# Patient Record
Sex: FEMALE | Race: WHITE
[De-identification: names, ages, dates, MRNs, and addresses within clinical notes are randomized per-mention and may not be internally consistent; named-entity substitution may affect disease eponyms.]

---
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
## 2023-01-03 ENCOUNTER — HOSPITAL ENCOUNTER (OUTPATIENT)
Dept: HOSPITAL 76 - LAB | Age: 18
Discharge: HOME | End: 2023-01-03
Payer: COMMERCIAL

## 2023-01-03 DIAGNOSIS — N83.202: Primary | ICD-10-CM

## 2023-01-03 DIAGNOSIS — G89.29: ICD-10-CM

## 2023-01-03 DIAGNOSIS — R10.2: ICD-10-CM

## 2023-01-03 LAB
ANION GAP SERPL CALCULATED.4IONS-SCNC: 11 MMOL/L (ref 6–13)
BASOPHILS NFR BLD AUTO: 0 10^3/UL (ref 0–0.1)
BASOPHILS NFR BLD AUTO: 0.4 %
BUN SERPL-MCNC: 17 MG/DL (ref 6–20)
CALCIUM UR-MCNC: 9 MG/DL (ref 8.5–10.3)
CHLORIDE SERPL-SCNC: 99 MMOL/L (ref 101–111)
CO2 SERPL-SCNC: 26 MMOL/L (ref 21–32)
CREAT SERPLBLD-SCNC: 0.9 MG/DL (ref 0.4–1)
EOSINOPHIL # BLD AUTO: 0.1 10^3/UL (ref 0–0.7)
EOSINOPHIL NFR BLD AUTO: 1.3 %
ERYTHROCYTE [DISTWIDTH] IN BLOOD BY AUTOMATED COUNT: 12.7 % (ref 12–15)
GFRSERPLBLD MDRD-ARVRAT: 81 ML/MIN/{1.73_M2} (ref 89–?)
GLUCOSE SERPL-MCNC: 96 MG/DL (ref 70–100)
HCT VFR BLD AUTO: 40.8 % (ref 37–47)
HGB UR QL STRIP: 13.3 G/DL (ref 12–16)
IRON SATN MFR SERPL: 11 % (ref 20–50)
IRON SERPL-MCNC: 55 UG/DL (ref 28–170)
LYMPHOCYTES # SPEC AUTO: 2.1 10^3/UL (ref 1.5–3.5)
LYMPHOCYTES NFR BLD AUTO: 19.1 %
MCH RBC QN AUTO: 29 PG (ref 27–31)
MCHC RBC AUTO-ENTMCNC: 32.6 G/DL (ref 32–36)
MCV RBC AUTO: 88.9 FL (ref 81–99)
MONOCYTES # BLD AUTO: 0.6 10^3/UL (ref 0–1)
MONOCYTES NFR BLD AUTO: 5.6 %
NEUTROPHILS # BLD AUTO: 7.9 10^3/UL (ref 1.5–6.6)
NEUTROPHILS # SNV AUTO: 10.8 X10^3/UL (ref 4.8–10.8)
NEUTROPHILS NFR BLD AUTO: 73.3 %
NRBC # BLD AUTO: 0 /100WBC
NRBC # BLD AUTO: 0 X10^3/UL
PDW BLD AUTO: 9.8 FL (ref 7.9–10.8)
PLATELET # BLD: 504 10^3/UL (ref 130–450)
POTASSIUM SERPL-SCNC: 3.7 MMOL/L (ref 3.5–5)
RBC MAR: 4.59 10^6/UL (ref 4.2–5.4)
SODIUM SERPLBLD-SCNC: 136 MMOL/L (ref 135–145)
TIBC SERPL-MCNC: 482 UG/DL (ref 250–450)
TRANSFERRIN SERPL-MCNC: 344 MG/DL (ref 192–382)

## 2023-01-03 PROCEDURE — 36415 COLL VENOUS BLD VENIPUNCTURE: CPT

## 2023-01-03 PROCEDURE — 85025 COMPLETE CBC W/AUTO DIFF WBC: CPT

## 2023-01-03 PROCEDURE — 83540 ASSAY OF IRON: CPT

## 2023-01-03 PROCEDURE — 80048 BASIC METABOLIC PNL TOTAL CA: CPT

## 2023-01-03 PROCEDURE — 84702 CHORIONIC GONADOTROPIN TEST: CPT

## 2023-01-03 PROCEDURE — 84466 ASSAY OF TRANSFERRIN: CPT

## 2023-01-03 PROCEDURE — 82728 ASSAY OF FERRITIN: CPT

## 2023-02-17 ENCOUNTER — HOSPITAL ENCOUNTER (EMERGENCY)
Dept: HOSPITAL 76 - ED | Age: 18
Discharge: HOME | End: 2023-02-17
Payer: COMMERCIAL

## 2023-02-17 VITALS — DIASTOLIC BLOOD PRESSURE: 77 MMHG | SYSTOLIC BLOOD PRESSURE: 127 MMHG

## 2023-02-17 DIAGNOSIS — L23.1: Primary | ICD-10-CM

## 2023-02-17 PROCEDURE — 99282 EMERGENCY DEPT VISIT SF MDM: CPT

## 2023-02-17 PROCEDURE — 99283 EMERGENCY DEPT VISIT LOW MDM: CPT

## 2023-02-17 NOTE — EXTERNAL MEDICAL SUMMARY RPT
Continuity of Care Document

                          Created on:2023



Patient:Lesvia Aguero

Sex:Female

:2005

External Reference #:[id number]





Demographics







                          Address                   [address] [place] [address]

 

                          Phone                     Unavailable

 

                          Preferred Language        English

 

                          Marital Status            Never 

 

                          Protestant Affiliation     Unknown

 

                          Race                      Unknown

 

                          Ethnic Group              Unknown









Author







                          Organization              [facility]

 

                          [place] [address]

 

                          Phone                     [phone]









Care Team Providers







                    Name                Role                Phone

 

                    Unavailable         Unavailable         Unavailable

 

                    Denia Faustin      Unavailable         Unavailable









Allergies and Intolerances







                 date            description     facility        type

 

                 (no date)       Opioids - Morphine Analogues  [facility]  

(unknown)

 

                 (no date)       doxycycline     [facility]  (unknown)







Encounters

No information.



Functional Status

No information.



Immunizations

No information.



Medications







                     date                description         facility

 

                     2022 00:00    Ondansetron         [facility]

 

                     2022 00:00    Ketorolac           [facility]

 

                     2022 00:00    Hydrocodone-Acetaminophen  [facility]

joyce







Problems







                     date                description         facility

 

                     2022 00:00    Hemorrhagic cyst of left ovary  [facility]

 

                     2022 00:00    Chronic pelvic pain in female  [facility]

ospital

 

                     2023 00:00    Cyst of ovary       [facility]

 

                     2023-02-15 14:44    Other specified noninflammatory disorde

rs [facility]



                                        vagina              

 

                     2023-02-15 14:44    Pelvic and perineal pain  [facility]

 

                     2023-02-15 14:44    Encounter for screening for infections 

with a  [facility]



                                        predominantly       

 

                     2023-02-15 15:06    Hemorrhage in early pregnancy, unspecif

[facility]

 

                     2023-02-15 15:15    Other chronic pain  [facility]

 

                     2023-02-15 15:15    Hemorrhage in early pregnancy, unspecif

[facility]

 

                     2023-02-15 15:15    Left upper quadrant [facility]

al

 

                     2023-02-15 15:15    Pelvic and perineal pain  [facility]

 

                     2023-02-15 15:33    Other chronic pain  [facility]

 

                     2023-02-15 15:33    Hemorrhage in early pregnancy, unspecif

[facility]

 

                     2023-02-15 15:33    Left upper quadrant pain  [facility]

al

 

                     2023-02-15 15:33    Pelvic and perineal pain  [facility]

al

 

                     2023 02:58    Other specified noninflammatory disorde

rs of  [facility]



                                        vagina              

 

                     2023 02:58    Pelvic and perineal pain  [facility]

al

 

                     2023 02:58    Encounter for screening for infections 

with a  [facility]



                                        predominantly       







Procedures







                     date                description         facility

 

                     2022 00:00    Complete ultrasound of pelvis  [facility]

ospital

 

                     2023 00:00    Complete ultrasound of pelvis  [facility]

ospital

 

                     2022 00:00    Gram Stain          [facility]

 

                     2022 00:00    Gram Stain          [facility]

 

                     2023 00:00    Gram Stain          [facility]

 

                     2023-02-15 00:00    Gram Stain          [facility]

 

                     2022 00:00    CT abdomen pelvis w con  [facility]

l







Results/Labs







           test      date      author    facility  value     unit      interpret

ation









                                         Result panel 1









           (unknown)  (no date)  (unknown)  [facility]  ([facility]    (unk

nown)



                                        [facility]            unknown)  









                                         Result panel 2









           (unknown)  (no date)  (unknown)  [facility] value[facility]  (units    (unk

nown)



                                        Hospital            unknown)  









                                         Result panel 3









           (unknown)  (no date)  (unknown)  [facility] value[facility]  (units    (unk

nown)



                                        Hospital            unknown)  









                                         Result panel 4









           (unknown)  (no date)  (unknown)  [facility]  (units    (unk

nown)



                                        Hospital            unknown)  









                                         Result panel 5









           (unknown)  (no date)  (unknown)  [facility]  (units    (unk

nown)



                                        Hospital            unknown)  









                                         Result panel 6









           (unknown)  (no date)  (unknown)  [facility]  [facility]units    (unk

nown)



                                        Hospital            unknown)  









                                         Result panel 7









           (unknown)  (no date)  (unknown)  [facility] value[facility]  (units    (unk

nown)



                                        Hospital            unknown)  









                                         Result panel 8









           (unknown)  (no date)  (unknown)  [facility]  (units    (unk

nown)



                                        Hospital            unknown)  









                                         Result panel 9









           (unknown)  (no date)  (unknown)  [facility]  [facility]units    (unk

nown)



                                        Hospital            unknown)  









                                         Result panel 10









           (unknown)  (no date)  (unknown)  [facility] value[facility]  (units    (unk

nown)



                                        Hospital            unknown)  









                                         Result panel 11









           (unknown)  (no date)  (unknown)  [facility]  [facility]units    (unk

nown)



                                        Hospital            unknown)  









                                         Result panel 12









           (unknown)  (no date)  (unknown)  [facility]  [facility]units    (unk

nown)



                                        Hospital            unknown)  









                                         Result panel 13









           (unknown)  (no date)  (unknown)  [facility]  (units    (unk

nown)



                                        Hospital            unknown)  









                                         Result panel 14









           (unknown)  (no date)  (unknown)  [facility]  [facility]units    (unk

nown)



                                        Hospital            unknown)  









                                         Result panel 15









           (unknown)  (no date)  (unknown)  [facility]  [facility]units    (unk

nown)



                                        Hospital            unknown)  









                                         Result panel 16









           (unknown)  (no date)  (unknown)  [facility]  [facility]units    (unk

nown)



                                        Hospital            unknown)  









                                         Result panel 17









           (unknown)  (no date)  (unknown)  [facility]  [facility]units    (unk

nown)



                                        Hospital            unknown)  









                                         Result panel 18









           (unknown)  (no date)  (unknown)  [facility]  [facility]units    (unk

nown)



                                        Hospital            unknown)  









                                         Result panel 19









           (unknown)  (no date)  (unknown)  [facility]  (units    (unk

nown)



                                        Hospital            unknown)  









                                         Result panel 20









           (unknown)  (no date)  (unknown)  [facility]  (units    (unk

nown)



                                        Hospital            unknown)  









                                         Result panel 21









           (unknown)  (no date)  (unknown)  [facility]  [facility]units    (unk

nown)



                                        Hospital            unknown)  









                                         Result panel 22









           (unknown)  (no date)  (unknown)  [facility]units    (unk

nown)



                                        Hospital            unknown)  









                                         Result panel 23









           (unknown)  (no date)  (unknown)  [facility]  [facility]units    (unk

nown)



                                        Hospital            unknown)  









                                         Result panel 24









           (unknown)  (no date)  (unknown)  [facility]units    (unk

nown)



                                        Hospital            unknown)  









                                         Result panel 25









           (unknown)  (no date)  (unknown)  [facility]  [facility]units    (unk

nown)



                                        Hospital            unknown)  









                                         Result panel 26









           (unknown)  (no date)  (unknown)  [facility]  [facility]units    (unk

nown)



                                        Hospital            unknown)  









                                         Result panel 27









           (unknown)  (no date)  (unknown)  [facility]  [facility]units    (unk

nown)



                                        Hospital            unknown)  









                                         Result panel 28









           (unknown)  (no date)  (unknown)  [facility]  [facility]units    (unk

nown)



                                        Hospital            unknown)  









                                         Result panel 29









           (unknown)  (no date)  (unknown)  [facility]units    (unk

nown)



                                        Hospital            unknown)  









                                         Result panel 30









           (unknown)  (no date)  (unknown)  [facility]  [facility]units    (unk

nown)



                                        Hospital            unknown)  









                                         Result panel 31









           (unknown)  (no date)  (unknown)  [facility]  [facility]units    (unk

nown)



                                        Hospital            unknown)  









                                         Result panel 32









           (unknown)  (no date)  (unknown)  [facility]units    (unk

nown)



                                        Hospital            unknown)  









                                         Result panel 33









           (unknown)  (no date)  (unknown)  [facility]units    (unk

nown)



                                        Hospital            unknown)  









                                         Result panel 34









           (unknown)  (no date)  (unknown)  [facility]units    (unk

nown)



                                        Hospital            unknown)  









                                         Result panel 35









           (unknown)  (no date)  (unknown)  [facility]units    (unk

nown)



                                        Hospital            unknown)  









                                         Result panel 36









           (unknown)  (no date)  (unknown)  [facility]units    (unk

nown)



                                        Hospital            unknown)  









                                         Result panel 37









           (unknown)  (no date)  (unknown)  [facility]units    (unk

nown)



                                        Hospital            unknown)  









                                         Result panel 38









           (unknown)  (no date)  (unknown)  [facility]  (units    (unk

nown)



                                        Hospital            unknown)  









                                         Result panel 39









           (unknown)  (no date)  (unknown)  [facility]  [facility]units    (unk

nown)



                                        Hospital            unknown)  









                                         Result panel 40









           (unknown)  (no date)  (unknown)  [facility]  [facility]units    (unk

nown)



                                        Hospital            unknown)  









                                         Result panel 41









           (unknown)  (no date)  (unknown)  [facility]  (units    (unk

nown)



                                        Hospital            unknown)  









                                         Result panel 42









           (unknown)  (no date)  (unknown)  [facility]  [facility]units    (unk

nown)



                                        Hospital            unknown)  









                                         Result panel 43









           (unknown)  (no date)  (unknown)  [facility]  [facility]units    (unk

nown)



                                        Hospital            unknown)  









                                         Result panel 44









           (unknown)  (no date)  (unknown)  [facility]  (units    (unk

nown)



                                        Hospital            unknown)  









                                         Result panel 45









           (unknown)  (no date)  (unknown)  [facility]  [facility]units    (unk

nown)



                                        Hospital            unknown)  









                                         Result panel 46









           (unknown)  (no date)  (unknown)  [facility]  (units    (unk

nown)



                                        Hospital            unknown)  









                                         Result panel 47









           (unknown)  (no date)  (unknown)  [facility]  (units    (unk

nown)



                                        Hospital            unknown)  









                                         Result panel 48









           (unknown)  (no date)  (unknown)  [facility]  (units    (unk

nown)



                                        Hospital            unknown)  









                                         Result panel 49









           (unknown)  (no date)  (unknown)  [facility]  (units    (unk

nown)



                                        Hospital            unknown)  









                                         Result panel 50









           (unknown)  (no date)  (unknown)  [facility]  (units    (unk

nown)



                                        Hospital            unknown)  









                                         Result panel 51









           (unknown)  (no date)  (unknown)  [facility]  (units    (unk

nown)



                                        Hospital            unknown)  









                                         Result panel 52









           (unknown)  (no date)  (unknown)  [facility]  (units    (unk

nown)



                                        Hospital            unknown)  









                                         Result panel 53









           (unknown)  (no date)  (unknown)  [facility]  [facility]units    (unk

nown)



                                        Hospital            unknown)  









                                         Result panel 54









           (unknown)  (no date)  (unknown)  [facility] value[facility]  (units    (unk

nown)



                                        Hospital            unknown)  









                                         Result panel 55









           (unknown)  (no date)  (unknown)  [facility]  (units    (unk

nown)



                                        Hospital            unknown)  









                                         Result panel 56









           (unknown)  (no date)  (unknown)  [facility] value[facility]  (units    (unk

nown)



                                        Hospital            unknown)  









                                         Result panel 57









           (unknown)  (no date)  (unknown)  [facility]    (no value[facility]  (units    (unk

nown)



                                        Hospital            unknown)  









                                         Result panel 58









           (unknown)  (no date)  (unknown)  [facility] value[facility]  (units    (unk

nown)



                                        Hospital            unknown)  









                                         Result panel 59









           (unknown)  (no date)  (unknown)  [facility]    (no value[facility]  (units    (unk

nown)



                                        Hospital            unknown)  









                                         Result panel 60









           (unknown)  (no date)  (unknown)  [facility]    (no value[facility]  (units    (unk

nown)



                                        Hospital            unknown)  









                                         Result panel 61









           (unknown)  (no date)  (unknown)  [facility]    (no value[facility]  (units    (unk

nown)



                                        Hospital            unknown)  









                                         Result panel 62









           (unknown)  (no date)  (unknown)  [facility]    (no value[facility]  (units    (unk

nown)



                                        Hospital            unknown)  









                                         Result panel 63









           (unknown)  (no date)  (unknown)  [facility]    (no value[facility]  (units    (unk

nown)



                                        Hospital            unknown)  









                                         Result panel 64









           (unknown)  (no date)  (unknown)  [facility]    (no value[facility]  (units    (unk

nown)



                                        Hospital            unknown)  









                                         Result panel 65









           (unknown)  (no date)  (unknown)  [facility] value[facility]  (units    (unk

nown)



                                        Hospital            unknown)  









                                         Result panel 66









           (unknown)  (no date)  (unknown)  [facility] value[facility]  (units    (unk

nown)



                                        Hospital            unknown)  









                                         Result panel 67









           (unknown)  (no date)  (unknown)  [facility]    (no value[facility]  (units    (unk

nown)



                                        Hospital            unknown)  









                                         Result panel 68









           (unknown)  (no date)  (unknown)  [facility] value[facility]  (units    (unk

nown)



                                        Hospital            unknown)  









                                         Result panel 69









           (unknown)  (no date)  (unknown)  [facility] value[facility]  (units    (unk

nown)



                                        Hospital            unknown)  









                                         Result panel 70









           (unknown)  (no date)  (unknown)  [facility] value[facility]  (units    (unk

nown)



                                        Hospital            unknown)  









                                         Result panel 71









           (unknown)  (no date)  (unknown)  [facility] value[facility]  (units    (unk

nown)



                                        Hospital            unknown)  









                                         Result panel 72









           (unknown)  (no date)  (unknown)  [facility] value[facility]  (units    (unk

nown)



                                        Hospital            unknown)  









                                         Result panel 73









           (unknown)  (no date)  (unknown)  [facility] value[facility]  (units    (unk

nown)



                                        Hospital            unknown)  









                                         Result panel 74









           (unknown)  (no date)  (unknown)  [facility] value[facility]  (units    (unk

nown)



                                        Hospital            unknown)  









                                         Result panel 75









           (unknown)  (no date)  (unknown)  [facility] value[facility]  (units    (unk

nown)



                                        Hospital            unknown)  









                                         Result panel 76









           (unknown)  (no date)  (unknown)  [facility] value[facility]  (units    (unk

nown)



                                        Hospital            unknown)  









                                         Result panel 77









           (unknown)  (no date)  (unknown)  [facility] value[facility]  (units    (unk

nown)



                                        Hospital            unknown)  









                                         Result panel 78









           (unknown)  (no date)  (unknown)  [facility] value[facility]  (units    (unk

nown)



                                        [facility]ospital            unknown)  









                                         Result panel 79









           (unknown)  (no date)  (unknown)  [facility] value[facility]  (units    (unk

nown)



                                        Hospital            unknown)  









                                         Result panel 80









           (unknown)  (no date)  (unknown)  [facility] value[facility]  (units    (unk

nown)



                                        Hospital            unknown)  









                                         Result panel 81









           (unknown)  (no date)  (unknown)  [facility] value[facility]  (units    (unk

nown)



                                        Hospital            unknown)  









                                         Result panel 82









           (unknown)  (no date)  (unknown)  [facility] value[facility]  (units    (unk

nown)



                                        Hospital            unknown)  









                                         Result panel 83









           (unknown)  (no date)  (unknown)  [facility] value[facility]  (units    (unk

nown)



                                        Hospital            unknown)  









                                         Result panel 84









           (unknown)  (no date)  (unknown)  [facility]  (units    (unk

nown)



                                        Hospital            unknown)  









                                         Result panel 85









           (unknown)  (no date)  (unknown)  [facility]  (units    (unk

nown)



                                        Hospital            unknown)  









                                         Result panel 86









           (unknown)  (no date)  (unknown)  [facility]  (units    (unk

nown)



                                        Hospital            unknown)  









                                         Result panel 87









           (unknown)  (no date)  (unknown)  [facility] value[facility]  (units    (unk

nown)



                                        Hospital            unknown)  









                                         Result panel 88









           (unknown)  (no date)  (unknown)  [facility] value[facility]  (units    (unk

nown)



                                        Hospital            unknown)  









                                         Result panel 89









           (unknown)  (no date)  (unknown)  [facility]    (no value)  (units    (unk

nown)



                                        [facility]            unknown)  









                                         Result panel 90









           (unknown)  (no date)  (unknown)  [facility]    (no value)  (units    (unk

nown)



                                        [facility]ospital            unknown)  









                                         Result panel 91









           (unknown)  (no date)  (unknown)  [facility]    (no value)  (units    (unk

nown)



                                        [facility]            unknown)  









                                         Result panel 92









           (unknown)  (no date)  (unknown)  [facility]    (no value)  (units    (unk

nown)



                                        [facility]            unknown)  









                                         Result panel 93









           (unknown)  (no date)  (unknown)  [facility]    (no value)  (units    (unk

nown)



                                        [facility]ospital            unknown)  









                                         Result panel 94









           (unknown)  (no date)  (unknown)  [facility]    (no value)  (units    (unk

nown)



                                        [facility]            unknown)  









                                         Result panel 95









           (unknown)  (no date)  (unknown)  [facility]    (no value)  (units    (unk

nown)



                                        [facility]            unknown)  









                                         Result panel 96









           (unknown)  (no date)  (unknown)  [facility]    (no value)  (units    (unk

nown)



                                        [facility]            unknown)  









                                         Result panel 97









           (unknown)  (no date)  (unknown)  [facility]    (no value)  (units    (unk

nown)



                                        [facility]            unknown)  









                                         Result panel 98









           (unknown)  (no date)  (unknown)  [facility]    (no value)  (units    (unk

nown)



                                        [facility]            unknown)  









                                         Result panel 99









           (unknown)  (no date)  (unknown)  [facility]    (no value)  (units    (unk

nown)



                                        [facility]            unknown)  









                                         Result panel 100









           (unknown)  (no date)  (unknown)  [facility]    (no value)  (units    (unk

nown)



                                        [facility]            unknown)  









                                         Result panel 101









           (unknown)  (no date)  (unknown)  [facility]    (no value)  (units    (unk

nown)



                                        [facility]            unknown)  









                                         Result panel 102









           (unknown)  (no date)  (unknown)  [facility]    (no value)  (units    (unk

nown)



                                        [facility]            unknown)  









                                         Result panel 103









           (unknown)  (no date)  (unknown)  [facility]    (no value)  (units    (unk

nown)



                                        [facility]            unknown)  









                                         Result panel 104









           (unknown)  (no date)  (unknown)  [facility]    (no value)  (units    (unk

nown)



                                        [facility]            unknown)  









                                         Result panel 105









           (unknown)  (no date)  (unknown)  [facility]    (no value)  (units    (unk

nown)



                                        [facility]            unknown)  









                                         Result panel 106









           (unknown)  (no date)  (unknown)  [facility]    (no value)  (units    (unk

nown)



                                        [facility]            unknown)  









                                         Result panel 107









           (unknown)  (no date)  (unknown)  [facility]    (no value)  (units    (unk

nown)



                                        [facility]            unknown)  









                                         Result panel 108









           (unknown)  (no date)  (unknown)  [facility]    (no value)  (units    (unk

nown)



                                        [facility]            unknown)  









                                         Result panel 109









           (unknown)  (no date)  (unknown)  [facility]    (no value)  (units    (unk

nown)



                                        [facility]            unknown)  









                                         Result panel 110









           (unknown)  (no date)  (unknown)  [facility]    (no value)  (units    (unk

nown)



                                        [facility]            unknown)  









                                         Result panel 111









           (unknown)  (no date)  (unknown)  [facility]    (no value)  (units    (unk

nown)



                                        [facility]            unknown)  









                                         Result panel 112









           (unknown)  (no date)  (unknown)  [facility]    (no value)  (units    (unk

nown)



                                        [facility]            unknown)  









                                         Result panel 113









           (unknown)  (no date)  (unknown)  [facility]    (no value)  (units    (unk

nown)



                                        [facility]            unknown)  









                                         Result panel 114









           (unknown)  (no date)  (unknown)  [facility]    (no value)  (units    (unk

nown)



                                        [facility]            unknown)  









                                         Result panel 115









           (unknown)  (no date)  (unknown)  [facility]    (no value)  (units    (unk

nown)



                                        [facility]            unknown)  









                                         Result panel 116









           (unknown)  (no date)  (unknown)  [facility]    (no value)  (units    (unk

nown)



                                        [facility]            unknown)  









                                         Result panel 117









           (unknown)  (no date)  (unknown)  [facility]    (no value)  (units    (unk

nown)



                                        [facility]            unknown)  









                                         Result panel 118









           (unknown)  (no date)  (unknown)  [facility]    (no value)  (units    (unk

nown)



                                        [facility]            unknown)  









                                         Result panel 119









           (unknown)  (no date)  (unknown)  [facility]    (no value)  (units    (unk

nown)



                                        [facility]            unknown)  









                                         Result panel 120









           (unknown)  (no date)  (unknown)  [facility]    (no value)  (units    (unk

nown)



                                        [facility]            unknown)  









                                         Result panel 121









           (unknown)  (no date)  (unknown)  [facility]    (no value)  (units    (unk

nown)



                                        [facility]            unknown)  









                                         Result panel 122









           (unknown)  (no date)  (unknown)  [facility]    (no value)  (units    (unk

nown)



                                        [facility]            unknown)  









                                         Result panel 123









           (unknown)  (no date)  (unknown)  [facility]    (no value)  (units    (unk

nown)



                                        [facility]            unknown)  









                                         Result panel 124









           (unknown)  (no date)  (unknown)  [facility]    (no value)  (units    (unk

nown)



                                        [facility]            unknown)  









                                         Result panel 125









           (unknown)  (no date)  (unknown)  [facility]    (no value)  (units    (unk

nown)



                                        [facility]            unknown)  









                                         Result panel 126









           (unknown)  (no date)  (unknown)  [facility]    (no value)  (units    (unk

nown)



                                        [facility]            unknown)  









                                         Result panel 127









           (unknown)  (no date)  (unknown)  [facility]    (no value)  (units    (unk

nown)



                                        [facility]            unknown)  









                                         Result panel 128









           (unknown)  (no date)  (unknown)  [facility]    (no value)  (units    (unk

nown)



                                        [facility]            unknown)  









                                         Result panel 129









           (unknown)  (no date)  (unknown)  [facility]    (no value)  (units    (unk

nown)



                                        [facility]            unknown)  









                                         Result panel 130









           (unknown)  (no date)  (unknown)  [facility]    (no value)  (units    (unk

nown)



                                        [facility]            unknown)  









                                         Result panel 131









           (unknown)  (no date)  (unknown)  [facility]    (no value)  (units    (unk

nown)



                                        [facility]            unknown)  









                                         Result panel 132









           (unknown)  (no date)  (unknown)  [facility]    (no value)  (units    (unk

nown)



                                        [facility]            unknown)  









                                         Result panel 133









           (unknown)  (no date)  (unknown)  [facility]    (no value)  (units    (unk

nown)



                                        [facility]            unknown)  









                                         Result panel 134









           (unknown)  (no date)  (unknown)  [facility]    (no value)  (units    (unk

nown)



                                        [facility]            unknown)  









                                         Result panel 135









           (unknown)  (no date)  (unknown)  [facility]    (no value)  (units    (unk

nown)



                                        [facility]            unknown)  









                                         Result panel 136









           (unknown)  (no date)  (unknown)  [facility]    (no value)  (units    (unk

nown)



                                        [facility]            unknown)  









                                         Result panel 137









           (unknown)  (no date)  (unknown)  [facility]    (no value)  (units    (unk

nown)



                                        [facility]            unknown)  









                                         Result panel 138









           (unknown)  (no date)  (unknown)  [facility]    (no value)  (units    (unk

nown)



                                        [facility]            unknown)  









                                         Result panel 139









           (unknown)  (no date)  (unknown)  [facility]    (no value)  (units    (unk

nown)



                                        [facility]            unknown)  









                                         Result panel 140









           (unknown)  (no date)  (unknown)  [facility]    (no value)  (units    (unk

nown)



                                        [facility]            unknown)  









                                         Result panel 141









           (unknown)  (no date)  (unknown)  [facility]    (no value)  (units    (unk

nown)



                                        [facility]            unknown)  









                                         Result panel 142









           (unknown)  (no date)  (unknown)  [facility]    (no value)  (units    (unk

nown)



                                        [facility]            unknown)  









                                         Result panel 143









           (unknown)  (no date)  (unknown)  [facility]    (no value)  (units    (unk

nown)



                                        [facility]            unknown)  









                                         Result panel 144









           (unknown)  (no date)  (unknown)  [facility]    (no value)  (units    (unk

nown)



                                        [facility]            unknown)  









                                         Result panel 145









           (unknown)  (no date)  (unknown)  [facility]    (no value)  (units    (unk

nown)



                                        [facility]            unknown)  









                                         Result panel 146









           (unknown)  (no date)  (unknown)  [facility]    (no value)  (units    (unk

nown)



                                        [facility]            unknown)  









                                         Result panel 147









           (unknown)  (no date)  (unknown)  [facility]    (no value)  (units    (unk

nown)



                                        [facility]            unknown)  









                                         Result panel 148









           (unknown)  (no date)  (unknown)  [facility]    (no value)  (units    (unk

nown)



                                        [facility]            unknown)  









                                         Result panel 149









           (unknown)  (no date)  (unknown)  [facility]    (no value)  (units    (unk

nown)



                                        [facility]            unknown)  









                                         Result panel 150









           (unknown)  (no date)  (unknown)  [facility]    (no value)  (units    (unk

nown)



                                        [facility]            unknown)  









                                         Result panel 151









           (unknown)  (no date)  (unknown)  [facility]    (no value)  (units    (unk

nown)



                                        [facility]            unknown)  









                                         Result panel 152









           (unknown)  (no date)  (unknown)  [facility]    (no value)  (units    (unk

nown)



                                        [facility]            unknown)  









                                         Result panel 153









           (unknown)  (no date)  (unknown)  [facility]    (no value)  (units    (unk

nown)



                                        [facility]            unknown)  









                                         Result panel 154









           (unknown)  (no date)  (unknown)  [facility]    (no value)  (units    (unk

nown)



                                        [facility]            unknown)  









                                         Result panel 155









           (unknown)  (no date)  (unknown)  [facility]    (no value)  (units    (unk

nown)



                                        [facility]            unknown)  









                                         Result panel 156









           (unknown)  (no date)  (unknown)  [facility]    (no value)  (units    (unk

nown)



                                        [facility]            unknown)  









                                         Result panel 157









           (unknown)  (no date)  (unknown)  [facility]    (no value[facility]  (units    (unk

nown)



                                        [facility]            unknown)  









                                         Result panel 158









           (unknown)  (no date)  (unknown)  [facility]    (no value[facility]  (units    (unk

nown)



                                        [facility]            unknown)  









                                         Result panel 159









           (unknown)  (no date)  (unknown)  [facility]    (no value)  (units    (unk

nown)



                                        [facility]            unknown)  









                                         Result panel 160









           (unknown)  (no date)  (unknown)  [facility]    (no value)  (units    (unk

nown)



                                        [facility]            unknown)  









                                         Result panel 161









           (unknown)  (no date)  (unknown)  [facility]    (no value)  (units    (unk

nown)



                                        [facility]            unknown)  









                                         Result panel 162









           (unknown)  (no date)  (unknown)  [facility]    (no value[facility]  (units    (unk

nown)



                                        [facility]            unknown)  









                                         Result panel 163









           (unknown)  (no date)  (unknown)  [facility]    (no value)  (units    (unk

nown)



                                        [facility]            unknown)  









                                         Result panel 164









           (unknown)  (no date)  (unknown)  [facility]    (no value[facility]  (units    (unk

nown)



                                        [facility]            unknown)  









                                         Result panel 165









           (unknown)  (no date)  (unknown)  [facility]    (no value)  (units    (unk

nown)



                                        [facility]            unknown)  









                                         Result panel 166









           (unknown)  (no date)  (unknown)  [facility]    (no value)  (units    (unk

nown)



                                        [facility]            unknown)  









                                         Result panel 167









           (unknown)  (no date)  (unknown)  [facility]    (no value)  (units    (unk

nown)



                                        [facility]            unknown)  









                                         Result panel 168









           (unknown)  (no date)  (unknown)  [facility]    (no value[facility]  (units    (unk

nown)



                                        [facility]            unknown)  









                                         Result panel 169









           (unknown)  (no date)  (unknown)  [facility]    (no value[facility]  (units    (unk

nown)



                                        [facility]            unknown)  









                                         Result panel 170









           (unknown)  (no date)  (unknown)  [facility]    (no value[facility]  (units    (unk

nown)



                                        [facility]            unknown)  









                                         Result panel 171









           (unknown)  (no date)  (unknown)  [facility]    (no value[facility]  (units    (unk

nown)



                                        [facility]            unknown)  









                                         Result panel 172









           (unknown)  (no date)  (unknown)  [facility]    (no value[facility]  (units    (unk

nown)



                                        [facility]            unknown)  









                                         Result panel 173









           (unknown)  (no date)  (unknown)  [facility]    (no value)  (units    (unk

nown)



                                        [facility]            unknown)  









                                         Result panel 174









           (unknown)  (no date)  (unknown)  [facility]    (no value[facility]  (units    (unk

nown)



                                        [facility]            unknown)  









                                         Result panel 175









           (unknown)  (no date)  (unknown)  [facility]    (no value[facility]  (units    (unk

nown)



                                        [facility]            unknown)  









                                         Result panel 176









           (unknown)  (no date)  (unknown)  [facility]    (no value[facility]  (units    (unk

nown)



                                        [facility]            unknown)  









                                         Result panel 177









           (unknown)  (no date)  (unknown)  [facility]    (no value[facility]  (units    (unk

nown)



                                        [facility]            unknown)  









                                         Result panel 178









           (unknown)  (no date)  (unknown)  [facility]    (no value[facility]  (units    (unk

nown)



                                        [facility]            unknown)  









                                         Result panel 179









           (unknown)  (no date)  (unknown)  [facility]    (no value[facility]  (units    (unk

nown)



                                        [facility]            unknown)  









                                         Result panel 180









           (unknown)  (no date)  (unknown)  [facility]    (no value[facility]  (units    (unk

nown)



                                        [facility]            unknown)  









                                         Result panel 181









           (unknown)  (no date)  (unknown)  [facility]    (no value[facility]  (units    (unk

nown)



                                        [facility]            unknown)  









                                         Result panel 182









           (unknown)  (no date)  (unknown)  [facility]    (no value[facility]  (units    (unk

nown)



                                        [facility]            unknown)  









                                         Result panel 183









           (unknown)  (no date)  (unknown)  [facility]    (no value[facility]  (units    (unk

nown)



                                        [facility]            unknown)  









                                         Result panel 184









           (unknown)  (no date)  (unknown)  [facility]    (no value[facility]  (units    (unk

nown)



                                        [facility]            unknown)  









                                         Result panel 185









           (unknown)  (no date)  (unknown)  [facility]    (no value[facility]  (units    (unk

nown)



                                        [facility]            unknown)  









                                         Result panel 186









           (unknown)  (no date)  (unknown)  [facility]    (no value[facility]  (units    (unk

nown)



                                        [facility]            unknown)  









                                         Result panel 187









           (unknown)  (no date)  (unknown)  [facility]    (no value[facility]  (units    (unk

nown)



                                        [facility]            unknown)  









                                         Result panel 188









           (unknown)  (no date)  (unknown)  [facility]    (no value[facility]  (units    (unk

nown)



                                        [facility]            unknown)  









                                         Result panel 189









           (unknown)  (no date)  (unknown)  [facility]    (no value[facility]  (units    (unk

nown)



                                        [facility]            unknown)  









                                         Result panel 190









           (unknown)  (no date)  (unknown)  [facility]    (no value[facility]  (units    (unk

nown)



                                        [facility]            unknown)  









                                         Result panel 191









           (unknown)  (no date)  (unknown)  [facility]    (no value[facility]  (units    (unk

nown)



                                        [facility]            unknown)  









                                         Result panel 192









           (unknown)  (no date)  (unknown)  [facility] value[facility]  (units    (unk

nown)



                                        [facility]            unknown)  









                                         Result panel 193









           (unknown)  (no date)  (unknown)  [facility]    (no value[facility]  (units    (unk

nown)



                                        [facility]            unknown)  









                                         Result panel 194









           (unknown)  (no date)  (unknown)  [facility]    (no value[facility]  (units    (unk

nown)



                                        [facility]            unknown)  









                                         Result panel 195









           (unknown)  (no date)  (unknown)  [facility]    (no value[facility]  (units    (unk

nown)



                                        [facility]            unknown)  









                                         Result panel 196









           (unknown)  (no date)  (unknown)  [facility]    (no value[facility]  (units    (unk

nown)



                                        [facility]            unknown)  









                                         Result panel 197









           (unknown)  (no date)  (unknown)  [facility]    (no value[facility]  (units    (unk

nown)



                                        [facility]            unknown)  









                                         Result panel 198









           (unknown)  (no date)  (unknown)  [facility]    (no value)  (units    (unk

nown)



                                        [facility]            unknown)  









                                         Result panel 199









           (unknown)  (no date)  (unknown)  [facility]    (no value)  (units    (unk

nown)



                                        [facility]ospital            unknown)  









                                         Result panel 200









           (unknown)  (no date)  (unknown)  [facility]    (no value)  (units    (unk

nown)



                                        [facility]            unknown)  









                                         Result panel 201









           (unknown)  (no date)  (unknown)  [facility]    (no value)  (units    (unk

nown)



                                        [facility]            unknown)  









                                         Result panel 202









           (unknown)  (no date)  (unknown)  [facility]    (no value)  (units    (unk

nown)



                                        [facility]            unknown)  









                                         Result panel 203









           (unknown)  (no date)  (unknown)  [facility]    (no value)  (units    (unk

nown)



                                        [facility]            unknown)  









                                         Result panel 204









           (unknown)  (no date)  (unknown)  [facility]    (no value)  (units    (unk

nown)



                                        [facility]            unknown)  









                                         Result panel 205









           (unknown)  (no date)  (unknown)  [facility]    (no value)  (units    (unk

nown)



                                        [facility]            unknown)  









                                         Result panel 206









           (unknown)  (no date)  (unknown)  [facility]    (no value)  (units    (unk

nown)



                                        [facility]            unknown)  









                                         Result panel 207









           (unknown)  (no date)  (unknown)  [facility]    (no value)  (units    (unk

nown)



                                        [facility]            unknown)  









                                         Result panel 208









           (unknown)  (no date)  (unknown)  [facility]    (no value)  (units    (unk

nown)



                                        [facility]            unknown)  









                                         Result panel 209









           (unknown)  (no date)  (unknown)  [facility]    (no value)  (units    (unk

nown)



                                        [facility]            unknown)  









                                         Result panel 210









           (unknown)  (no date)  (unknown)  [facility]    (no value)  (units    (unk

nown)



                                        [facility]            unknown)  









                                         Result panel 211









           (unknown)  (no date)  (unknown)  [facility]    (no value)  (units    (unk

nown)



                                        [facility]            unknown)  









                                         Result panel 212









           (unknown)  (no date)  (unknown)  [facility]    (no value)  (units    (unk

nown)



                                        [facility]            unknown)  









                                         Result panel 213









           (unknown)  (no date)  (unknown)  [facility]    (no value)  (units    (unk

nown)



                                        [facility]            unknown)  









                                         Result panel 214









           (unknown)  (no date)  (unknown)  [facility]    (no value)  (units    (unk

nown)



                                        [facility]            unknown)  









                                         Result panel 215









           (unknown)  (no date)  (unknown)  [facility]    (no value)  (units    (unk

nown)



                                        [facility]            unknown)  









                                         Result panel 216









           (unknown)  (no date)  (unknown)  [facility]    (no value)  (units    (unk

nown)



                                        [facility]            unknown)  









                                         Result panel 217









           (unknown)  (no date)  (unknown)  [facility]    (no value)  (units    (unk

nown)



                                        [facility]            unknown)  









                                         Result panel 218









           (unknown)  (no date)  (unknown)  [facility]    (no value)  (units    (unk

nown)



                                        [facility]            unknown)  









                                         Result panel 219









           (unknown)  (no date)  (unknown)  [facility]    (no value)  (units    (unk

nown)



                                        [facility]            unknown)  









                                         Result panel 220









           (unknown)  (no date)  (unknown)  [facility]    (no value)  (units    (unk

nown)



                                        [facility]            unknown)  









                                         Result panel 221









           (unknown)  (no date)  (unknown)  [facility]    (no value)  (units    (unk

nown)



                                        [facility]            unknown)  









                                         Result panel 222









           (unknown)  (no date)  (unknown)  [facility]    (no value)  (units    (unk

nown)



                                        [facility]            unknown)  









                                         Result panel 223









           (unknown)  (no date)  (unknown)  [facility]    (no value)  (units    (unk

nown)



                                        [facility]            unknown)  









                                         Result panel 224









           (unknown)  (no date)  (unknown)  [facility]    (no value)  (units    (unk

nown)



                                        [facility]            unknown)  









                                         Result panel 225









           (unknown)  (no date)  (unknown)  [facility]    (no value)  (units    (unk

nown)



                                        [facility]            unknown)  









                                         Result panel 226









           (unknown)  (no date)  (unknown)  [facility]    (no value)  (units    (unk

nown)



                                        [facility]            unknown)  









                                         Result panel 227









           (unknown)  (no date)  (unknown)  [facility]    (no value)  (units    (unk

nown)



                                        [facility]            unknown)  









                                         Result panel 228









           (unknown)  (no date)  (unknown)  [facility]    (no value)  (units    (unk

nown)



                                        [facility]            unknown)  









                                         Result panel 229









           (unknown)  (no date)  (unknown)  [facility]    (no value)  (units    (unk

nown)



                                        [facility]            unknown)  









                                         Result panel 230









           (unknown)  (no date)  (unknown)  [facility]    (no value)  (units    (unk

nown)



                                        [facility]            unknown)  









                                         Result panel 231









           (unknown)  (no date)  (unknown)  [facility]    (no value[facility]  (units    (unk

nown)



                                        [facility]            unknown)  









                                         Result panel 232









           (unknown)  (no date)  (unknown)  [facility]    (no value)  (units    (unk

nown)



                                        [facility]            unknown)  









                                         Result panel 233









           (unknown)  (no date)  (unknown)  [facility]    (no value)  (units    (unk

nown)



                                        [facility]            unknown)  









                                         Result panel 234









           (unknown)  (no date)  (unknown)  [facility]    (no value)  (units    (unk

nown)



                                        [facility]            unknown)  









                                         Result panel 235









           (unknown)  (no date)  (unknown)  [facility]    (no value)  (units    (unk

nown)



                                        [facility]            unknown)  









                                         Result panel 236









           (unknown)  (no date)  (unknown)  [facility]    (no value)  (units    (unk

nown)



                                        [facility]            unknown)  









                                         Result panel 237









           (unknown)  (no date)  (unknown)  [facility]    (no value)  (units    (unk

nown)



                                        [facility]            unknown)  









                                         Result panel 238









           (unknown)  (no date)  (unknown)  [facility]    (no value)  (units    (unk

nown)



                                        [facility]            unknown)  









                                         Result panel 239









           (unknown)  (no date)  (unknown)  [facility]    (no value[facility]  (units    (unk

nown)



                                        [facility]            unknown)  









                                         Result panel 240









           (unknown)  (no date)  (unknown)  [facility]    (no value)  (units    (unk

nown)



                                        [facility]            unknown)  









                                         Result panel 241









           (unknown)  (no date)  (unknown)  [facility]    (no value)  (units    (unk

nown)



                                        [facility]            unknown)  









                                         Result panel 242









           (unknown)  (no date)  (unknown)  [facility]    (no value)  (units    (unk

nown)



                                        [facility]            unknown)  









                                         Result panel 243









           (unknown)  (no date)  (unknown)  [facility]    (no value)  (units    (unk

nown)



                                        [facility]            unknown)  









                                         Result panel 244









           (unknown)  (no date)  (unknown)  [facility]    (no value)  (units    (unk

nown)



                                        [facility]            unknown)  









                                         Result panel 245









           (unknown)  (no date)  (unknown)  [facility]    (no value[facility]  (units    (unk

nown)



                                        [facility]            unknown)  









                                         Result panel 246









           (unknown)  (no date)  (unknown)  [facility]    (no value)  (units    (unk

nown)



                                        [facility]            unknown)  









                                         Result panel 247









           (unknown)  (no date)  (unknown)  [facility]    (no value)  (units    (unk

nown)



                                        [facility]            unknown)  









                                         Result panel 248









           (unknown)  (no date)  (unknown)  [facility]    (no value)  (units    (unk

nown)



                                        [facility]            unknown)  









                                         Result panel 249









           (unknown)  (no date)  (unknown)  [facility]    (no value)  (units    (unk

nown)



                                        [facility]            unknown)  









                                         Result panel 250









           (unknown)  (no date)  (unknown)  [facility]    (no value[facility]  (units    (unk

nown)



                                        [facility]            unknown)  









                                         Result panel 251









           (unknown)  (no date)  (unknown)  [facility]    (no value)  (units    (unk

nown)



                                        [facility]            unknown)  









                                         Result panel 252









           (unknown)  (no date)  (unknown)  [facility]    (no value)  (units    (unk

nown)



                                        [facility]            unknown)  









                                         Result panel 253









           (unknown)  (no date)  (unknown)  [facility]    (no value)  (units    (unk

nown)



                                        [facility]            unknown)  









                                         Result panel 254









           (unknown)  (no date)  (unknown)  [facility]    (no value)  (units    (unk

nown)



                                        [facility]            unknown)  









                                         Result panel 255









           (unknown)  (no date)  (unknown)  [facility]    (no value)  (units    (unk

nown)



                                        [facility]            unknown)  









                                         Result panel 256









           (unknown)  (no date)  (unknown)  [facility]    (no value)  (units    (unk

nown)



                                        [facility]            unknown)  









                                         Result panel 257









           (unknown)  (no date)  (unknown)  [facility]    (no value)  (units    (unk

nown)



                                        [facility]            unknown)  









                                         Result panel 258









           (unknown)  (no date)  (unknown)  [facility]    (no value)  (units    (unk

nown)



                                        [facility]            unknown)  









                                         Result panel 259









           (unknown)  (no date)  (unknown)  [facility]    (no value)  (units    (unk

nown)



                                        [facility]            unknown)  









                                         Result panel 260









           (unknown)  (no date)  (unknown)  [facility]    (no value)  (units    (unk

nown)



                                        [facility]            unknown)  









                                         Result panel 261









           (unknown)  (no date)  (unknown)  [facility]    (no value)  (units    (unk

nown)



                                        [facility]            unknown)  









                                         Result panel 262









           (unknown)  (no date)  (unknown)  [facility]    (no value)  (units    (unk

nown)



                                        [facility]            unknown)  









                                         Result panel 263









           (unknown)  (no date)  (unknown)  [facility]    (no value[facility]  (units    (unk

nown)



                                        [facility]            unknown)  









                                         Result panel 264









           (unknown)  (no date)  (unknown)  [facility]    (no value)  (units    (unk

nown)



                                        [facility]            unknown)  









                                         Result panel 265









           (unknown)  (no date)  (unknown)  [facility]    (no value[facility]  (units    (unk

nown)



                                        [facility]            unknown)  









                                         Result panel 266









           (unknown)  (no date)  (unknown)  [facility]    (no value[facility]  (units    (unk

nown)



                                        [facility]            unknown)  









                                         Result panel 267









           (unknown)  (no date)  (unknown)  [facility]    (no value)  (units    (unk

nown)



                                        [facility]            unknown)  









                                         Result panel 268









           (unknown)  (no date)  (unknown)  [facility]    (no value[facility]  (units    (unk

nown)



                                        [facility]            unknown)  









                                         Result panel 269









           (unknown)  (no date)  (unknown)  [facility]    (no value[facility]  (units    (unk

nown)



                                        [facility]            unknown)  









                                         Result panel 270









           (unknown)  (no date)  (unknown)  [facility]    (no value[facility]  (units    (unk

nown)



                                        [facility]            unknown)  









                                         Result panel 271









           (unknown)  (no date)  (unknown)  [facility]    (no value[facility]  (units    (unk

nown)



                                        [facility]            unknown)  









                                         Result panel 272









           (unknown)  (no date)  (unknown)  [facility]    (no value[facility]  (units    (unk

nown)



                                        [facility]            unknown)  









                                         Result panel 273









           (unknown)  (no date)  (unknown)  [facility]    (no value[facility]  (units    (unk

nown)



                                        [facility]            unknown)  









                                         Result panel 274









           (unknown)  (no date)  (unknown)  [facility]    (no value[facility]  (units    (unk

nown)



                                        [facility]            unknown)  









                                         Result panel 275









           (unknown)  (no date)  (unknown)  [facility]    (no value[facility]  (units    (unk

nown)



                                        [facility]            unknown)  









                                         Result panel 276









           (unknown)  (no date)  (unknown)  [facility]    (no value[facility]  (units    (unk

nown)



                                        [facility]            unknown)  









                                         Result panel 277









           (unknown)  (no date)  (unknown)  [facility]    (no value[facility]  (units    (unk

nown)



                                        [facility]            unknown)  









                                         Result panel 278









           (unknown)  (no date)  (unknown)  [facility]    (no value[facility]  (units    (unk

nown)



                                        [facility]            unknown)  









                                         Result panel 279









           (unknown)  (no date)  (unknown)  [facility]    (no value[facility]  (units    (unk

nown)



                                        [facility]            unknown)  









                                         Result panel 280









           (unknown)  (no date)  (unknown)  [facility]    (no value[facility]  (units    (unk

nown)



                                        [facility]            unknown)  









                                         Result panel 281









           (unknown)  (no date)  (unknown)  [facility]    (no value[facility]  (units    (unk

nown)



                                        [facility]            unknown)  









                                         Result panel 282









           (unknown)  (no date)  (unknown)  [facility]    (no value[facility]  (units    (unk

nown)



                                        [facility]            unknown)  









                                         Result panel 283









           (unknown)  (no date)  (unknown)  [facility]    (no value[facility]  (units    (unk

nown)



                                        [facility]            unknown)  









                                         Result panel 284









           (unknown)  (no date)  (unknown)  [facility]    (no value[facility]  (units    (unk

nown)



                                        [facility]            unknown)  









                                         Result panel 285









           (unknown)  (no date)  (unknown)  [facility]    (no value[facility]  (units    (unk

nown)



                                        [facility]            unknown)  









                                         Result panel 286









           (unknown)  (no date)  (unknown)  [facility]    (no value[facility]  (units    (unk

nown)



                                        [facility]            unknown)  









                                         Result panel 287









           (unknown)  (no date)  (unknown)  [facility]    (no value[facility]  (units    (unk

nown)



                                        [facility]            unknown)  









                                         Result panel 288









           (unknown)  (no date)  (unknown)  [facility]    (no value[facility]  (units    (unk

nown)



                                        [facility]            unknown)  









                                         Result panel 289









           (unknown)  (no date)  (unknown)  [facility]    (no value[facility]  (units    (unk

nown)



                                        [facility]            unknown)  









                                         Result panel 290









           (unknown)  (no date)  (unknown)  [facility]    (no value[facility]  (units    (unk

nown)



                                        [facility]            unknown)  









                                         Result panel 291









           (unknown)  (no date)  (unknown)  [facility] value[facility]  (units    (unk

nown)



                                        [facility]            unknown)  









                                         Result panel 292









           (unknown)  (no date)  (unknown)  [facility]    (no value[facility]  (units    (unk

nown)



                                        [facility]            unknown)  









                                         Result panel 293









           (unknown)  (no date)  (unknown)  [facility]    (no value[facility]  (units    (unk

nown)



                                        [facility]            unknown)  









                                         Result panel 294









           (unknown)  (no date)  (unknown)  [facility]    (no value[facility]  (units    (unk

nown)



                                        [facility]            unknown)  









                                         Result panel 295









           (unknown)  (no date)  (unknown)  [facility]    (no value[facility]  (units    (unk

nown)



                                        [facility]            unknown)  









                                         Result panel 296









           (unknown)  (no date)  (unknown)  [facility]    (no value[facility]  (units    (unk

nown)



                                        [facility]            unknown)  









                                         Result panel 297









           (unknown)  (no date)  (unknown)  [facility]    (no value[facility]  (units    (unk

nown)



                                        [facility]            unknown)  









                                         Result panel 298









           (unknown)  (no date)  (unknown)  [facility]    (no value[facility]  (units    (unk

nown)



                                        [facility]            unknown)  









                                         Result panel 299









           (unknown)  (no date)  (unknown)  [facility] value[facility]  (units    (unk

nown)



                                        [facility]            unknown)  









                                         Result panel 300









           (unknown)  (no date)  (unknown)  [facility]    (no value[facility]  (units    (unk

nown)



                                        [facility]            unknown)  









                                         Result panel 301









           (unknown)  (no date)  (unknown)  [facility] value[facility]  (units    (unk

nown)



                                        [facility]            unknown)  









                                         Result panel 302









           (unknown)  (no date)  (unknown)  [facility] value[facility]  (units    (unk

nown)



                                        [facility]            unknown)  









                                         Result panel 303









           (unknown)  (no date)  (unknown)  [facility] value[facility]  (units    (unk

nown)



                                        [facility]            unknown)  









                                         Result panel 304









           (unknown)  (no date)  (unknown)  [facility] value[facility]  (units    (unk

nown)



                                        [facility]            unknown)  









                                         Result panel 305









           (unknown)  (no date)  (unknown)  [facility]    (no value[facility]  (units    (unk

nown)



                                        [facility]            unknown)  









                                         Result panel 306









           (unknown)  (no date)  (unknown)  [facility] value[facility]  (units    (unk

nown)



                                        [facility]            unknown)  









                                         Result panel 307









           (unknown)  (no date)  (unknown)  [facility]    (no value)  (units    (unk

nown)



                                        Hospital            unknown)  









                                         Result panel 308









           (unknown)  (no date)  (unknown)  [facility]    (no value)  (units    (unk

nown)



                                        [facility]            unknown)  









                                         Result panel 309









           (unknown)  (no date)  (unknown)  [facility]    (no value)  (units    (unk

nown)



                                        [facility]            unknown)  









                                         Result panel 310









           (unknown)  (no date)  (unknown)  [facility]    (no value)  (units    (unk

nown)



                                        [facility]            unknown)  









                                         Result panel 311









           (unknown)  (no date)  (unknown)  [facility]    (no value)  (units    (unk

nown)



                                        [facility]            unknown)  









                                         Result panel 312









           (unknown)  (no date)  (unknown)  [facility]    (no value)  (units    (unk

nown)



                                        [facility]            unknown)  









                                         Result panel 313









           (unknown)  (no date)  (unknown)  [facility]    (no value)  (units    (unk

nown)



                                        [facility]            unknown)  









                                         Result panel 314









           (unknown)  (no date)  (unknown)  [facility]    (no value)  (units    (unk

nown)



                                        [facility]ospital            unknown)  









                                         Result panel 315









           (unknown)  (no date)  (unknown)  [facility]    (no value)  (units    (unk

nown)



                                        [facility]            unknown)  









                                         Result panel 316









           (unknown)  (no date)  (unknown)  [facility]    (no value)  (units    (unk

nown)



                                        [facility]            unknown)  









                                         Result panel 317









           (unknown)  (no date)  (unknown)  [facility]    (no value)  (units    (unk

nown)



                                        [facility]            unknown)  









                                         Result panel 318









           (unknown)  (no date)  (unknown)  [facility]    (no value)  (units    (unk

nown)



                                        [facility]            unknown)  









                                         Result panel 319









           (unknown)  (no date)  (unknown)  [facility]    (no value)  (units    (unk

nown)



                                        [facility]            unknown)  









                                         Result panel 320









           (unknown)  (no date)  (unknown)  [facility]    (no value)  (units    (unk

nown)



                                        [facility]            unknown)  









                                         Result panel 321









           (unknown)  (no date)  (unknown)  [facility]    (no value)  (units    (unk

nown)



                                        [facility]            unknown)  









                                         Result panel 322









           (unknown)  (no date)  (unknown)  [facility]    (no value)  (units    (unk

nown)



                                        [facility]            unknown)  









                                         Result panel 323









           (unknown)  (no date)  (unknown)  [facility]    (no value)  (units    (unk

nown)



                                        [facility]            unknown)  









                                         Result panel 324









           (unknown)  (no date)  (unknown)  [facility]    (no value)  (units    (unk

nown)



                                        [facility]            unknown)  









                                         Result panel 325









           (unknown)  (no date)  (unknown)  [facility]    (no value)  (units    (unk

nown)



                                        [facility]            unknown)  









                                         Result panel 326









           (unknown)  (no date)  (unknown)  [facility]    (no value)  (units    (unk

nown)



                                        [facility]            unknown)  









                                         Result panel 327









           (unknown)  (no date)  (unknown)  [facility]    (no value)  (units    (unk

nown)



                                        [facility]            unknown)  









                                         Result panel 328









           (unknown)  (no date)  (unknown)  [facility]    (no value)  (units    (unk

nown)



                                        [facility]            unknown)  









                                         Result panel 329









           (unknown)  (no date)  (unknown)  [facility]    (no value)  (units    (unk

nown)



                                        [facility]            unknown)  









                                         Result panel 330









           (unknown)  (no date)  (unknown)  [facility]    (no value)  (units    (unk

nown)



                                        [facility]            unknown)  









                                         Result panel 331









           (unknown)  (no date)  (unknown)  [facility]    (no value)  (units    (unk

nown)



                                        [facility]            unknown)  









                                         Result panel 332









           (unknown)  (no date)  (unknown)  [facility]    (no value)  (units    (unk

nown)



                                        [facility]            unknown)  









                                         Result panel 333









           (unknown)  (no date)  (unknown)  [facility]    (no value)  (units    (unk

nown)



                                        [facility]            unknown)  









                                         Result panel 334









           (unknown)  (no date)  (unknown)  [facility]    (no value)  (units    (unk

nown)



                                        [facility]            unknown)  









                                         Result panel 335









           (unknown)  (no date)  (unknown)  [facility]    (no value)  (units    (unk

nown)



                                        [facility]            unknown)  









                                         Result panel 336









           (unknown)  (no date)  (unknown)  [facility]    (no value)  (units    (unk

nown)



                                        [facility]            unknown)  









                                         Result panel 337









           (unknown)  (no date)  (unknown)  [facility]    (no value)  (units    (unk

nown)



                                        [facility]            unknown)  









                                         Result panel 338









           (unknown)  (no date)  (unknown)  [facility]    (no value)  (units    (unk

nown)



                                        [facility]            unknown)  









                                         Result panel 339









           (unknown)  (no date)  (unknown)  [facility]    (no value)  (units    (unk

nown)



                                        [facility]            unknown)  









                                         Result panel 340









           (unknown)  (no date)  (unknown)  [facility]    (no value)  (units    (unk

nown)



                                        [facility]            unknown)  









                                         Result panel 341









           (unknown)  (no date)  (unknown)  [facility]    (no value)  (units    (unk

nown)



                                        [facility]            unknown)  









                                         Result panel 342









           (unknown)  (no date)  (unknown)  [facility]    (no value)  (units    (unk

nown)



                                        [facility]            unknown)  









                                         Result panel 343









           (unknown)  (no date)  (unknown)  [facility]    (no value)  (units    (unk

nown)



                                        [facility]            unknown)  









                                         Result panel 344









           (unknown)  (no date)  (unknown)  [facility]    (no value)  (units    (unk

nown)



                                        [facility]            unknown)  









                                         Result panel 345









           (unknown)  (no date)  (unknown)  [facility]    (no value)  (units    (unk

nown)



                                        [facility]            unknown)  









                                         Result panel 346









           (unknown)  (no date)  (unknown)  [facility]    (no value)  (units    (unk

nown)



                                        [facility]            unknown)  









                                         Result panel 347









           (unknown)  (no date)  (unknown)  [facility]    (no value)  (units    (unk

nown)



                                        [facility]            unknown)  









                                         Result panel 348









           (unknown)  (no date)  (unknown)  [facility]    (no value)  (units    (unk

nown)



                                        [facility]            unknown)  









                                         Result panel 349









           (unknown)  (no date)  (unknown)  [facility]    (no value)  (units    (unk

nown)



                                        [facility]            unknown)  









                                         Result panel 350









           (unknown)  (no date)  (unknown)  [facility]    (no value)  (units    (unk

nown)



                                        [facility]            unknown)  









                                         Result panel 351









           (unknown)  (no date)  (unknown)  [facility]    (no value)  (units    (unk

nown)



                                        [facility]            unknown)  









                                         Result panel 352









           (unknown)  (no date)  (unknown)  [facility]    (no value)  (units    (unk

nown)



                                        [facility]            unknown)  









                                         Result panel 353









           (unknown)  (no date)  (unknown)  [facility]    (no value)  (units    (unk

nown)



                                        [facility]            unknown)  









                                         Result panel 354









           (unknown)  (no date)  (unknown)  [facility]    (no value)  (units    (unk

nown)



                                        [facility]            unknown)  









                                         Result panel 355









           (unknown)  (no date)  (unknown)  [facility]    (no value)  (units    (unk

nown)



                                        [facility]            unknown)  









                                         Result panel 356









           (unknown)  (no date)  (unknown)  [facility]    (no value)  (units    (unk

nown)



                                        [facility]            unknown)  









                                         Result panel 357









           (unknown)  (no date)  (unknown)  [facility]    (no value)  (units    (unk

nown)



                                        [facility]            unknown)  









                                         Result panel 358









           (unknown)  (no date)  (unknown)  [facility]    (no value)  (units    (unk

nown)



                                        [facility]            unknown)  









                                         Result panel 359









           (unknown)  (no date)  (unknown)  [facility]    (no value)  (units    (unk

nown)



                                        [facility]            unknown)  









                                         Result panel 360









           (unknown)  (no date)  (unknown)  [facility]    (no value)  (units    (unk

nown)



                                        [facility]            unknown)  









                                         Result panel 361









           (unknown)  (no date)  (unknown)  [facility]    (no value)  (units    (unk

nown)



                                        [facility]            unknown)  









                                         Result panel 362









           (unknown)  (no date)  (unknown)  [facility]    (no value)  (units    (unk

nown)



                                        [facility]            unknown)  









                                         Result panel 363









           (unknown)  (no date)  (unknown)  [facility]    (no value)  (units    (unk

nown)



                                        [facility]            unknown)  









                                         Result panel 364









           (unknown)  (no date)  (unknown)  [facility]    (no value)  (units    (unk

nown)



                                        [facility]            unknown)  









                                         Result panel 365









           (unknown)  (no date)  (unknown)  [facility]    (no value)  (units    (unk

nown)



                                        [facility]            unknown)  









                                         Result panel 366









           (unknown)  (no date)  (unknown)  [facility]    (no value)  (units    (unk

nown)



                                        [facility]            unknown)  









                                         Result panel 367









           (unknown)  (no date)  (unknown)  [facility]    (no value)  (units    (unk

nown)



                                        [facility]            unknown)  









                                         Result panel 368









           (unknown)  (no date)  (unknown)  [facility]    (no value)  (units    (unk

nown)



                                        [facility]            unknown)  









                                         Result panel 369









           (unknown)  (no date)  (unknown)  [facility]    (no value)  (units    (unk

nown)



                                        [facility]            unknown)  









                                         Result panel 370









           (unknown)  (no date)  (unknown)  [facility]    (no value)  (units    (unk

nown)



                                        [facility]            unknown)  









                                         Result panel 371









           (unknown)  (no date)  (unknown)  [facility]    (no value)  (units    (unk

nown)



                                        [facility]            unknown)  









                                         Result panel 372









           (unknown)  (no date)  (unknown)  [facility]    (no value)  (units    (unk

nown)



                                        [facility]            unknown)  









                                         Result panel 373









           (unknown)  (no date)  (unknown)  [facility]    (no value)  (units    (unk

nown)



                                        [facility]            unknown)  









                                         Result panel 374









           (unknown)  (no date)  (unknown)  [facility]    (no value)  (units    (unk

nown)



                                        [facility]            unknown)  









                                         Result panel 375









           (unknown)  (no date)  (unknown)  [facility]    (no value)  (units    (unk

nown)



                                        [facility]            unknown)  









                                         Result panel 376









           (unknown)  (no date)  (unknown)  [facility]    (no value)  (units    (unk

nown)



                                        [facility]            unknown)  









                                         Result panel 377









           (unknown)  (no date)  (unknown)  [facility]    (no value)  (units    (unk

nown)



                                        [facility]            unknown)  









                                         Result panel 378









           (unknown)  (no date)  (unknown)  [facility]    (no value)  (units    (unk

nown)



                                        [facility]            unknown)  









                                         Result panel 379









           (unknown)  (no date)  (unknown)  [facility]    (no value)  (units    (unk

nown)



                                        [facility]            unknown)  









                                         Result panel 380









           (unknown)  (no date)  (unknown)  [facility]    (no value)  (units    (unk

nown)



                                        [facility]            unknown)  









                                         Result panel 381









           (unknown)  (no date)  (unknown)  [facility]    (no value)  (units    (unk

nown)



                                        [facility]            unknown)  









                                         Result panel 382









           (unknown)  (no date)  (unknown)  [facility]    (no value)  (units    (unk

nown)



                                        [facility]            unknown)  









                                         Result panel 383









           (unknown)  (no date)  (unknown)  [facility]    (no value)  (units    (unk

nown)



                                        [facility]            unknown)  









                                         Result panel 384









           (unknown)  (no date)  (unknown)  [facility]    (no value)  (units    (unk

nown)



                                        [facility]            unknown)  









                                         Result panel 385









           (unknown)  (no date)  (unknown)  [facility]    (no value)  (units    (unk

nown)



                                        [facility]            unknown)  









                                         Result panel 386









           (unknown)  (no date)  (unknown)  [facility]    (no value)  (units    (unk

nown)



                                        [facility]            unknown)  









                                         Result panel 387









           (unknown)  (no date)  (unknown)  [facility]    (no value)  (units    (unk

nown)



                                        [facility]            unknown)  









                                         Result panel 388









           (unknown)  (no date)  (unknown)  [facility]    (no value)  (units    (unk

nown)



                                        [facility]            unknown)  









                                         Result panel 389









           (unknown)  (no date)  (unknown)  [facility]    (no value)  (units    (unk

nown)



                                        [facility]            unknown)  









                                         Result panel 390









           (unknown)  (no date)  (unknown)  [facility]    (no value)  (units    (unk

nown)



                                        [facility]            unknown)  









                                         Result panel 391









           (unknown)  (no date)  (unknown)  [facility]    (no value)  (units    (unk

nown)



                                        [facility]            unknown)  









                                         Result panel 392









           (unknown)  (no date)  (unknown)  [facility]    (no value)  (units    (unk

nown)



                                        [facility]            unknown)  









                                         Result panel 393









           (unknown)  (no date)  (unknown)  [facility]    (no value)  (units    (unk

nown)



                                        [facility]            unknown)  









                                         Result panel 394









           (unknown)  (no date)  (unknown)  [facility]    (no value)  (units    (unk

nown)



                                        [facility]            unknown)  









                                         Result panel 395









           (unknown)  (no date)  (unknown)  [facility]    (no value)  (units    (unk

nown)



                                        [facility]            unknown)  









                                         Result panel 396









           (unknown)  (no date)  (unknown)  [facility]    (no value)  (units    (unk

nown)



                                        [facility]            unknown)  









                                         Result panel 397









           (unknown)  (no date)  (unknown)  [facility]    (no value)  (units    (unk

nown)



                                        [facility]            unknown)  









                                         Result panel 398









           (unknown)  (no date)  (unknown)  [facility]    (no value)  (units    (unk

nown)



                                        [facility]            unknown)  









                                         Result panel 399









           (unknown)  (no date)  (unknown)  [facility]    (no value)  (units    (unk

nown)



                                        [facility]            unknown)  









                                         Result panel 400









           (unknown)  (no date)  (unknown)  [facility]    (no value)  (units    (unk

nown)



                                        [facility]            unknown)  









                                         Result panel 401









           (unknown)  (no date)  (unknown)  [facility]    (no value)  (units    (unk

nown)



                                        [facility]            unknown)  









                                         Result panel 402









           (unknown)  (no date)  (unknown)  [facility]    (no value)  (units    (unk

nown)



                                        [facility]            unknown)  









                                         Result panel 403









           (unknown)  (no date)  (unknown)  [facility]    (no value)  (units    (unk

nown)



                                        [facility]            unknown)  









                                         Result panel 404









           (unknown)  (no date)  (unknown)  [facility]    (no value)  (units    (unk

nown)



                                        [facility]            unknown)  









                                         Result panel 405









           (unknown)  (no date)  (unknown)  [facility]    (no value)  (units    (unk

nown)



                                        [facility]            unknown)  









                                         Result panel 406









           (unknown)  (no date)  (unknown)  [facility]    (no value)  (units    (unk

nown)



                                        [facility]            unknown)  









                                         Result panel 407









           (unknown)  (no date)  (unknown)  [facility]    (no value)  (units    (unk

nown)



                                        [facility]            unknown)  









                                         Result panel 408









           (unknown)  (no date)  (unknown)  [facility]    (no value)  (units    (unk

nown)



                                        [facility]            unknown)  









                                         Result panel 409









           (unknown)  (no date)  (unknown)  [facility]    (no value)  (units    (unk

nown)



                                        [facility]            unknown)  









                                         Result panel 410









           (unknown)  (no date)  (unknown)  [facility]    (no value)  (units    (unk

nown)



                                        [facility]            unknown)  









                                         Result panel 411









           (unknown)  (no date)  (unknown)  [facility]    (no value)  (units    (unk

nown)



                                        [facility]            unknown)  









                                         Result panel 412









           (unknown)  (no date)  (unknown)  [facility]    (no value)  (units    (unk

nown)



                                        [facility]            unknown)  









                                         Result panel 413









           (unknown)  (no date)  (unknown)  [facility]    (no value)  (units    (unk

nown)



                                        [facility]            unknown)  









                                         Result panel 414









           (unknown)  (no date)  (unknown)  [facility]    (no value)  (units    (unk

nown)



                                        [facility]            unknown)  









                                         Result panel 415









           (unknown)  (no date)  (unknown)  [facility]    (no value)  (units    (unk

nown)



                                        [facility]            unknown)  









                                         Result panel 416









           (unknown)  (no date)  (unknown)  [facility]    (no value)  (units    (unk

nown)



                                        Hospital            unknown)  









                                         Result panel 417









           (unknown)  (no date)  (unknown)  [facility]    (no value)  (units    (unk

nown)



                                        Hospital            unknown)  









                                         Result panel 418









           (unknown)  (no date)  (unknown)  [facility]    (no value)  (units    (unk

nown)



                                        Hospital            unknown)  









                                         Result panel 419









           (unknown)  (no date)  (unknown)  [facility]    (no value)  (units    (unk

nown)



                                        Hospital            unknown)  









                                         Result panel 420









           (unknown)  (no date)  (unknown)  [facility]    (no value)  (units    (unk

nown)



                                        Hospital            unknown)  









                                         Result panel 421









           (unknown)  (no date)  (unknown)  [facility]    (no value)  (units    (unk

nown)



                                        Hospital            unknown)  









                                         Result panel 422









           (unknown)  (no date)  (unknown)  [facility]    (no value)  (units    (unk

nown)



                                        Hospital            unknown)  









                                         Result panel 423









           (unknown)  (no date)  (unknown)  [facility]    (no value)  (units    (unk

nown)



                                        Hospital            unknown)  









                                         Result panel 424









           (unknown)  (no date)  (unknown)  [facility]    (no value)  (units    (unk

nown)



                                        [facility]ospital            unknown)  









                                         Result panel 425









           (unknown)  (no date)  (unknown)  [facility]    (no value)  (units    (unk

nown)



                                        [facility]ospital            unknown)  









                                         Result panel 426









           (unknown)  (no date)  (unknown)  [facility]    (no value)  (units    (unk

nown)



                                        [facility]ospital            unknown)  









                                         Result panel 427









           (unknown)  (no date)  (unknown)  [facility]    (no value)  (units    (unk

nown)



                                        [facility]ospital            unknown)  









                                         Result panel 428









           (unknown)  (no date)  (unknown)  [facility]    (no value)  (units    (unk

nown)



                                        [facility]ospital            unknown)  









                                         Result panel 429









           (unknown)  (no date)  (unknown)  [facility]    (no value)  (units    (unk

nown)



                                        [facility]ospital            unknown)  









                                         Result panel 430









           (unknown)  (no date)  (unknown)  [facility]    (no value)  (units    (unk

nown)



                                        [facility]ospital            unknown)  









                                         Result panel 431









           (unknown)  (no date)  (unknown)  [facility]    (no value)  (units    (unk

nown)



                                        Hospital            unknown)  









                                         Result panel 432









           (unknown)  (no date)  (unknown)  [facility]    (no value)  (units    (unk

nown)



                                        Hospital            unknown)  









                                         Result panel 433









           (unknown)  (no date)  (unknown)  [facility]    (no value)  (units    (unk

nown)



                                        [facility]ospital            unknown)  









                                         Result panel 434









           (unknown)  (no date)  (unknown)  [facility]    (no value)  (units    (unk

nown)



                                        Hospital            unknown)  









                                         Result panel 435









           (unknown)  (no date)  (unknown)  [facility]    (no value)  (units    (unk

nown)



                                        [facility]ospital            unknown)  









                                         Result panel 436









           (unknown)  (no date)  (unknown)  [facility]    (no value)  (units    (unk

nown)



                                        Hospital            unknown)  









                                         Result panel 437









           (unknown)  (no date)  (unknown)  [facility]    (no value)  (units    (unk

nown)



                                        [facility]ospital            unknown)  









                                         Result panel 438









           (unknown)  (no date)  (unknown)  [facility]    (no value)  (units    (unk

nown)



                                        Hospital            unknown)  









                                         Result panel 439









           (unknown)  (no date)  (unknown)  [facility]    (no value)  (units    (unk

nown)



                                        Hospital            unknown)  









                                         Result panel 440









           (unknown)  (no date)  (unknown)  [facility]    (no value)  (units    (unk

nown)



                                        [facility]ospital            unknown)  









                                         Result panel 441









           (unknown)  (no date)  (unknown)  [facility]    (no value)  (units    (unk

nown)



                                        Hospital            unknown)  









                                         Result panel 442









           (unknown)  (no date)  (unknown)  [facility]    (no value)  (units    (unk

nown)



                                        [facility]ospital            unknown)  









                                         Result panel 443









           (unknown)  (no date)  (unknown)  [facility]    (no value)  (units    (unk

nown)



                                        [facility]ospital            unknown)  









                                         Result panel 444









           (unknown)  (no date)  (unknown)  [facility]    (no value)  (units    (unk

nown)



                                        [facility]ospital            unknown)  









                                         Result panel 445









           (unknown)  (no date)  (unknown)  [facility]    (no value)  (units    (unk

nown)



                                        [facility]ospital            unknown)  









                                         Result panel 446









           (unknown)  (no date)  (unknown)  [facility]    (no value)  (units    (unk

nown)



                                        [facility]ospital            unknown)  









                                         Result panel 447









           (unknown)  (no date)  (unknown)  [facility]    (no value)  (units    (unk

nown)



                                        Hospital            unknown)  









                                         Result panel 448









           (unknown)  (no date)  (unknown)  [facility]    (no value)  (units    (unk

nown)



                                        [facility]ospital            unknown)  









                                         Result panel 449









           (unknown)  (no date)  (unknown)  [facility]    (no value)  (units    (unk

nown)



                                        [facility]ospital            unknown)  









                                         Result panel 450









           (unknown)  (no date)  (unknown)  [facility]    (no value)  (units    (unk

nown)



                                        [facility]ospital            unknown)  









                                         Result panel 451









           (unknown)  (no date)  (unknown)  [facility]    (no value)  (units    (unk

nown)



                                        [facility]            unknown)  









                                         Result panel 452









           (unknown)  (no date)  (unknown)  [facility]    (no value)  (units    (unk

nown)



                                        [facility]ospital            unknown)  









                                         Result panel 453









           (unknown)  (no date)  (unknown)  [facility]    (no value)  (units    (unk

nown)



                                        [facility]ospital            unknown)  









                                         Result panel 454









           (unknown)  (no date)  (unknown)  [facility]    (no value)  (units    (unk

nown)



                                        [facility]ospital            unknown)  









                                         Result panel 455









           (unknown)  (no date)  (unknown)  [facility]    (no value)  (units    (unk

nown)



                                        [facility]ospital            unknown)  









                                         Result panel 456









           (unknown)  (no date)  (unknown)  [facility]    (no value)  (units    (unk

nown)



                                        [facility]ospital            unknown)  









                                         Result panel 457









           (unknown)  (no date)  (unknown)  [facility]    (no value)  (units    (unk

nown)



                                        [facility]ospital            unknown)  









                                         Result panel 458









           (unknown)  (no date)  (unknown)  [facility]    (no value)  (units    (unk

nown)



                                        [facility]ospital            unknown)  









                                         Result panel 459









           (unknown)  (no date)  (unknown)  [facility]    (no value)  (units    (unk

nown)



                                        [facility]ospital            unknown)  









                                         Result panel 460









           (unknown)  (no date)  (unknown)  [facility]    (no value)  (units    (unk

nown)



                                        [facility]            unknown)  









                                         Result panel 461









           (unknown)  (no date)  (unknown)  [facility]    (no value)  (units    (unk

nown)



                                        [facility]ospital            unknown)  









                                         Result panel 462









           (unknown)  (no date)  (unknown)  [facility]    (no value)  (units    (unk

nown)



                                        [facility]ospital            unknown)  









                                         Result panel 463









           (unknown)  (no date)  (unknown)  [facility]    (no value)  (units    (unk

nown)



                                        [facility]            unknown)  









                                         Result panel 464









           (unknown)  (no date)  (unknown)  [facility]    (no value)  (units    (unk

nown)



                                        [facility]            unknown)  









                                         Result panel 465









           (unknown)  (no date)  (unknown)  [facility]    (no value)  (units    (unk

nown)



                                        [facility]ospital            unknown)  









                                         Result panel 466









           (unknown)  (no date)  (unknown)  [facility]    (no value)  (units    (unk

nown)



                                        [facility]ospital            unknown)  









                                         Result panel 467









           (unknown)  (no date)  (unknown)  [facility]    (no value)  (units    (unk

nown)



                                        [facility]            unknown)  









                                         Result panel 468









           (unknown)  (no date)  (unknown)  [facility]    (no value)  (units    (unk

nown)



                                        [facility]            unknown)  









                                         Result panel 469









           (unknown)  (no date)  (unknown)  [facility]    (no value)  (units    (unk

nown)



                                        [facility]ospital            unknown)  









                                         Result panel 470









           (unknown)  (no date)  (unknown)  [facility]    (no value)  (units    (unk

nown)



                                        [facility]ospital            unknown)  









                                         Result panel 471









           (unknown)  (no date)  (unknown)  [facility]    (no value)  (units    (unk

nown)



                                        [facility]            unknown)  









                                         Result panel 472









           (unknown)  (no date)  (unknown)  [facility]    (no value)  (units    (unk

nown)



                                        [facility]            unknown)  









                                         Result panel 473









           (unknown)  (no date)  (unknown)  [facility]    (no value)  (units    (unk

nown)



                                        [facility]            unknown)  









                                         Result panel 474









           (unknown)  (no date)  (unknown)  [facility]    (no value)  (units    (unk

nown)



                                        [facility]            unknown)  









                                         Result panel 475









           (unknown)  (no date)  (unknown)  [facility]    (no value)  (units    (unk

nown)



                                        [facility]            unknown)  









                                         Result panel 476









           (unknown)  (no date)  (unknown)  [facility]    (no value)  (units    (unk

nown)



                                        [facility]            unknown)  









                                         Result panel 477









           (unknown)  (no date)  (unknown)  [facility]    (no value)  (units    (unk

nown)



                                        [facility]            unknown)  









                                         Result panel 478









           (unknown)  (no date)  (unknown)  [facility]    (no value)  (units    (unk

nown)



                                        [facility]            unknown)  









                                         Result panel 479









           (unknown)  (no date)  (unknown)  [facility]    (no value)  (units    (unk

nown)



                                        [facility]            unknown)  









                                         Result panel 480









           (unknown)  (no date)  (unknown)  [facility]    (no value)  (units    (unk

nown)



                                        [facility]            unknown)  









                                         Result panel 481









           (unknown)  (no date)  (unknown)  [facility]    (no value)  (units    (unk

nown)



                                        [facility]            unknown)  









                                         Result panel 482









           (unknown)  (no date)  (unknown)  [facility]    (no value)  (units    (unk

nown)



                                        [facility]            unknown)  









                                         Result panel 483









           (unknown)  (no date)  (unknown)  [facility]    (no value)  (units    (unk

nown)



                                        [facility]            unknown)  









                                         Result panel 484









           (unknown)  (no date)  (unknown)  [facility]    (no value)  (units    (unk

nown)



                                        [facility]            unknown)  









                                         Result panel 485









           (unknown)  (no date)  (unknown)  [facility]    (no value)  (units    (unk

nown)



                                        [facility]            unknown)  









                                         Result panel 486









           (unknown)  (no date)  (unknown)  [facility]    (no value)  (units    (unk

nown)



                                        [facility]            unknown)  









                                         Result panel 487









           (unknown)  (no date)  (unknown)  [facility]    (no value)  (units    (unk

nown)



                                        [facility]            unknown)  









                                         Result panel 488









           (unknown)  (no date)  (unknown)  [facility]    (no value)  (units    (unk

nown)



                                        [facility]            unknown)  









                                         Result panel 489









           (unknown)  (no date)  (unknown)  [facility]    (no value)  (units    (unk

nown)



                                        [facility]            unknown)  









                                         Result panel 490









           (unknown)  (no date)  (unknown)  [facility]    (no value)  (units    (unk

nown)



                                        [facility]            unknown)  









                                         Result panel 491









           (unknown)  (no date)  (unknown)  [facility]    (no value)  (units    (unk

nown)



                                        [facility]            unknown)  









                                         Result panel 492









           (unknown)  (no date)  (unknown)  [facility]    (no value)  (units    (unk

nown)



                                        [facility]            unknown)  









                                         Result panel 493









           (unknown)  (no date)  (unknown)  [facility]    (no value)  (units    (unk

nown)



                                        [facility]            unknown)  









                                         Result panel 494









           (unknown)  (no date)  (unknown)  [facility]    (no value)  (units    (unk

nown)



                                        [facility]            unknown)  









                                         Result panel 495









           (unknown)  (no date)  (unknown)  [facility]    (no value)  (units    (unk

nown)



                                        [facility]            unknown)  









                                         Result panel 496









           (unknown)  (no date)  (unknown)  [facility]    (no value)  (units    (unk

nown)



                                        [facility]            unknown)  









                                         Result panel 497









           (unknown)  (no date)  (unknown)  [facility]    (no value)  (units    (unk

nown)



                                        [facility]            unknown)  









                                         Result panel 498









           (unknown)  (no date)  (unknown)  [facility]    (no value)  (units    (unk

nown)



                                        [facility]            unknown)  









                                         Result panel 499









           (unknown)  (no date)  (unknown)  [facility]    (no value)  (units    (unk

nown)



                                        [facility]            unknown)  









                                         Result panel 500









           (unknown)  (no date)  (unknown)  [facility]    (no value)  (units    (unk

nown)



                                        [facility]            unknown)  









                                         Result panel 501









           (unknown)  (no date)  (unknown)  [facility]    (no value)  (units    (unk

nown)



                                        [facility]            unknown)  









                                         Result panel 502









           (unknown)  (no date)  (unknown)  [facility]    (no value)  (units    (unk

nown)



                                        [facility]            unknown)  









                                         Result panel 503









           (unknown)  (no date)  (unknown)  [facility]    (no value)  (units    (unk

nown)



                                        [facility]            unknown)  









                                         Result panel 504









           (unknown)  (no date)  (unknown)  [facility]    (no value)  (units    (unk

nown)



                                        [facility]            unknown)  









                                         Result panel 505









           (unknown)  (no date)  (unknown)  [facility]    (no value)  (units    (unk

nown)



                                        [facility]            unknown)  









                                         Result panel 506









           (unknown)  (no date)  (unknown)  [facility]    (no value)  (units    (unk

nown)



                                        [facility]            unknown)  









                                         Result panel 507









           (unknown)  (no date)  (unknown)  [facility]    (no value)  (units    (unk

nown)



                                        [facility]            unknown)  









                                         Result panel 508









           (unknown)  (no date)  (unknown)  [facility]    (no value)  (units    (unk

nown)



                                        [facility]            unknown)  









                                         Result panel 509









           (unknown)  (no date)  (unknown)  [facility]    (no value)  (units    (unk

nown)



                                        [facility]            unknown)  









                                         Result panel 510









           (unknown)  (no date)  (unknown)  [facility]    (no value)  (units    (unk

nown)



                                        [facility]            unknown)  









                                         Result panel 511









           (unknown)  (no date)  (unknown)  [facility]    (no value)  (units    (unk

nown)



                                        [facility]            unknown)  









                                         Result panel 512









           (unknown)  (no date)  (unknown)  [facility]    (no value)  (units    (unk

nown)



                                        [facility]            unknown)  









                                         Result panel 513









           (unknown)  (no date)  (unknown)  [facility]    (no value)  (units    (unk

nown)



                                        [facility]            unknown)  









                                         Result panel 514









           (unknown)  (no date)  (unknown)  [facility]    (no value)  (units    (unk

nown)



                                        [facility]            unknown)  









                                         Result panel 515









           (unknown)  (no date)  (unknown)  [facility]    (no value)  (units    (unk

nown)



                                        [facility]            unknown)  









                                         Result panel 516









           (unknown)  (no date)  (unknown)  [facility]    (no value)  (units    (unk

nown)



                                        [facility]            unknown)  









                                         Result panel 517









           (unknown)  (no date)  (unknown)  [facility]    (no value)  (units    (unk

nown)



                                        [facility]            unknown)  









                                         Result panel 518









           (unknown)  (no date)  (unknown)  [facility]    (no value)  (units    (unk

nown)



                                        [facility]            unknown)  









                                         Result panel 519









           (unknown)  (no date)  (unknown)  [facility]    (no value)  (units    (unk

nown)



                                        [facility]            unknown)  









                                         Result panel 520









           (unknown)  (no date)  (unknown)  [facility]    (no value)  (units    (unk

nown)



                                        [facility]            unknown)  









                                         Result panel 521









           (unknown)  (no date)  (unknown)  [facility]    (no value)  (units    (unk

nown)



                                        [facility]            unknown)  









                                         Result panel 522









           (unknown)  (no date)  (unknown)  [facility]    (no value)  (units    (unk

nown)



                                        [facility]            unknown)  









                                         Result panel 523









           (unknown)  (no date)  (unknown)  [facility]    (no value)  (units    (unk

nown)



                                        [facility]            unknown)  









                                         Result panel 524









           (unknown)  (no date)  (unknown)  [facility]    (no value)  (units    (unk

nown)



                                        Hospital            unknown)  









                                         Result panel 525









           (unknown)  (no date)  (unknown)  [facility]    (no value)  (units    (unk

nown)



                                        Hospital            unknown)  









                                         Result panel 526









           (unknown)  (no date)  (unknown)  [facility] value)  (units    (unk

nown)



                                        Hospital            unknown)  









                                         Result panel 527









           (unknown)  (no date)  (unknown)  [facility]    (no value)  (units    (unk

nown)



                                        Hospital            unknown)  









                                         Result panel 528









           (unknown)  (no date)  (unknown)  [facility]    (no value)  (units    (unk

nown)



                                        Hospital            unknown)  









                                         Result panel 529









           (unknown)  (no date)  (unknown)  [facility]    (no value)  (units    (unk

nown)



                                        Hospital            unknown)  









                                         Result panel 530









           (unknown)  (no date)  (unknown)  [facility]    (no value)  (units    (unk

nown)



                                        Hospital            unknown)  









                                         Result panel 531









           (unknown)  (no date)  (unknown)  [facility]    (no value)  (units    (unk

nown)



                                        Hospital            unknown)  









                                         Result panel 532









           (unknown)  (no date)  (unknown)  [facility]    (no value)  (units    (unk

nown)



                                        Hospital            unknown)  









                                         Result panel 533









           (unknown)  (no date)  (unknown)  [facility]    (no value)  (units    (unk

nown)



                                        Hospital            unknown)  









                                         Result panel 534









           (unknown)  (no date)  (unknown)  [facility]    (no value)  (units    (unk

nown)



                                        Hospital            unknown)  









                                         Result panel 535









           (unknown)  (no date)  (unknown)  [facility]    (no value)  (units    (unk

nown)



                                        Hospital            unknown)  









                                         Result panel 536









           (unknown)  (no date)  (unknown)  [facility]    (no value)  (units    (unk

nown)



                                        Hospital            unknown)  









                                         Result panel 537









           (unknown)  (no date)  (unknown)  [facility]    (no value)  (units    (unk

nown)



                                        Hospital            unknown)  









                                         Result panel 538









           (unknown)  (no date)  (unknown)  [facility]    (no value)  (units    (unk

nown)



                                        Hospital            unknown)  









                                         Result panel 539









           (unknown)  (no date)  (unknown)  [facility]    (no value)  (units    (unk

nown)



                                        Hospital            unknown)  









                                         Result panel 540









           (unknown)  (no date)  (unknown)  [facility]    (no value)  (units    (unk

nown)



                                        Hospital            unknown)  









                                         Result panel 541









           (unknown)  (no date)  (unknown)  [facility]    (no value)  (units    (unk

nown)



                                        Hospital            unknown)  









                                         Result panel 542









           (unknown)  (no date)  (unknown)  [facility] value)  (units    (unk

nown)



                                        Hospital            unknown)  









                                         Result panel 543









           (unknown)  (no date)  (unknown)  [facility]    (no value)  (units    (unk

nown)



                                        Hospital            unknown)  









                                         Result panel 544









           (unknown)  (no date)  (unknown)  [facility]    (no value)  (units    (unk

nown)



                                        Hospital            unknown)  









                                         Result panel 545









           (unknown)  (no       (unknown)  (unknown)  (no value)  (units    (unk

nown)



                    date)                                   unknown)  

 

           (unknown)  (no       (unknown)  (unknown)  (Dilaudid)  (units    (unk

nown)



                    date)                                   unknown)  

 

           (unknown)  (no       (unknown)  (unknown)  [id number]  (units    (unkno

wn)



                    date)                                   unknown)  

 

           (unknown)  (no       (unknown)  (unknown)  02:06     (units    (unkno

wn)



                    date)                                   unknown)  

 

           (unknown)  (no       (unknown)  (unknown)  22  (units    (unkno

wn)



                    date)                                   unknown)  

 

           (unknown)  (no       (unknown)  (unknown)  150 mg PO DAILY  (units   

 (unknown)



                    date)                         Qty: 30 0RF unknown)  

 

           (unknown)  (no       (unknown)  (unknown)  4 mg PO Q4-6H  (units    (

unknown)



                    date)                         PRN (Reason: unknown)  



                                                  pain) Qty: 20 0RF           

 

           (unknown)  (no       (unknown)  (unknown)  5 mg PO Q4H PRN  (units   

 (unknown)



                    date)                         (Reason: pain) unknown)  



                                                  Qty: 20 0RF           

 

           (unknown)  (no       (unknown)  (unknown)  Acne (-)  (units    (u

nknown)



                    date)                                   unknown)  

 

           (unknown)  (no       (unknown)  (unknown)  Age/Sex: 17 / F  (units   

 (unknown)



                    date)                                   unknown)  

 

           (unknown)  (no       (unknown)  (unknown)  Alcohol type:  (units    (

unknown)



                    date)                         wine      unknown)  

 

           (unknown)  (no       (unknown)  (unknown)  Allergies  (units    (unkn

own)



                    date)                                   unknown)  

 

           (unknown)  (no       (unknown)  (unknown)  Allergy/AdvReac  (units   

 (unknown)



                    date)                         Type Severity unknown)  



                                                  Reaction Status           



                                                  Date / Time           

 

           (unknown)  (no       (unknown)  (unknown)  Anesthesia  (units    (unk

nown)



                    date)                                   unknown)  

 

           (unknown)  (no       (unknown)  (unknown)  Blood Pressure  (units    

(unknown)



                    date)                         112/71 22 unknown)  



                                                  02:06               

 

           (unknown)  (no       (unknown)  (unknown)  Blood Pressure  (units    

(unknown)



                    date)                             unknown)  

 

           (unknown)  (no       (unknown)  (unknown)  Chief Complaint:  (units  

  (unknown)



                    date)                         Abdominal Pain unknown)  

 

           (unknown)  (no       (unknown)  (unknown)  Chlamydia  (units    (unkn

own)



                    date)                         infection () unknown)  

 

           (unknown)  (no       (unknown)  (unknown)  Course    (units    (unkno

wn)



                    date)                                   unknown)  

 

           (unknown)  (no       (unknown)  (unknown)  : 2005  (units   

 (unknown)



                    date)                         Acct:[id number] unknown)  

 

           (unknown)  (no       (unknown)  (unknown)  Date of Service:  (units  

  (unknown)



                    date)                         22  unknown)  

 

           (unknown)  (no       (unknown)  (unknown)  Departure  (units    (unkn

own)



                    date)                                   unknown)  

 

           (unknown)  (no       (unknown)  (unknown)  Discharge Plan  (units    

(unknown)



                    date)                                   unknown)  

 

           (unknown)  (no       (unknown)  (unknown)  ER Physician:  (units    (

unknown)



                    date)                         Dashawn Quintero unknown)  



                                                  D.O.                

 

           (unknown)  (no       (unknown)  (unknown)  Emergency Report  (units  

  (unknown)



                    date)                                   unknown)  

 

           (unknown)  (no       (unknown)  (unknown)  Endometriosis  (units    (

unknown)



                    date)                         ()   unknown)  

 

           (unknown)  (no       (unknown)  (unknown)  Exam      (units    (unkno

wn)



                    date)                                   unknown)  

 

           (unknown)  (no       (unknown)  (unknown)  General   (units    (unkno

wn)



                    date)                                   unknown)  

 

           (unknown)  (no       (unknown)  (unknown)  HPI - Abdominal  (units   

 (unknown)



                    date)                         Pain      unknown)  

 

           (unknown)  (no       (unknown)  (unknown)  Heavy menstrual  (units   

 (unknown)



                    date)                         period (-2019) unknown)  

 

           (unknown)  (no       (unknown)  (unknown)  Initial Vital  (units    (

unknown)



                    date)                         Signs     unknown)  

 

           (unknown)  (no       (unknown)  (unknown)  Initial Vital  (units    (

unknown)



                    date)                         Signs:    unknown)  

 

           (unknown)  (no       (unknown)  (unknown)  Irregular  (units    (unkn

own)



                    date)                         menstrual cycle unknown)  



                                                  ()             

 

           (unknown)  (no       (unknown)  (unknown)  [facility]  (units   

 (unknown)



                    date)                         [address] unknown)  



                                                  [place]           



                                                  [id number]               

 

           (unknown)  (no       (unknown)  (unknown)  Arcenio Darnell  (units  

  (unknown)



                    date)                         HOLLY kim [Primary unknown)  



                                                  Care Provider]           

 

           (unknown)  (no       (unknown)  (unknown)  Lymphangioma  (units    (u

nknown)



                    date)                                   unknown)  

 

           (unknown)  (no       (unknown)  (unknown)  Medical History  (units   

 (unknown)



                    date)                         (Updated 10/04/22 unknown)  



                                                  @ 10:24 by Jose Almodovar MD)           

 

           (unknown)  (no       (unknown)  (unknown)  Medication  (units    (unk

nown)



                    date)                         Instructions unknown)  



                                                  Recorded            

 

           (unknown)  (no       (unknown)  (unknown)  Mode of arrival:  (units  

  (unknown)



                    date)                         Ambulatory unknown)  

 

           (unknown)  (no       (unknown)  (unknown)  No Action  (units    (unkn

own)



                    date)                                   unknown)  

 

           (unknown)  (no       (unknown)  (unknown)  Opioids -  (units    (unkn

own)



                    date)                         Morphine  unknown)  



                                                  Analogues Allergy           



                                                  Intermediate rash           



                                                  Verified 10/04/22           



                                                  07:57               

 

           (unknown)  (no       (unknown)  (unknown)  Ovarian cyst  (units    (u

nknown)



                    date)                                   unknown)  

 

           (unknown)  (no       (unknown)  (unknown)  Oxygen Delivery  (units   

 (unknown)



                    date)                         Method 22 unknown)  



                                                  02:06               

 

           (unknown)  (no       (unknown)  (unknown)  Oxygen Delivery  (units   

 (unknown)



                    date)                         Method Room Air unknown)  

 

           (unknown)  (no       (unknown)  (unknown)  Painful   (units    (unkno

wn)



                    date)                         menstrual periods unknown)  



                                                  ()             

 

           (unknown)  (no       (unknown)  (unknown)  Patient History  (units   

 (unknown)



                    date)                                   unknown)  

 

           (unknown)  (no       (unknown)  (unknown)  Patient:  (units    (unkno

wn)



                    date)                         Lesvia Aguero unknown)  



                                                  MR#: [id number]             

 

           (unknown)  (no       (unknown)  (unknown)  Prescriptions:  (units    

(unknown)



                    date)                                   unknown)  

 

           (unknown)  (no       (unknown)  (unknown)  Previous Rx's  (units    (

unknown)



                    date)                                   unknown)  

 

           (unknown)  (no       (unknown)  (unknown)  Pulse Oximetry  (units    

(unknown)



                    date)                         100 22 unknown)  



                                                  02:06               

 

           (unknown)  (no       (unknown)  (unknown)  Pulse Oximetry  (units    

(unknown)



                    date)                         100       unknown)  

 

           (unknown)  (no       (unknown)  (unknown)  Pulse Rate 72  (units    (

unknown)



                    date)                         22 02:06 unknown)  

 

           (unknown)  (no       (unknown)  (unknown)  Pulse Rate 72  (units    (

unknown)



                    date)                                   unknown)  

 

           (unknown)  (no       (unknown)  (unknown)  Referrals:  (units    (unk

nown)



                    date)                                   unknown)  

 

           (unknown)  (no       (unknown)  (unknown)  Related Data  (units    (u

nknown)



                    date)                                   unknown)  

 

           (unknown)  (no       (unknown)  (unknown)  Respiratory Rate  (units  

  (unknown)



                    date)                         16 22 02:06 unknown)  

 

           (unknown)  (no       (unknown)  (unknown)  Respiratory Rate  (units  

  (unknown)



                    date)                         16        unknown)  

 

           (unknown)  (no       (unknown)  (unknown)  S/P ACL   (units    (unkno

wn)



                    date)                         reconstruction unknown)  



                                                  (-21)           

 

           (unknown)  (no       (unknown)  (unknown)  Signed By:  (units    (unk

nown)



                    date)                                   unknown)  

 

           (unknown)  (no       (unknown)  (unknown)  Smoking Status:  (units   

 (unknown)



                    date)                         Current some day unknown)  



                                                  smoker              

 

           (unknown)  (no       (unknown)  (unknown)  Social History  (units    

(unknown)



                    date)                                   unknown)  

 

           (unknown)  (no       (unknown)  (unknown)  Source: patient  (units   

 (unknown)



                    date)                                   unknown)  

 

           (unknown)  (no       (unknown)  (unknown)  Stated    (units    (unkno

wn)



                    date)                         Complaint: ABD unknown)  



                                                  PAIN                

 

           (unknown)  (no       (unknown)  (unknown)  Substance Use  (units    (

unknown)



                    date)                         Type: does not unknown)  



                                                  use                 

 

           (unknown)  (no       (unknown)  (unknown)  Surgical History  (units  

  (unknown)



                    date)                         (Updated 10/04/22 unknown)  



                                                  @ 10:24 by Jose Almodovar MD)           

 

           (unknown)  (no       (unknown)  (unknown)  TOA       (units    (unkno

wn)



                    date)                         (tubo-ovarian unknown)  



                                                  abscess)            



                                                  (-21)           

 

           (unknown)  (no       (unknown)  (unknown)  Temperature 98.4  (units  

  (unknown)



                    date)                         F 22 02:06 unknown)  

 

           (unknown)  (no       (unknown)  (unknown)  Temperature 98.4  (units  

  (unknown)



                    date)                         F         unknown)  

 

           (unknown)  (no       (unknown)  (unknown)  Time Seen by  (units    (u

nknown)



                    date)                         Provider: unknown)  



                                                  22 02:07           

 

           (unknown)  (no       (unknown)  (unknown)  Vital Signs - 8  (units   

 (unknown)



                    date)                         hr        unknown)  

 

           (unknown)  (no       (unknown)  (unknown)  Vital Signs  (units    (un

known)



                    date)                                   unknown)  

 

           (unknown)  (no       (unknown)  (unknown)  Vital signs:  (units    (u

nknown)



                    date)                                   unknown)  

 

           (unknown)  (no       (unknown)  (unknown)  alcohol intake  (units    

(unknown)



                    date)                         frequency: unknown)  



                                                  holidays/special           



                                                  occasions only           

 

           (unknown)  (no       (unknown)  (unknown)  alcohol intake:  (units   

 (unknown)



                    date)                         current   unknown)  

 

           (unknown)  (no       (unknown)  (unknown)  clindamycin HCl  (units   

 (unknown)



                    date)                         150 mg capsule unknown)  



                                                  150 mg PO DAILY           



                                                  #30 caps 22           

 

           (unknown)  (no       (unknown)  (unknown)  clindamycin HCl  (units   

 (unknown)



                    date)                         150 mg capsule unknown)  

 

           (unknown)  (no       (unknown)  (unknown)  doxycycline  (units    (un

known)



                    date)                         AdvReac   unknown)  



                                                  Intermediate           



                                                  vomitt Verified           



                                                  10/04/22 07:57           

 

           (unknown)  (no       (unknown)  (unknown)  household  (units    (unkn

own)



                    date)                         members:  unknown)  



                                                  friend(s)           

 

           (unknown)  (no       (unknown)  (unknown)  hydromorphone 4  (units   

 (unknown)



                    date)                         mg tablet 4 mg PO unknown)  



                                                  Q4-6H PRN pain           



                                                  #20 tabs 22           

 

           (unknown)  (no       (unknown)  (unknown)  hydromorphone  (units    (

unknown)



                    date)                         [Dilaudid] 4 mg unknown)  



                                                  tablet              

 

           (unknown)  (no       (unknown)  (unknown)  oxycodone 5 mg  (units    

(unknown)



                    date)                         tablet 5 mg PO unknown)  



                                                  Q4H PRN pain #20           



                                                  tabs 22           

 

           (unknown)  (no       (unknown)  (unknown)  oxycodone 5 mg  (units    

(unknown)



                    date)                         tablet    unknown)  

 

           (unknown)  (no       (unknown)  (unknown)  tobacco type:  (units    (

unknown)



                    date)                         vaping    unknown)  









                                         Result panel 546









           (unknown)  (no       (unknown)  (unknown)  (no value)  (units    (unk

nown)



                    date)                                   unknown)  

 

           (unknown)  (no       (unknown)  (unknown)  [id number]  (units    (unkn

own)



                    date)                                   unknown)  

 

           (unknown)  (no       (unknown)  (unknown)  1. A 4.0 x 5.3 x  (units  

  (unknown)



                    date)                         5.2 cm complex unknown)  



                                                  cystic mass in           



                                                  left adnexa.           



                                                  Differential           

 

           (unknown)  (no       (unknown)  (unknown)  22  (units    (unkno

wn)



                    date)                                   unknown)  

 

           (unknown)  (no       (unknown)  (unknown)  [address] [place] [address]  (units  

  (unknown)



                    date)                                   unknown)  

 

           (unknown)  (no       (unknown)  (unknown)  2. There is a  (units    (

unknown)



                    date)                         trace amount of unknown)  



                                                  free fluid in the           



                                                  cul-de-sac.           

 

           (unknown)  (no       (unknown)  (unknown)  ABDOMEN:  (units    (unkno

wn)



                    date)                                   unknown)  

 

           (unknown)  (no       (unknown)  (unknown)  Abdominal Nodes:  (units  

  (unknown)



                    date)                         No retroperitoneal unknown)  



                                                  or mesenteric           



                                                  adenopathy by size           



                                                  criteria.           

 

           (unknown)  (no       (unknown)  (unknown)  Accession Number:  (units 

   (unknown)



                    date)                         [id number] unknown)  

 

           (unknown)  (no       (unknown)  (unknown)  Adrenal Glands:  (units   

 (unknown)



                    date)                         Unremarkable. unknown)  

 

           (unknown)  (no       (unknown)  (unknown)  After the  (units    (unkn

own)



                    date)                         administration of unknown)  



                                                  oral and IV           



                                                  contrast, axial           



                                                  sections were           



                                                  acquired            

 

           (unknown)  (no       (unknown)  (unknown)  Age/Sex: 17 / F  (units   

 (unknown)



                    date)                         Date of Service: unknown)  

 

           (unknown)  (no       (unknown)  (unknown)  [place], [place]  (units    (

unknown)



                    date)                         [id number]     unknown)  

 

           (unknown)  (no       (unknown)  (unknown)  Approved by:  (units    (u

nknown)



                    date)                         Lucian Ly M.D. on unknown)  



                                                  2022 at 8:32           

 

           (unknown)  (no       (unknown)  (unknown)  Biliary ducts:  (units    

(unknown)



                    date)                         Unremarkable. unknown)  

 

           (unknown)  (no       (unknown)  (unknown)  Bladder:  (units    (unkno

wn)



                    date)                         Unremarkable. unknown)  

 

           (unknown)  (no       (unknown)  (unknown)  Bones:    (units    (unkno

wn)



                    date)                         Unremarkable. unknown)  

 

           (unknown)  (no       (unknown)  (unknown)  COMPARISON:  (units    (un

known)



                    date)                         [facility], unknown)  



                                                  US, US PELVIC           



                                                  COMPLETE,           



                                                  2022, 5:30.           

 

           (unknown)  (no       (unknown)  (unknown)  CT Scan Report  (units    

(unknown)



                    date)                                   unknown)  

 

           (unknown)  (no       (unknown)  (unknown)  : 2005  (units   

 (unknown)



                    date)                         Acct:[id number] unknown)  

 

           (unknown)  (no       (unknown)  (unknown)  Dictated by:  (units    (u

nknown)



                    date)                         Lucian Ly M.D. on unknown)  



                                                  2022 at 8:24           

 

           (unknown)  (no       (unknown)  (unknown)  FINDINGS:  (units    (unkn

own)



                    date)                                   unknown)  

 

           (unknown)  (no       (unknown)  (unknown)  Gallbladder:  (units    (u

nknown)



                    date)                         Unremarkable. unknown)  

 

           (unknown)  (no       (unknown)  (unknown)  Heart: No  (units    (unkn

own)



                    date)                         significant unknown)  



                                                  findings.           

 

           (unknown)  (no       (unknown)  (unknown)  IMPRESSION:  (units    (un

known)



                    date)                                   unknown)  

 

           (unknown)  (no       (unknown)  (unknown)  INDICATIONS:  (units    (u

nknown)



                    date)                         severe LLQ pain, unknown)  



                                                  recent pelvic           



                                                  surgery, hx           



                                                  abscess             

 

           (unknown)  (no       (unknown)  (unknown)  Image quality:  (units    

(unknown)



                    date)                         Excellent. unknown)  

 

           (unknown)  (no       (unknown)  (unknown)  [facility]  (units   

 (unknown)



                    date)                                   unknown)  

 

           (unknown)  (no       (unknown)  (unknown)  Kidneys and  (units    (un

known)



                    date)                         Ureters:  unknown)  



                                                  Unremarkable.           

 

           (unknown)  (no       (unknown)  (unknown)  Liver:    (units    (unkno

wn)



                    date)                         Unremarkable. unknown)  

 

           (unknown)  (no       (unknown)  (unknown)  Loc: ED   (units    (unkno

wn)



                    date)                                   unknown)  

 

           (unknown)  (no       (unknown)  (unknown)  Lung bases:  (units    (un

known)



                    date)                         Unremarkable. unknown)  

 

           (unknown)  (no       (unknown)  (unknown)  Miscellaneous: No  (units 

   (unknown)



                    date)                         inguinal hernias unknown)  



                                                  are seen.           

 

           (unknown)  (no       (unknown)  (unknown)  No significant  (units    

(unknown)



                    date)                         discrepancy with unknown)  



                                                  the night shift           



                                                  radiology           



                                                  preliminary           



                                                  report.             

 

           (unknown)  (no       (unknown)  (unknown)  Ordering  (units    (unkno

wn)



                    date)                         Provider: unknown)  



                                                  Dashawn Quintero D.O.           

 

           (unknown)  (no       (unknown)  (unknown)  PELVIS:   (units    (unkno

wn)



                    date)                                   unknown)  

 

           (unknown)  (no       (unknown)  (unknown)  PROCEDURE: CT  (units    (

unknown)



                    date)                         ABDOMEN PELVIS W unknown)  



                                                  CON                 

 

           (unknown)  (no       (unknown)  (unknown)  Pancreas:  (units    (unkn

own)



                    date)                         Unremarkable. unknown)  

 

           (unknown)  (no       (unknown)  (unknown)  Patient:  (units    (unkno

wn)



                    date)                         Lesvia Aguero E unknown)  



                                                  MR#: M              

 

           (unknown)  (no       (unknown)  (unknown)  Pelvic Nodes: No  (units  

  (unknown)



                    date)                         enlarged lymph unknown)  



                                                  nodes.              

 

           (unknown)  (no       (unknown)  (unknown)  Pelvic Organs:  (units    

(unknown)



                    date)                         Unremarkable. unknown)  



                                                  Uterus is normal.           



                                                  There is a complex           



                                                  cystic              

 

           (unknown)  (no       (unknown)  (unknown)  Peritoneum: No  (units    

(unknown)



                    date)                         abnormal  unknown)  



                                                  intraperitoneal           



                                                  fluid. No free           



                                                  air.                

 

           (unknown)  (no       (unknown)  (unknown)  Procedure: CT  (units    (

unknown)



                    date)                         abdomen pelvis w unknown)  



                                                  con                 

 

           (unknown)  (no       (unknown)  (unknown)  Signed    (units    (unkno

wn)



                    date)                                   unknown)  

 

           (unknown)  (no       (unknown)  (unknown)  Spleen:   (units    (unkno

wn)



                    date)                         Unremarkable. unknown)  

 

           (unknown)  (no       (unknown)  (unknown)  Stomach and  (units    (un

known)



                    date)                         Bowel: Stomach, unknown)  



                                                  small bowel loops,           



                                                  and colon are           



                                                  unremarkable.           

 

           (unknown)  (no       (unknown)  (unknown)  TECHNIQUE:  (units    (unk

nown)



                    date)                                   unknown)  

 

           (unknown)  (no       (unknown)  (unknown)  There is a  (units    (unk

nown)



                    date)                                   unknown)  

 

           (unknown)  (no       (unknown)  (unknown)  Ventral Wall: No  (units  

  (unknown)



                    date)                         hernia.   unknown)  

 

           (unknown)  (no       (unknown)  (unknown)  Vessels: Aorta  (units    

(unknown)



                    date)                         and inferior vena unknown)  



                                                  cava are normal in           



                                                  size.               

 

           (unknown)  (no       (unknown)  (unknown)  adjustment  (units    (unk

nown)



                    date)                                   unknown)  

 

           (unknown)  (no       (unknown)  (unknown)  amount of free  (units    

(unknown)



                    date)                         fluid in the unknown)  



                                                  cul-de-sac.           

 

           (unknown)  (no       (unknown)  (unknown)  diagnoses  (units    (unkn

own)



                    date)                                   unknown)  

 

           (unknown)  (no       (unknown)  (unknown)  from the  (units    (unkno

wn)



                    date)                                   unknown)  

 

           (unknown)  (no       (unknown)  (unknown)  gynecological  (units    (

unknown)



                    date)                         follow-up. unknown)  

 

           (unknown)  (no       (unknown)  (unknown)  include complex  (units   

 (unknown)



                    date)                         ovarian cyst and unknown)  



                                                  tubo-ovarian           



                                                  abscess. Recommend           



                                                  pelvic              

 

           (unknown)  (no       (unknown)  (unknown)  is a trace  (units    (unk

nown)



                    date)                                   unknown)  

 

           (unknown)  (no       (unknown)  (unknown)  left adnexa  (units    (un

known)



                    date)                         measuring 4.0 x unknown)  



                                                  5.3 x 5.2 cm.           



                                                  Right ovary is           



                                                  unremarkable.           



                                                  There               

 

           (unknown)  (no       (unknown)  (unknown)  lung bases to the  (units 

   (unknown)



                    date)                         pubic symphysis. unknown)  



                                                  Coronal and           



                                                  sagittal reformats           



                                                  were                

 

           (unknown)  (no       (unknown)  (unknown)  mass in the  (units    (un

known)



                    date)                                   unknown)  

 

           (unknown)  (no       (unknown)  (unknown)  moderate amount  (units   

 (unknown)



                    date)                         of stool in colon. unknown)  

 

           (unknown)  (no       (unknown)  (unknown)  of mA and/or kV  (units   

 (unknown)



                    date)                         according to unknown)  



                                                  patient size.           

 

           (unknown)  (no       (unknown)  (unknown)  performed. For  (units    

(unknown)



                    date)                                   unknown)  

 

           (unknown)  (no       (unknown)  (unknown)  radiation dose  (units    

(unknown)



                    date)                         reduction, the unknown)  



                                                  following was           



                                                  used: automated           



                                                  exposure control,           

 

           (unknown)  (no       (unknown)  (unknown)  ultrasound and  (units    

(unknown)



                    date)                                   unknown)  









                                         Result panel 547









           (unknown)  (no date)  (unknown)  (unknown)  1.0       %         (unkn

own)

 

           (unknown)  (no date)  (unknown)  (unknown)  100       /ul       (unkn

own)

 

           (unknown)  (no date)  (unknown)  (unknown)  12.5      g/dl      (unkn

own)

 

           (unknown)  (no date)  (unknown)  (unknown)  14.0      %         (unkn

own)

 

           (unknown)  (no date)  (unknown)  (unknown)  2700      /ul       (unkn

own)

 

           (unknown)  (no date)  (unknown)  (unknown)  29.3      pg        (unkn

own)

 

           (unknown)  (no date)  (unknown)  (unknown)  3.9       %         (unkn

own)

 

           (unknown)  (no date)  (unknown)  (unknown)  300       /ul       (unkn

own)

 

           (unknown)  (no date)  (unknown)  (unknown)  32.4      %         (unkn

own)

 

           (unknown)  (no date)  (unknown)  (unknown)  34.0      %         (unkn

own)

 

           (unknown)  (no date)  (unknown)  (unknown)  359       x10 3/ul  (unkn

own)

 

           (unknown)  (no date)  (unknown)  (unknown)  36.6      %         (unkn

own)

 

           (unknown)  (no date)  (unknown)  (unknown)  4.25      x10 6/ul  (unkn

own)

 

           (unknown)  (no date)  (unknown)  (unknown)  4700      /ul       (unkn

own)

 

           (unknown)  (no date)  (unknown)  (unknown)  54.8      %         (unkn

own)

 

           (unknown)  (no date)  (unknown)  (unknown)  7.9       %         (unkn

own)

 

           (unknown)  (no date)  (unknown)  (unknown)  700       /ul       (unkn

own)

 

           (unknown)  (no date)  (unknown)  (unknown)  8.5       x10 3/ul  (unkn

own)

 

           (unknown)  (no date)  (unknown)  (unknown)  86.2      fl        (unkn

own)









                                         Result panel 548









           (unknown)  (no date)  (unknown)  (unknown)  > 60      ml/min    (unkn

own)

 

           (unknown)  (no date)  (unknown)  (unknown)  > 60      ml/min    (unkn

own)

 

           (unknown)  (no date)  (unknown)  (unknown)  0.4       mg/dl     (unkn

own)

 

           (unknown)  (no date)  (unknown)  (unknown)  0.75      mg/dl     (unkn

own)

 

           (unknown)  (no date)  (unknown)  (unknown)  1.5       (units unknown)

  (unknown)

 

           (unknown)  (no date)  (unknown)  (unknown)  106       mmol/l    (unkn

own)

 

           (unknown)  (no date)  (unknown)  (unknown)  11        mg/dl     (unkn

own)

 

           (unknown)  (no date)  (unknown)  (unknown)  113       u/l       (unkn

own)

 

           (unknown)  (no date)  (unknown)  (unknown)  13        iu/l      (unkn

own)

 

           (unknown)  (no date)  (unknown)  (unknown)  138       mmol/l    (unkn

own)

 

           (unknown)  (no date)  (unknown)  (unknown)  14.7      (units unknown)

  (unknown)

 

           (unknown)  (no date)  (unknown)  (unknown)  16        iu/l      (unkn

own)

 

           (unknown)  (no date)  (unknown)  (unknown)  2.6       g/dl      (unkn

own)

 

           (unknown)  (no date)  (unknown)  (unknown)  26        mmol/l    (unkn

own)

 

           (unknown)  (no date)  (unknown)  (unknown)  3.6       mmol/l    (unkn

own)

 

           (unknown)  (no date)  (unknown)  (unknown)  4.0       g/dl      (unkn

own)

 

           (unknown)  (no date)  (unknown)  (unknown)  6.6       g/dl      (unkn

own)

 

           (unknown)  (no date)  (unknown)  (unknown)  8.8       mg/dl     (unkn

own)

 

           (unknown)  (no date)  (unknown)  (unknown)  81        u/l       (unkn

own)

 

           (unknown)  (no date)  (unknown)  (unknown)  99        mg/dl     (unkn

own)

 

           (unknown)  (no date)  (unknown)  (unknown)  99        mg/dl     (unkn

own)









                                         Result panel 549









           (unknown)  (no       (unknown)  (unknown)  (no value)  (units    (unk

nown)



                    date)                                   unknown)  

 

           (unknown)  (no       (unknown)  (unknown)  [id number]  (units    (unkn

own)



                    date)                                   unknown)  

 

           (unknown)  (no       (unknown)  (unknown)  1. The left  (units    (un

known)



                    date)                         ovary is  unknown)  



                                                  enlarged. There           



                                                  is a complex cyst           



                                                  in the left           



                                                  ovary.              

 

           (unknown)  (no       (unknown)  (unknown)  22  (units    (unkno

wn)



                    date)                                   unknown)  

 

           (unknown)  (no       (unknown)  (unknown)  [address] [place] [address]  (units  

  (unknown)



                    date)                                   unknown)  

 

           (unknown)  (no       (unknown)  (unknown)  2. Normal right  (units   

 (unknown)



                    date)                         ovary and uterus. unknown)  

 

           (unknown)  (no       (unknown)  (unknown)  Accession  (units    (unkn

own)



                    date)                         Number:   unknown)  



                                                  [id number]           

 

           (unknown)  (no       (unknown)  (unknown)  Additional  (units    (unk

nown)



                    date)                         endovaginal unknown)  



                                                  scanning was           



                                                  necessary due to           



                                                  incomplete           



                                                  visualization           

 

           (unknown)  (no       (unknown)  (unknown)  Age/Sex: 17 / F  (units   

 (unknown)



                    date)                         Date of Service: unknown)  

 

           (unknown)  (no       (unknown)  (unknown)  RENZO Roland  (units    (

unknown)



                    date)                         [id number]     unknown)  

 

           (unknown)  (no       (unknown)  (unknown)  Approved by:  (units    (u

nknown)



                    date)                         Lucian Ly M.D. unknown)  



                                                  on 2022 at           



                                                  9:03                

 

           (unknown)  (no       (unknown)  (unknown)  COMPARISON:  (units    (un

known)



                    date)                         [facility], unknown)  



                                                  CT, CT ABDOMEN           



                                                  PELVIS W CON,           



                                                  2022, 3:28.           

 

           (unknown)  (no       (unknown)  (unknown)  : 2005  (units   

 (unknown)



                    date)                         Acct:[id number] unknown)  

 

           (unknown)  (no       (unknown)  (unknown)  Dictated by:  (units    (u

nknown)



                    date)                         Lucian Ly M.D. unknown)  



                                                  on 2022 at           



                                                  8:56                

 

           (unknown)  (no       (unknown)  (unknown)  FINDINGS:  (units    (unkn

own)



                    date)                                   unknown)  

 

           (unknown)  (no       (unknown)  (unknown)  IMPRESSION:  (units    (un

known)



                    date)                                   unknown)  

 

           (unknown)  (no       (unknown)  (unknown)  INDICATIONS:  (units    (u

nknown)



                    date)                         LEFT LOWER unknown)  



                                                  QUADRANT PAIN.           



                                                  FOLLOW UP CT.           

 

           (unknown)  (no       (unknown)  (unknown)  [facility]  (units   

 (unknown)



                    date)                                   unknown)  

 

           (unknown)  (no       (unknown)  (unknown)  Loc: ED   (units    (unkno

wn)



                    date)                                   unknown)  

 

           (unknown)  (no       (unknown)  (unknown)  No significant  (units    

(unknown)



                    date)                         discrepancy with unknown)  



                                                  the night shift           



                                                  radiology           



                                                  preliminary           



                                                  report.             

 

           (unknown)  (no       (unknown)  (unknown)  Ordering  (units    (unkno

wn)



                    date)                         Provider: unknown)  



                                                  Dashawn Quintero D.O.                

 

           (unknown)  (no       (unknown)  (unknown)  Other: No  (units    (unkn

own)



                    date)                         pathologic free unknown)  



                                                  abdominal or           



                                                  pelvic fluid.           

 

           (unknown)  (no       (unknown)  (unknown)  Ovaries: The  (units    (u

nknown)



                    date)                         right ovary unknown)  



                                                  measures 3.0 x           



                                                  1.5 x 2.7 cm,           



                                                  with a calculated           

 

           (unknown)  (no       (unknown)  (unknown)  PROCEDURE: US  (units    (

unknown)



                    date)                         PELVIC COMPLETE unknown)  

 

           (unknown)  (no       (unknown)  (unknown)  Patient:  (units    (unkno

wn)



                    date)                         More,Grace E unknown)  



                                                  MR#: M              

 

           (unknown)  (no       (unknown)  (unknown)  Procedure: US  (units    (

unknown)



                    date)                         pelvic complete unknown)  

 

           (unknown)  (no       (unknown)  (unknown)  Real-time  (units    (unkn

own)



                    date)                         scanning was unknown)  



                                                  performed of the           



                                                  pelvic organs,           



                                                  with image           

 

           (unknown)  (no       (unknown)  (unknown)  Recommend  (units    (unkn

own)



                    date)                                   unknown)  

 

           (unknown)  (no       (unknown)  (unknown)  Signed    (units    (unkno

wn)



                    date)                                   unknown)  

 

           (unknown)  (no       (unknown)  (unknown)  TECHNIQUE:  (units    (unk

nown)



                    date)                                   unknown)  

 

           (unknown)  (no       (unknown)  (unknown)  There is a  (units    (unk

nown)



                    date)                                   unknown)  

 

           (unknown)  (no       (unknown)  (unknown)  To assist  (units    (unkn

own)



                    date)                                   unknown)  

 

           (unknown)  (no       (unknown)  (unknown)  Ultrasound  (units    (unk

nown)



                    date)                         Report    unknown)  

 

           (unknown)  (no       (unknown)  (unknown)  Uterus: Uterus  (units    

(unknown)



                    date)                         is anteverted and unknown)  



                                                  normal in size at           



                                                  7.1 x 5.2 x 3.8           



                                                  cm. The             

 

           (unknown)  (no       (unknown)  (unknown)  We strive to  (units    (u

nknown)



                    date)                         produce accurate, unknown)  



                                                  complete, and           



                                                  clear reports of           



                                                  imaging services.           

 

           (unknown)  (no       (unknown)  (unknown)  adnexal and  (units    (un

known)



                    date)                         endometrial unknown)  



                                                  structures by           



                                                  transabdominal           



                                                  scanning.           

 

           (unknown)  (no       (unknown)  (unknown)  and voice  (units    (unkn

own)



                    date)                         recognition unknown)  



                                                  software.           



                                                  Therefore, it may           



                                                  contain abnormal           



                                                  punctuation,           

 

           (unknown)  (no       (unknown)  (unknown)  clinical  (units    (unkno

wn)



                    date)                         correlation. unknown)  



                                                  After adequate           



                                                  treatment, a           



                                                  follow-up           



                                                  ultrasound is           

 

           (unknown)  (no       (unknown)  (unknown)  documentation.  (units    

(unknown)



                    date)                                   unknown)  

 

           (unknown)  (no       (unknown)  (unknown)  fluid filled  (units    (u

nknown)



                    date)                         structure unknown)  



                                                  adjacent to the           



                                                  left ovary.           



                                                  Differential           



                                                  diagnoses are           

 

           (unknown)  (no       (unknown)  (unknown)  hemorrhagic  (units    (un

known)



                    date)                         ovarian cyst unknown)  



                                                  versus              



                                                  hydrosalpinx and           



                                                  tubo-ovarian           



                                                  abscess.            

 

           (unknown)  (no       (unknown)  (unknown)  homogeneous. The  (units  

  (unknown)



                    date)                         endometrium unknown)  



                                                  measures 10.9 mm           



                                                  combined            



                                                  thickness.           

 

           (unknown)  (no       (unknown)  (unknown)  in each   (units    (unkno

wn)



                    date)                                   unknown)  

 

           (unknown)  (no       (unknown)  (unknown)  inaccuracies.  (units    (

unknown)



                    date)                                   unknown)  

 

           (unknown)  (no       (unknown)  (unknown)  insertions  (units    (unk

nown)



                    date)                         and/or omissions. unknown)  



                                                  Occasional           



                                                  wrong-word or           



                                                  sound-alike           



                                                  substitutions           

 

           (unknown)  (no       (unknown)  (unknown)  left ovary,  (units    (un

known)



                    date)                                   unknown)  

 

           (unknown)  (no       (unknown)  (unknown)  may       (units    (unkno

wn)



                    date)                                   unknown)  

 

           (unknown)  (no       (unknown)  (unknown)  myometrium is  (units    (

unknown)



                    date)                                   unknown)  

 

           (unknown)  (no       (unknown)  (unknown)  occur. Though we  (units  

  (unknown)



                    date)                         review the report unknown)  



                                                  and make efforts           



                                                  to correct it, we           



                                                  do                  

 

           (unknown)  (no       (unknown)  (unknown)  of 6.3 cc. The  (units    

(unknown)



                    date)                         left ovary unknown)  



                                                  measures 5.1 x           



                                                  4.5 x 4 point cm,           



                                                  with a calculated           

 

           (unknown)  (no       (unknown)  (unknown)  of the    (units    (unkno

wn)



                    date)                                   unknown)  

 

           (unknown)  (no       (unknown)  (unknown)  ovarian volume  (units    

(unknown)



                    date)                                   unknown)  

 

           (unknown)  (no       (unknown)  (unknown)  ovarian   (units    (unkno

wn)



                    date)                                   unknown)  

 

           (unknown)  (no       (unknown)  (unknown)  ovary. No  (units    (unkn

own)



                    date)                         adnexal masses unknown)  



                                                  are seen.           

 

           (unknown)  (no       (unknown)  (unknown)  recommend that  (units    

(unknown)



                    date)                                   unknown)  

 

           (unknown)  (no       (unknown)  (unknown)  suggested.  (units    (unk

nown)



                    date)                                   unknown)  

 

           (unknown)  (no       (unknown)  (unknown)  templates  (units    (unkn

own)



                    date)                                   unknown)  

 

           (unknown)  (no       (unknown)  (unknown)  the report be  (units    (

unknown)



                    date)                         read carefully in unknown)  



                                                  proper context to           



                                                  recognize any           



                                                  text                

 

           (unknown)  (no       (unknown)  (unknown)  there is  (units    (unkno

wn)



                    date)                         anechoic fluid unknown)  



                                                  filled structure.           



                                                  Less than 12           



                                                  follicles can be           



                                                  seen                

 

           (unknown)  (no       (unknown)  (unknown)  us in improving  (units   

 (unknown)



                    date)                         patient care, unknown)  



                                                  this report was           



                                                  composed using           



                                                  standard report           

 

           (unknown)  (no       (unknown)  (unknown)  volume of 59.1  (units    

(unknown)



                    date)                         cc. There is a unknown)  



                                                  complex cyst in           



                                                  left ovary.           



                                                  Superior to the           









                                         Result panel 550









           (unknown)  (no       (unknown)  (unknown)  (no value)  (units    (unk

nown)



                    date)                                   unknown)  

 

           (unknown)  (no       (unknown)  (unknown)  (Dilaudid)  (units    (unk

nown)



                    date)                                   unknown)  

 

           (unknown)  (no       (unknown)  (unknown)  [id number]  (units    (unkno

wn)



                    date)                                   unknown)  

 

           (unknown)  (no       (unknown)  (unknown)  02:06     (units    (unkno

wn)



                    date)                                   unknown)  

 

           (unknown)  (no       (unknown)  (unknown)  02:55 02:55  (units    (un

known)



                    date)                                   unknown)  

 

           (unknown)  (no       (unknown)  (unknown)  22 02:41  (units    

(unknown)



                    date)                                   unknown)  

 

           (unknown)  (no       (unknown)  (unknown)  22 02:55  (units    

(unknown)



                    date)                                   unknown)  

 

           (unknown)  (no       (unknown)  (unknown)  22 03:01  (units    

(unknown)



                    date)                                   unknown)  

 

           (unknown)  (no       (unknown)  (unknown)  22 04:52  (units    

(unknown)



                    date)                                   unknown)  

 

           (unknown)  (no       (unknown)  (unknown)  22  (units 

   (unknown)



                    date)                         Range/Units unknown)  

 

           (unknown)  (no       (unknown)  (unknown)  22  (units    (unkno

wn)



                    date)                                   unknown)  

 

           (unknown)  (no       (unknown)  (unknown)  12 point review of  (units

    (unknown)



                    date)                         systems is negative unknown)  



                                                  except for those           



                                                  stated above           

 

           (unknown)  (no       (unknown)  (unknown)  150 mg PO DAILY  (units   

 (unknown)



                    date)                         Qty: 30 0RF unknown)  

 

           (unknown)  (no       (unknown)  (unknown)  17-year-old female  (units

    (unknown)



                    date)                         nonsmoker with unknown)  



                                                  history of a           



                                                  relatively recent           



                                                  pelvic surgery           

 

           (unknown)  (no       (unknown)  (unknown)  4 mg PO Q4-6H PRN  (units 

   (unknown)



                    date)                         (Reason: pain) Qty: unknown)  



                                                  20 0RF              

 

           (unknown)  (no       (unknown)  (unknown)  5 mg PO Q4H PRN  (units   

 (unknown)



                    date)                         (Reason: pain) Qty: unknown)  



                                                  20 0RF              

 

           (unknown)  (no       (unknown)  (unknown)  ALT 13 (<35) IU/L  (units 

   (unknown)



                    date)                                   unknown)  

 

           (unknown)  (no       (unknown)  (unknown)  AST 16 (14-36)  (units    

(unknown)



                    date)                         IU/L      unknown)  

 

           (unknown)  (no       (unknown)  (unknown)  Acne (-2016)  (units    (u

nknown)



                    date)                                   unknown)  

 

           (unknown)  (no       (unknown)  (unknown)  Age/Sex: 17 / F  (units   

 (unknown)



                    date)                                   unknown)  

 

           (unknown)  (no       (unknown)  (unknown)  Albumin 4.0  (units    (un

known)



                    date)                         (3.5-5.0) g/dL unknown)  

 

           (unknown)  (no       (unknown)  (unknown)  Albumin/Globulin  (units  

  (unknown)



                    date)                         Ratio 1.5 (1.0-2.8) unknown)  

 

           (unknown)  (no       (unknown)  (unknown)  Alcohol type: wine  (units

    (unknown)



                    date)                                   unknown)  

 

           (unknown)  (no       (unknown)  (unknown)  Alkaline  (units    (unkno

wn)



                    date)                         Phosphatase 81 unknown)  



                                                  () U/L           

 

           (unknown)  (no       (unknown)  (unknown)  Allergies  (units    (unkn

own)



                    date)                                   unknown)  

 

           (unknown)  (no       (unknown)  (unknown)  Allergy/AdvReac  (units   

 (unknown)



                    date)                         Type Severity unknown)  



                                                  Reaction Status           



                                                  Date / Time           

 

           (unknown)  (no       (unknown)  (unknown)  Anesthesia  (units    (unk

nown)



                    date)                                   unknown)  

 

           (unknown)  (no       (unknown)  (unknown)  BACK: Nontender  (units   

 (unknown)



                    date)                         without deformity unknown)  



                                                  or crepitance. No           



                                                  flank tenderness.           

 

           (unknown)  (no       (unknown)  (unknown)  BUN 11 (7-17)  (units    (

unknown)



                    date)                         mg/dL     unknown)  

 

           (unknown)  (no       (unknown)  (unknown)  BUN/Creatinine  (units    

(unknown)



                    date)                         Ratio 14.7 (6-22) unknown)  

 

           (unknown)  (no       (unknown)  (unknown)  Baso # (Auto) 100  (units 

   (unknown)



                    date)                         (0-100) /uL unknown)  

 

           (unknown)  (no       (unknown)  (unknown)  Baso % (Auto) 1.0  (units 

   (unknown)



                    date)                         (0-2) %   unknown)  

 

           (unknown)  (no       (unknown)  (unknown)  Bedside Urine  (units    (

unknown)



                    date)                         Bilirubin - unknown)  



                                                  Negative            

 

           (unknown)  (no       (unknown)  (unknown)  Bedside Urine  (units    (

unknown)



                    date)                         Glucose Negative unknown)  

 

           (unknown)  (no       (unknown)  (unknown)  Bedside Urine  (units    (

unknown)



                    date)                         Ketone - Negative unknown)  

 

           (unknown)  (no       (unknown)  (unknown)  Bedside Urine  (units    (

unknown)



                    date)                         Leukocytes - unknown)  



                                                  Negative            

 

           (unknown)  (no       (unknown)  (unknown)  Bedside Urine  (units    (

unknown)



                    date)                         Nitrite - Negative unknown)  

 

           (unknown)  (no       (unknown)  (unknown)  Bedside Urine  (units    (

unknown)



                    date)                         Occult Blood - unknown)  



                                                  Negative            

 

           (unknown)  (no       (unknown)  (unknown)  Bedside Urine  (units    (

unknown)



                    date)                         Protein - Negative unknown)  

 

           (unknown)  (no       (unknown)  (unknown)  Bedside Urine  (units    (

unknown)



                    date)                         Urobilinogen - unknown)  



                                                  Negative            

 

           (unknown)  (no       (unknown)  (unknown)  Bedside Urine pH  (units  

  (unknown)



                    date)                         6.0       unknown)  

 

           (unknown)  (no       (unknown)  (unknown)  Blood Pressure  (units    

(unknown)



                    date)                         11271 22 unknown)  



                                                  02:06               

 

           (unknown)  (no       (unknown)  (unknown)  Blood Pressure  (units    

(unknown)



                    date)                             unknown)  

 

           (unknown)  (no       (unknown)  (unknown)  CARDIOVASCULAR:  (units   

 (unknown)



                    date)                         Denies chest pain, unknown)  



                                                  palpitations,           



                                                  orthopnea, edema,           

 

           (unknown)  (no       (unknown)  (unknown)  CARDIOVASCULAR:  (units   

 (unknown)



                    date)                         Regular rate and unknown)  



                                                  rhythm without           



                                                  murmurs, gallops,           



                                                  or rubs.            

 

           (unknown)  (no       (unknown)  (unknown)  CT abdomen pelvis  (units 

   (unknown)



                    date)                         w con Stat unknown)  

 

           (unknown)  (no       (unknown)  (unknown)  Calcium 8.8  (units    (un

known)



                    date)                         (8.4-10.2) mg/dL unknown)  

 

           (unknown)  (no       (unknown)  (unknown)  Carbon Dioxide 26  (units 

   (unknown)



                    date)                         (22-32) mmol/L unknown)  

 

           (unknown)  (no       (unknown)  (unknown)  Chief Complaint:  (units  

  (unknown)



                    date)                         Abdominal Pain unknown)  

 

           (unknown)  (no       (unknown)  (unknown)  Chlamydia  (units    (unkn

own)



                    date)                         infection (-2021) unknown)  

 

           (unknown)  (no       (unknown)  (unknown)  Chloride 106  (units    (u

nknown)



                    date)                         () mmol/L unknown)  

 

           (unknown)  (no       (unknown)  (unknown)  Complete Blood  (units    

(unknown)



                    date)                         Count AUTO DIFF unknown)  



                                                  Stat                

 

           (unknown)  (no       (unknown)  (unknown)  Comprehensive  (units    (

unknown)



                    date)                         Metabolic Panel unknown)  



                                                  Stat                

 

           (unknown)  (no       (unknown)  (unknown)  Course    (units    (unkno

wn)



                    date)                                   unknown)  

 

           (unknown)  (no       (unknown)  (unknown)  Creatinine 0.75  (units   

 (unknown)



                    date)                         (0.52-1.04) mg/dL unknown)  

 

           (unknown)  (no       (unknown)  (unknown)  : 2005  (units   

 (unknown)



                    date)                         Acct:[id number] unknown)  

 

           (unknown)  (no       (unknown)  (unknown)  Date of Service:  (units  

  (unknown)



                    date)                         22  unknown)  

 

           (unknown)  (no       (unknown)  (unknown)  Departure  (units    (unkn

own)



                    date)                                   unknown)  

 

           (unknown)  (no       (unknown)  (unknown)  Discharge Plan  (units    

(unknown)



                    date)                                   unknown)  

 

           (unknown)  (no       (unknown)  (unknown)  Discontinued  (units    (u

nknown)



                    date)                         Medications unknown)  

 

           (unknown)  (no       (unknown)  (unknown)  Documented By: DKB  (units

    (unknown)



                    date)                                   unknown)  

 

           (unknown)  (no       (unknown)  (unknown)  ED Orders  (units    (unkn

own)



                    date)                                   unknown)  

 

           (unknown)  (no       (unknown)  (unknown)  EKG-12 Lead Stat  (units  

  (unknown)



                    date)                                   unknown)  

 

           (unknown)  (no       (unknown)  (unknown)  ENT: Nose without  (units 

   (unknown)



                    date)                         bleeding, purulent unknown)  



                                                  drainage. Throat           



                                                  without erythema,           

 

           (unknown)  (no       (unknown)  (unknown)  ER Physician:  (units    (

unknown)



                    date)                         Dashawn Quintero D.O. unknown)  

 

           (unknown)  (no       (unknown)  (unknown)  EXTREMITIES: No  (units   

 (unknown)



                    date)                         edema or joint unknown)  



                                                  tenderness.           

 

           (unknown)  (no       (unknown)  (unknown)  EYES: Pupils equal  (units

    (unknown)



                    date)                         round and reactive. unknown)  



                                                  Extraocular motions           



                                                  intact. No scleral           

 

           (unknown)  (no       (unknown)  (unknown)  Emergency Report  (units  

  (unknown)



                    date)                                   unknown)  

 

           (unknown)  (no       (unknown)  (unknown)  Endometriosis  (units    (

unknown)



                    date)                         (-1)   unknown)  

 

           (unknown)  (no       (unknown)  (unknown)  Eos # (Auto) 300  (units  

  (unknown)



                    date)                         (0-450) /uL unknown)  

 

           (unknown)  (no       (unknown)  (unknown)  Eos % (Auto) 3.9  (units  

  (unknown)



                    date)                         (2-4) %   unknown)  

 

           (unknown)  (no       (unknown)  (unknown)  Esterase  (units    (unkno

wn)



                    date)                                   unknown)  

 

           (unknown)  (no       (unknown)  (unknown)  Estimated GFR > 60  (units

    (unknown)



                    date)                         (>60) mL/min unknown)  

 

           (unknown)  (no       (unknown)  (unknown)  Exam Narrative:  (units   

 (unknown)



                    date)                                   unknown)  

 

           (unknown)  (no       (unknown)  (unknown)  Exam      (units    (unkno

wn)



                    date)                                   unknown)  

 

           (unknown)  (no       (unknown)  (unknown)  GASTROINTESTINAL  (units  

  (unknown)



                    date)                         see HPI   unknown)  

 

           (unknown)  (no       (unknown)  (unknown)  GASTROINTESTINAL:  (units 

   (unknown)



                    date)                         Abdomen soft, unknown)  



                                                  non-tender,           



                                                  nondistended.           

 

           (unknown)  (no       (unknown)  (unknown)  GENERAL: Denies  (units   

 (unknown)



                    date)                         chills, fatigue, unknown)  



                                                  malaise, fever,           



                                                  sweats.             

 

           (unknown)  (no       (unknown)  (unknown)  GENERAL: [19] year  (units

    (unknown)



                    date)                         old patient appears unknown)  



                                                  stated age.           



                                                  Well-developed           



                                                  patient, in           

 

           (unknown)  (no       (unknown)  (unknown)  : D see HPI  (units    (

unknown)



                    date)                                   unknown)  

 

           (unknown)  (no       (unknown)  (unknown)  General   (units    (unkno

wn)



                    date)                                   unknown)  

 

           (unknown)  (no       (unknown)  (unknown)  Globulin 2.6  (units    (u

nknown)



                    date)                         (1.7-4.1) g/dL unknown)  

 

           (unknown)  (no       (unknown)  (unknown)  Glucose 99  (units    (unk

nown)



                    date)                         () mg/dL unknown)  

 

           (unknown)  (no       (unknown)  (unknown)  HEAD: Atraumatic.  (units 

   (unknown)



                    date)                         Normocephalic. unknown)  

 

           (unknown)  (no       (unknown)  (unknown)  HEENT: Denies  (units    (

unknown)



                    date)                         sinus pain, ear unknown)  



                                                  pain, sore throat,           



                                                  difficulty           



                                                  swallowing,           

 

           (unknown)  (no       (unknown)  (unknown)  HPI - Abdominal  (units   

 (unknown)



                    date)                         Pain      unknown)  

 

           (unknown)  (no       (unknown)  (unknown)  HPI narrative:  (units    

(unknown)



                    date)                                   unknown)  

 

           (unknown)  (no       (unknown)  (unknown)  Hct 36.6 (36-46) %  (units

    (unknown)



                    date)                                   unknown)  

 

           (unknown)  (no       (unknown)  (unknown)  Heavy menstrual  (units   

 (unknown)



                    date)                         period (-2019) unknown)  

 

           (unknown)  (no       (unknown)  (unknown)  Hgb 12.5  (units    (unkno

wn)



                    date)                         (12.0-16.0) g/dL unknown)  

 

           (unknown)  (no       (unknown)  (unknown)  History of Present  (units

    (unknown)



                    date)                         Illness   unknown)  

 

           (unknown)  (no       (unknown)  (unknown)  Initial Vital  (units    (

unknown)



                    date)                         Signs     unknown)  

 

           (unknown)  (no       (unknown)  (unknown)  Initial Vital  (units    (

unknown)



                    date)                         Signs:    unknown)  

 

           (unknown)  (no       (unknown)  (unknown)  Irregular  (units    (unkn

own)



                    date)                         menstrual cycle unknown)  



                                                  (-)             

 

           (unknown)  (no       (unknown)  (unknown)  [facility]  (units   

 (unknown)



                    date)                         [address] unknown)  



                                                  [place] [address]           

 

           (unknown)  (no       (unknown)  (unknown)  Casie Darnell,  (units

    (unknown)



                    date)                         ARNP [Primary Care unknown)  



                                                  Provider]           

 

           (unknown)  (no       (unknown)  (unknown)  Ketorolac  (units    (unkn

own)



                    date)                         Tromethamine unknown)  



                                                  (Ketorolac 30 Mg/Ml           



                                                  Vial) 15 mg IV NOW           



                                                  ONE                 

 

           (unknown)  (no       (unknown)  (unknown)  Lab Data  (units    (unkno

wn)



                    date)                                   unknown)  

 

           (unknown)  (no       (unknown)  (unknown)  Lab Results  (units    (un

known)



                    date)                                   unknown)  

 

           (unknown)  (no       (unknown)  (unknown)  Labs:     (units    (unkno

wn)



                    date)                                   unknown)  

 

           (unknown)  (no       (unknown)  (unknown)  Last Admin:  (units    (un

known)



                    date)                         22 04:13 unknown)  



                                                  Dose: Not Given           

 

           (unknown)  (no       (unknown)  (unknown)  Last Admin:  (units    (un

known)



                    date)                         22 04:20 unknown)  



                                                  Dose: 15 mg           

 

           (unknown)  (no       (unknown)  (unknown)  Lipase 113  (units    (unk

nown)



                    date)                         () U/L unknown)  

 

           (unknown)  (no       (unknown)  (unknown)  Lipase Stat  (units    (un

known)



                    date)                                   unknown)  

 

           (unknown)  (no       (unknown)  (unknown)  Lymph # (Auto)  (units    

(unknown)



                    date)                         2700 (8148-0803) unknown)  



                                                  /uL                 

 

           (unknown)  (no       (unknown)  (unknown)  Lymph % (Auto)  (units    

(unknown)



                    date)                         32.4 (25-40) % unknown)  

 

           (unknown)  (no       (unknown)  (unknown)  Lymphangioma  (units    (u

nknown)



                    date)                                   unknown)  

 

           (unknown)  (no       (unknown)  (unknown)  MCH 29.3 (26-34)  (units  

  (unknown)



                    date)                         PG        unknown)  

 

           (unknown)  (no       (unknown)  (unknown)  MCHC 34.0 (30-36)  (units 

   (unknown)



                    date)                         %         unknown)  

 

           (unknown)  (no       (unknown)  (unknown)  MCV 86.2 ()  (units 

   (unknown)



                    date)                         fL        unknown)  

 

           (unknown)  (no       (unknown)  (unknown)  MDM - Abdominal  (units   

 (unknown)



                    date)                         Pain      unknown)  

 

           (unknown)  (no       (unknown)  (unknown)  MUSCULOSKELETAL:  (units  

  (unknown)



                    date)                         denies weakness, unknown)  



                                                  joint pain, or bony           



                                                  pain                

 

           (unknown)  (no       (unknown)  (unknown)  Medical History  (units   

 (unknown)



                    date)                         (Reviewed 22 unknown)  



                                                  @ 05:36 by Dashawn Quintero DO)           

 

           (unknown)  (no       (unknown)  (unknown)  Medication  (units    (unk

nown)



                    date)                         Instructions unknown)  



                                                  Recorded            

 

           (unknown)  (no       (unknown)  (unknown)  Mode of arrival:  (units  

  (unknown)



                    date)                         Ambulatory unknown)  

 

           (unknown)  (no       (unknown)  (unknown)  Mono # (Auto) 700  (units 

   (unknown)



                    date)                         (0-900) /uL unknown)  

 

           (unknown)  (no       (unknown)  (unknown)  Mono % (Auto) 7.9  (units 

   (unknown)



                    date)                         (3-14) %  unknown)  

 

           (unknown)  (no       (unknown)  (unknown)  NECK: Trachea  (units    (

unknown)



                    date)                         midline. Non tender unknown)  

 

           (unknown)  (no       (unknown)  (unknown)  NEURO: AOx3.  (units    (u

nknown)



                    date)                                   unknown)  

 

           (unknown)  (no       (unknown)  (unknown)  NEUROLOGIC: Denies  (units

    (unknown)



                    date)                         weakness, headache, unknown)  



                                                  numbness, change in           



                                                  speech, confusion,           

 

           (unknown)  (no       (unknown)  (unknown)  Narrative  (units    (unkn

own)



                    date)                                   unknown)  

 

           (unknown)  (no       (unknown)  (unknown)  Narrative:  (units    (unk

nown)



                    date)                                   unknown)  

 

           (unknown)  (no       (unknown)  (unknown)  Neut # (Auto) 4700  (units

    (unknown)



                    date)                         (7130-6912) /uL unknown)  

 

           (unknown)  (no       (unknown)  (unknown)  Neut % (Auto) 54.8  (units

    (unknown)



                    date)                         (50-75) % unknown)  

 

           (unknown)  (no       (unknown)  (unknown)  No Action  (units    (unkn

own)



                    date)                                   unknown)  

 

           (unknown)  (no       (unknown)  (unknown)  Ondansetron HCl  (units   

 (unknown)



                    date)                         (Ondansetron 4 Mg unknown)  



                                                  Odt) 4 mg PO NOW           



                                                  ONE                 

 

           (unknown)  (no       (unknown)  (unknown)  Ondansetron HCl  (units   

 (unknown)



                    date)                         (Ondansetron 4 Mg/2 unknown)  



                                                  Ml Inj) 4 mg IV NOW           



                                                  ONE                 

 

           (unknown)  (no       (unknown)  (unknown)  Opioids - Morphine  (units

    (unknown)



                    date)                         Analogues Allergy unknown)  



                                                  Intermediate rash           



                                                  Verified 10/04/22           



                                                  07:57               

 

           (unknown)  (no       (unknown)  (unknown)  Ordered:  (units    (unkno

wn)



                    date)                                   unknown)  

 

           (unknown)  (no       (unknown)  (unknown)  Orders    (units    (unkno

wn)



                    date)                                   unknown)  

 

           (unknown)  (no       (unknown)  (unknown)  Ovarian cyst  (units    (u

nknown)



                    date)                                   unknown)  

 

           (unknown)  (no       (unknown)  (unknown)  Oxygen Delivery  (units   

 (unknown)



                    date)                         Method 22 unknown)  



                                                  02:06               

 

           (unknown)  (no       (unknown)  (unknown)  Oxygen Delivery  (units   

 (unknown)



                    date)                         Method Room Air unknown)  

 

           (unknown)  (no       (unknown)  (unknown)  PSYCHIATRIC: No  (units   

 (unknown)



                    date)                         concerning unknown)  



                                                  psychosocial           



                                                  issues.             

 

           (unknown)  (no       (unknown)  (unknown)  Painful menstrual  (units 

   (unknown)



                    date)                         periods () unknown)  

 

           (unknown)  (no       (unknown)  (unknown)  Patient History  (units   

 (unknown)



                    date)                                   unknown)  

 

           (unknown)  (no       (unknown)  (unknown)  Patient:  (units    (unkno

wn)



                    date)                         Lesvia Aguero unknown)  



                                                  MR#: [id number]             

 

           (unknown)  (no       (unknown)  (unknown)  Plt Count 359  (units    (

unknown)



                    date)                         (150-400) X103/uL unknown)  

 

           (unknown)  (no       (unknown)  (unknown)  Point of Care  (units    (

unknown)



                    date)                         Testing   unknown)  

 

           (unknown)  (no       (unknown)  (unknown)  Point of care  (units    (

unknown)



                    date)                         testing:  unknown)  

 

           (unknown)  (no       (unknown)  (unknown)  Potassium 3.6  (units    (

unknown)



                    date)                         (3.4-5.1) mmol/L unknown)  

 

           (unknown)  (no       (unknown)  (unknown)  Pregnancy Test  (units    

(unknown)



                    date)                         Results Negative unknown)  

 

           (unknown)  (no       (unknown)  (unknown)  Prescriptions:  (units    

(unknown)



                    date)                                   unknown)  

 

           (unknown)  (no       (unknown)  (unknown)  Previous Rx's  (units    (

unknown)



                    date)                                   unknown)  

 

           (unknown)  (no       (unknown)  (unknown)  Pulse Oximetry 100  (units

    (unknown)



                    date)                         22 02:06 unknown)  

 

           (unknown)  (no       (unknown)  (unknown)  Pulse Oximetry 100  (units

    (unknown)



                    date)                                   unknown)  

 

           (unknown)  (no       (unknown)  (unknown)  Pulse Rate 72  (units    (

unknown)



                    date)                         22 02:06 unknown)  

 

           (unknown)  (no       (unknown)  (unknown)  Pulse Rate 72  (units    (

unknown)



                    date)                                   unknown)  

 

           (unknown)  (no       (unknown)  (unknown)  RBC 4.25 (4.0-5.2)  (units

    (unknown)



                    date)                         X106/uL   unknown)  

 

           (unknown)  (no       (unknown)  (unknown)  RDW 14.0  (units    (unkno

wn)



                    date)                         (11.6-14.8) % unknown)  

 

           (unknown)  (no       (unknown)  (unknown) RESPIRATORY: Clear  (units 

   (unknown)



                    date)                         to auscultation. unknown)  



                                                  Breath sounds equal           



                                                  bilaterally. No           



                                                  wheezes,            

 

           (unknown)  (no       (unknown)  (unknown)  RESPIRATORY:  (units    (u

nknown)



                    date)                         Denies dyspnea, unknown)  



                                                  cough, wheezing,           



                                                  hemoptysis, sputum.           

 

           (unknown)  (no       (unknown)  (unknown)  Referrals:  (units    (unk

nown)



                    date)                                   unknown)  

 

           (unknown)  (no       (unknown)  (unknown)  Related Data  (units    (u

nknown)



                    date)                                   unknown)  

 

           (unknown)  (no       (unknown)  (unknown)  Respiratory Rate  (units  

  (unknown)



                    date)                         16 22 02:06 unknown)  

 

           (unknown)  (no       (unknown)  (unknown)  Respiratory Rate  (units  

  (unknown)



                    date)                         16        unknown)  

 

           (unknown)  (no       (unknown)  (unknown)  Result diagrams:  (units  

  (unknown)



                    date)                                   unknown)  

 

           (unknown)  (no       (unknown)  (unknown)  Review of Systems  (units 

   (unknown)



                    date)                                   unknown)  

 

           (unknown)  (no       (unknown)  (unknown)  S/P ACL   (units    (unkno

wn)



                    date)                         reconstruction unknown)  



                                                  (-21)           

 

           (unknown)  (no       (unknown)  (unknown)  SKIN: Denies rash,  (units

    (unknown)



                    date)                         skin lesions, or unknown)  



                                                  other               

 

           (unknown)  (no       (unknown)  (unknown)  SKIN: No rash or  (units  

  (unknown)



                    date)                         erythema of visible unknown)  



                                                  areas               

 

           (unknown)  (no       (unknown)  (unknown)  Signed By:  (units    (unk

nown)



                    date)                                   unknown)  

 

           (unknown)  (no       (unknown)  (unknown)  Smoking Status:  (units   

 (unknown)



                    date)                         Current some day unknown)  



                                                  smoker              

 

           (unknown)  (no       (unknown)  (unknown)  Social History  (units    

(unknown)



                    date)                         (Reviewed 22 unknown)  



                                                  @ 05:36 by Dashawn Quintero DO)           

 

           (unknown)  (no       (unknown)  (unknown)  Sodium 138  (units    (unk

nown)



                    date)                         (137-145) mmol/L unknown)  

 

           (unknown)  (no       (unknown)  (unknown)  Source: patient  (units   

 (unknown)



                    date)                                   unknown)  

 

           (unknown)  (no       (unknown)  (unknown)  Stated Complaint:  (units 

   (unknown)



                    date)                         ABD PAIN  unknown)  

 

           (unknown)  (no       (unknown)  (unknown)  Stop: 22  (units    

(unknown)



                    date)                         02:42     unknown)  

 

           (unknown)  (no       (unknown)  (unknown)  Stop: 22  (units    

(unknown)



                    date)                         03:44     unknown)  

 

           (unknown)  (no       (unknown)  (unknown)  Substance Use  (units    (

unknown)



                    date)                         Type: does not use unknown)  

 

           (unknown)  (no       (unknown)  (unknown)  Surgical History  (units  

  (unknown)



                    date)                         (Reviewed 22 unknown)  



                                                  @ 05:36 by Dashawn Quintero DO)           

 

           (unknown)  (no       (unknown)  (unknown)  TOA (tubo-ovarian  (units 

   (unknown)



                    date)                         abscess)  unknown)  



                                                  (-21)           

 

           (unknown)  (no       (unknown)  (unknown)  Temperature 98.4 F  (units

    (unknown)



                    date)                         22 02:06 unknown)  

 

           (unknown)  (no       (unknown)  (unknown)  Temperature 98.4 F  (units

    (unknown)



                    date)                                   unknown)  

 

           (unknown)  (no       (unknown)  (unknown)  Time Seen by  (units    (u

nknown)



                    date)                         Provider: 22 unknown)  



                                                  02:07               

 

           (unknown)  (no       (unknown)  (unknown)  Total Bilirubin  (units   

 (unknown)



                    date)                         0.4 (0.2-1.3) mg/dL unknown)  

 

           (unknown)  (no       (unknown)  (unknown)  Total Protein 6.6  (units 

   (unknown)



                    date)                         (6.3-8.2) g/dL unknown)  

 

           (unknown)  (no       (unknown)  (unknown)  US pelvic complete  (units

    (unknown)



                    date)                         Stat      unknown)  

 

           (unknown)  (no       (unknown)  (unknown)  Urine Dip  (units    (unkn

own)



                    date)                                   unknown)  

 

           (unknown)  (no       (unknown)  (unknown)  Urine Specific  (units    

(unknown)



                    date)                         Gravity 1.025 unknown)  

 

           (unknown)  (no       (unknown)  (unknown)  Vital Signs - 8 hr  (units

    (unknown)



                    date)                                   unknown)  

 

           (unknown)  (no       (unknown)  (unknown)  Vital Signs  (units    (un

known)



                    date)                                   unknown)  

 

           (unknown)  (no       (unknown)  (unknown)  Vital signs:  (units    (u

nknown)



                    date)                                   unknown)  

 

           (unknown)  (no       (unknown)  (unknown)  WBC 8.5 (4.5-11.0)  (units

    (unknown)



                    date)                         X103/uL   unknown)  

 

           (unknown)  (no       (unknown)  (unknown)  [Embedded Image  (units   

 (unknown)



                    date)                         Not Available] unknown)  

 

           (unknown)  (no       (unknown)  (unknown)  abscess presents  (units  

  (unknown)



                    date)                         with significant unknown)  



                                                  other and a chief           



                                                  complaint of severe           



                                                  left                

 

           (unknown)  (no       (unknown)  (unknown)  alcohol intake  (units    

(unknown)



                    date)                         frequency: unknown)  



                                                  holidays/special           



                                                  occasions only           

 

           (unknown)  (no       (unknown)  (unknown)  alcohol intake:  (units   

 (unknown)



                    date)                         current   unknown)  

 

           (unknown)  (no       (unknown)  (unknown)  clindamycin HCl  (units   

 (unknown)



                    date)                         150 mg capsule 150 unknown)  



                                                  mg PO DAILY #30           



                                                  caps 22           

 

           (unknown)  (no       (unknown)  (unknown)  clindamycin HCl  (units   

 (unknown)



                    date)                         150 mg capsule unknown)  

 

           (unknown)  (no       (unknown)  (unknown)  denies any  (units    (unk

nown)



                    date)                         vomiting. She is unknown)  



                                                  had no change in           



                                                  her bowel habits.           



                                                  She denies           

 

           (unknown)  (no       (unknown)  (unknown)  dizziness.  (units    (unk

nown)



                    date)                                   unknown)  

 

           (unknown)  (no       (unknown)  (unknown)  doxycycline  (units    (un

known)



                    date)                         AdvReac   unknown)  



                                                  Intermediate vomitt           



                                                  Verified 10/04/22           



                                                  07:57               

 

           (unknown)  (no       (unknown)  (unknown)  dysuria, frequency  (units

    (unknown)



                    date)                         or urgency. unknown)  

 

           (unknown)  (no       (unknown)  (unknown)  for persistent  (units    

(unknown)



                    date)                         right lower unknown)  



                                                  quadrant pain in           



                                                  the aftermath of a           



                                                  tubo-ovarian           

 

           (unknown)  (no       (unknown)  (unknown)  household members:  (units

    (unknown)



                    date)                         friend(s) unknown)  

 

           (unknown)  (no       (unknown)  (unknown)  hydromorphone 4 mg  (units

    (unknown)



                    date)                         tablet 4 mg PO unknown)  



                                                  Q4-6H PRN pain #20           



                                                  tabs 22           

 

           (unknown)  (no       (unknown)  (unknown)  hydromorphone  (units    (

unknown)



                    date)                         [Dilaudid] 4 mg unknown)  



                                                  tablet              

 

           (unknown)  (no       (unknown)  (unknown)  icterus. No  (units    (un

known)



                    date)                         injection or unknown)  



                                                  drainage.           

 

           (unknown)  (no       (unknown)  (unknown)  lower quadrant  (units    

(unknown)



                    date)                         pain over the past unknown)  



                                                  few days. The pain           



                                                  is severe and seems           



                                                  to be               

 

           (unknown)  (no       (unknown)  (unknown)  mild distress.  (units    

(unknown)



                    date)                                   unknown)  

 

           (unknown)  (no       (unknown)  (unknown)  oxycodone 5 mg  (units    

(unknown)



                    date)                         tablet 5 mg PO Q4H unknown)  



                                                  PRN pain #20 tabs           



                                                  22            

 

           (unknown)  (no       (unknown)  (unknown)  oxycodone 5 mg  (units    

(unknown)



                    date)                         tablet    unknown)  

 

           (unknown)  (no       (unknown)  (unknown)  radiates to her  (units   

 (unknown)



                    date)                         back. She denies unknown)  



                                                  any fever chills.           



                                                  She is been           



                                                  nauseated but           

 

           (unknown)  (no       (unknown)  (unknown)  rales, or rhonchi.  (units

    (unknown)



                    date)                                   unknown)  

 

           (unknown)  (no       (unknown)  (unknown)  seizures,  (units    (unkn

own)



                    date)                         incoordination. unknown)  

 

           (unknown)  (no       (unknown)  (unknown)  tobacco type:  (units    (

unknown)



                    date)                         vaping    unknown)  

 

           (unknown)  (no       (unknown)  (unknown)  tonsillar  (units    (unkn

own)



                    date)                         hypertrophy or unknown)  



                                                  exudate. Airway           



                                                  patent.             

 

           (unknown)  (no       (unknown)  (unknown)  worse with motion  (units 

   (unknown)



                    date)                         and improves with unknown)  



                                                  rest. She does at           



                                                  times feel like the           



                                                  pain                









                                         Result panel 551









           (unknown)  (no       (unknown)  (unknown)  (no value)  (units    (unk

nown)



                    date)                                   unknown)  

 

           (unknown)  (no       (unknown)  (unknown)  (Dilaudid)  (units    (unk

nown)



                    date)                                   unknown)  

 

           (unknown)  (no       (unknown)  (unknown)  [id number]  (units    (unkno

wn)



                    date)                                   unknown)  

 

           (unknown)  (no       (unknown)  (unknown)  02:06     (units    (unkno

wn)



                    date)                                   unknown)  

 

           (unknown)  (no       (unknown)  (unknown)  02:55 02:55  (units    (un

known)



                    date)                                   unknown)  

 

           (unknown)  (no       (unknown)  (unknown)  22 02:41  (units    

(unknown)



                    date)                                   unknown)  

 

           (unknown)  (no       (unknown)  (unknown)  22 02:55  (units    

(unknown)



                    date)                                   unknown)  

 

           (unknown)  (no       (unknown)  (unknown)  22 03:01  (units    

(unknown)



                    date)                                   unknown)  

 

           (unknown)  (no       (unknown)  (unknown)  22 04:52  (units    

(unknown)



                    date)                                   unknown)  

 

           (unknown)  (no       (unknown)  (unknown)  22  (units 

   (unknown)



                    date)                         Range/Units unknown)  

 

           (unknown)  (no       (unknown)  (unknown)  22  (units    (unkno

wn)



                    date)                                   unknown)  

 

           (unknown)  (no       (unknown)  (unknown)  12 point review of  (units

    (unknown)



                    date)                         systems is negative unknown)  



                                                  except for those           



                                                  stated above           

 

           (unknown)  (no       (unknown)  (unknown)  150 mg PO DAILY  (units   

 (unknown)



                    date)                         Qty: 30 0RF unknown)  

 

           (unknown)  (no       (unknown)  (unknown)  17-year-old female  (units

    (unknown)



                    date)                         nonsmoker with unknown)  



                                                  history of a           



                                                  relatively recent           



                                                  pelvic surgery           

 

           (unknown)  (no       (unknown)  (unknown)  4 mg PO Q4-6H PRN  (units 

   (unknown)



                    date)                         (Reason: pain) Qty: unknown)  



                                                  20 0RF              

 

           (unknown)  (no       (unknown)  (unknown)  5 mg PO Q4H PRN  (units   

 (unknown)



                    date)                         (Reason: pain) Qty: unknown)  



                                                  20 0RF              

 

           (unknown)  (no       (unknown)  (unknown)  ALT 13 (<35) IU/L  (units 

   (unknown)



                    date)                                   unknown)  

 

           (unknown)  (no       (unknown)  (unknown)  AST 16 (14-36)  (units    

(unknown)



                    date)                         IU/L      unknown)  

 

           (unknown)  (no       (unknown)  (unknown)  Acne (-2016)  (units    (u

nknown)



                    date)                                   unknown)  

 

           (unknown)  (no       (unknown)  (unknown)  Age/Sex: 17 / F  (units   

 (unknown)



                    date)                                   unknown)  

 

           (unknown)  (no       (unknown)  (unknown)  Albumin 4.0  (units    (un

known)



                    date)                         (3.5-5.0) g/dL unknown)  

 

           (unknown)  (no       (unknown)  (unknown)  Albumin/Globulin  (units  

  (unknown)



                    date)                         Ratio 1.5 (1.0-2.8) unknown)  

 

           (unknown)  (no       (unknown)  (unknown)  Alcohol type: wine  (units

    (unknown)



                    date)                                   unknown)  

 

           (unknown)  (no       (unknown)  (unknown)  Alkaline  (units    (unkno

wn)



                    date)                         Phosphatase 81 unknown)  



                                                  () U/L           

 

           (unknown)  (no       (unknown)  (unknown)  Allergies  (units    (unkn

own)



                    date)                                   unknown)  

 

           (unknown)  (no       (unknown)  (unknown)  Allergy/AdvReac  (units   

 (unknown)



                    date)                         Type Severity unknown)  



                                                  Reaction Status           



                                                  Date / Time           

 

           (unknown)  (no       (unknown)  (unknown)  Anesthesia  (units    (unk

nown)



                    date)                                   unknown)  

 

           (unknown)  (no       (unknown)  (unknown)  BACK: Nontender  (units   

 (unknown)



                    date)                         without deformity unknown)  



                                                  or crepitance. No           



                                                  flank tenderness.           

 

           (unknown)  (no       (unknown)  (unknown)  BUN 11 (7-17)  (units    (

unknown)



                    date)                         mg/dL     unknown)  

 

           (unknown)  (no       (unknown)  (unknown)  BUN/Creatinine  (units    

(unknown)



                    date)                         Ratio 14.7 (6-22) unknown)  

 

           (unknown)  (no       (unknown)  (unknown)  Baso # (Auto) 100  (units 

   (unknown)



                    date)                         (0-100) /uL unknown)  

 

           (unknown)  (no       (unknown)  (unknown)  Baso % (Auto) 1.0  (units 

   (unknown)



                    date)                         (0-2) %   unknown)  

 

           (unknown)  (no       (unknown)  (unknown)  Bedside Urine  (units    (

unknown)



                    date)                         Bilirubin - unknown)  



                                                  Negative            

 

           (unknown)  (no       (unknown)  (unknown)  Bedside Urine  (units    (

unknown)



                    date)                         Glucose Negative unknown)  

 

           (unknown)  (no       (unknown)  (unknown)  Bedside Urine  (units    (

unknown)



                    date)                         Ketone - Negative unknown)  

 

           (unknown)  (no       (unknown)  (unknown)  Bedside Urine  (units    (

unknown)



                    date)                         Leukocytes - unknown)  



                                                  Negative            

 

           (unknown)  (no       (unknown)  (unknown)  Bedside Urine  (units    (

unknown)



                    date)                         Nitrite - Negative unknown)  

 

           (unknown)  (no       (unknown)  (unknown)  Bedside Urine  (units    (

unknown)



                    date)                         Occult Blood - unknown)  



                                                  Negative            

 

           (unknown)  (no       (unknown)  (unknown)  Bedside Urine  (units    (

unknown)



                    date)                         Protein - Negative unknown)  

 

           (unknown)  (no       (unknown)  (unknown)  Bedside Urine  (units    (

unknown)



                    date)                         Urobilinogen - unknown)  



                                                  Negative            

 

           (unknown)  (no       (unknown)  (unknown)  Bedside Urine pH  (units  

  (unknown)



                    date)                         6.0       unknown)  

 

           (unknown)  (no       (unknown)  (unknown)  Blood Pressure  (units    

(unknown)



                    date)                         112/71 22 unknown)  



                                                  02:06               

 

           (unknown)  (no       (unknown)  (unknown)  Blood Pressure  (units    

(unknown)



                    date)                         112/71    unknown)  

 

           (unknown)  (no       (unknown)  (unknown)  CARDIOVASCULAR:  (units   

 (unknown)



                    date)                         Denies chest pain, unknown)  



                                                  palpitations,           



                                                  orthopnea, edema,           

 

           (unknown)  (no       (unknown)  (unknown)  CARDIOVASCULAR:  (units   

 (unknown)



                    date)                         Regular rate and unknown)  



                                                  rhythm without           



                                                  murmurs, gallops,           



                                                  or rubs.            

 

           (unknown)  (no       (unknown)  (unknown)  CT abdomen pelvis  (units 

   (unknown)



                    date)                         w con Stat unknown)  

 

           (unknown)  (no       (unknown)  (unknown)  Calcium 8.8  (units    (un

known)



                    date)                         (8.4-10.2) mg/dL unknown)  

 

           (unknown)  (no       (unknown)  (unknown)  Carbon Dioxide 26  (units 

   (unknown)



                    date)                         (22-32) mmol/L unknown)  

 

           (unknown)  (no       (unknown)  (unknown)  Chief Complaint:  (units  

  (unknown)



                    date)                         Abdominal Pain unknown)  

 

           (unknown)  (no       (unknown)  (unknown)  Chlamydia  (units    (unkn

own)



                    date)                         infection (-) unknown)  

 

           (unknown)  (no       (unknown)  (unknown)  Chloride 106  (units    (u

nknown)



                    date)                         () mmol/L unknown)  

 

           (unknown)  (no       (unknown)  (unknown)  Complete Blood  (units    

(unknown)



                    date)                         Count AUTO DIFF unknown)  



                                                  Stat                

 

           (unknown)  (no       (unknown)  (unknown)  Comprehensive  (units    (

unknown)



                    date)                         Metabolic Panel unknown)  



                                                  Stat                

 

           (unknown)  (no       (unknown)  (unknown)  Course    (units    (unkno

wn)



                    date)                                   unknown)  

 

           (unknown)  (no       (unknown)  (unknown)  Creatinine 0.75  (units   

 (unknown)



                    date)                         (0.52-1.04) mg/dL unknown)  

 

           (unknown)  (no       (unknown)  (unknown)  : 2005  (units   

 (unknown)



                    date)                         Acct:[id number] unknown)  

 

           (unknown)  (no       (unknown)  (unknown)  Date of Service:  (units  

  (unknown)



                    date)                         22  unknown)  

 

           (unknown)  (no       (unknown)  (unknown)  Departure  (units    (unkn

own)



                    date)                                   unknown)  

 

           (unknown)  (no       (unknown)  (unknown)  Discharge Plan  (units    

(unknown)



                    date)                                   unknown)  

 

           (unknown)  (no       (unknown)  (unknown)  Discontinued  (units    (u

nknown)



                    date)                         Medications unknown)  

 

           (unknown)  (no       (unknown)  (unknown)  Documented By: DKB  (units

    (unknown)



                    date)                                   unknown)  

 

           (unknown)  (no       (unknown)  (unknown)  ED Orders  (units    (unkn

own)



                    date)                                   unknown)  

 

           (unknown)  (no       (unknown)  (unknown)  EKG-12 Lead Stat  (units  

  (unknown)



                    date)                                   unknown)  

 

           (unknown)  (no       (unknown)  (unknown)  ENT: Nose without  (units 

   (unknown)



                    date)                         bleeding, purulent unknown)  



                                                  drainage. Throat           



                                                  without erythema,           

 

           (unknown)  (no       (unknown)  (unknown)  ER Physician:  (units    (

unknown)



                    date)                         Dashawn Quintero D.O. unknown)  

 

           (unknown)  (no       (unknown)  (unknown)  EXTREMITIES: No  (units   

 (unknown)



                    date)                         edema or joint unknown)  



                                                  tenderness.           

 

           (unknown)  (no       (unknown)  (unknown)  EYES: Pupils equal  (units

    (unknown)



                    date)                         round and reactive. unknown)  



                                                  Extraocular motions           



                                                  intact. No scleral           

 

           (unknown)  (no       (unknown)  (unknown)  Emergency Report  (units  

  (unknown)



                    date)                                   unknown)  

 

           (unknown)  (no       (unknown)  (unknown)  Endometriosis  (units    (

unknown)



                    date)                         (-)   unknown)  

 

           (unknown)  (no       (unknown)  (unknown)  Eos # (Auto) 300  (units  

  (unknown)



                    date)                         (0-450) /uL unknown)  

 

           (unknown)  (no       (unknown)  (unknown)  Eos % (Auto) 3.9  (units  

  (unknown)



                    date)                         (2-4) %   unknown)  

 

           (unknown)  (no       (unknown)  (unknown)  Esterase  (units    (unkno

wn)



                    date)                                   unknown)  

 

           (unknown)  (no       (unknown)  (unknown)  Estimated GFR > 60  (units

    (unknown)



                    date)                         (>60) mL/min unknown)  

 

           (unknown)  (no       (unknown)  (unknown)  Exam Narrative:  (units   

 (unknown)



                    date)                                   unknown)  

 

           (unknown)  (no       (unknown)  (unknown)  Exam      (units    (unkno

wn)



                    date)                                   unknown)  

 

           (unknown)  (no       (unknown)  (unknown)  GASTROINTESTINAL  (units  

  (unknown)



                    date)                         see HPI   unknown)  

 

           (unknown)  (no       (unknown)  (unknown)  GASTROINTESTINAL:  (units 

   (unknown)



                    date)                         Abdomen soft, unknown)  



                                                  non-tender,           



                                                  nondistended.           

 

           (unknown)  (no       (unknown)  (unknown)  GENERAL: Denies  (units   

 (unknown)



                    date)                         chills, fatigue, unknown)  



                                                  malaise, fever,           



                                                  sweats.             

 

           (unknown)  (no       (unknown)  (unknown)  GENERAL: [17] year  (units

    (unknown)



                    date)                         old patient appears unknown)  



                                                  stated age.           



                                                  Well-developed           



                                                  patient, in           

 

           (unknown)  (no       (unknown)  (unknown)  : D see HPI  (units    (

unknown)



                    date)                                   unknown)  

 

           (unknown)  (no       (unknown)  (unknown)  General   (units    (unkno

wn)



                    date)                                   unknown)  

 

           (unknown)  (no       (unknown)  (unknown)  Globulin 2.6  (units    (u

nknown)



                    date)                         (1.7-4.1) g/dL unknown)  

 

           (unknown)  (no       (unknown)  (unknown)  Glucose 99  (units    (unk

nown)



                    date)                         () mg/dL unknown)  

 

           (unknown)  (no       (unknown)  (unknown)  HEAD: Atraumatic.  (units 

   (unknown)



                    date)                         Normocephalic. unknown)  

 

           (unknown)  (no       (unknown)  (unknown)  HEENT: Denies  (units    (

unknown)



                    date)                         sinus pain, ear unknown)  



                                                  pain, sore throat,           



                                                  difficulty           



                                                  swallowing,           

 

           (unknown)  (no       (unknown)  (unknown)  HPI - Abdominal  (units   

 (unknown)



                    date)                         Pain      unknown)  

 

           (unknown)  (no       (unknown)  (unknown)  HPI narrative:  (units    

(unknown)



                    date)                                   unknown)  

 

           (unknown)  (no       (unknown)  (unknown)  Hct 36.6 (36-46) %  (units

    (unknown)



                    date)                                   unknown)  

 

           (unknown)  (no       (unknown)  (unknown)  Heavy menstrual  (units   

 (unknown)



                    date)                         period (-2019) unknown)  

 

           (unknown)  (no       (unknown)  (unknown)  Hgb 12.5  (units    (unkno

wn)



                    date)                         (12.0-16.0) g/dL unknown)  

 

           (unknown)  (no       (unknown)  (unknown)  History of Present  (units

    (unknown)



                    date)                         Illness   unknown)  

 

           (unknown)  (no       (unknown)  (unknown)  Initial Vital  (units    (

unknown)



                    date)                         Signs     unknown)  

 

           (unknown)  (no       (unknown)  (unknown)  Initial Vital  (units    (

unknown)



                    date)                         Signs:    unknown)  

 

           (unknown)  (no       (unknown)  (unknown)  Irregular  (units    (unkn

own)



                    date)                         menstrual cycle unknown)  



                                                  ()             

 

           (unknown)  (no       (unknown)  (unknown)  [facility]  (units   

 (unknown)



                    date)                         [address] unknown)  



                                                  [place] [id number]           

 

           (unknown)  (no       (unknown)  (unknown)  Casie Darnell,  (units

    (unknown)



                    date)                         ARNP [Primary Care unknown)  



                                                  Provider]           

 

           (unknown)  (no       (unknown)  (unknown)  Ketorolac  (units    (unkn

own)



                    date)                         Tromethamine unknown)  



                                                  (Ketorolac 30 Mg/Ml           



                                                  Vial) 15 mg IV NOW           



                                                  ONE                 

 

           (unknown)  (no       (unknown)  (unknown)  Lab Data  (units    (unkno

wn)



                    date)                                   unknown)  

 

           (unknown)  (no       (unknown)  (unknown)  Lab Results  (units    (un

known)



                    date)                                   unknown)  

 

           (unknown)  (no       (unknown)  (unknown)  Labs:     (units    (unkno

wn)



                    date)                                   unknown)  

 

           (unknown)  (no       (unknown)  (unknown)  Last Admin:  (units    (un

known)



                    date)                         22 04:13 unknown)  



                                                  Dose: Not Given           

 

           (unknown)  (no       (unknown)  (unknown)  Last Admin:  (units    (un

known)



                    date)                         22 04:20 unknown)  



                                                  Dose: 15 mg           

 

           (unknown)  (no       (unknown)  (unknown)  Lipase 113  (units    (unk

nown)



                    date)                         () U/L unknown)  

 

           (unknown)  (no       (unknown)  (unknown)  Lipase Stat  (units    (un

known)



                    date)                                   unknown)  

 

           (unknown)  (no       (unknown)  (unknown)  Lymph # (Auto)  (units    

(unknown)



                    date)                         2700 (0607-7632) unknown)  



                                                  /uL                 

 

           (unknown)  (no       (unknown)  (unknown)  Lymph % (Auto)  (units    

(unknown)



                    date)                         32.4 (25-40) % unknown)  

 

           (unknown)  (no       (unknown)  (unknown)  Lymphangioma  (units    (u

nknown)



                    date)                                   unknown)  

 

           (unknown)  (no       (unknown)  (unknown)  MCH 29.3 (26-34)  (units  

  (unknown)



                    date)                         PG        unknown)  

 

           (unknown)  (no       (unknown)  (unknown)  MCHC 34.0 (30-36)  (units 

   (unknown)



                    date)                         %         unknown)  

 

           (unknown)  (no       (unknown)  (unknown)  MCV 86.2 ()  (units 

   (unknown)



                    date)                         fL        unknown)  

 

           (unknown)  (no       (unknown)  (unknown)  MDM - Abdominal  (units   

 (unknown)



                    date)                         Pain      unknown)  

 

           (unknown)  (no       (unknown)  (unknown)  MUSCULOSKELETAL:  (units  

  (unknown)



                    date)                         denies weakness, unknown)  



                                                  joint pain, or bony           



                                                  pain                

 

           (unknown)  (no       (unknown)  (unknown)  Medical History  (units   

 (unknown)



                    date)                         (Reviewed 22 unknown)  



                                                  @ 05:36 by Dashawn Quintero DO)           

 

           (unknown)  (no       (unknown)  (unknown)  Medication  (units    (unk

nown)



                    date)                         Instructions unknown)  



                                                  Recorded            

 

           (unknown)  (no       (unknown)  (unknown)  Mode of arrival:  (units  

  (unknown)



                    date)                         Ambulatory unknown)  

 

           (unknown)  (no       (unknown)  (unknown)  Mono # (Auto) 700  (units 

   (unknown)



                    date)                         (0-900) /uL unknown)  

 

           (unknown)  (no       (unknown)  (unknown)  Mono % (Auto) 7.9  (units 

   (unknown)



                    date)                         (3-14) %  unknown)  

 

           (unknown)  (no       (unknown)  (unknown)  NECK: Trachea  (units    (

unknown)



                    date)                         midline. Non tender unknown)  

 

           (unknown)  (no       (unknown)  (unknown)  NEURO: AOx3.  (units    (u

nknown)



                    date)                                   unknown)  

 

           (unknown)  (no       (unknown)  (unknown)  NEUROLOGIC: Denies  (units

    (unknown)



                    date)                         weakness, headache, unknown)  



                                                  numbness, change in           



                                                  speech, confusion,           

 

           (unknown)  (no       (unknown)  (unknown)  Narrative  (units    (unkn

own)



                    date)                                   unknown)  

 

           (unknown)  (no       (unknown)  (unknown)  Narrative:  (units    (unk

nown)



                    date)                                   unknown)  

 

           (unknown)  (no       (unknown)  (unknown)  Neut # (Auto) 4700  (units

    (unknown)



                    date)                         (1071-2937) /uL unknown)  

 

           (unknown)  (no       (unknown)  (unknown)  Neut % (Auto) 54.8  (units

    (unknown)



                    date)                         (50-75) % unknown)  

 

           (unknown)  (no       (unknown)  (unknown)  No Action  (units    (unkn

own)



                    date)                                   unknown)  

 

           (unknown)  (no       (unknown)  (unknown)  Ondansetron HCl  (units   

 (unknown)



                    date)                         (Ondansetron 4 Mg unknown)  



                                                  Odt) 4 mg PO NOW           



                                                  ONE                 

 

           (unknown)  (no       (unknown)  (unknown)  Ondansetron HCl  (units   

 (unknown)



                    date)                         (Ondansetron 4 Mg/2 unknown)  



                                                  Ml Inj) 4 mg IV NOW           



                                                  ONE                 

 

           (unknown)  (no       (unknown)  (unknown)  Opioids - Morphine  (units

    (unknown)



                    date)                         Analogues Allergy unknown)  



                                                  Intermediate rash           



                                                  Verified 10/04/22           



                                                  07:57               

 

           (unknown)  (no       (unknown)  (unknown)  Ordered:  (units    (unkno

wn)



                    date)                                   unknown)  

 

           (unknown)  (no       (unknown)  (unknown)  Orders    (units    (unkno

wn)



                    date)                                   unknown)  

 

           (unknown)  (no       (unknown)  (unknown)  Ovarian cyst  (units    (u

nknown)



                    date)                                   unknown)  

 

           (unknown)  (no       (unknown)  (unknown)  Oxygen Delivery  (units   

 (unknown)



                    date)                         Method 22 unknown)  



                                                  02:06               

 

           (unknown)  (no       (unknown)  (unknown)  Oxygen Delivery  (units   

 (unknown)



                    date)                         Method Room Air unknown)  

 

           (unknown)  (no       (unknown)  (unknown)  PSYCHIATRIC: No  (units   

 (unknown)



                    date)                         concerning unknown)  



                                                  psychosocial           



                                                  issues.             

 

           (unknown)  (no       (unknown)  (unknown)  Painful menstrual  (units 

   (unknown)



                    date)                         periods (-) unknown)  

 

           (unknown)  (no       (unknown)  (unknown)  Patient History  (units   

 (unknown)



                    date)                                   unknown)  

 

           (unknown)  (no       (unknown)  (unknown)  Patient:  (units    (unkno

wn)



                    date)                         Lesvia Aguero unknown)  



                                                  MR#: [id number]             

 

           (unknown)  (no       (unknown)  (unknown)  Plt Count 359  (units    (

unknown)



                    date)                         (150-400) X103/uL unknown)  

 

           (unknown)  (no       (unknown)  (unknown)  Point of Care  (units    (

unknown)



                    date)                         Testing   unknown)  

 

           (unknown)  (no       (unknown)  (unknown)  Point of care  (units    (

unknown)



                    date)                         testing:  unknown)  

 

           (unknown)  (no       (unknown)  (unknown)  Potassium 3.6  (units    (

unknown)



                    date)                         (3.4-5.1) mmol/L unknown)  

 

           (unknown)  (no       (unknown)  (unknown)  Pregnancy Test  (units    

(unknown)



                    date)                         Results Negative unknown)  

 

           (unknown)  (no       (unknown)  (unknown)  Prescriptions:  (units    

(unknown)



                    date)                                   unknown)  

 

           (unknown)  (no       (unknown)  (unknown)  Previous Rx's  (units    (

unknown)



                    date)                                   unknown)  

 

           (unknown)  (no       (unknown)  (unknown)  Pulse Oximetry 100  (units

    (unknown)



                    date)                         22 02:06 unknown)  

 

           (unknown)  (no       (unknown)  (unknown)  Pulse Oximetry 100  (units

    (unknown)



                    date)                                   unknown)  

 

           (unknown)  (no       (unknown)  (unknown)  Pulse Rate 72  (units    (

unknown)



                    date)                         22 02:06 unknown)  

 

           (unknown)  (no       (unknown)  (unknown)  Pulse Rate 72  (units    (

unknown)



                    date)                                   unknown)  

 

           (unknown)  (no       (unknown)  (unknown)  RBC 4.25 (4.0-5.2)  (units

    (unknown)



                    date)                         X106/uL   unknown)  

 

           (unknown)  (no       (unknown)  (unknown)  RDW 14.0  (units    (unkno

wn)



                    date)                         (11.6-14.8) % unknown)  

 

           (unknown)  (no       (unknown)  (unknown) RESPIRATORY: Clear  (units 

   (unknown)



                    date)                         to auscultation. unknown)  



                                                  Breath sounds equal           



                                                  bilaterally. No           



                                                  wheezes,            

 

           (unknown)  (no       (unknown)  (unknown)  RESPIRATORY:  (units    (u

nknown)



                    date)                         Denies dyspnea, unknown)  



                                                  cough, wheezing,           



                                                  hemoptysis, sputum.           

 

           (unknown)  (no       (unknown)  (unknown)  Referrals:  (units    (unk

nown)



                    date)                                   unknown)  

 

           (unknown)  (no       (unknown)  (unknown)  Related Data  (units    (u

nknown)



                    date)                                   unknown)  

 

           (unknown)  (no       (unknown)  (unknown)  Respiratory Rate  (units  

  (unknown)



                    date)                         16 22 02:06 unknown)  

 

           (unknown)  (no       (unknown)  (unknown)  Respiratory Rate  (units  

  (unknown)



                    date)                         16        unknown)  

 

           (unknown)  (no       (unknown)  (unknown)  Result diagrams:  (units  

  (unknown)



                    date)                                   unknown)  

 

           (unknown)  (no       (unknown)  (unknown)  Review of Systems  (units 

   (unknown)



                    date)                                   unknown)  

 

           (unknown)  (no       (unknown)  (unknown)  S/P ACL   (units    (unkno

wn)



                    date)                         reconstruction unknown)  



                                                  (-21)           

 

           (unknown)  (no       (unknown)  (unknown)  SKIN: Denies rash,  (units

    (unknown)



                    date)                         skin lesions, or unknown)  



                                                  other               

 

           (unknown)  (no       (unknown)  (unknown)  SKIN: No rash or  (units  

  (unknown)



                    date)                         erythema of visible unknown)  



                                                  areas               

 

           (unknown)  (no       (unknown)  (unknown)  Signed By:  (units    (unk

nown)



                    date)                                   unknown)  

 

           (unknown)  (no       (unknown)  (unknown)  Smoking Status:  (units   

 (unknown)



                    date)                         Current some day unknown)  



                                                  smoker              

 

           (unknown)  (no       (unknown)  (unknown)  Social History  (units    

(unknown)



                    date)                         (Reviewed 22 unknown)  



                                                  @ 05:36 by Dashawn Quintero DO)           

 

           (unknown)  (no       (unknown)  (unknown)  Sodium 138  (units    (unk

nown)



                    date)                         (137-145) mmol/L unknown)  

 

           (unknown)  (no       (unknown)  (unknown)  Source: patient  (units   

 (unknown)



                    date)                                   unknown)  

 

           (unknown)  (no       (unknown)  (unknown)  Stated Complaint:  (units 

   (unknown)



                    date)                         ABD PAIN  unknown)  

 

           (unknown)  (no       (unknown)  (unknown)  Stop: 22  (units    

(unknown)



                    date)                         02:42     unknown)  

 

           (unknown)  (no       (unknown)  (unknown)  Stop: 22  (units    

(unknown)



                    date)                         03:44     unknown)  

 

           (unknown)  (no       (unknown)  (unknown)  Substance Use  (units    (

unknown)



                    date)                         Type: does not use unknown)  

 

           (unknown)  (no       (unknown)  (unknown)  Surgical History  (units  

  (unknown)



                    date)                         (Reviewed 22 unknown)  



                                                  @ 05:36 by Dashawn Quintero DO)           

 

           (unknown)  (no       (unknown)  (unknown)  TOA (tubo-ovarian  (units 

   (unknown)



                    date)                         abscess)  unknown)  



                                                  (-21)           

 

           (unknown)  (no       (unknown)  (unknown)  Temperature 98.4 F  (units

    (unknown)



                    date)                         22 02:06 unknown)  

 

           (unknown)  (no       (unknown)  (unknown)  Temperature 98.4 F  (units

    (unknown)



                    date)                                   unknown)  

 

           (unknown)  (no       (unknown)  (unknown)  Time Seen by  (units    (u

nknown)



                    date)                         Provider: 22 unknown)  



                                                  02:07               

 

           (unknown)  (no       (unknown)  (unknown)  Total Bilirubin  (units   

 (unknown)



                    date)                         0.4 (0.2-1.3) mg/dL unknown)  

 

           (unknown)  (no       (unknown)  (unknown)  Total Protein 6.6  (units 

   (unknown)



                    date)                         (6.3-8.2) g/dL unknown)  

 

           (unknown)  (no       (unknown)  (unknown)  US pelvic complete  (units

    (unknown)



                    date)                         Stat      unknown)  

 

           (unknown)  (no       (unknown)  (unknown)  Urine Dip  (units    (unkn

own)



                    date)                                   unknown)  

 

           (unknown)  (no       (unknown)  (unknown)  Urine Specific  (units    

(unknown)



                    date)                         Gravity 1.025 unknown)  

 

           (unknown)  (no       (unknown)  (unknown)  Vital Signs - 8 hr  (units

    (unknown)



                    date)                                   unknown)  

 

           (unknown)  (no       (unknown)  (unknown)  Vital Signs  (units    (un

known)



                    date)                                   unknown)  

 

           (unknown)  (no       (unknown)  (unknown)  Vital signs:  (units    (u

nknown)



                    date)                                   unknown)  

 

           (unknown)  (no       (unknown)  (unknown)  WBC 8.5 (4.5-11.0)  (units

    (unknown)



                    date)                         X103/uL   unknown)  

 

           (unknown)  (no       (unknown)  (unknown)  [Embedded Image  (units   

 (unknown)



                    date)                         Not Available] unknown)  

 

           (unknown)  (no       (unknown)  (unknown)  abscess presents  (units  

  (unknown)



                    date)                         with significant unknown)  



                                                  other and a chief           



                                                  complaint of severe           



                                                  left                

 

           (unknown)  (no       (unknown)  (unknown)  alcohol intake  (units    

(unknown)



                    date)                         frequency: unknown)  



                                                  holidays/special           



                                                  occasions only           

 

           (unknown)  (no       (unknown)  (unknown)  alcohol intake:  (units   

 (unknown)



                    date)                         current   unknown)  

 

           (unknown)  (no       (unknown)  (unknown)  clindamycin HCl  (units   

 (unknown)



                    date)                         150 mg capsule 150 unknown)  



                                                  mg PO DAILY #30           



                                                  caps 22           

 

           (unknown)  (no       (unknown)  (unknown)  clindamycin HCl  (units   

 (unknown)



                    date)                         150 mg capsule unknown)  

 

           (unknown)  (no       (unknown)  (unknown)  denies any  (units    (unk

nown)



                    date)                         vomiting. She is unknown)  



                                                  had no change in           



                                                  her bowel habits.           



                                                  She denies           

 

           (unknown)  (no       (unknown)  (unknown)  dizziness.  (units    (unk

nown)



                    date)                                   unknown)  

 

           (unknown)  (no       (unknown)  (unknown)  doxycycline  (units    (un

known)



                    date)                         AdvReac   unknown)  



                                                  Intermediate vomitt           



                                                  Verified 10/04/22           



                                                  07:57               

 

           (unknown)  (no       (unknown)  (unknown)  dysuria, frequency  (units

    (unknown)



                    date)                         or urgency. unknown)  

 

           (unknown)  (no       (unknown)  (unknown)  for persistent  (units    

(unknown)



                    date)                         right lower unknown)  



                                                  quadrant pain in           



                                                  the aftermath of a           



                                                  tubo-ovarian           

 

           (unknown)  (no       (unknown)  (unknown)  household members:  (units

    (unknown)



                    date)                         friend(s) unknown)  

 

           (unknown)  (no       (unknown)  (unknown)  hydromorphone 4 mg  (units

    (unknown)



                    date)                         tablet 4 mg PO unknown)  



                                                  Q4-6H PRN pain #20           



                                                  tabs 22           

 

           (unknown)  (no       (unknown)  (unknown)  hydromorphone  (units    (

unknown)



                    date)                         [Dilaudid] 4 mg unknown)  



                                                  tablet              

 

           (unknown)  (no       (unknown)  (unknown)  icterus. No  (units    (un

known)



                    date)                         injection or unknown)  



                                                  drainage.           

 

           (unknown)  (no       (unknown)  (unknown)  lower quadrant  (units    

(unknown)



                    date)                         pain over the past unknown)  



                                                  few days. The pain           



                                                  is severe and seems           



                                                  to be               

 

           (unknown)  (no       (unknown)  (unknown)  mild distress.  (units    

(unknown)



                    date)                                   unknown)  

 

           (unknown)  (no       (unknown)  (unknown)  oxycodone 5 mg  (units    

(unknown)



                    date)                         tablet 5 mg PO Q4H unknown)  



                                                  PRN pain #20 tabs           



                                                  22            

 

           (unknown)  (no       (unknown)  (unknown)  oxycodone 5 mg  (units    

(unknown)



                    date)                         tablet    unknown)  

 

           (unknown)  (no       (unknown)  (unknown)  radiates to her  (units   

 (unknown)



                    date)                         back. She denies unknown)  



                                                  any fever chills.           



                                                  She is been           



                                                  nauseated but           

 

           (unknown)  (no       (unknown)  (unknown)  rales, or rhonchi.  (units

    (unknown)



                    date)                                   unknown)  

 

           (unknown)  (no       (unknown)  (unknown)  seizures,  (units    (unkn

own)



                    date)                         incoordination. unknown)  

 

           (unknown)  (no       (unknown)  (unknown)  tobacco type:  (units    (

unknown)



                    date)                         vaping    unknown)  

 

           (unknown)  (no       (unknown)  (unknown)  tonsillar  (units    (unkn

own)



                    date)                         hypertrophy or unknown)  



                                                  exudate. Airway           



                                                  patent.             

 

           (unknown)  (no       (unknown)  (unknown)  worse with motion  (units 

   (unknown)



                    date)                         and improves with unknown)  



                                                  rest. She does at           



                                                  times feel like the           



                                                  pain                









                                         Result panel 552









           (unknown)  (no       (unknown)  (unknown)  (no value)  (units    (unk

nown)



                    date)                                   unknown)  

 

           (unknown)  (no       (unknown)  (unknown)  <Electronically  (units   

 (unknown)



                    date)                         signed by Dashawn Quintero D.O.>           

 

           (unknown)  (no       (unknown)  (unknown)  (Dilaudid)  (units    (unk

nown)



                    date)                                   unknown)  

 

           (unknown)  (no       (unknown)  (unknown)  *Please continue  (units  

  (unknown)



                    date)                         to take your unknown)  



                                                  regular medications           



                                                  as directed.           

 

           (unknown)  (no       (unknown)  (unknown)  *Please follow up  (units 

   (unknown)



                    date)                         with your primary unknown)  



                                                  gynecologist in 2-3           



                                                  days, call for an           

 

           (unknown)  (no       (unknown)  (unknown)  *Return to  (units    (unk

nown)



                    date)                         Emergency unknown)  



                                                  Department if you           



                                                  should have any           



                                                  new, worsening or           

 

           (unknown)  (no       (unknown)  (unknown)  *What to do:  (units    (u

nknown)



                    date)                                   unknown)  

 

           (unknown)  (no       (unknown)  (unknown)  *You have been  (units    

(unknown)



                    date)                         diagnosed with unknown)  



                                                  [left hemorrhagic           



                                                  ovarian cyst ]           

 

           (unknown)  (no       (unknown)  (unknown)  [id number]  (units    (unkno

wn)



                    date)                                   unknown)  

 

           (unknown)  (no       (unknown)  (unknown)  02:06     (units    (unkno

wn)



                    date)                                   unknown)  

 

           (unknown)  (no       (unknown)  (unknown)  02:55 02:55  (units    (un

known)



                    date)                                   unknown)  

 

           (unknown)  (no       (unknown)  (unknown)  1 tab PO Q4-6H PRN  (units

    (unknown)



                    date)                         (Reason: pain) Qty: unknown)  



                                                  10 0RF              

 

           (unknown)  (no       (unknown)  (unknown)  10 mg PO Q6H PRN  (units  

  (unknown)



                    date)                         (Reason: pain) Qty: unknown)  



                                                  14 0RF              

 

           (unknown)  (no       (unknown)  (unknown)  22 02:55  (units    

(unknown)



                    date)                                   unknown)  

 

           (unknown)  (no       (unknown)  (unknown)  22  (units 

   (unknown)



                    date)                         Range/Units unknown)  

 

           (unknown)  (no       (unknown)  (unknown)  22  (units    (unkno

wn)



                    date)                                   unknown)  

 

           (unknown)  (no       (unknown)  (unknown)  22 0244  (units    (

unknown)



                    date)                                   unknown)  

 

           (unknown)  (no       (unknown)  (unknown)  12 point review of  (units

    (unknown)



                    date)                         systems is negative unknown)  



                                                  except for those           



                                                  stated above           

 

           (unknown)  (no       (unknown)  (unknown)  150 mg PO DAILY  (units   

 (unknown)



                    date)                         Qty: 30 0RF unknown)  

 

           (unknown)  (no       (unknown)  (unknown)  17-year-old female  (units

    (unknown)



                    date)                         nonsmoker with unknown)  



                                                  history of a           



                                                  relatively recent           



                                                  pelvic surgery           

 

           (unknown)  (no       (unknown)  (unknown)  4 mg PO Q4-6H PRN  (units 

   (unknown)



                    date)                         (Reason: pain) Qty: unknown)  



                                                  20 0RF              

 

           (unknown)  (no       (unknown)  (unknown)  4 mg PO TID-QID  (units   

 (unknown)



                    date)                         PRN (Reason: nausea unknown)  



                                                  and vomiting) Qty:           



                                                  10 0RF              

 

           (unknown)  (no       (unknown)  (unknown)  5 mg PO Q4H PRN  (units   

 (unknown)



                    date)                         (Reason: pain) Qty: unknown)  



                                                  20 0RF              

 

           (unknown)  (no       (unknown)  (unknown)  ALT 13 (<35) IU/L  (units 

   (unknown)



                    date)                                   unknown)  

 

           (unknown)  (no       (unknown)  (unknown)  AST 16 (14-36)  (units    

(unknown)



                    date)                         IU/L      unknown)  

 

           (unknown)  (no       (unknown)  (unknown)  Acne (-2016)  (units    (u

nknown)



                    date)                                   unknown)  

 

           (unknown)  (no       (unknown)  (unknown)  Activity  (units    (unkno

wn)



                    date)                         Restrictions/Additi unknown)  



                                                  onal Instructions:           

 

           (unknown)  (no       (unknown)  (unknown)  Age/Sex: 17 / F  (units   

 (unknown)



                    date)                                   unknown)  

 

           (unknown)  (no       (unknown)  (unknown)  Albumin 4.0  (units    (un

known)



                    date)                         (3.5-5.0) g/dL unknown)  

 

           (unknown)  (no       (unknown)  (unknown)  Albumin/Globulin  (units  

  (unknown)



                    date)                         Ratio 1.5 (1.0-2.8) unknown)  

 

           (unknown)  (no       (unknown)  (unknown)  Alcohol type: wine  (units

    (unknown)



                    date)                                   unknown)  

 

           (unknown)  (no       (unknown)  (unknown)  Alkaline  (units    (unkno

wn)



                    date)                         Phosphatase 81 unknown)  



                                                  () U/L           

 

           (unknown)  (no       (unknown)  (unknown)  Allergies  (units    (unkn

own)



                    date)                                   unknown)  

 

           (unknown)  (no       (unknown)  (unknown)  Allergy/AdvReac  (units   

 (unknown)



                    date)                         Type Severity unknown)  



                                                  Reaction Status           



                                                  Date / Time           

 

           (unknown)  (no       (unknown)  (unknown)  Anesthesia  (units    (unk

nown)



                    date)                                   unknown)  

 

           (unknown)  (no       (unknown)  (unknown)  BACK: Nontender  (units   

 (unknown)



                    date)                         without deformity unknown)  



                                                  or crepitance. No           



                                                  flank tenderness.           

 

           (unknown)  (no       (unknown)  (unknown)  BUN 11 (7-17)  (units    (

unknown)



                    date)                         mg/dL     unknown)  

 

           (unknown)  (no       (unknown)  (unknown)  BUN/Creatinine  (units    

(unknown)



                    date)                         Ratio 14.7 (6-22) unknown)  

 

           (unknown)  (no       (unknown)  (unknown)  Baso # (Auto) 100  (units 

   (unknown)



                    date)                         (0-100) /uL unknown)  

 

           (unknown)  (no       (unknown)  (unknown)  Baso % (Auto) 1.0  (units 

   (unknown)



                    date)                         (0-2) %   unknown)  

 

           (unknown)  (no       (unknown)  (unknown)  Bedside Urine  (units    (

unknown)



                    date)                         Bilirubin - unknown)  



                                                  Negative            

 

           (unknown)  (no       (unknown)  (unknown)  Bedside Urine  (units    (

unknown)



                    date)                         Glucose Negative unknown)  

 

           (unknown)  (no       (unknown)  (unknown)  Bedside Urine  (units    (

unknown)



                    date)                         Ketone - Negative unknown)  

 

           (unknown)  (no       (unknown)  (unknown)  Bedside Urine  (units    (

unknown)



                    date)                         Leukocytes - unknown)  



                                                  Negative            

 

           (unknown)  (no       (unknown)  (unknown)  Bedside Urine  (units    (

unknown)



                    date)                         Nitrite - Negative unknown)  

 

           (unknown)  (no       (unknown)  (unknown)  Bedside Urine  (units    (

unknown)



                    date)                         Occult Blood - unknown)  



                                                  Negative            

 

           (unknown)  (no       (unknown)  (unknown)  Bedside Urine  (units    (

unknown)



                    date)                         Protein - Negative unknown)  

 

           (unknown)  (no       (unknown)  (unknown)  Bedside Urine  (units    (

unknown)



                    date)                         Urobilinogen - unknown)  



                                                  Negative            

 

           (unknown)  (no       (unknown)  (unknown)  Bedside Urine pH  (units  

  (unknown)



                    date)                         6.0       unknown)  

 

           (unknown)  (no       (unknown)  (unknown)  Blood Pressure  (units    

(unknown)



                    date)                         22 unknown)  



                                                  02:06               

 

           (unknown)  (no       (unknown)  (unknown)  Blood Pressure  (units    

(unknown)



                    date)                             unknown)  

 

           (unknown)  (no       (unknown)  (unknown)  CARDIOVASCULAR:  (units   

 (unknown)



                    date)                         Denies chest pain, unknown)  



                                                  palpitations,           



                                                  orthopnea, edema,           

 

           (unknown)  (no       (unknown)  (unknown)  CARDIOVASCULAR:  (units   

 (unknown)



                    date)                         Regular rate and unknown)  



                                                  rhythm without           



                                                  murmurs, gallops,           



                                                  or rubs.            

 

           (unknown)  (no       (unknown)  (unknown)  Calcium 8.8  (units    (un

known)



                    date)                         (8.4-10.2) mg/dL unknown)  

 

           (unknown)  (no       (unknown)  (unknown)  Carbon Dioxide 26  (units 

   (unknown)



                    date)                         (22-32) mmol/L unknown)  

 

           (unknown)  (no       (unknown)  (unknown)  Chief Complaint:  (units  

  (unknown)



                    date)                         Abdominal Pain unknown)  

 

           (unknown)  (no       (unknown)  (unknown)  Chlamydia  (units    (unkn

own)



                    date)                         infection (-) unknown)  

 

           (unknown)  (no       (unknown)  (unknown)  Chloride 106  (units    (u

nknown)



                    date)                         () mmol/L unknown)  

 

           (unknown)  (no       (unknown)  (unknown)  Clinical  (units    (unkno

wn)



                    date)                         Impression: unknown)  

 

           (unknown)  (no       (unknown)  (unknown)  Course    (units    (unkno

wn)



                    date)                                   unknown)  

 

           (unknown)  (no       (unknown)  (unknown)  Creatinine 0.75  (units   

 (unknown)



                    date)                         (0.52-1.04) mg/dL unknown)  

 

           (unknown)  (no       (unknown)  (unknown)  : 2005  (units   

 (unknown)



                    date)                         Acct:[id number] unknown)  

 

           (unknown)  (no       (unknown)  (unknown)  Date of Service:  (units  

  (unknown)



                    date)                         22  unknown)  

 

           (unknown)  (no       (unknown)  (unknown)  Departure  (units    (unkn

own)



                    date)                                   unknown)  

 

           (unknown)  (no       (unknown)  (unknown)  Discharge Plan  (units    

(unknown)



                    date)                                   unknown)  

 

           (unknown)  (no       (unknown)  (unknown)  Discontinued  (units    (u

nknown)



                    date)                         Medications unknown)  

 

           (unknown)  (no       (unknown)  (unknown)  Documented By: DKB  (units

    (unknown)



                    date)                                   unknown)  

 

           (unknown)  (no       (unknown)  (unknown)  Documented By: GC  (units 

   (unknown)



                    date)                                   unknown)  

 

           (unknown)  (no       (unknown)  (unknown)  ENT: Nose without  (units 

   (unknown)



                    date)                         bleeding, purulent unknown)  



                                                  drainage. Throat           



                                                  without erythema,           

 

           (unknown)  (no       (unknown)  (unknown)  ER Physician:  (units    (

unknown)



                    date)                         Dashawn Quintero D.O. unknown)  

 

           (unknown)  (no       (unknown)  (unknown)  EXTREMITIES: No  (units   

 (unknown)



                    date)                         edema or joint unknown)  



                                                  tenderness.           

 

           (unknown)  (no       (unknown)  (unknown)  EYES: Pupils equal  (units

    (unknown)



                    date)                         round and reactive. unknown)  



                                                  Extraocular motions           



                                                  intact. No scleral           

 

           (unknown)  (no       (unknown)  (unknown)  Emergency Report  (units  

  (unknown)



                    date)                                   unknown)  

 

           (unknown)  (no       (unknown)  (unknown)  Endometriosis  (units    (

unknown)



                    date)                         (-)   unknown)  

 

           (unknown)  (no       (unknown)  (unknown)  Eos # (Auto) 300  (units  

  (unknown)



                    date)                         (0-450) /uL unknown)  

 

           (unknown)  (no       (unknown)  (unknown)  Eos % (Auto) 3.9  (units  

  (unknown)



                    date)                         (2-4) %   unknown)  

 

           (unknown)  (no       (unknown)  (unknown)  Esterase  (units    (unkno

wn)



                    date)                                   unknown)  

 

           (unknown)  (no       (unknown)  (unknown)  Estimated GFR > 60  (units

    (unknown)



                    date)                         (>60) mL/min unknown)  

 

           (unknown)  (no       (unknown)  (unknown)  Exam Narrative:  (units   

 (unknown)



                    date)                                   unknown)  

 

           (unknown)  (no       (unknown)  (unknown)  Exam      (units    (unkno

wn)



                    date)                                   unknown)  

 

           (unknown)  (no       (unknown)  (unknown)  [place] ]  (units    (unk

nown)



                    date)                                   unknown)  

 

           (unknown)  (no       (unknown)  (unknown)  GASTROINTESTINAL  (units  

  (unknown)



                    date)                         see HPI   unknown)  

 

           (unknown)  (no       (unknown)  (unknown)  GASTROINTESTINAL:  (units 

   (unknown)



                    date)                         Abdomen soft, unknown)  



                                                  non-tender,           



                                                  nondistended.           

 

           (unknown)  (no       (unknown)  (unknown)  GENERAL: Denies  (units   

 (unknown)



                    date)                         chills, fatigue, unknown)  



                                                  malaise, fever,           



                                                  sweats.             

 

           (unknown)  (no       (unknown)  (unknown)  GENERAL: [17] year  (units

    (unknown)



                    date)                         old patient appears unknown)  



                                                  stated age.           



                                                  Well-developed           



                                                  patient, in           

 

           (unknown)  (no       (unknown)  (unknown)  : D see HPI  (units    (

unknown)



                    date)                                   unknown)  

 

           (unknown)  (no       (unknown)  (unknown)  General   (units    (unkno

wn)



                    date)                                   unknown)  

 

           (unknown)  (no       (unknown)  (unknown)  Globulin 2.6  (units    (u

nknown)



                    date)                         (1.7-4.1) g/dL unknown)  

 

           (unknown)  (no       (unknown)  (unknown)  Glucose 99  (units    (unk

nown)



                    date)                         () mg/dL unknown)  

 

           (unknown)  (no       (unknown)  (unknown)  HEAD: Atraumatic.  (units 

   (unknown)



                    date)                         Normocephalic. unknown)  

 

           (unknown)  (no       (unknown)  (unknown)  HEENT: Denies  (units    (

unknown)



                    date)                         sinus pain, ear unknown)  



                                                  pain, sore throat,           



                                                  difficulty           



                                                  swallowing,           

 

           (unknown)  (no       (unknown)  (unknown)  HPI - Abdominal  (units   

 (unknown)



                    date)                         Pain      unknown)  

 

           (unknown)  (no       (unknown)  (unknown)  HPI narrative:  (units    

(unknown)



                    date)                                   unknown)  

 

           (unknown)  (no       (unknown)  (unknown)  Hct 36.6 (36-46) %  (units

    (unknown)



                    date)                                   unknown)  

 

           (unknown)  (no       (unknown)  (unknown)  Heavy menstrual  (units   

 (unknown)



                    date)                         period (-2019) unknown)  

 

           (unknown)  (no       (unknown)  (unknown)  Hemorrhagic cyst  (units  

  (unknown)



                    date)                         of left ovary unknown)  

 

           (unknown)  (no       (unknown)  (unknown)  Hgb 12.5  (units    (unkno

wn)



                    date)                         (12.0-16.0) g/dL unknown)  

 

           (unknown)  (no       (unknown)  (unknown)  History of Present  (units

    (unknown)



                    date)                         Illness   unknown)  

 

           (unknown)  (no       (unknown)  (unknown)  Hydrocodone  (units    (un

known)



                    date)                         Bitart/Acetaminophe unknown)  



                                                  n (Hydrocodone/Acet           



                                                  5/325 Prepack) 1           



                                                  bottle MISC           

 

           (unknown)  (no       (unknown)  (unknown)  Initial Vital  (units    (

unknown)



                    date)                         Signs     unknown)  

 

           (unknown)  (no       (unknown)  (unknown)  Initial Vital  (units    (

unknown)



                    date)                         Signs:    unknown)  

 

           (unknown)  (no       (unknown)  (unknown)  Instructions: DI  (units  

  (unknown)



                    date)                         for Ovarian Cyst unknown)  

 

           (unknown)  (no       (unknown)  (unknown)  Irregular  (units    (unkn

own)



                    date)                         menstrual cycle unknown)  



                                                  (-)             

 

           (unknown)  (no       (unknown)  (unknown)  [facility]  (units   

 (unknown)



                    date)                         [address] unknown)  



                                                  [place] [address]           

 

           (unknown)  (no       (unknown)  (unknown)  Casie Darnell,  (units

    (unknown)



                    date)                         ARNP [Primary Care unknown)  



                                                  Provider]           

 

           (unknown)  (no       (unknown)  (unknown)  Ketorolac  (units    (unkn

own)



                    date)                         Tromethamine unknown)  



                                                  (Ketorolac 30 Mg/Ml           



                                                  Vial) 15 mg IV NOW           



                                                  ONE                 

 

           (unknown)  (no       (unknown)  (unknown)  Lab Data  (units    (unkno

wn)



                    date)                                   unknown)  

 

           (unknown)  (no       (unknown)  (unknown)  Lab Results  (units    (un

known)



                    date)                                   unknown)  

 

           (unknown)  (no       (unknown)  (unknown)  Labs:     (units    (unkno

wn)



                    date)                                   unknown)  

 

           (unknown)  (no       (unknown)  (unknown)  Last Admin:  (units    (un

known)



                    date)                         22 04:13 unknown)  



                                                  Dose: Not Given           

 

           (unknown)  (no       (unknown)  (unknown)  Last Admin:  (units    (un

known)



                    date)                         22 04:20 unknown)  



                                                  Dose: 15 mg           

 

           (unknown)  (no       (unknown)  (unknown)  Last Admin:  (units    (un

known)



                    date)                         22 06:50 unknown)  



                                                  Dose: 1 bottle           

 

           (unknown)  (no       (unknown)  (unknown)  Last Admin:  (units    (un

known)



                    date)                         22 06:51 unknown)  



                                                  Dose: 1 bottle           

 

           (unknown)  (no       (unknown)  (unknown)  Lipase 113  (units    (unk

nown)



                    date)                         () U/L unknown)  

 

           (unknown)  (no       (unknown)  (unknown)  Lymph # (Auto)  (units    

(unknown)



                    date)                         2700 (5930-0653) unknown)  



                                                  /uL                 

 

           (unknown)  (no       (unknown)  (unknown)  Lymph % (Auto)  (units    

(unknown)



                    date)                         32.4 (25-40) % unknown)  

 

           (unknown)  (no       (unknown)  (unknown)  Lymphangioma  (units    (u

nknown)



                    date)                                   unknown)  

 

           (unknown)  (no       (unknown)  (unknown)  MCH 29.3 (26-34)  (units  

  (unknown)



                    date)                         PG        unknown)  

 

           (unknown)  (no       (unknown)  (unknown)  MCHC 34.0 (30-36)  (units 

   (unknown)



                    date)                         %         unknown)  

 

           (unknown)  (no       (unknown)  (unknown)  MCV 86.2 ()  (units 

   (unknown)



                    date)                         fL        unknown)  

 

           (unknown)  (no       (unknown)  (unknown)  MDM - Abdominal  (units   

 (unknown)



                    date)                         Pain      unknown)  

 

           (unknown)  (no       (unknown)  (unknown)  MUSCULOSKELETAL:  (units  

  (unknown)



                    date)                         denies weakness, unknown)  



                                                  joint pain, or bony           



                                                  pain                

 

           (unknown)  (no       (unknown)  (unknown)  Medical History  (units   

 (unknown)



                    date)                         (Reviewed 22 unknown)  



                                                  @ 05:36 by Dashawn Quintero DO)           

 

           (unknown)  (no       (unknown)  (unknown)  Medication  (units    (unk

nown)



                    date)                         Instructions unknown)  



                                                  Recorded            

 

           (unknown)  (no       (unknown)  (unknown)  Mode of arrival:  (units  

  (unknown)



                    date)                         Ambulatory unknown)  

 

           (unknown)  (no       (unknown)  (unknown)  Mono # (Auto) 700  (units 

   (unknown)



                    date)                         (0-900) /uL unknown)  

 

           (unknown)  (no       (unknown)  (unknown)  Mono % (Auto) 7.9  (units 

   (unknown)



                    date)                         (3-14) %  unknown)  

 

           (unknown)  (no       (unknown)  (unknown)  NECK: Trachea  (units    (

unknown)



                    date)                         midline. Non tender unknown)  

 

           (unknown)  (no       (unknown)  (unknown)  NEURO: AOx3.  (units    (u

nknown)



                    date)                                   unknown)  

 

           (unknown)  (no       (unknown)  (unknown)  NEUROLOGIC: Denies  (units

    (unknown)



                    date)                         weakness, headache, unknown)  



                                                  numbness, change in           



                                                  speech, confusion,           

 

           (unknown)  (no       (unknown)  (unknown)  Narrative  (units    (unkn

own)



                    date)                                   unknown)  

 

           (unknown)  (no       (unknown)  (unknown)  Narrative:  (units    (unk

nown)



                    date)                                   unknown)  

 

           (unknown)  (no       (unknown)  (unknown)  Neut # (Auto) 4700  (units

    (unknown)



                    date)                         (2374-0630) /uL unknown)  

 

           (unknown)  (no       (unknown)  (unknown)  Neut % (Auto) 54.8  (units

    (unknown)



                    date)                         (50-75) % unknown)  

 

           (unknown)  (no       (unknown)  (unknown)  New       (units    (unkno

wn)



                    date)                                   unknown)  

 

           (unknown)  (no       (unknown)  (unknown)  No Action  (units    (unkn

own)



                    date)                                   unknown)  

 

           (unknown)  (no       (unknown)  (unknown)  Ondansetron HCl  (units   

 (unknown)



                    date)                         (Ondansetron 4 Mg unknown)  



                                                  Odt Prepack) 1           



                                                  bottle MISC           



                                                  SEEINSTR ONE           

 

           (unknown)  (no       (unknown)  (unknown)  Ondansetron HCl  (units   

 (unknown)



                    date)                         (Ondansetron 4 Mg unknown)  



                                                  Odt) 4 mg PO NOW           



                                                  ONE                 

 

           (unknown)  (no       (unknown)  (unknown)  Ondansetron HCl  (units   

 (unknown)



                    date)                         (Ondansetron 4 Mg/2 unknown)  



                                                  Ml Inj) 4 mg IV NOW           



                                                  ONE                 

 

           (unknown)  (no       (unknown)  (unknown)  Opioids - Morphine  (units

    (unknown)



                    date)                         Analogues Allergy unknown)  



                                                  Intermediate rash           



                                                  Verified 10/04/22           



                                                  07:57               

 

           (unknown)  (no       (unknown)  (unknown)  Ordered:  (units    (unkno

wn)



                    date)                                   unknown)  

 

           (unknown)  (no       (unknown)  (unknown)  Orders    (units    (unkno

wn)



                    date)                                   unknown)  

 

           (unknown)  (no       (unknown)  (unknown)  Ovarian cyst  (units    (u

nknown)



                    date)                                   unknown)  

 

           (unknown)  (no       (unknown)  (unknown)  Oxygen Delivery  (units   

 (unknown)



                    date)                         Method 22 unknown)  



                                                  02:06               

 

           (unknown)  (no       (unknown)  (unknown)  Oxygen Delivery  (units   

 (unknown)



                    date)                         Method Room Air unknown)  

 

           (unknown)  (no       (unknown)  (unknown)  PSYCHIATRIC: No  (units   

 (unknown)



                    date)                         concerning unknown)  



                                                  psychosocial           



                                                  issues.             

 

           (unknown)  (no       (unknown)  (unknown)  Painful menstrual  (units 

   (unknown)



                    date)                         periods () unknown)  

 

           (unknown)  (no       (unknown)  (unknown)  Patient   (units    (unkno

wn)



                    date)                         Disposition: Home unknown)  

 

           (unknown)  (no       (unknown)  (unknown)  Patient History  (units   

 (unknown)



                    date)                                   unknown)  

 

           (unknown)  (no       (unknown)  (unknown)  Patient:  (units    (unkno

wn)



                    date)                         Lesvia Aguero unknown)  



                                                  MR#: [id number]             

 

           (unknown)  (no       (unknown)  (unknown)  Plt Count 359  (units    (

unknown)



                    date)                         (150-400) X103/uL unknown)  

 

           (unknown)  (no       (unknown)  (unknown)  Point of Care  (units    (

unknown)



                    date)                         Testing   unknown)  

 

           (unknown)  (no       (unknown)  (unknown)  Point of care  (units    (

unknown)



                    date)                         testing:  unknown)  

 

           (unknown)  (no       (unknown)  (unknown)  Potassium 3.6  (units    (

unknown)



                    date)                         (3.4-5.1) mmol/L unknown)  

 

           (unknown)  (no       (unknown)  (unknown)  Pregnancy Test  (units    

(unknown)



                    date)                         Results Negative unknown)  

 

           (unknown)  (no       (unknown)  (unknown)  Prescriptions:  (units    

(unknown)



                    date)                                   unknown)  

 

           (unknown)  (no       (unknown)  (unknown)  Previous Rx's  (units    (

unknown)



                    date)                                   unknown)  

 

           (unknown)  (no       (unknown)  (unknown)  Pulse Oximetry 100  (units

    (unknown)



                    date)                         22 02:06 unknown)  

 

           (unknown)  (no       (unknown)  (unknown)  Pulse Oximetry 100  (units

    (unknown)



                    date)                                   unknown)  

 

           (unknown)  (no       (unknown)  (unknown)  Pulse Rate 72  (units    (

unknown)



                    date)                         22 02:06 unknown)  

 

           (unknown)  (no       (unknown)  (unknown)  Pulse Rate 72  (units    (

unknown)



                    date)                                   unknown)  

 

           (unknown)  (no       (unknown)  (unknown)  RBC 4.25 (4.0-5.2)  (units

    (unknown)



                    date)                         X106/uL   unknown)  

 

           (unknown)  (no       (unknown)  (unknown)  RDW 14.0  (units    (unkno

wn)



                    date)                         (11.6-14.8) % unknown)  

 

           (unknown)  (no       (unknown)  (unknown) RESPIRATORY: Clear  (units 

   (unknown)



                    date)                         to auscultation. unknown)  



                                                  Breath sounds equal           



                                                  bilaterally. No           



                                                  wheezes,            

 

           (unknown)  (no       (unknown)  (unknown)  RESPIRATORY:  (units    (u

nknown)



                    date)                         Denies dyspnea, unknown)  



                                                  cough, wheezing,           



                                                  hemoptysis, sputum.           

 

           (unknown)  (no       (unknown)  (unknown)  Referrals:  (units    (unk

nown)



                    date)                                   unknown)  

 

           (unknown)  (no       (unknown)  (unknown)  Related Data  (units    (u

nknown)



                    date)                                   unknown)  

 

           (unknown)  (no       (unknown)  (unknown)  Respiratory Rate  (units  

  (unknown)



                    date)                         16 22 02:06 unknown)  

 

           (unknown)  (no       (unknown)  (unknown)  Respiratory Rate  (units  

  (unknown)



                    date)                         16        unknown)  

 

           (unknown)  (no       (unknown)  (unknown)  Result diagrams:  (units  

  (unknown)



                    date)                                   unknown)  

 

           (unknown)  (no       (unknown)  (unknown)  Review of Systems  (units 

   (unknown)



                    date)                                   unknown)  

 

           (unknown)  (no       (unknown)  (unknown)  S/P ACL   (units    (unkno

wn)



                    date)                         reconstruction unknown)  



                                                  (-21)           

 

           (unknown)  (no       (unknown)  (unknown)  SEEINSTR ONE  (units    (u

nknown)



                    date)                                   unknown)  

 

           (unknown)  (no       (unknown)  (unknown)  SKIN: Denies rash,  (units

    (unknown)



                    date)                         skin lesions, or unknown)  



                                                  other               

 

           (unknown)  (no       (unknown)  (unknown)  SKIN: No rash or  (units  

  (unknown)



                    date)                         erythema of visible unknown)  



                                                  areas               

 

           (unknown)  (no       (unknown)  (unknown)  Signed By:  (units    (unk

nown)



                    date)                                   unknown)  

 

           (unknown)  (no       (unknown)  (unknown)  Smoking Status:  (units   

 (unknown)



                    date)                         Current some day unknown)  



                                                  smoker              

 

           (unknown)  (no       (unknown)  (unknown)  Social History  (units    

(unknown)



                    date)                         (Reviewed 22 unknown)  



                                                  @ 05:36 by Dashawn Quintero DO)           

 

           (unknown)  (no       (unknown)  (unknown)  Sodium 138  (units    (unk

nown)



                    date)                         (137-145) mmol/L unknown)  

 

           (unknown)  (no       (unknown)  (unknown)  Source: patient  (units   

 (unknown)



                    date)                                   unknown)  

 

           (unknown)  (no       (unknown)  (unknown)  Stated Complaint:  (units 

   (unknown)



                    date)                         ABD PAIN  unknown)  

 

           (unknown)  (no       (unknown)  (unknown)  Stop: 22  (units    

(unknown)



                    date)                         02:42     unknown)  

 

           (unknown)  (no       (unknown)  (unknown)  Stop: 22  (units    

(unknown)



                    date)                         03:44     unknown)  

 

           (unknown)  (no       (unknown)  (unknown)  Stop: 22  (units    

(unknown)



                    date)                         06:24     unknown)  

 

           (unknown)  (no       (unknown)  (unknown)  Substance Use  (units    (

unknown)



                    date)                         Type: does not use unknown)  

 

           (unknown)  (no       (unknown)  (unknown)  Surgical History  (units  

  (unknown)



                    date)                         (Reviewed 22 unknown)  



                                                  @ 05:36 by Dashawn Quintero DO)           

 

           (unknown)  (no       (unknown)  (unknown)  TOA (tubo-ovarian  (units 

   (unknown)



                    date)                         abscess)  unknown)  



                                                  (-21)           

 

           (unknown)  (no       (unknown)  (unknown)  Temperature 98.4 F  (units

    (unknown)



                    date)                         22 02:06 unknown)  

 

           (unknown)  (no       (unknown)  (unknown)  Temperature 98.4 F  (units

    (unknown)



                    date)                                   unknown)  

 

           (unknown)  (no       (unknown)  (unknown)  Time Seen by  (units    (u

nknown)



                    date)                         Provider: 22 unknown)  



                                                  02:07               

 

           (unknown)  (no       (unknown)  (unknown)  Total Bilirubin  (units   

 (unknown)



                    date)                         0.4 (0.2-1.3) mg/dL unknown)  

 

           (unknown)  (no       (unknown)  (unknown)  Total Protein 6.6  (units 

   (unknown)



                    date)                         (6.3-8.2) g/dL unknown)  

 

           (unknown)  (no       (unknown)  (unknown)  Urine Dip  (units    (unkn

own)



                    date)                                   unknown)  

 

           (unknown)  (no       (unknown)  (unknown)  Urine Specific  (units    

(unknown)



                    date)                         Gravity 1.025 unknown)  

 

           (unknown)  (no       (unknown)  (unknown)  Visit Report  (units    (u

nknown)



                    date)                         Forms: Patient unknown)  



                                                  Portal/API           

 

           (unknown)  (no       (unknown)  (unknown)  Vital Signs - 8 hr  (units

    (unknown)



                    date)                                   unknown)  

 

           (unknown)  (no       (unknown)  (unknown)  Vital Signs  (units    (un

known)



                    date)                                   unknown)  

 

           (unknown)  (no       (unknown)  (unknown)  Vital signs:  (units    (u

nknown)



                    date)                                   unknown)  

 

           (unknown)  (no       (unknown)  (unknown)  WBC 8.5 (4.5-11.0)  (units

    (unknown)



                    date)                         X103/uL   unknown)  

 

           (unknown)  (no       (unknown)  (unknown)  [ ] New medication  (units

    (unknown)



                    date)                         written as a paper unknown)  



                                                  prescription           

 

           (unknown)  (no       (unknown)  (unknown)  [ ] No new  (units    (unk

nown)



                    date)                         medications given unknown)  

 

           (unknown)  (no       (unknown)  (unknown)  [ x] New  (units    (unkno

wn)



                    date)                         medication unknown)  



                                                  prescriptions sent           



                                                  to your pharmacy:           



                                                  [[facility] in           

 

           (unknown)  (no       (unknown)  (unknown)  [Embedded Image  (units   

 (unknown)



                    date)                         Not Available] unknown)  

 

           (unknown)  (no       (unknown)  (unknown)  abscess presents  (units  

  (unknown)



                    date)                         with significant unknown)  



                                                  other and a chief           



                                                  complaint of severe           



                                                  left                

 

           (unknown)  (no       (unknown)  (unknown)  alcohol intake  (units    

(unknown)



                    date)                         frequency: unknown)  



                                                  holidays/special           



                                                  occasions only           

 

           (unknown)  (no       (unknown)  (unknown)  alcohol intake:  (units   

 (unknown)



                    date)                         current   unknown)  

 

           (unknown)  (no       (unknown)  (unknown) appointment. Let  (units   

 (unknown)



                    date)                         them know you were unknown)  



                                                  seen in the           



                                                  Emergency           



                                                  Department and that           



                                                  we                  

 

           (unknown)  (no       (unknown)  (unknown)  ask that you be  (units   

 (unknown)



                    date)                         seen in follow up. unknown)  



                                                  We will             



                                                  electronically           



                                                  transmit a record           



                                                  of                  

 

           (unknown)  (no       (unknown)  (unknown)  clindamycin HCl  (units   

 (unknown)



                    date)                         150 mg capsule 150 unknown)  



                                                  mg PO DAILY #30           



                                                  caps 22           

 

           (unknown)  (no       (unknown)  (unknown)  clindamycin HCl  (units   

 (unknown)



                    date)                         150 mg capsule unknown)  

 

           (unknown)  (no       (unknown)  (unknown)  concerning  (units    (unk

nown)



                    date)                         symptoms, such as unknown)  



                                                  [fever greater than           



                                                  101 F, shaking           



                                                  chills,             

 

           (unknown)  (no       (unknown)  (unknown)  denies any  (units    (unk

nown)



                    date)                         vomiting. She is unknown)  



                                                  had no change in           



                                                  her bowel habits.           



                                                  She denies           

 

           (unknown)  (no       (unknown)  (unknown)  dizziness.  (units    (unk

nown)



                    date)                                   unknown)  

 

           (unknown)  (no       (unknown)  (unknown)  doxycycline  (units    (un

known)



                    date)                         AdvReac   unknown)  



                                                  Intermediate vomitt           



                                                  Verified 10/04/22           



                                                  07:57               

 

           (unknown)  (no       (unknown)  (unknown)  dysuria, frequency  (units

    (unknown)



                    date)                         or urgency. unknown)  

 

           (unknown)  (no       (unknown)  (unknown)  for persistent  (units    

(unknown)



                    date)                         right lower unknown)  



                                                  quadrant pain in           



                                                  the aftermath of a           



                                                  tubo-ovarian           

 

           (unknown)  (no       (unknown)  (unknown)  household members:  (units

    (unknown)



                    date)                         friend(s) unknown)  

 

           (unknown)  (no       (unknown)  (unknown)  hydrocodone 5  (units    (

unknown)



                    date)                         mg-acetaminophen unknown)  



                                                  325 1 tab PO Q4-6H           



                                                  PRN pain #10 tabs           



                                                  22            

 

           (unknown)  (no       (unknown)  (unknown)  hydrocodone-acetam  (units

    (unknown)



                    date)                         inophen 5-325 mg unknown)  



                                                  tablet              

 

           (unknown)  (no       (unknown)  (unknown)  hydromorphone 4 mg  (units

    (unknown)



                    date)                         tablet 4 mg PO unknown)  



                                                  Q4-6H PRN pain #20           



                                                  tabs 22           

 

           (unknown)  (no       (unknown)  (unknown)  hydromorphone  (units    (

unknown)



                    date)                         [Dilaudid] 4 mg unknown)  



                                                  tablet              

 

           (unknown)  (no       (unknown)  (unknown)  icterus. No  (units    (un

known)



                    date)                         injection or unknown)  



                                                  drainage.           

 

           (unknown)  (no       (unknown)  (unknown)  ketorolac 10 mg  (units   

 (unknown)



                    date)                         tablet 10 mg PO Q6H unknown)  



                                                  PRN pain #14 tabs           



                                                  22            

 

           (unknown)  (no       (unknown)  (unknown)  ketorolac 10 mg  (units   

 (unknown)



                    date)                         tablet    unknown)  

 

           (unknown)  (no       (unknown)  (unknown)  lower quadrant  (units    

(unknown)



                    date)                         pain over the past unknown)  



                                                  few days. The pain           



                                                  is severe and seems           



                                                  to be               

 

           (unknown)  (no       (unknown)  (unknown)  mg tablet  (units    (unkn

own)



                    date)                                   unknown)  

 

           (unknown)  (no       (unknown)  (unknown)  mild distress.  (units    

(unknown)



                    date)                                   unknown)  

 

           (unknown)  (no       (unknown)  (unknown)  ondansetron 4 mg  (units  

  (unknown)



                    date)                         disintegrating 4 mg unknown)  



                                                  PO TID-QID PRN           



                                                  nausea and 22           

 

           (unknown)  (no       (unknown)  (unknown)  ondansetron 4 mg  (units  

  (unknown)



                    date)                         tablet,disintegrati unknown)  



                                                  ng                  

 

           (unknown)  (no       (unknown)  (unknown)  oxycodone 5 mg  (units    

(unknown)



                    date)                         tablet 5 mg PO Q4H unknown)  



                                                  PRN pain #20 tabs           



                                                  22            

 

           (unknown)  (no       (unknown)  (unknown)  oxycodone 5 mg  (units    

(unknown)



                    date)                         tablet    unknown)  

 

           (unknown)  (no       (unknown)  (unknown)  radiates to her  (units   

 (unknown)



                    date)                         back. She denies unknown)  



                                                  any fever chills.           



                                                  She is been           



                                                  nauseated but           

 

           (unknown)  (no       (unknown)  (unknown)  rales, or rhonchi.  (units

    (unknown)



                    date)                                   unknown)  

 

           (unknown)  (no       (unknown)  (unknown)  seizures,  (units    (unkn

own)



                    date)                         incoordination. unknown)  

 

           (unknown)  (no       (unknown)  (unknown)  tablet vomiting  (units   

 (unknown)



                    date)                         #10 tabs  unknown)  

 

           (unknown)  (no       (unknown)  (unknown)  tobacco type:  (units    (

unknown)



                    date)                         vaping    unknown)  

 

           (unknown)  (no       (unknown)  (unknown)  today's note  (units    (u

nknown)



                    date)                                   unknown)  

 

           (unknown)  (no       (unknown)  (unknown)  tonsillar  (units    (unkn

own)



                    date)                         hypertrophy or unknown)  



                                                  exudate. Airway           



                                                  patent.             

 

           (unknown)  (no       (unknown)  (unknown)  worse with motion  (units 

   (unknown)



                    date)                         and improves with unknown)  



                                                  rest. She does at           



                                                  times feel like the           



                                                  pain                

 

           (unknown)  (no       (unknown)  (unknown)  worsening pain,  (units   

 (unknown)



                    date)                         persistent vomiting unknown)  



                                                  or other bothersome           



                                                  symptoms]           









                                         Result panel 553









           (unknown)  (no       (unknown)  (unknown)  (no value)  (units    (unk

nown)



                    date)                                   unknown)  

 

           (unknown)  (no       (unknown)  (unknown)  #10 tabs  (units    (unkno

wn)



                    date)                         22 [Rx unknown)  



                                                  Confirmed           



                                                  22]           

 

           (unknown)  (no       (unknown)  (unknown)  [id number]  (units    (unkno

wn)



                    date)                                   unknown)  

 

           (unknown)  (no       (unknown)  (unknown)  22 [Rx  (units    (u

nknown)



                    date)                         Confirmed unknown)  



                                                  22]           

 

           (unknown)  (no       (unknown)  (unknown)  22  (units    (unkno

wn)



                    date)                                   unknown)  

 

           (unknown)  (no       (unknown)  (unknown)  22]  (units    (unkn

own)



                    date)                                   unknown)  

 

           (unknown)  (no       (unknown)  (unknown)  14:51     (units    (unkno

wn)



                    date)                                   unknown)  

 

           (unknown)  (no       (unknown)  (unknown)  Acne (-2016)  (units    (u

nknown)



                    date)                                   unknown)  

 

           (unknown)  (no       (unknown)  (unknown)  Age/Sex: 17 / F  (units   

 (unknown)



                    date)                         Date of Service: unknown)  

 

           (unknown)  (no       (unknown)  (unknown)  Allergies  (units    (unkn

own)



                    date)                                   unknown)  

 

           (unknown)  (no       (unknown)  (unknown)  [place]  (units    (

unknown)



                    date)                         [id number]     unknown)  

 

           (unknown)  (no       (unknown)  (unknown)  Anesthesia  (units    (unk

nown)



                    date)                                   unknown)  

 

           (unknown)  (no       (unknown)  (unknown)  Attending Dr:  (units    (

unknown)



                    date)                         Jose Almodovar unknown)  



                                                  MD                  

 

           (unknown)  (no       (unknown)  (unknown)  BMI 27.1  (units    (unkno

wn)



                    date)                                   unknown)  

 

           (unknown)  (no       (unknown)  (unknown)  /62  (units    (unkn

own)



                    date)                                   unknown)  

 

           (unknown)  (no       (unknown)  (unknown)  Blood Pressure  (units    

(unknown)



                    date)                         Location Rt unknown)  



                                                  brachial            

 

           (unknown)  (no       (unknown)  (unknown)  Chlamydia  (units    (unkn

own)



                    date)                         infection () unknown)  

 

           (unknown)  (no       (unknown)  (unknown)  : 2005  (units   

 (unknown)



                    date)                         Acct:[id number] unknown)  

 

           (unknown)  (no       (unknown)  (unknown)  Dept at   (units    (unkno

wn)



                    date)                         [phone]. unknown)  

 

           (unknown)  (no       (unknown)  (unknown)  Documented By:  (units    

(unknown)



                    date)                         Jose Almodovar unknown)  



                                                  MD 22 1450           

 

           (unknown)  (no       (unknown)  (unknown)  [employer]     (units    (unkno

wn)



                    date)                                   unknown)  

 

           (unknown)  (no       (unknown)  (unknown)  Endometriosis  (units    (

unknown)



                    date)                         ()   unknown)  

 

           (unknown)  (no       (unknown)  (unknown)  [facility]  (units   

 (unknown)



                    date)                         [facility] unknown)  

 

           (unknown)  (no       (unknown)  (unknown)  Gynecology Visit  (units  

  (unknown)



                    date)                                   unknown)  

 

           (unknown)  (no       (unknown)  (unknown)  Heavy menstrual  (units   

 (unknown)



                    date)                         period () unknown)  

 

           (unknown)  (no       (unknown)  (unknown)  Height 5 ft 3 in  (units  

  (unknown)



                    date)                                   unknown)  

 

           (unknown)  (no       (unknown)  (unknown)  Intake Note:  (units    (u

nknown)



                    date)                                   unknown)  

 

           (unknown)  (no       (unknown)  (unknown)  Intake performed  (units  

  (unknown)



                    date)                         by: Harshal Gipson unknown)  

 

           (unknown)  (no       (unknown)  (unknown)  Intake    (units    (unkno

wn)



                    date)                                   unknown)  

 

           (unknown)  (no       (unknown)  (unknown)  Intake- Clincial  (units  

  (unknown)



                    date)                         Staff     unknown)  

 

           (unknown)  (no       (unknown)  (unknown)  Irregular  (units    (unkn

own)



                    date)                         menstrual cycle unknown)  



                                                  ()             

 

           (unknown)  (no       (unknown)  (unknown)  Last Menstural  (units    

(unknown)



                    date)                         Cycle + Details unknown)  

 

           (unknown)  (no       (unknown)  (unknown)  Loc: [facility]  (units    (unkno

wn)



                    date)                                   unknown)  

 

           (unknown)  (no       (unknown)  (unknown)  Lymphangioma  (units    (u

nknown)



                    date)                                   unknown)  

 

           (unknown)  (no       (unknown)  (unknown)  Medical History  (units   

 (unknown)



                    date)                         (Reviewed unknown)  



                                                  22 @ 05:36           



                                                  by Dashawn Quintero DO)                 

 

           (unknown)  (no       (unknown)  (unknown)  Medications  (units    (un

known)



                    date)                                   unknown)  

 

           (unknown)  (no       (unknown)  (unknown)  Opioids -  (units    (unkn

own)



                    date)                         Morphine  unknown)  



                                                  Analogues Allergy           



                                                  (Intermediate,           



                                                  Verified 22           



                                                  14:51)              

 

           (unknown)  (no       (unknown)  (unknown)  Other Menstrual  (units   

 (unknown)



                    date)                         Period: Other unknown)  

 

           (unknown)  (no       (unknown)  (unknown)  Ovarian cyst  (units    (u

nknown)



                    date)                                   unknown)  

 

           (unknown)  (no       (unknown)  (unknown)  PFSH      (units    (unkno

wn)



                    date)                                   unknown)  

 

           (unknown)  (no       (unknown)  (unknown)  Painful   (units    (unkno

wn)



                    date)                         menstrual periods unknown)  



                                                  ()             

 

           (unknown)  (no       (unknown)  (unknown)  Patient:  (units    (unkno

wn)



                    date)                         Lesvia Aguero E unknown)  



                                                  MR#: [id number]             

 

           (unknown)  (no       (unknown)  (unknown)  Position Sitting  (units  

  (unknown)



                    date)                                   unknown)  

 

           (unknown)  (no       (unknown)  (unknown)  Pt here to  (units    (unk

nown)



                    date)                         follow-up on her unknown)  



                                                  hemorrhagic           



                                                  ovarian cyst           

 

           (unknown)  (no       (unknown)  (unknown)  Reason For Visit  (units  

  (unknown)



                    date)                                   unknown)  

 

           (unknown)  (no       (unknown)  (unknown)  S/P ACL   (units    (unkno

wn)



                    date)                         reconstruction unknown)  



                                                  (-21)           

 

           (unknown)  (no       (unknown)  (unknown)  Signed By:  (units    (unk

nown)



                    date)                                   unknown)  

 

           (unknown)  (no       (unknown)  (unknown)  Smoking Status:  (units   

 (unknown)



                    date)                         Current some day unknown)  



                                                  smoker              

 

           (unknown)  (no       (unknown)  (unknown)  Social History  (units    

(unknown)



                    date)                                   unknown)  

 

           (unknown)  (no       (unknown)  (unknown)  Surgical History  (units  

  (unknown)



                    date)                         (Reviewed unknown)  



                                                  22 @ 05:36           



                                                  by Dashawn Quintero DO)                 

 

           (unknown)  (no       (unknown)  (unknown)  TOA       (units    (unkno

wn)



                    date)                         (tubo-ovarian unknown)  



                                                  abscess)            



                                                  ()           

 

           (unknown)  (no       (unknown)  (unknown)  This note may  (units    (

unknown)



                    date)                         have been all or unknown)  



                                                  partially           



                                                  generated using           



                                                  voice recognition           

 

           (unknown)  (no       (unknown)  (unknown)  Tobacco +  (units    (unkn

own)



                    date)                         Substance Use unknown)  

 

           (unknown)  (no       (unknown)  (unknown)  Tobacco Status  (units    

(unknown)



                    date)                                   unknown)  

 

           (unknown)  (no       (unknown)  (unknown)  Visit Reasons:  (units    

(unknown)



                    date)                         Hemorrhagic unknown)  



                                                  Ovarian Cyst F/U           

 

           (unknown)  (no       (unknown)  (unknown)  Vitals    (units    (unkno

wn)



                    date)                                   unknown)  

 

           (unknown)  (no       (unknown)  (unknown)  Weight 153 lb  (units    (

unknown)



                    date)                                   unknown)  

 

           (unknown)  (no       (unknown)  (unknown)  [Rx Confirmed  (units    (

unknown)



                    date)                         22] unknown)  

 

           (unknown)  (no       (unknown)  (unknown)  alcohol intake:  (units   

 (unknown)



                    date)                         current   unknown)  

 

           (unknown)  (no       (unknown)  (unknown)  clindamycin HCl  (units   

 (unknown)



                    date)                         150 mg capsule unknown)  



                                                  150 mg PO DAILY           



                                                  #30 caps 22           



                                                  [Rx Confirmed           

 

           (unknown)  (no       (unknown)  (unknown)  doxycycline  (units    (un

known)



                    date)                         Adverse Reaction unknown)  



                                                  (Intermediate,           



                                                  Verified 22           



                                                  14:51)              

 

           (unknown)  (no       (unknown)  (unknown)  have occurred.  (units    

(unknown)



                    date)                         If there are any unknown)  



                                                  questions, please           



                                                  contact the           



                                                  Medical Records           

 

           (unknown)  (no       (unknown)  (unknown)  household  (units    (unkn

own)



                    date)                         members:  unknown)  



                                                  friend(s)           

 

           (unknown)  (no       (unknown)  (unknown)  hydrocodone 5  (units    (

unknown)



                    date)                         mg-acetaminophen unknown)  



                                                  325 mg tablet 1           



                                                  tab PO Q4-6H PRN           



                                                  pain #10 tabs           

 

           (unknown)  (no       (unknown)  (unknown)  hydromorphone 4  (units   

 (unknown)



                    date)                         mg tablet unknown)  



                                                  (Dilaudid) 4 mg           



                                                  PO Q4-6H PRN pain           



                                                  #20 tabs 22           

 

           (unknown)  (no       (unknown)  (unknown)  ketorolac 10 mg  (units   

 (unknown)



                    date)                         tablet 10 mg PO unknown)  



                                                  Q6H PRN pain #14           



                                                  tabs 22 [Rx           



                                                  Confirmed           

 

           (unknown)  (no       (unknown)  (unknown)  may occur.  (units    (unk

nown)



                    date)                         Occasional unknown)  



                                                  wrong-word or           



                                                  'sound-alike'           



                                                  substitutions may           



                                                  have                

 

           (unknown)  (no       (unknown)  (unknown)  occurred due to  (units   

 (unknown)



                    date)                         the inherent unknown)  



                                                  limitations of           



                                                  voice recognition           



                                                  software. Please           

 

           (unknown)  (no       (unknown)  (unknown)  ondansetron 4 mg  (units  

  (unknown)



                    date)                         disintegrating unknown)  



                                                  tablet 4 mg PO           



                                                  TID-QID PRN           



                                                  nausea and           



                                                  vomiting            

 

           (unknown)  (no       (unknown)  (unknown)  oxycodone 5 mg  (units    

(unknown)



                    date)                         tablet 5 mg PO unknown)  



                                                  Q4H PRN pain #20           



                                                  tabs 22 [Rx           



                                                  Confirmed           

 

           (unknown)  (no       (unknown)  (unknown)  rash      (units    (unkno

wn)



                    date)                                   unknown)  

 

           (unknown)  (no       (unknown)  (unknown)  read the note  (units    (

unknown)



                    date)                         carefully and unknown)  



                                                  recognize, using           



                                                  context, where           



                                                  these               



                                                  substitutions           

 

           (unknown)  (no       (unknown)  (unknown)  software.  (units    (unkn

own)



                    date)                         Although every unknown)  



                                                  effort is made to           



                                                  edit content,           



                                                  transcription           



                                                  errors              

 

           (unknown)  (no       (unknown)  (unknown)  vomcarlo    (units    (unkno

wn)



                    date)                                   unknown)  









                                         Result panel 554









           (unknown)  (no       (unknown)  (unknown)  (no value)  (units    (unk

nown)



                    date)                                   unknown)  

 

           (unknown)  (no       (unknown)  (unknown)  #10 tabs  (units    (unkno

wn)



                    date)                         22 [Rx unknown)  



                                                  Confirmed           



                                                  22]           

 

           (unknown)  (no       (unknown)  (unknown)  [id number]  (units    (unkno

wn)



                    date)                                   unknown)  

 

           (unknown)  (no       (unknown)  (unknown)  22 [Rx  (units    (u

nknown)



                    date)                         Confirmed unknown)  



                                                  22]           

 

           (unknown)  (no       (unknown)  (unknown)  22  (units    (unkno

wn)



                    date)                                   unknown)  

 

           (unknown)  (no       (unknown)  (unknown)  22]  (units    (unkn

own)



                    date)                                   unknown)  

 

           (unknown)  (no       (unknown)  (unknown)  14:51     (units    (unkno

wn)



                    date)                                   unknown)  

 

           (unknown)  (no       (unknown)  (unknown)  Acne (-2016)  (units    (u

nknown)



                    date)                                   unknown)  

 

           (unknown)  (no       (unknown)  (unknown)  Age/Sex: 17 / F  (units   

 (unknown)



                    date)                         Date of Service: unknown)  

 

           (unknown)  (no       (unknown)  (unknown)  Allergies  (units    (unkn

own)



                    date)                                   unknown)  

 

           (unknown)  (no       (unknown)  (unknown)  [place]  (units    (

unknown)



                    date)                         [id number]     unknown)  

 

           (unknown)  (no       (unknown)  (unknown)  Anesthesia  (units    (unk

nown)



                    date)                                   unknown)  

 

           (unknown)  (no       (unknown)  (unknown)  Attending Dr:  (units    (

unknown)



                    date)                         Jose Almodovar unknown)  



                                                  MD                  

 

           (unknown)  (no       (unknown)  (unknown)  BMI 27.1  (units    (unkno

wn)



                    date)                                   unknown)  

 

           (unknown)  (no       (unknown)  (unknown)  /62  (units    (unkn

own)



                    date)                                   unknown)  

 

           (unknown)  (no       (unknown)  (unknown)  Blood Pressure  (units    

(unknown)



                    date)                         Location Rt unknown)  



                                                  brachial            

 

           (unknown)  (no       (unknown)  (unknown)  Chlamydia  (units    (unkn

own)



                    date)                         infection () unknown)  

 

           (unknown)  (no       (unknown)  (unknown)  : 2005  (units   

 (unknown)



                    date)                         Acct:[id number] unknown)  

 

           (unknown)  (no       (unknown)  (unknown)  Dept at   (units    (unkno

wn)



                    date)                         [phone]. unknown)  

 

           (unknown)  (no       (unknown)  (unknown)  Documented By:  (units    

(unknown)



                    date)                         Jose Almodovar unknown)  



                                                  MD 22 1450           

 

           (unknown)  (no       (unknown)  (unknown)  Draft     (units    (unkno

wn)



                    date)                                   unknown)  

 

           (unknown)  (no       (unknown)  (unknown)  Endometriosis  (units    (

unknown)



                    date)                         ()   unknown)  

 

           (unknown)  (no       (unknown)  (unknown)  [facility]  (units   

 (unknown)



                    date)                         [facility] unknown)  

 

           (unknown)  (no       (unknown)  (unknown)  Gynecology Visit  (units  

  (unknown)



                    date)                                   unknown)  

 

           (unknown)  (no       (unknown)  (unknown)  Heavy menstrual  (units   

 (unknown)



                    date)                         period () unknown)  

 

           (unknown)  (no       (unknown)  (unknown)  Height 5 ft 3 in  (units  

  (unknown)



                    date)                                   unknown)  

 

           (unknown)  (no       (unknown)  (unknown)  Intake Note:  (units    (u

nknown)



                    date)                                   unknown)  

 

           (unknown)  (no       (unknown)  (unknown)  Intake performed  (units  

  (unknown)



                    date)                         by: Harshal Gipson unknown)  

 

           (unknown)  (no       (unknown)  (unknown)  Intake    (units    (unkno

wn)



                    date)                                   unknown)  

 

           (unknown)  (no       (unknown)  (unknown)  Intake- Clincial  (units  

  (unknown)



                    date)                         Staff     unknown)  

 

           (unknown)  (no       (unknown)  (unknown)  Irregular  (units    (unkn

own)



                    date)                         menstrual cycle unknown)  



                                                  ()             

 

           (unknown)  (no       (unknown)  (unknown)  Last Menstural  (units    

(unknown)



                    date)                         Cycle + Details unknown)  

 

           (unknown)  (no       (unknown)  (unknown)  Loc: [facility]  (units    (unkno

wn)



                    date)                                   unknown)  

 

           (unknown)  (no       (unknown)  (unknown)  Lymphangioma  (units    (u

nknown)



                    date)                                   unknown)  

 

           (unknown)  (no       (unknown)  (unknown)  Medical History  (units   

 (unknown)



                    date)                         (Reviewed unknown)  



                                                  22 @ 05:36           



                                                  by Dashawn Quintero DO)                 

 

           (unknown)  (no       (unknown)  (unknown)  Medications  (units    (un

known)



                    date)                                   unknown)  

 

           (unknown)  (no       (unknown)  (unknown)  Opioids -  (units    (unkn

own)



                    date)                         Morphine  unknown)  



                                                  Analogues Allergy           



                                                  (Intermediate,           



                                                  Verified 22           



                                                  14:51)              

 

           (unknown)  (no       (unknown)  (unknown)  Other Menstrual  (units   

 (unknown)



                    date)                         Period: Other unknown)  

 

           (unknown)  (no       (unknown)  (unknown)  Ovarian cyst  (units    (u

nknown)



                    date)                                   unknown)  

 

           (unknown)  (no       (unknown)  (unknown)  PFSH      (units    (unkno

wn)



                    date)                                   unknown)  

 

           (unknown)  (no       (unknown)  (unknown)  Painful   (units    (unkno

wn)



                    date)                         menstrual periods unknown)  



                                                  ()             

 

           (unknown)  (no       (unknown)  (unknown)  Patient:  (units    (unkno

wn)



                    date)                         Lesvia Aguero E unknown)  



                                                  MR#: [id number]             

 

           (unknown)  (no       (unknown)  (unknown)  Position Sitting  (units  

  (unknown)



                    date)                                   unknown)  

 

           (unknown)  (no       (unknown)  (unknown)  Pt here to  (units    (unk

nown)



                    date)                         follow-up on her unknown)  



                                                  hemorrhagic           



                                                  ovarian cyst pt           



                                                  was seen in the           



                                                  ED                  

 

           (unknown)  (no       (unknown)  (unknown)  Reason For Visit  (units  

  (unknown)



                    date)                                   unknown)  

 

           (unknown)  (no       (unknown)  (unknown)  S/P ACL   (units    (unkno

wn)



                    date)                         reconstruction unknown)  



                                                  (-21)           

 

           (unknown)  (no       (unknown)  (unknown)  Signed By:  (units    (unk

nown)



                    date)                                   unknown)  

 

           (unknown)  (no       (unknown)  (unknown)  Smoking Status:  (units   

 (unknown)



                    date)                         Current some day unknown)  



                                                  smoker              

 

           (unknown)  (no       (unknown)  (unknown)  Social History  (units    

(unknown)



                    date)                                   unknown)  

 

           (unknown)  (no       (unknown)  (unknown)  Surgical History  (units  

  (unknown)



                    date)                         (Reviewed unknown)  



                                                  22 @ 05:36           



                                                  by Dashawn Quintero DO)                 

 

           (unknown)  (no       (unknown)  (unknown)  TOA       (units    (unkno

wn)



                    date)                         (tubo-ovarian unknown)  



                                                  abscess)            



                                                  (-21)           

 

           (unknown)  (no       (unknown)  (unknown)  This note may  (units    (

unknown)



                    date)                         have been all or unknown)  



                                                  partially           



                                                  generated using           



                                                  voice recognition           

 

           (unknown)  (no       (unknown)  (unknown)  Tobacco +  (units    (unkn

own)



                    date)                         Substance Use unknown)  

 

           (unknown)  (no       (unknown)  (unknown)  Tobacco Status  (units    

(unknown)



                    date)                                   unknown)  

 

           (unknown)  (no       (unknown)  (unknown)  Visit Reasons:  (units    

(unknown)



                    date)                         Hemorrhagic unknown)  



                                                  Ovarian Cyst F/U           

 

           (unknown)  (no       (unknown)  (unknown)  Vitals    (units    (unkno

wn)



                    date)                                   unknown)  

 

           (unknown)  (no       (unknown)  (unknown)  Weight 153 lb  (units    (

unknown)



                    date)                                   unknown)  

 

           (unknown)  (no       (unknown)  (unknown)  [Rx Confirmed  (units    (

unknown)



                    date)                         22] unknown)  

 

           (unknown)  (no       (unknown)  (unknown)  alcohol intake:  (units   

 (unknown)



                    date)                         current   unknown)  

 

           (unknown)  (no       (unknown)  (unknown)  clindamycin HCl  (units   

 (unknown)



                    date)                         150 mg capsule unknown)  



                                                  150 mg PO DAILY           



                                                  #30 caps 22           



                                                  [Rx Confirmed           

 

           (unknown)  (no       (unknown)  (unknown)  doxycycline  (units    (un

known)



                    date)                         Adverse Reaction unknown)  



                                                  (Intermediate,           



                                                  Verified 22           



                                                  14:51)              

 

           (unknown)  (no       (unknown)  (unknown)  have occurred.  (units    

(unknown)



                    date)                         If there are any unknown)  



                                                  questions, please           



                                                  contact the           



                                                  Medical Records           

 

           (unknown)  (no       (unknown)  (unknown)  household  (units    (unkn

own)



                    date)                         members:  unknown)  



                                                  friend(s)           

 

           (unknown)  (no       (unknown)  (unknown)  hydrocodone 5  (units    (

unknown)



                    date)                         mg-acetaminophen unknown)  



                                                  325 mg tablet 1           



                                                  tab PO Q4-6H PRN           



                                                  pain #10 tabs           

 

           (unknown)  (no       (unknown)  (unknown)  hydromorphone 4  (units   

 (unknown)



                    date)                         mg tablet unknown)  



                                                  (Dilaudid) 4 mg           



                                                  PO Q4-6H PRN pain           



                                                  #20 tabs 22           

 

           (unknown)  (no       (unknown)  (unknown)  ketorolac 10 mg  (units   

 (unknown)



                    date)                         tablet 10 mg PO unknown)  



                                                  Q6H PRN pain #14           



                                                  tabs 22 [Rx           



                                                  Confirmed           

 

           (unknown)  (no       (unknown)  (unknown)  may occur.  (units    (unk

nown)



                    date)                         Occasional unknown)  



                                                  wrong-word or           



                                                  'sound-alike'           



                                                  substitutions may           



                                                  have                

 

           (unknown)  (no       (unknown)  (unknown)  occurred due to  (units   

 (unknown)



                    date)                         the inherent unknown)  



                                                  limitations of           



                                                  voice recognition           



                                                  software. Please           

 

           (unknown)  (no       (unknown)  (unknown)  ondansetron 4 mg  (units  

  (unknown)



                    date)                         disintegrating unknown)  



                                                  tablet 4 mg PO           



                                                  TID-QID PRN           



                                                  nausea and           



                                                  vomiting            

 

           (unknown)  (no       (unknown)  (unknown)  oxycodone 5 mg  (units    

(unknown)



                    date)                         tablet 5 mg PO unknown)  



                                                  Q4H PRN pain #20           



                                                  tabs 22 [Rx           



                                                  Confirmed           

 

           (unknown)  (no       (unknown)  (unknown)  rash      (units    (unkno

wn)



                    date)                                   unknown)  

 

           (unknown)  (no       (unknown)  (unknown)  read the note  (units    (

unknown)



                    date)                         carefully and unknown)  



                                                  recognize, using           



                                                  context, where           



                                                  these               



                                                  substitutions           

 

           (unknown)  (no       (unknown)  (unknown)  software.  (units    (unkn

own)



                    date)                         Although every unknown)  



                                                  effort is made to           



                                                  edit content,           



                                                  transcription           



                                                  errors              

 

           (unknown)  (no       (unknown)  (unknown)  vomitt    (units    (unkno

wn)



                    date)                                   unknown)  









                                         Result panel 555









           (unknown)  (no       (unknown)  (unknown)  (no value)  (units    (unk

nown)



                    date)                                   unknown)  

 

           (unknown)  (no       (unknown)  (unknown)  #10 tabs  (units    (unkno

wn)



                    date)                         22 [Rx unknown)  



                                                  Confirmed           



                                                  22]           

 

           (unknown)  (no       (unknown)  (unknown)  [id number]  (units    (unkno

wn)



                    date)                                   unknown)  

 

           (unknown)  (no       (unknown)  (unknown)  22 [Rx  (units    (u

nknown)



                    date)                         Confirmed unknown)  



                                                  22]           

 

           (unknown)  (no       (unknown)  (unknown)  22  (units    (unkno

wn)



                    date)                                   unknown)  

 

           (unknown)  (no       (unknown)  (unknown)  22]  (units    (unkn

own)



                    date)                                   unknown)  

 

           (unknown)  (no       (unknown)  (unknown)  14:51     (units    (unkno

wn)



                    date)                                   unknown)  

 

           (unknown)  (no       (unknown)  (unknown)  Acne (-2016)  (units    (u

nknown)



                    date)                                   unknown)  

 

           (unknown)  (no       (unknown)  (unknown)  Age/Sex: 17 / F  (units   

 (unknown)



                    date)                         Date of Service: unknown)  

 

           (unknown)  (no       (unknown)  (unknown)  Allergies  (units    (unkn

own)



                    date)                                   unknown)  

 

           (unknown)  (no       (unknown)  (unknown)  [place]  (units    (

unknown)



                    date)                         [id number]     unknown)  

 

           (unknown)  (no       (unknown)  (unknown)  Anesthesia  (units    (unk

nown)



                    date)                                   unknown)  

 

           (unknown)  (no       (unknown)  (unknown)  Attending Dr:  (units    (

unknown)



                    date)                         Jose Almodovar unknown)  



                                                  MD                  

 

           (unknown)  (no       (unknown)  (unknown)  BMI 27.1  (units    (unkno

wn)



                    date)                                   unknown)  

 

           (unknown)  (no       (unknown)  (unknown)  /62  (units    (unkn

own)



                    date)                                   unknown)  

 

           (unknown)  (no       (unknown)  (unknown)  Blood Pressure  (units    

(unknown)



                    date)                         Location Rt unknown)  



                                                  brachial            

 

           (unknown)  (no       (unknown)  (unknown)  Chlamydia  (units    (unkn

own)



                    date)                         infection () unknown)  

 

           (unknown)  (no       (unknown)  (unknown)  : 2005  (units   

 (unknown)



                    date)                         Acct:[id number] unknown)  

 

           (unknown)  (no       (unknown)  (unknown)  Dept at   (units    (unkno

wn)



                    date)                         [phone]. unknown)  

 

           (unknown)  (no       (unknown)  (unknown)  Documented By:  (units    

(unknown)



                    date)                         Jose Almodovar unknown)  



                                                  MD 22 1450           

 

           (unknown)  (no       (unknown)  (unknown)  Draft     (units    (unkno

wn)



                    date)                                   unknown)  

 

           (unknown)  (no       (unknown)  (unknown)  Endometriosis  (units    (

unknown)



                    date)                         ()   unknown)  

 

           (unknown)  (no       (unknown)  (unknown)  [facility]  (units   

 (unknown)



                    date)                         [facility] unknown)  

 

           (unknown)  (no       (unknown)  (unknown)  Gynecology Visit  (units  

  (unknown)



                    date)                                   unknown)  

 

           (unknown)  (no       (unknown)  (unknown)  Heavy menstrual  (units   

 (unknown)



                    date)                         period (-2019) unknown)  

 

           (unknown)  (no       (unknown)  (unknown)  Height 5 ft 3 in  (units  

  (unknown)



                    date)                                   unknown)  

 

           (unknown)  (no       (unknown)  (unknown)  Intake Note:  (units    (u

nknown)



                    date)                                   unknown)  

 

           (unknown)  (no       (unknown)  (unknown)  Intake performed  (units  

  (unknown)



                    date)                         by: Harshal Gipson unknown)  

 

           (unknown)  (no       (unknown)  (unknown)  Intake    (units    (unkno

wn)



                    date)                                   unknown)  

 

           (unknown)  (no       (unknown)  (unknown)  Intake- Clincial  (units  

  (unknown)



                    date)                         Staff     unknown)  

 

           (unknown)  (no       (unknown)  (unknown)  Irregular  (units    (unkn

own)



                    date)                         menstrual cycle unknown)  



                                                  ()             

 

           (unknown)  (no       (unknown)  (unknown)  Last Menstural  (units    

(unknown)



                    date)                         Cycle + Details unknown)  

 

           (unknown)  (no       (unknown)  (unknown)  Loc: [facility]  (units    (unkno

wn)



                    date)                                   unknown)  

 

           (unknown)  (no       (unknown)  (unknown)  Lymphangioma  (units    (u

nknown)



                    date)                                   unknown)  

 

           (unknown)  (no       (unknown)  (unknown)  Medical History  (units   

 (unknown)



                    date)                         (Reviewed unknown)  



                                                  22 @ 05:36           



                                                  by Dashawn Quintero DO)                 

 

           (unknown)  (no       (unknown)  (unknown)  Medications  (units    (un

known)



                    date)                                   unknown)  

 

           (unknown)  (no       (unknown)  (unknown)  Opioids -  (units    (unkn

own)



                    date)                         Morphine  unknown)  



                                                  Analogues Allergy           



                                                  (Intermediate,           



                                                  Verified 22           



                                                  14:51)              

 

           (unknown)  (no       (unknown)  (unknown)  Other Menstrual  (units   

 (unknown)



                    date)                         Period: Other unknown)  

 

           (unknown)  (no       (unknown)  (unknown)  Ovarian cyst  (units    (u

nknown)



                    date)                                   unknown)  

 

           (unknown)  (no       (unknown)  (unknown)  PFSH      (units    (unkno

wn)



                    date)                                   unknown)  

 

           (unknown)  (no       (unknown)  (unknown)  Painful   (units    (unkno

wn)



                    date)                         menstrual periods unknown)  



                                                  ()             

 

           (unknown)  (no       (unknown)  (unknown)  Patient:  (units    (unkno

wn)



                    date)                         MoreLesvia E unknown)  



                                                  MR#: [id number]             

 

           (unknown)  (no       (unknown)  (unknown)  Position Sitting  (units  

  (unknown)



                    date)                                   unknown)  

 

           (unknown)  (no       (unknown)  (unknown) Pt here to  (units    (unkn

own)



                    date)                         follow-up on her unknown)  



                                                  hemorrhagic           



                                                  ovarian cyst pt           



                                                  was seen in the           



                                                  ED at             

 

           (unknown)  (no       (unknown)  (unknown)  Reason For Visit  (units  

  (unknown)



                    date)                                   unknown)  

 

           (unknown)  (no       (unknown)  (unknown)  S/P ACL   (units    (unkno

wn)



                    date)                         reconstruction unknown)  



                                                  (-21)           

 

           (unknown)  (no       (unknown)  (unknown)  Signed By:  (units    (unk

nown)



                    date)                                   unknown)  

 

           (unknown)  (no       (unknown)  (unknown)  Smoking Status:  (units   

 (unknown)



                    date)                         Current some day unknown)  



                                                  smoker              

 

           (unknown)  (no       (unknown)  (unknown)  Social History  (units    

(unknown)



                    date)                                   unknown)  

 

           (unknown)  (no       (unknown)  (unknown)  Surgical History  (units  

  (unknown)



                    date)                         (Reviewed unknown)  



                                                  22 @ 05:36           



                                                  by Dashawn Quintero DO)                 

 

           (unknown)  (no       (unknown)  (unknown)  TOA       (units    (unkno

wn)



                    date)                         (tubo-ovarian unknown)  



                                                  abscess)            



                                                  (-21)           

 

           (unknown)  (no       (unknown)  (unknown)  This note may  (units    (

unknown)



                    date)                         have been all or unknown)  



                                                  partially           



                                                  generated using           



                                                  voice recognition           

 

           (unknown)  (no       (unknown)  (unknown)  Tobacco +  (units    (unkn

own)



                    date)                         Substance Use unknown)  

 

           (unknown)  (no       (unknown)  (unknown)  Tobacco Status  (units    

(unknown)



                    date)                                   unknown)  

 

           (unknown)  (no       (unknown)  (unknown)  Visit Reasons:  (units    

(unknown)



                    date)                         Hemorrhagic unknown)  



                                                  Ovarian Cyst F/U           

 

           (unknown)  (no       (unknown)  (unknown)  Vitals    (units    (unkno

wn)



                    date)                                   unknown)  

 

           (unknown)  (no       (unknown)  (unknown)  Weight 153 lb  (units    (

unknown)



                    date)                                   unknown)  

 

           (unknown)  (no       (unknown)  (unknown)  [Rx Confirmed  (units    (

unknown)



                    date)                         22] unknown)  

 

           (unknown)  (no       (unknown)  (unknown)  alcohol intake:  (units   

 (unknown)



                    date)                         current   unknown)  

 

           (unknown)  (no       (unknown)  (unknown)  clindamycin HCl  (units   

 (unknown)



                    date)                         150 mg capsule unknown)  



                                                  150 mg PO DAILY           



                                                  #30 caps 22           



                                                  [Rx Confirmed           

 

           (unknown)  (no       (unknown)  (unknown)  doxycycline  (units    (un

known)



                    date)                         Adverse Reaction unknown)  



                                                  (Intermediate,           



                                                  Verified 22           



                                                  14:51)              

 

           (unknown)  (no       (unknown)  (unknown)  have occurred.  (units    

(unknown)



                    date)                         If there are any unknown)  



                                                  questions, please           



                                                  contact the           



                                                  Medical Records           

 

           (unknown)  (no       (unknown)  (unknown)  household  (units    (unkn

own)



                    date)                         members:  unknown)  



                                                  friend(s)           

 

           (unknown)  (no       (unknown)  (unknown)  hydrocodone 5  (units    (

unknown)



                    date)                         mg-acetaminophen unknown)  



                                                  325 mg tablet 1           



                                                  tab PO Q4-6H PRN           



                                                  pain #10 tabs           

 

           (unknown)  (no       (unknown)  (unknown)  hydromorphone 4  (units   

 (unknown)



                    date)                         mg tablet unknown)  



                                                  (Dilaudid) 4 mg           



                                                  PO Q4-6H PRN pain           



                                                  #20 tabs 22           

 

           (unknown)  (no       (unknown)  (unknown)  ketorolac 10 mg  (units   

 (unknown)



                    date)                         tablet 10 mg PO unknown)  



                                                  Q6H PRN pain #14           



                                                  tabs 22 [Rx           



                                                  Confirmed           

 

           (unknown)  (no       (unknown)  (unknown)  may occur.  (units    (unk

nown)



                    date)                         Occasional unknown)  



                                                  wrong-word or           



                                                  'sound-alike'           



                                                  substitutions may           



                                                  have                

 

           (unknown)  (no       (unknown)  (unknown)  occurred due to  (units   

 (unknown)



                    date)                         the inherent unknown)  



                                                  limitations of           



                                                  voice recognition           



                                                  software. Please           

 

           (unknown)  (no       (unknown)  (unknown)  on  due to  (units   

 (unknown)



                    date)                         extreme pain pt unknown)  



                                                  had a pelvic US           



                                                  and pelvic CT           



                                                  done                

 

           (unknown)  (no       (unknown)  (unknown)  ondansetron 4 mg  (units  

  (unknown)



                    date)                         disintegrating unknown)  



                                                  tablet 4 mg PO           



                                                  TID-QID PRN           



                                                  nausea and           



                                                  vomiting            

 

           (unknown)  (no       (unknown)  (unknown)  oxycodone 5 mg  (units    

(unknown)



                    date)                         tablet 5 mg PO unknown)  



                                                  Q4H PRN pain #20           



                                                  tabs 22 [Rx           



                                                  Confirmed           

 

           (unknown)  (no       (unknown)  (unknown)  rash      (units    (unkno

wn)



                    date)                                   unknown)  

 

           (unknown)  (no       (unknown)  (unknown)  read the note  (units    (

unknown)



                    date)                         carefully and unknown)  



                                                  recognize, using           



                                                  context, where           



                                                  these               



                                                  substitutions           

 

           (unknown)  (no       (unknown)  (unknown)  software.  (units    (unkn

own)



                    date)                         Although every unknown)  



                                                  effort is made to           



                                                  edit content,           



                                                  transcription           



                                                  errors              

 

           (unknown)  (no       (unknown)  (unknown)  vomcarlo    (units    (unkno

wn)



                    date)                                   unknown)  









                                         Result panel 556









           (unknown)  (no       (unknown)  (unknown)  (no value)  (units    (unk

nown)



                    date)                                   unknown)  

 

           (unknown)  (no       (unknown)  (unknown)  #10 tabs  (units    (unkno

wn)



                    date)                         22 [Rx unknown)  



                                                  Confirmed           



                                                  22]           

 

           (unknown)  (no       (unknown)  (unknown)  [id number]  (units    (unkno

wn)



                    date)                                   unknown)  

 

           (unknown)  (no       (unknown)  (unknown)  22 [Rx  (units    (u

nknown)



                    date)                         Confirmed unknown)  



                                                  22]           

 

           (unknown)  (no       (unknown)  (unknown)  22  (units    (unkno

wn)



                    date)                                   unknown)  

 

           (unknown)  (no       (unknown)  (unknown)  22]  (units    (unkn

own)



                    date)                                   unknown)  

 

           (unknown)  (no       (unknown)  (unknown)  14:51     (units    (unkno

wn)



                    date)                                   unknown)  

 

           (unknown)  (no       (unknown)  (unknown)  Acne (-2016)  (units    (u

nknown)



                    date)                                   unknown)  

 

           (unknown)  (no       (unknown)  (unknown)  Age/Sex: 17 / F  (units   

 (unknown)



                    date)                         Date of Service: unknown)  

 

           (unknown)  (no       (unknown)  (unknown)  Allergies  (units    (unkn

own)



                    date)                                   unknown)  

 

           (unknown)  (no       (unknown)  (unknown)  [place]  (units    (

unknown)



                    date)                         [address]     unknown)  

 

           (unknown)  (no       (unknown)  (unknown)  Anesthesia  (units    (unk

nown)



                    date)                                   unknown)  

 

           (unknown)  (no       (unknown)  (unknown)  Attending Dr:  (units    (

unknown)



                    date)                         Jose Almodovar unknown)  



                                                  MD                  

 

           (unknown)  (no       (unknown)  (unknown)  BMI 27.1  (units    (unkno

wn)



                    date)                                   unknown)  

 

           (unknown)  (no       (unknown)  (unknown)  /62  (units    (unkn

own)



                    date)                                   unknown)  

 

           (unknown)  (no       (unknown)  (unknown)  Blood Pressure  (units    

(unknown)



                    date)                         Location Rt unknown)  



                                                  brachial            

 

           (unknown)  (no       (unknown)  (unknown)  Chief Complaint  (units   

 (unknown)



                    date)                                   unknown)  

 

           (unknown)  (no       (unknown)  (unknown)  Chief Complaint:  (units  

  (unknown)



                    date)                         ED follow-up unknown)  

 

           (unknown)  (no       (unknown)  (unknown)  Chlamydia  (units    (unkn

own)



                    date)                         infection () unknown)  

 

           (unknown)  (no       (unknown)  (unknown)  : 2005  (units   

 (unknown)



                    date)                         Acct:[id number] unknown)  

 

           (unknown)  (no       (unknown)  (unknown)  Dept at   (units    (unkno

wn)



                    date)                         [phone] unknown)  

 

           (unknown)  (no       (unknown)  (unknown)  Details:  (units    (unkno

wn)



                    date)                                   unknown)  

 

           (unknown)  (no       (unknown)  (unknown)  Documented By:  (units    

(unknown)



                    date)                         Jose Almodovar unknown)  



                                                  MD 22 1450           

 

           (unknown)  (no       (unknown)  (unknown)  [employer]     (units    (unkno

wn)



                    date)                                   unknown)  

 

           (unknown)  (no       (unknown)  (unknown)  Endometriosis  (units    (

unknown)



                    date)                         ()   unknown)  

 

           (unknown)  (no       (unknown)  (unknown)  [facility]  (units   

 (unknown)



                    date)                         [facility] unknown)  

 

           (unknown)  (no       (unknown)  (unknown)  Lesvia was seen  (units    

(unknown)



                    date[facility]           



                                                  [facility]Q pain on           

 

           (unknown)  (no       (unknown)  (unknown)  Gynecology Visit  (units  

  (unknown)



                    date)                                   unknown)  

 

           (unknown)  (no       (unknown)  (unknown)  HPI       (units    (unkno

wn)



                    date)                                   unknown)  

 

           (unknown)  (no       (unknown)  (unknown)  Heavy menstrual  (units   

 (unknown)



                    date)                         period (-) unknown)  

 

           (unknown)  (no       (unknown)  (unknown)  Height 5 ft 3 in  (units  

  (unknown)



                    date)                                   unknown)  

 

           (unknown)  (no       (unknown)  (unknown)  Intake Note:  (units    (u

nknown)



                    date)                                   unknown)  

 

           (unknown)  (no       (unknown)  (unknown)  Intake performed  (units  

  (unknown)



                    date)                         by: Harshal Gipson unknown)  

 

           (unknown)  (no       (unknown)  (unknown)  Intake    (units    (unkno

wn)



                    date)                                   unknown)  

 

           (unknown)  (no       (unknown)  (unknown)  Intake- Clincial  (units  

  (unknown)



                    date)                         Staff     unknown)  

 

           (unknown)  (no       (unknown)  (unknown)  Irregular  (units    (unkn

own)



                    date)                         menstrual cycle unknown)  



                                                  ()             

 

           (unknown)  (no       (unknown)  (unknown)  Last Menstural  (units    

(unknown)



                    date)                         Cycle + Details unknown)  

 

           (unknown)  (no       (unknown)  (unknown)  Loc: [facility]  (units    (unkno

wn)



                    date)                                   unknown)  

 

           (unknown)  (no       (unknown)  (unknown)  Lymphangioma  (units    (u

nknown)



                    date)                                   unknown)  

 

           (unknown)  (no       (unknown)  (unknown)  Medical History  (units   

 (unknown)



                    date)                         (Reviewed unknown)  



                                                  22 @ 05:36           



                                                  by Dashawn Quintero DO)                 

 

           (unknown)  (no       (unknown)  (unknown)  Medications  (units    (un

known)



                    date)                                   unknown)  

 

           (unknown)  (no       (unknown)  (unknown)  Opioids -  (units    (unkn

own)



                    date)                         Morphine  unknown)  



                                                  Analogues Allergy           



                                                  (Intermediate,           



                                                  Verified 22           



                                                  14:51)              

 

           (unknown)  (no       (unknown)  (unknown)  Other Menstrual  (units   

 (unknown)



                    date)                         Period: Other unknown)  

 

           (unknown)  (no       (unknown)  (unknown)  Ovarian cyst  (units    (u

nknown)



                    date)                                   unknown)  

 

           (unknown)  (no       (unknown)  (unknown)  PFSH      (units    (unkno

wn)



                    date)                                   unknown)  

 

           (unknown)  (no       (unknown)  (unknown)  Painful   (units    (unkno

wn)



                    date)                         menstrual periods unknown)  



                                                  ()             

 

           (unknown)  (no       (unknown)  (unknown)  Patient:  (units    (unkno

wn)



                    date)                         MoreLesvia unknown)  



                                                  MR#: [id number]             

 

           (unknown)  (no       (unknown)  (unknown)  Position Sitting  (units  

  (unknown)



                    date)                                   unknown)  

 

           (unknown)  (no       (unknown)  (unknown) Pt here to  (units    (unkn

own)



                    date)                         follow-up on her unknown)  



                                                  hemorrhagic           



                                                  ovarian cyst pt           



                                                  was seen in the           



                                                  ED at             

 

           (unknown)  (no       (unknown)  (unknown)  Reason For Visit  (units  

  (unknown)



                    date)                                   unknown)  

 

           (unknown)  (no       (unknown)  (unknown)  S/P ACL   (units    (unkno

wn)



                    date)                         reconstruction unknown)  



                                                  (-21)           

 

           (unknown)  (no       (unknown)  (unknown)  Signed By:  (units    (unk

nown)



                    date)                                   unknown)  

 

           (unknown)  (no       (unknown)  (unknown)  Smoking Status:  (units   

 (unknown)



                    date)                         Current some day unknown)  



                                                  smoker              

 

           (unknown)  (no       (unknown)  (unknown)  Social History  (units    

(unknown)



                    date)                                   unknown)  

 

           (unknown)  (no       (unknown)  (unknown)  Surgical History  (units  

  (unknown)



                    date)                         (Reviewed unknown)  



                                                  22 @ 05:36           



                                                  by Dashawn Quintero DO)                 

 

           (unknown)  (no       (unknown)  (unknown)  TOA       (units    (unkno

wn)



                    date)                         (tubo-ovarian unknown)  



                                                  abscess)            



                                                  (-21)           

 

           (unknown)  (no       (unknown)  (unknown)  This note may  (units    (

unknown)



                    date)                         have been all or unknown)  



                                                  partially           



                                                  generated using           



                                                  voice recognition           

 

           (unknown)  (no       (unknown)  (unknown)  Tobacco +  (units    (unkn

own)



                    date)                         Substance Use unknown)  

 

           (unknown)  (no       (unknown)  (unknown)  Tobacco Status  (units    

(unknown)



                    date)                                   unknown)  

 

           (unknown)  (no       (unknown)  (unknown)  Visit Reasons:  (units    

(unknown)



                    date)                         Hemorrhagic unknown)  



                                                  Ovarian Cyst F/U           

 

           (unknown)  (no       (unknown)  (unknown)  Vitals    (units    (unkno

wn)



                    date)                                   unknown)  

 

           (unknown)  (no       (unknown)  (unknown)  Weight 153 lb  (units    (

unknown)



                    date)                                   unknown)  

 

           (unknown)  (no       (unknown)  (unknown)  [Rx Confirmed  (units    (

unknown)



                    date)                         22] unknown)  

 

           (unknown)  (no       (unknown)  (unknown)  alcohol intake:  (units   

 (unknown)



                    date)                         current   unknown)  

 

           (unknown)  (no       (unknown)  (unknown)  clindamycin HCl  (units   

 (unknown)



                    date)                         150 mg capsule unknown)  



                                                  150 mg PO DAILY           



                                                  #30 caps 22           



                                                  [Rx Confirmed           

 

           (unknown)  (no       (unknown)  (unknown)  doxycycline  (units    (un

known)



                    date)                         Adverse Reaction unknown)  



                                                  (Intermediate,           



                                                  Verified 22           



                                                  14:51)              

 

           (unknown)  (no       (unknown)  (unknown)  have occurred.  (units    

(unknown)



                    date)                         If there are any unknown)  



                                                  questions, please           



                                                  contact the           



                                                  Medical Records           

 

           (unknown)  (no       (unknown)  (unknown)  household  (units    (unkn

own)



                    date)                         members:  unknown)  



                                                  friend(s)           

 

           (unknown)  (no       (unknown)  (unknown)  hydrocodone 5  (units    (

unknown)



                    date)                         mg-acetaminophen unknown)  



                                                  325 mg tablet 1           



                                                  tab PO Q4-6H PRN           



                                                  pain #10 tabs           

 

           (unknown)  (no       (unknown)  (unknown)  hydromorphone 4  (units   

 (unknown)



                    date)                         mg tablet unknown)  



                                                  (Dilaudid) 4 mg           



                                                  PO Q4-6H PRN pain           



                                                  #20 tabs 22           

 

           (unknown)  (no       (unknown)  (unknown)  ketorolac 10 mg  (units   

 (unknown)



                    date)                         tablet 10 mg PO unknown)  



                                                  Q6H PRN pain #14           



                                                  tabs 22 [Rx           



                                                  Confirmed           

 

           (unknown)  (no       (unknown)  (unknown)  may occur.  (units    (unk

nown)



                    date)                         Occasional unknown)  



                                                  wrong-word or           



                                                  'sound-alike'           



                                                  substitutions may           



                                                  have                

 

           (unknown)  (no       (unknown)  (unknown)  occurred due to  (units   

 (unknown)



                    date)                         the inherent unknown)  



                                                  limitations of           



                                                  voice recognition           



                                                  software. Please           

 

           (unknown)  (no       (unknown)  (unknown)  on  due to  (units   

 (unknown)



                    date)                         extreme pain pt unknown)  



                                                  had a pelvic US           



                                                  and pelvic CT           



                                                  done                

 

           (unknown)  (no       (unknown)  (unknown)  ondansetron 4 mg  (units  

  (unknown)



                    date)                         disintegrating unknown)  



                                                  tablet 4 mg PO           



                                                  TID-QID PRN           



                                                  nausea and           



                                                  vomiting            

 

           (unknown)  (no       (unknown)  (unknown)  oxycodone 5 mg  (units    

(unknown)



                    date)                         tablet 5 mg PO unknown)  



                                                  Q4H PRN pain #20           



                                                  tabs 22 [Rx           



                                                  Confirmed           

 

           (unknown)  (no       (unknown)  (unknown)  rash      (units    (unkno

wn)



                    date)                                   unknown)  

 

           (unknown)  (no       (unknown)  (unknown)  read the note  (units    (

unknown)



                    date)                         carefully and unknown)  



                                                  recognize, using           



                                                  context, where           



                                                  these               



                                                  substitutions           

 

           (unknown)  (no       (unknown)  (unknown)  software.  (units    (unkn

own)



                    date)                         Although every unknown)  



                                                  effort is made to           



                                                  edit content,           



                                                  transcription           



                                                  errors              

 

           (unknown)  (no       (unknown)  (unknown)  vomitt    (units    (unkno

wn)



                    date)                                   unknown)  









                                         Result panel 557









           (unknown)  (no       (unknown)  (unknown)  (no value)  (units    (unk

nown)



                    date)                                   unknown)  

 

           (unknown)  (no       (unknown)  (unknown)  #10 tabs 22  (units 

   (unknown)



                    date)                         [Rx Confirmed unknown)  



                                                  22]           

 

           (unknown)  (no       (unknown)  (unknown)  [id number]  (units    (unkno

wn)



                    date)                                   unknown)  

 

           (unknown)  (no       (unknown)  (unknown)  1. A 4.0 x 5.3 x  (units  

  (unknown)



                    date)                         5.2 cm complex unknown)  



                                                  cystic mass in           



                                                  left adnexa.?           



                                                  Differential           

 

           (unknown)  (no       (unknown)  (unknown)  22 [Rx  (units    (u

nknown)



                    date)                         Confirmed unknown)  



                                                  22]           

 

           (unknown)  (no       (unknown)  (unknown)  22. Labs  (units    

(unknown)



                    date)                         including CBC and unknown)  



                                                  CMP were normal.           



                                                  Imaging studies           



                                                  included            

 

           (unknown)  (no       (unknown)  (unknown)  22  (units    (unkno

wn)



                    date)                                   unknown)  

 

           (unknown)  (no       (unknown)  (unknown)  22]  (units    (unkn

own)



                    date)                                   unknown)  

 

           (unknown)  (no       (unknown)  (unknown)  14:51     (units    (unkno

wn)



                    date)                                   unknown)  

 

           (unknown)  (no       (unknown)  (unknown)  2.? There is a  (units    

(unknown)



                    date)                         trace amount of unknown)  



                                                  free fluid in the           



                                                  cul-de-sac.           

 

           (unknown)  (no       (unknown)  (unknown)  ?         (units    (unkno

wn)



                    date)                                   unknown)  

 

           (unknown)  (no       (unknown)  (unknown)  ABD/PELVIC CT  (units    (

unknown)



                    date)                         which showed: unknown)  

 

           (unknown)  (no       (unknown)  (unknown)  ABDOMEN:  (units    (unkno

wn)



                    date)                                   unknown)  

 

           (unknown)  (no       (unknown)  (unknown) Abdominal Nodes:?  (units  

  (unknown)



                    date)                         No retroperitoneal unknown)  



                                                  or mesenteric           



                                                  adenopathy by size           



                                                  criteria.?           

 

           (unknown)  (no       (unknown)  (unknown)  Acne (-2016)  (units    (u

nknown)



                    date)                                   unknown)  

 

           (unknown)  (no       (unknown)  (unknown)  Adrenal Glands:?  (units  

  (unknown)



                    date)                         Unremarkable.? ? unknown)  

 

           (unknown)  (no       (unknown)  (unknown)  After the  (units    (unkn

own)



                    date)                         administration of unknown)  



                                                  oral and IV           



                                                  contrast, axial           



                                                  sections were           



                                                  acquired            

 

           (unknown)  (no       (unknown)  (unknown)  Age/Sex: 17 / F  (units   

 (unknown)



                    date)                         Date of Service: unknown)  

 

           (unknown)  (no       (unknown)  (unknown)  Allergies  (units    (unkn

own)



                    date)                                   unknown)  

 

           (unknown)  (no       (unknown)  (unknown)  [place]  (units    (

unknown)



                    date)                         [address]     unknown)  

 

           (unknown)  (no       (unknown)  (unknown)  Anesthesia  (units    (unk

nown)



                    date)                                   unknown)  

 

           (unknown)  (no       (unknown)  (unknown)  Attending Dr:  (units    (

unknown)



                    date)                         Jose Almodovar MD unknown)  

 

           (unknown)  (no       (unknown)  (unknown)  BMI 27.1  (units    (unkno

wn)



                    date)                                   unknown)  

 

           (unknown)  (no       (unknown)  (unknown)  /62  (units    (unkn

own)



                    date)                                   unknown)  

 

           (unknown)  (no       (unknown)  (unknown)  Biliary ducts:?  (units   

 (unknown)



                    date)                         Unremarkable.? ? unknown)  

 

           (unknown)  (no       (unknown)  (unknown)  Bladder:?  (units    (unkn

own)



                    date)                         Unremarkable.? ? unknown)  

 

           (unknown)  (no       (unknown)  (unknown)  Blood Pressure  (units    

(unknown)



                    date)                         Location Rt unknown)  



                                                  brachial            

 

           (unknown)  (no       (unknown)  (unknown)  Bones:?   (units    (unkno

wn)



                    date)                         Unremarkable.?? unknown)  

 

           (unknown)  (no       (unknown)  (unknown)  COMPARISON:?  (units    (u

nknown)



                    date)                         [facility], unknown)  



                                                  US, US PELVIC           



                                                  COMPLETE,           



                                                  2022, 5:30.           

 

           (unknown)  (no       (unknown)  (unknown)  Chief Complaint  (units   

 (unknown)



                    date)                                   unknown)  

 

           (unknown)  (no       (unknown)  (unknown)  Chief Complaint:  (units  

  (unknown)



                    date)                         ED follow-up unknown)  

 

           (unknown)  (no       (unknown)  (unknown)  Chlamydia  (units    (unkn

own)



                    date)                         infection () unknown)  

 

           (unknown)  (no       (unknown)  (unknown)  : 2005  (units   

 (unknown)



                    date)                         Acct:[id number] unknown)  

 

           (unknown)  (no       (unknown)  (unknown)  Dept at   (units    (unkno

wn)



                    date)                         [phone]. unknown)  

 

           (unknown)  (no       (unknown)  (unknown)  Details:  (units    (unkno

wn)



                    date)                                   unknown)  

 

           (unknown)  (no       (unknown)  (unknown)  Documented By:  (units    

(unknown)



                    date)                         Jose Almodovar MD unknown)  



                                                  22 1450           

 

           (unknown)  (no       (unknown)  (unknown)  [employer]     (units    (unkno

wn)



                    date)                                   unknown)  

 

           (unknown)  (no       (unknown)  (unknown)  Endometriosis  (units    (

unknown)



                    date)                         (-)   unknown)  

 

           (unknown)  (no       (unknown)  (unknown)  FINDINGS:?  (units    (unk

nown)



                    date)                                   unknown)  

 

           (unknown)  (no       (unknown)  (unknown)  [facility]  (units   

 (unknown)



                    date)                         [facility] unknown)  

 

           (unknown)  (no       (unknown)  (unknown)  Gallbladder:?  (units    (

unknown)



                    date)                         Unremarkable.? ? unknown)  

 

           (unknown)  (no       (unknown)  (unknown) Lesvia was seen in  (units  

  (unknown)



                    date)                         the ED at [facility] for LLQ           



                                                  pain early on the           



                                                  morning of           

 

           (unknown)  (no       (unknown)  (unknown)  Gynecology Visit  (units  

  (unknown)



                    date)                                   unknown)  

 

           (unknown)  (no       (unknown)  (unknown)  HPI       (units    (unkno

wn)



                    date)                                   unknown)  

 

           (unknown)  (no       (unknown)  (unknown)  Heart:? No  (units    (unk

nown)



                    date)                         significant unknown)  



                                                  findings.           

 

           (unknown)  (no       (unknown)  (unknown)  Heavy menstrual  (units   

 (unknown)



                    date)                         period (-) unknown)  

 

           (unknown)  (no       (unknown)  (unknown)  Height 5 ft 3 in  (units  

  (unknown)



                    date)                                   unknown)  

 

           (unknown)  (no       (unknown)  (unknown)  IMPRESSION:?  (units    (u

nknown)



                    date)                                   unknown)  

 

           (unknown)  (no       (unknown)  (unknown)  INDICATIONS:?  (units    (

unknown)



                    date)                         severe LLQ pain, unknown)  



                                                  recent pelvic           



                                                  surgery, hx           



                                                  abscess             

 

           (unknown)  (no       (unknown)  (unknown)  Image quality:?  (units   

 (unknown)



                    date)                         Excellent.? unknown)  

 

           (unknown)  (no       (unknown)  (unknown)  Intake Note:  (units    (u

nknown)



                    date)                                   unknown)  

 

           (unknown)  (no       (unknown)  (unknown)  Intake performed  (units  

  (unknown)



                    date)                         by: Harshal Gipson unknown)  

 

           (unknown)  (no       (unknown)  (unknown)  Intake    (units    (unkno

wn)



                    date)                                   unknown)  

 

           (unknown)  (no       (unknown)  (unknown)  Intake- Clincial  (units  

  (unknown)



                    date)                         Staff     unknown)  

 

           (unknown)  (no       (unknown)  (unknown)  Irregular  (units    (unkn

own)



                    date)                         menstrual cycle unknown)  



                                                  (-)             

 

           (unknown)  (no       (unknown)  (unknown)  Kidneys and  (units    (un

known)



                    date)                         Ureters:? unknown)  



                                                  Unremarkable.? ?           

 

           (unknown)  (no       (unknown)  (unknown)  Last Menstural  (units    

(unknown)



                    date)                         Cycle + Details unknown)  

 

           (unknown)  (no       (unknown)  (unknown)  Liver:?   (units    (unkno

wn)



                    date)                         Unremarkable.? ? unknown)  

 

           (unknown)  (no       (unknown)  (unknown)  Loc: [facility]  (units    (unkno

wn)



                    date)                                   unknown)  

 

           (unknown)  (no       (unknown)  (unknown)  Lung bases:?  (units    (u

nknown)



                    date)                         Unremarkable.? ? unknown)  

 

           (unknown)  (no       (unknown)  (unknown)  Lymphangioma  (units    (u

nknown)



                    date)                                   unknown)  

 

           (unknown)  (no       (unknown)  (unknown)  Medical History  (units   

 (unknown)



                    date)                         (Reviewed 22 unknown)  



                                                  @ 05:36 by Dashawn Quintero DO)           

 

           (unknown)  (no       (unknown)  (unknown)  Medications  (units    (un

known)



                    date)                                   unknown)  

 

           (unknown)  (no       (unknown)  (unknown)  Miscellaneous: No  (units 

   (unknown)



                    date)                         inguinal hernias unknown)  



                                                  are seen. ? ?           

 

           (unknown)  (no       (unknown)  (unknown)  Opioids -  (units    (unkn

own)



                    date)                         Morphine Analogues unknown)  



                                                  Allergy             



                                                  (Intermediate,           



                                                  Verified 22           



                                                  14:51)              

 

           (unknown)  (no       (unknown)  (unknown)  Other Menstrual  (units   

 (unknown)



                    date)                         Period: Other unknown)  

 

           (unknown)  (no       (unknown)  (unknown)  Ovarian cyst  (units    (u

nknown)



                    date)                                   unknown)  

 

           (unknown)  (no       (unknown)  (unknown)  PELVIS:   (units    (unkno

wn)



                    date)                                   unknown)  

 

           (unknown)  (no       (unknown)  (unknown)  PFSH      (units    (unkno

wn)



                    date)                                   unknown)  

 

           (unknown)  (no       (unknown)  (unknown)  PROCEDURE:? CT  (units    

(unknown)



                    date)                         ABDOMEN PELVIS W unknown)  



                                                  CON                 

 

           (unknown)  (no       (unknown)  (unknown)  Painful menstrual  (units 

   (unknown)



                    date)                         periods (-) unknown)  

 

           (unknown)  (no       (unknown)  (unknown)  Pancreas:?  (units    (unk

nown)



                    date)                         Unremarkable.? ? unknown)  

 

           (unknown)  (no       (unknown)  (unknown)  Patient:  (units    (unkno

wn)



                    date)                         Lesvia Aguero unknown)  



                                                  MR#: [id number]             

 

           (unknown)  (no       (unknown)  (unknown)  Pelvic Nodes: No  (units  

  (unknown)



                    date)                         enlarged lymph unknown)  



                                                  nodes.?             

 

           (unknown)  (no       (unknown)  (unknown) Pelvic Organs:?  (units    

(unknown)



                    date)                         Unremarkable.? unknown)  



                                                  Uterus is normal.?           



                                                  There is a complex           



                                                  cystic mass           

 

           (unknown)  (no       (unknown)  (unknown)  Pelvic US  (units    (unkn

own)



                    date)                         performed shortly unknown)  



                                                  thereafter showed:           

 

           (unknown)  (no       (unknown)  (unknown)  Peritoneum:? No  (units   

 (unknown)



                    date)                         abnormal  unknown)  



                                                  intraperitoneal           



                                                  fluid.? No free           



                                                  air.?               

 

           (unknown)  (no       (unknown)  (unknown)  Position Sitting  (units  

  (unknown)



                    date)                                   unknown)  

 

           (unknown)  (no       (unknown)  (unknown) Pt here to  (units    (unkn

own)



                    date)                         follow-up on her unknown)  



                                                  hemorrhagic           



                                                  ovarian cyst pt           



                                                  was seen in the ED           



                                                  at                

 

           (unknown)  (no       (unknown)  (unknown)  Reason For Visit  (units  

  (unknown)



                    date)                                   unknown)  

 

           (unknown)  (no       (unknown)  (unknown)  S/P ACL   (units    (unkno

wn)



                    date)                         reconstruction unknown)  



                                                  (-21)           

 

           (unknown)  (no       (unknown)  (unknown)  Signed By:  (units    (unk

nown)



                    date)                                   unknown)  

 

           (unknown)  (no       (unknown)  (unknown)  Smoking Status:  (units   

 (unknown)



                    date)                         Current some day unknown)  



                                                  smoker              

 

           (unknown)  (no       (unknown)  (unknown)  Social History  (units    

(unknown)



                    date)                                   unknown)  

 

           (unknown)  (no       (unknown)  (unknown)  Spleen:?  (units    (unkno

wn)



                    date)                         Unremarkable.? ? unknown)  

 

           (unknown)  (no       (unknown)  (unknown)  Stomach and  (units    (un

known)



                    date)                         Bowel:? Stomach, unknown)  



                                                  small bowel loops,           



                                                  and colon are           



                                                  unremarkable.?           

 

           (unknown)  (no       (unknown)  (unknown)  Surgical History  (units  

  (unknown)



                    date)                         (Reviewed 22 unknown)  



                                                  @ 05:36 by Dashawn Quintero DO)           

 

           (unknown)  (no       (unknown)  (unknown)  TECHNIQUE:?  (units    (un

known)



                    date)                                   unknown)  

 

           (unknown)  (no       (unknown)  (unknown)  TOA (tubo-ovarian  (units 

   (unknown)



                    date)                         abscess)  unknown)  



                                                  (-21)           

 

           (unknown)  (no       (unknown)  (unknown)  There is a  (units    (unk

nown)



                    date)                                   unknown)  

 

           (unknown)  (no       (unknown)  (unknown)  This note may  (units    (

unknown)



                    date)                         have been all or unknown)  



                                                  partially           



                                                  generated using           



                                                  voice recognition           

 

           (unknown)  (no       (unknown)  (unknown)  Tobacco +  (units    (unkn

own)



                    date)                         Substance Use unknown)  

 

           (unknown)  (no       (unknown)  (unknown)  Tobacco Status  (units    

(unknown)



                    date)                                   unknown)  

 

           (unknown)  (no       (unknown)  (unknown)  Ventral Wall: ?  (units   

 (unknown)



                    date)                         No hernia.? unknown)  

 

           (unknown)  (no       (unknown)  (unknown)  Vessels:? Aorta  (units   

 (unknown)



                    date)                         and inferior vena unknown)  



                                                  cava are normal in           



                                                  size.?              

 

           (unknown)  (no       (unknown)  (unknown)  Visit Reasons:  (units    

(unknown)



                    date)                         Hemorrhagic unknown)  



                                                  Ovarian Cyst F/U           

 

           (unknown)  (no       (unknown)  (unknown)  Vitals    (units    (unkno

wn)



                    date)                                   unknown)  

 

           (unknown)  (no       (unknown)  (unknown)  Weight 153 lb  (units    (

unknown)



                    date)                                   unknown)  

 

           (unknown)  (no       (unknown)  (unknown)  [Rx Confirmed  (units    (

unknown)



                    date)                         22] unknown)  

 

           (unknown)  (no       (unknown)  (unknown)  adjustment  (units    (unk

nown)



                    date)                                   unknown)  

 

           (unknown)  (no       (unknown)  (unknown)  alcohol intake:  (units   

 (unknown)



                    date)                         current   unknown)  

 

           (unknown)  (no       (unknown)  (unknown)  amount of free  (units    

(unknown)



                    date)                         fluid in the unknown)  



                                                  cul-de-sac.           

 

           (unknown)  (no       (unknown)  (unknown)  clindamycin HCl  (units   

 (unknown)



                    date)                         150 mg capsule 150 unknown)  



                                                  mg PO DAILY #30           



                                                  caps 22 [Rx           



                                                  Confirmed           

 

           (unknown)  (no       (unknown)  (unknown)  diagnoses  (units    (unkn

own)



                    date)                                   unknown)  

 

           (unknown)  (no       (unknown)  (unknown)  doxycycline  (units    (un

known)



                    date)                         Adverse Reaction unknown)  



                                                  (Intermediate,           



                                                  Verified 22           



                                                  14:51)              

 

           (unknown)  (no       (unknown)  (unknown)  from the  (units    (unkno

wn)



                    date)                                   unknown)  

 

           (unknown)  (no       (unknown)  (unknown)  gynecological  (units    (

unknown)



                    date)                         follow-up.? unknown)  

 

           (unknown)  (no       (unknown)  (unknown)  have occurred. If  (units 

   (unknown)



                    date)                         there are any unknown)  



                                                  questions, please           



                                                  contact the           



                                                  Medical Records           

 

           (unknown)  (no       (unknown)  (unknown)  household  (units    (unkn

own)



                    date)                         members: friend(s) unknown)  

 

           (unknown)  (no       (unknown)  (unknown)  hydrocodone 5  (units    (

unknown)



                    date)                         mg-acetaminophen unknown)  



                                                  325 mg tablet 1           



                                                  tab PO Q4-6H PRN           



                                                  pain #10 tabs           

 

           (unknown)  (no       (unknown)  (unknown)  hydromorphone 4  (units   

 (unknown)



                    date)                         mg tablet unknown)  



                                                  (Dilaudid) 4 mg PO           



                                                  Q4-6H PRN pain #20           



                                                  tabs 22           

 

           (unknown)  (no       (unknown)  (unknown)  in the    (units    (unkno

wn)



                    date)                                   unknown)  

 

           (unknown)  (no       (unknown)  (unknown)  include complex  (units   

 (unknown)



                    date)                         ovarian cyst and unknown)  



                                                  tubo-ovarian           



                                                  abscess.?           



                                                  Recommend pelvic           

 

           (unknown)  (no       (unknown)  (unknown)  is a trace  (units    (unk

nown)



                    date)                                   unknown)  

 

           (unknown)  (no       (unknown)  (unknown)  ketorolac 10 mg  (units   

 (unknown)



                    date)                         tablet 10 mg PO unknown)  



                                                  Q6H PRN pain #14           



                                                  tabs 22 [Rx           



                                                  Confirmed           

 

           (unknown)  (no       (unknown)  (unknown)  left adnexa  (units    (un

known)



                    date)                         measuring 4.0 x unknown)  



                                                  5.3 x 5.2 cm.?           



                                                  Right ovary is           



                                                  unremarkable.?           



                                                  There               

 

           (unknown)  (no       (unknown)  (unknown)  lung bases to the  (units 

   (unknown)



                    date)                         pubic symphysis.? unknown)  



                                                  Coronal and           



                                                  sagittal reformats           



                                                  were                

 

           (unknown)  (no       (unknown)  (unknown)  may occur.  (units    (unk

nown)



                    date)                         Occasional unknown)  



                                                  wrong-word or           



                                                  'sound-alike'           



                                                  substitutions may           



                                                  have                

 

           (unknown)  (no       (unknown)  (unknown)  moderate amount  (units   

 (unknown)



                    date)                         of stool in colon. unknown)  

 

           (unknown)  (no       (unknown)  (unknown)  occurred due to  (units   

 (unknown)



                    date)                         the inherent unknown)  



                                                  limitations of           



                                                  voice recognition           



                                                  software. Please           

 

           (unknown)  (no       (unknown)  (unknown)  of mA and/or kV  (units   

 (unknown)



                    date)                         according to unknown)  



                                                  patient size.           

 

           (unknown)  (no       (unknown)  (unknown)  on  due to  (units   

 (unknown)



                    date)                         extreme pain pt unknown)  



                                                  had a pelvic US           



                                                  and pelvic CT done           

 

           (unknown)  (no       (unknown)  (unknown)  ondansetron 4 mg  (units  

  (unknown)



                    date)                         disintegrating unknown)  



                                                  tablet 4 mg PO           



                                                  TID-QID PRN nausea           



                                                  and vomiting           

 

           (unknown)  (no       (unknown)  (unknown)  oxycodone 5 mg  (units    

(unknown)



                    date)                         tablet 5 mg PO Q4H unknown)  



                                                  PRN pain #20 tabs           



                                                  22 [Rx           



                                                  Confirmed           

 

           (unknown)  (no       (unknown)  (unknown)  performed.? For  (units   

 (unknown)



                    date)                                   unknown)  

 

           (unknown)  (no       (unknown)  (unknown)  radiation dose  (units    

(unknown)



                    date)                         reduction, the unknown)  



                                                  following was           



                                                  used:? automated           



                                                  exposure control,           

 

           (unknown)  (no       (unknown)  (unknown)  rash      (units    (unkno

wn)



                    date)                                   unknown)  

 

           (unknown)  (no       (unknown)  (unknown)  read the note  (units    (

unknown)



                    date)                         carefully and unknown)  



                                                  recognize, using           



                                                  context, where           



                                                  these               



                                                  substitutions           

 

           (unknown)  (no       (unknown)  (unknown)  software.  (units    (unkn

own)



                    date)                         Although every unknown)  



                                                  effort is made to           



                                                  edit content,           



                                                  transcription           



                                                  errors              

 

           (unknown)  (no       (unknown)  (unknown)  ultrasound and  (units    

(unknown)



                    date)                                   unknown)  

 

           (unknown)  (no       (unknown)  (unknown)  vomitt    (units    (unkno

wn)



                    date)                                   unknown)  









                                         Result panel [id number]









           (unknown)  (no       (unknown)  (unknown)  (no value)  (units    (unk

nown)



                    date)                                   unknown)  

 

           (unknown)  (no       (unknown)  (unknown)  #10 tabs 22  (units 

   (unknown)



                    date)                         [Rx Confirmed unknown)  



                                                  22]           

 

           (unknown)  (no       (unknown)  (unknown)  (1) Hemorrhagic  (units   

 (unknown)



                    date)                         cyst of left unknown)  



                                                  ovary:              

 

           (unknown)  (no       (unknown)  (unknown)  (2) Chronic  (units    (un

known)



                    date)                         pelvic pain in unknown)  



                                                  female:             

 

           (unknown)  (no       (unknown)  (unknown)  [id number]  (units    (unkno

wn)



                    date)                                   unknown)  

 

           (unknown)  (no       (unknown)  (unknown)  1. A 4.0 x 5.3 x  (units  

  (unknown)



                    date)                         5.2 cm complex unknown)  



                                                  cystic mass in           



                                                  left adnexa.?           



                                                  Differential           

 

           (unknown)  (no       (unknown)  (unknown)  1. The left ovary  (units 

   (unknown)



                    date)                         is enlarged.? unknown)  



                                                  There is a complex           



                                                  cyst in the left           



                                                  ovary.?             

 

           (unknown)  (no       (unknown)  (unknown)  22 [Rx  (units    (u

nknown)



                    date)                         Confirmed unknown)  



                                                  22]           

 

           (unknown)  (no       (unknown)  (unknown)  22. Labs  (units    

(unknown)



                    date)                         including CBC and unknown)  



                                                  CMP were normal.           



                                                  Imaging studies           



                                                  included            

 

           (unknown)  (no       (unknown)  (unknown)  22  (units    (unkno

wn)



                    date)                                   unknown)  

 

           (unknown)  (no       (unknown)  (unknown)  22]  (units    (unkn

own)



                    date)                                   unknown)  

 

           (unknown)  (no       (unknown)  (unknown)  14:51     (units    (unkno

wn)



                    date)                                   unknown)  

 

           (unknown)  (no       (unknown)  (unknown)  2. Normal right  (units   

 (unknown)



                    date)                         ovary and uterus. unknown)  

 

           (unknown)  (no       (unknown)  (unknown)  2.? There is a  (units    

(unknown)



                    date)                         trace amount of unknown)  



                                                  free fluid in the           



                                                  cul-de-sac.           

 

           (unknown)  (no       (unknown)  (unknown)  ?         (units    (unkno

wn)



                    date)                                   unknown)  

 

           (unknown)  (no       (unknown)  (unknown)  ??        (units    (unkno

wn)



                    date)                                   unknown)  

 

           (unknown)  (no       (unknown)  (unknown)  ABD/PELVIC CT  (units    (

unknown)



                    date)                         which showed: unknown)  

 

           (unknown)  (no       (unknown)  (unknown)  ABDOMEN:  (units    (unkno

wn)



                    date)                                   unknown)  

 

           (unknown)  (no       (unknown)  (unknown) Abdominal Nodes:?  (units  

  (unknown)



                    date)                         No retroperitoneal unknown)  



                                                  or mesenteric           



                                                  adenopathy by size           



                                                  criteria.?           

 

           (unknown)  (no       (unknown)  (unknown)  Acne (-2016)  (units    (u

nknown)



                    date)                                   unknown)  

 

           (unknown)  (no       (unknown)  (unknown) Additional  (units    (unkn

own)



                    date)                         endovaginal unknown)  



                                                  scanning was           



                                                  necessary due to           



                                                  incomplete           



                                                  visualization of           

 

           (unknown)  (no       (unknown)  (unknown)  Adrenal Glands:?  (units  

  (unknown)



                    date)                         Unremarkable.? ? unknown)  

 

           (unknown)  (no       (unknown)  (unknown)  Affect: normal  (units    

(unknown)



                    date)                         affect    unknown)  

 

           (unknown)  (no       (unknown)  (unknown)  After the  (units    (unkn

own)



                    date)                         administration of unknown)  



                                                  oral and IV           



                                                  contrast, axial           



                                                  sections were           



                                                  acquired            

 

           (unknown)  (no       (unknown)  (unknown)  Age/Sex: 17 / F  (units   

 (unknown)



                    date)                         Date of Service: unknown)  

 

           (unknown)  (no       (unknown)  (unknown)  Allergies  (units    (unkn

own)



                    date)                                   unknown)  

 

           (unknown)  (no       (unknown)  (unknown)  [place]  (units    (

unknown)



                    date)                         [id number]     unknown)  

 

           (unknown)  (no       (unknown)  (unknown)  Anesthesia  (units    (unk

nown)



                    date)                                   unknown)  

 

           (unknown)  (no       (unknown)  (unknown)  Appearance:  (units    (un

known)



                    date)                         grossly normal unknown)  

 

           (unknown)  (no       (unknown)  (unknown)  Assessment + Plan  (units 

   (unknown)



                    date)                                   unknown)  

 

           (unknown)  (no       (unknown)  (unknown)  Attending Dr:  (units    (

unknown)



                    date)                         Jose Almodovar MD unknown)  

 

           (unknown)  (no       (unknown)  (unknown)  Attitude:  (units    (unkn

own)



                    date)                         cooperative unknown)  

 

           (unknown)  (no       (unknown)  (unknown)  BMI 27.1  (units    (unkno

wn)



                    date)                                   unknown)  

 

           (unknown)  (no       (unknown)  (unknown)  /62  (units    (unkn

own)



                    date)                                   unknown)  

 

           (unknown)  (no       (unknown)  (unknown)  Biliary ducts:?  (units   

 (unknown)



                    date)                         Unremarkable.? ? unknown)  

 

           (unknown)  (no       (unknown)  (unknown)  Bladder:?  (units    (unkn

own)



                    date)                         Unremarkable.? ? unknown)  

 

           (unknown)  (no       (unknown)  (unknown)  Blood Pressure  (units    

(unknown)



                    date)                         Location Rt unknown)  



                                                  brachial            

 

           (unknown)  (no       (unknown)  (unknown)  Bones:?   (units    (unkno

wn)



                    date)                         Unremarkable.?? unknown)  

 

           (unknown)  (no       (unknown)  (unknown)  COMPARISON:?  (units    (u

nknown)



                    date)                         [facility], unknown)  



                                                  CT, CT ABDOMEN           



                                                  PELVIS W CON,           



                                                  2022, 3:28.           

 

           (unknown)  (no       (unknown)  (unknown)  COMPARISON:?  (units    (u

nknown)



                    date)                         [facility], unknown)  



                                                  US, US PELVIC           



                                                  COMPLETE,           



                                                  2022, 5:30.           

 

           (unknown)  (no       (unknown)  (unknown)  Chief Complaint  (units   

 (unknown)



                    date)                                   unknown)  

 

           (unknown)  (no       (unknown)  (unknown)  Chief Complaint:  (units  

  (unknown)



                    date)                         ED follow-up unknown)  

 

           (unknown)  (no       (unknown)  (unknown)  Chlamydia  (units    (unkn

own)



                    date)                         infection () unknown)  

 

           (unknown)  (no       (unknown)  (unknown)  Conjunctivae:  (units    (

unknown)



                    date)                         conjunctivae unknown)  



                                                  normal              

 

           (unknown)  (no       (unknown)  (unknown)  Const     (units    (unkno

wn)



                    date)                                   unknown)  

 

           (unknown)  (no       (unknown)  (unknown)  : 2005  (units   

 (unknown)



                    date)                         Acct:[id number] unknown)  

 

           (unknown)  (no       (unknown)  (unknown)  Date of Last  (units    (u

nknown)



                    date)                         Menstrual Period: unknown)  



                                                  22            

 

           (unknown)  (no       (unknown)  (unknown)  Dept at   (units    (unkno

wn)



                    date)                         [phone] unknown)  

 

           (unknown)  (no       (unknown)  (unknown)  Details:  (units    (unkno

wn)



                    date)                                   unknown)  

 

           (unknown)  (no       (unknown)  (unknown)  Documented By:  (units    

(unknown)



                    date)                         Jose Almodovar MD unknown)  



                                                  22 1450           

 

           (unknown)  (no       (unknown)  (unknown)  Draft     (units    (unkno

wn)



                    date)                                   unknown)  

 

           (unknown)  (no       (unknown)  (unknown)  EOM: EOM intact  (units   

 (unknown)



                    date)                         bilaterally unknown)  

 

           (unknown)  (no       (unknown)  (unknown)  Ears: hearing  (units    (

unknown)



                    date)                         grossly normal unknown)  



                                                  bilaterally           

 

           (unknown)  (no       (unknown)  (unknown)  Effort +  (units    (unkno

wn)



                    date)                         Inspection: normal unknown)  



                                                  respiratory effort           



                                                  and able to speak           



                                                  in complete           

 

           (unknown)  (no       (unknown)  (unknown)  Endometriosis  (units    (

unknown)



                    date)                         (-)   unknown)  

 

           (unknown)  (no       (unknown)  (unknown)  Exam      (units    (unkno

wn)



                    date)                                   unknown)  

 

           (unknown)  (no       (unknown)  (unknown)  Eyes      (units    (unkno

wn)



                    date)                                   unknown)  

 

           (unknown)  (no       (unknown)  (unknown)  FINDINGS:?  (units    (unk

nown)



                    date)                                   unknown)  

 

           (unknown)  (no       (unknown)  (unknown)  Face and sinus:  (units   

 (unknown)



                    date)                         face symmetric unknown)  

 

           (unknown)  (no       (unknown)  (unknown)  [facility]  (units   

 (unknown)



                    date)                         [facility] unknown)  

 

           (unknown)  (no       (unknown)  (unknown)          (units    (unkno

wn)



                    date)                                   unknown)  

 

           (unknown)  (no       (unknown)  (unknown)  Gallbladder:?  (units    (

unknown)



                    date)                         Unremarkable.? ? unknown)  

 

           (unknown)  (no       (unknown)  (unknown)  General:  (units    (o

wn)



                    date)                         appearance normal, unknown)  



                                                  both eyes and all           



                                                  related structures           

 

           (unknown)  (no       (unknown)  (unknown)  General:  (units    (unkno

wn)



                    date)                         cooperative, unknown)  



                                                  comfortable and no           



                                                  acute distress           

 

           (unknown)  (no       (unknown)  (unknown)  General: deferred  (units 

   (unknown)



                    date)                                   unknown)  

 

           (unknown)  (no       (unknown)  (unknown) Lesvia was seen in  (units  

  (unknown)



                    date)                         the ED at [facility] for LLQ           



                                                  pain early on the           



                                                  morning of           

 

           (unknown)  (no       (unknown)  (unknown)  Gynecology Visit  (units  

  (unknown)



                    date)                                   unknown)  

 

           (unknown)  (no       (unknown)  (unknown)  HENMT     (units    (unkno

wn)



                    date)                                   unknown)  

 

           (unknown)  (no       (unknown)  (unknown)  HPI       (units    (unkno

wn)



                    date)                                   unknown)  

 

           (unknown)  (no       (unknown)  (unknown)  Head: normal to  (units   

 (unknown)



                    date)                         inspection, unknown)  



                                                  normocephalic and           



                                                  atraumatic           

 

           (unknown)  (no       (unknown)  (unknown)  Heart:? No  (units    (unk

nown)



                    date)                         significant unknown)  



                                                  findings.           

 

           (unknown)  (no       (unknown)  (unknown)  Heavy menstrual  (units   

 (unknown)



                    date)                         period (-2019) unknown)  

 

           (unknown)  (no       (unknown)  (unknown)  Height 5 ft 3 in  (units  

  (unknown)



                    date)                                   unknown)  

 

           (unknown)  (no       (unknown)  (unknown)  IMPRESSION:?  (units    (u

nknown)



                    date)                                   unknown)  

 

           (unknown)  (no       (unknown)  (unknown)  INDICATIONS:?  (units    (

unknown)



                    date)                         LEFT LOWER unknown)  



                                                  QUADRANT PAIN.           



                                                  FOLLOW UP CT.           

 

           (unknown)  (no       (unknown)  (unknown)  INDICATIONS:?  (units    (

unknown)



                    date)                         severe LLQ pain, unknown)  



                                                  recent pelvic           



                                                  surgery, hx           



                                                  abscess             

 

           (unknown)  (no       (unknown)  (unknown)  Image quality:?  (units   

 (unknown)



                    date)                         Excellent.? unknown)  

 

           (unknown)  (no       (unknown)  (unknown)  Intake Note:  (units    (u

nknown)



                    date)                                   unknown)  

 

           (unknown)  (no       (unknown)  (unknown)  Intake performed  (units  

  (unknown)



                    date)                         by: Harshal Gipson unknown)  

 

           (unknown)  (no       (unknown)  (unknown)  Intake    (units    (unkno

wn)



                    date)                                   unknown)  

 

           (unknown)  (no       (unknown)  (unknown)  Intake- Clincial  (units  

  (unknown)



                    date)                         Staff     unknown)  

 

           (unknown)  (no       (unknown)  (unknown)  Irregular  (units    (unkn

own)



                    date)                         menstrual cycle unknown)  



                                                  ()             

 

           (unknown)  (no       (unknown)  (unknown)  Judgment:  (units    (unkn

own)



                    date)                         judgment good unknown)  

 

           (unknown)  (no       (unknown)  (unknown)  Kidneys and  (units    (un

known)



                    date)                         Ureters:? unknown)  



                                                  Unremarkable.? ?           

 

           (unknown)  (no       (unknown)  (unknown)  Last Menstural  (units    

(unknown)



                    date)                         Cycle + Details unknown)  

 

           (unknown)  (no       (unknown)  (unknown)  Liver:?   (units    (unkno

wn)



                    date)                         Unremarkable.? ? unknown)  

 

           (unknown)  (no       (unknown)  (unknown)  Loc: FMA  (units    (unkno

wn)



                    date)                                   unknown)  

 

           (unknown)  (no       (unknown)  (unknown)  Lung bases:?  (units    (u

nknown)



                    date)                         Unremarkable.? ? unknown)  

 

           (unknown)  (no       (unknown)  (unknown)  Lymphangioma  (units    (u

nknown)



                    date)                                   unknown)  

 

           (unknown)  (no       (unknown)  (unknown)  Medical History  (units   

 (unknown)



                    date)                         (Reviewed 22 unknown)  



                                                  @ 05:36 by Dashawn Quintero DO)           

 

           (unknown)  (no       (unknown)  (unknown)  Medications  (units    (un

known)



                    date)                                   unknown)  

 

           (unknown)  (no       (unknown)  (unknown)  Mental Status:  (units    

(unknown)



                    date)                         mental status unknown)  



                                                  grossly normal           

 

           (unknown)  (no       (unknown)  (unknown)  Miscellaneous: No  (units 

   (unknown)



                    date)                         inguinal hernias unknown)  



                                                  are seen. ? ?           

 

           (unknown)  (no       (unknown)  (unknown)  Mood: congruent  (units   

 (unknown)



                    date)                         mood      unknown)  

 

           (unknown)  (no       (unknown)  (unknown)  Neck      (units    (unkno

wn)



                    date)                                   unknown)  

 

           (unknown)  (no       (unknown)  (unknown)  Neck: normal  (units    (u

nknown)



                    date)                         visual inspection unknown)  

 

           (unknown)  (no       (unknown)  (unknown)  Nutritional  (units    (un

known)



                    date)                         Appearance: unknown)  



                                                  average body           



                                                  habitus             

 

           (unknown)  (no       (unknown)  (unknown)  Opioids -  (units    (unkn

own)



                    date)                         Morphine Analogues unknown)  



                                                  Allergy             



                                                  (Intermediate,           



                                                  Verified 22           



                                                  14:51)              

 

           (unknown)  (no       (unknown)  (unknown)  Orientation:  (units    (u

nknown)



                    date)                         alert and oriented unknown)  



                                                  x3                  

 

           (unknown)  (no       (unknown)  (unknown)  Other Menstrual  (units   

 (unknown)



                    date)                         Period: Other unknown)  

 

           (unknown)  (no       (unknown)  (unknown)  Other:? No  (units    (unk

nown)



                    date)                         pathologic free unknown)  



                                                  abdominal or           



                                                  pelvic fluid.           

 

           (unknown)  (no       (unknown)  (unknown)  Ovarian cyst  (units    (u

nknown)



                    date)                                   unknown)  

 

           (unknown)  (no       (unknown)  (unknown) Ovaries:? The  (units    (u

nknown)



                    date)                         right ovary unknown)  



                                                  measures 3.0 x 1.5           



                                                  x 2.7 cm, with a           



                                                  calculated ovarian           

 

           (unknown)  (no       (unknown)  (unknown)  PELVIS:   (units    (unkno

wn)



                    date)                                   unknown)  

 

           (unknown)  (no       (unknown)  (unknown)  PFSH      (units    (unkno

wn)



                    date)                                   unknown)  

 

           (unknown)  (no       (unknown)  (unknown)  PROCEDURE:? CT  (units    

(unknown)



                    date)                         ABDOMEN PELVIS W unknown)  



                                                  CON                 

 

           (unknown)  (no       (unknown)  (unknown)  PROCEDURE:? US  (units    

(unknown)



                    date)                         PELVIC COMPLETE unknown)  

 

           (unknown)  (no       (unknown)  (unknown)  Painful menstrual  (units 

   (unknown)



                    date)                         periods (-) unknown)  

 

           (unknown)  (no       (unknown)  (unknown)  Pancreas:?  (units    (unk

nown)



                    date)                         Unremarkable.? ? unknown)  

 

           (unknown)  (no       (unknown)  (unknown)  Patient:  (units    (unkno

wn)



                    date)                         Lesvia Aguero E unknown)  



                                                  MR#: [id number]             

 

           (unknown)  (no       (unknown)  (unknown)  Pelvic Nodes: No  (units  

  (unknown)



                    date)                         enlarged lymph unknown)  



                                                  nodes.?             

 

           (unknown)  (no       (unknown)  (unknown) Pelvic Organs:?  (units    

(unknown)



                    date)                         Unremarkable.? unknown)  



                                                  Uterus is normal.?           



                                                  There is a complex           



                                                  cystic mass           

 

           (unknown)  (no       (unknown)  (unknown)  Pelvic US  (units    (unkn

own)



                    date)                         performed shortly unknown)  



                                                  thereafter showed:           

 

           (unknown)  (no       (unknown)  (unknown)  Peritoneum:? No  (units   

 (unknown)



                    date)                         abnormal  unknown)  



                                                  intraperitoneal           



                                                  fluid.? No free           



                                                  air.?               

 

           (unknown)  (no       (unknown)  (unknown)  Position Sitting  (units  

  (unknown)



                    date)                                   unknown)  

 

           (unknown)  (no       (unknown)  (unknown)  Problem-specific  (units  

  (unknown)



                    date)                         ROS positives unknown)  



                                                  included with the           



                                                  HPI                 

 

           (unknown)  (no       (unknown)  (unknown)  Psych     (units    (unkno

wn)



                    date)                                   unknown)  

 

           (unknown)  (no       (unknown)  (unknown) Pt here to  (units    (unkn

own)



                    date)                         follow-up on her unknown)  



                                                  hemorrhagic           



                                                  ovarian cyst pt           



                                                  was seen in the ED           



                                                  at                

 

           (unknown)  (no       (unknown)  (unknown)  ROS Narrative  (units    (

unknown)



                    date)                                   unknown)  

 

           (unknown)  (no       (unknown)  (unknown)  ROS Narrative:  (units    

(unknown)



                    date)                                   unknown)  

 

           (unknown)  (no       (unknown)  (unknown)  ROS       (units    (unkno

wn)



                    date)                                   unknown)  

 

           (unknown)  (no       (unknown)  (unknown)  Real-time  (units    (unkn

own)



                    date)                         scanning was unknown)  



                                                  performed of the           



                                                  pelvic organs,           



                                                  with image           

 

           (unknown)  (no       (unknown)  (unknown)  Reason For Visit  (units  

  (unknown)



                    date)                                   unknown)  

 

           (unknown)  (no       (unknown)  (unknown)  Recommend  (units    (unkn

own)



                    date)                                   unknown)  

 

           (unknown)  (no       (unknown)  (unknown)  Resp      (units    (unkno

wn)



                    date)                                   unknown)  

 

           (unknown)  (no       (unknown)  (unknown)  S/P ACL   (units    (unkno

wn)



                    date)                         reconstruction unknown)  



                                                  (-21)           

 

           (unknown)  (no       (unknown)  (unknown)  Sclera: sclerae  (units   

 (unknown)



                    date)                         normal    unknown)  

 

           (unknown)  (no       (unknown)  (unknown)  Signed By:  (units    (unk

nown)



                    date)                                   unknown)  

 

           (unknown)  (no       (unknown)  (unknown)  Since her ED  (units    (u

nknown)



                    date)                         visit on  unknown)  



                                                  2022, the           



                                                  patient's symptoms           



                                                  have significantly           

 

           (unknown)  (no       (unknown)  (unknown)  Smoking Status:  (units   

 (unknown)



                    date)                         Current some day unknown)  



                                                  smoker              

 

           (unknown)  (no       (unknown)  (unknown)  Social History  (units    

(unknown)



                    date)                                   unknown)  

 

           (unknown)  (no       (unknown)  (unknown)  Speech and  (units    (unk

nown)



                    date)                         Movement: speech unknown)  



                                                  and movement           



                                                  normal              

 

           (unknown)  (no       (unknown)  (unknown)  Spleen:?  (units    (unkno

wn)



                    date)                         Unremarkable.? ? unknown)  

 

           (unknown)  (no       (unknown)  (unknown)  Status: Acute  (units    (

unknown)



                    date)                                   unknown)  

 

           (unknown)  (no       (unknown)  (unknown)  Stomach and  (units    (un

known)



                    date)                         Bowel:? Stomach, unknown)  



                                                  small bowel loops,           



                                                  and colon are           



                                                  unremarkable.?           

 

           (unknown)  (no       (unknown)  (unknown)  Surgical History  (units  

  (unknown)



                    date)                         (Reviewed 22 unknown)  



                                                  @ 05:36 by Dashawn Quintero DO)           

 

           (unknown)  (no       (unknown)  (unknown)  TECHNIQUE:?  (units    (un

known)



                    date)                                   unknown)  

 

           (unknown)  (no       (unknown)  (unknown)  TOA (tubo-ovarian  (units 

   (unknown)



                    date)                         abscess)  unknown)  



                                                  (-21)           

 

           (unknown)  (no       (unknown)  (unknown)  There is a  (units    (unk

nown)



                    date)                                   unknown)  

 

           (unknown)  (no       (unknown)  (unknown)  This note may  (units    (

unknown)



                    date)                         have been all or unknown)  



                                                  partially           



                                                  generated using           



                                                  voice recognition           

 

           (unknown)  (no       (unknown)  (unknown)  Thought Content:  (units  

  (unknown)



                    date)                         normal    unknown)  

 

           (unknown)  (no       (unknown)  (unknown)  Thought Process:  (units  

  (unknown)



                    date)                         normal    unknown)  

 

           (unknown)  (no       (unknown)  (unknown)  Tobacco +  (units    (unkn

own)



                    date)                         Substance Use unknown)  

 

           (unknown)  (no       (unknown)  (unknown)  Tobacco Status  (units    

(unknown)



                    date)                                   unknown)  

 

           (unknown)  (no       (unknown)  (unknown)  Uterus:? Uterus  (units   

 (unknown)



                    date)                         is anteverted and unknown)  



                                                  normal in size at           



                                                  7.1 x 5.2 x 3.8           



                                                  cm. The             

 

           (unknown)  (no       (unknown)  (unknown)  Ventral Wall: ?  (units   

 (unknown)



                    date)                         No hernia.? unknown)  

 

           (unknown)  (no       (unknown)  (unknown)  Vessels:? Aorta  (units   

 (unknown)



                    date)                         and inferior vena unknown)  



                                                  cava are normal in           



                                                  size.?              

 

           (unknown)  (no       (unknown)  (unknown)  Visit Reasons:  (units    

(unknown)



                    date)                         Hemorrhagic unknown)  



                                                  Ovarian Cyst F/U           

 

           (unknown)  (no       (unknown)  (unknown)  Vitals    (units    (unkno

wn)



                    date)                                   unknown)  

 

           (unknown)  (no       (unknown)  (unknown)  Weight 153 lb  (units    (

unknown)



                    date)                                   unknown)  

 

           (unknown)  (no       (unknown)  (unknown)  [Rx Confirmed  (units    (

unknown)



                    date)                         22] unknown)  

 

           (unknown)  (no       (unknown)  (unknown)  adjustment  (units    (unk

nown)



                    date)                                   unknown)  

 

           (unknown)  (no       (unknown)  (unknown)  adnexal and  (units    (un

known)



                    date)                         endometrial unknown)  



                                                  structures by           



                                                  transabdominal           



                                                  scanning.?           

 

           (unknown)  (no       (unknown)  (unknown)  alcohol intake:  (units   

 (unknown)



                    date)                         current   unknown)  

 

           (unknown)  (no       (unknown)  (unknown)  amount of free  (units    

(unknown)



                    date)                         fluid in the unknown)  



                                                  cul-de-sac.           

 

           (unknown)  (no       (unknown)  (unknown)  clindamycin HCl  (units   

 (unknown)



                    date)                         150 mg capsule 150 unknown)  



                                                  mg PO DAILY #30           



                                                  caps 22 [Rx           



                                                  Confirmed           

 

           (unknown)  (no       (unknown)  (unknown)  clinical  (units    (unkno

wn)



                    date)                         correlation.? unknown)  



                                                  After adequate           



                                                  treatment, a           



                                                  follow-up           



                                                  ultrasound is           

 

           (unknown)  (no       (unknown)  (unknown)  diagnoses  (units    (unkn

own)



                    date)                                   unknown)  

 

           (unknown)  (no       (unknown)  (unknown)  documentation.?  (units   

 (unknown)



                    date)                                   unknown)  

 

           (unknown)  (no       (unknown)  (unknown)  doxycycline  (units    (un

known)



                    date)                         Adverse Reaction unknown)  



                                                  (Intermediate,           



                                                  Verified 22           



                                                  14:51)              

 

           (unknown)  (no       (unknown)  (unknown)  each      (units    (unkno

wn)



                    date)                                   unknown)  

 

           (unknown)  (no       (unknown)  (unknown)  fluid filled  (units    (u

nknown)



                    date)                         structure adjacent unknown)  



                                                  to the left           



                                                  ovary.?             



                                                  Differential           



                                                  diagnoses are           

 

           (unknown)  (no       (unknown)  (unknown)  from the  (units    (unkno

wn)



                    date)                                   unknown)  

 

           (unknown)  (no       (unknown)  (unknown)  gynecological  (units    (

unknown)



                    date)                         follow-up.? unknown)  

 

           (unknown)  (no       (unknown)  (unknown)  have occurred. If  (units 

   (unknown)



                    date)                         there are any unknown)  



                                                  questions, please           



                                                  contact the           



                                                  Medical Records           

 

           (unknown)  (no       (unknown)  (unknown)  hemorrhagic  (units    (un

known)



                    date)                         ovarian cyst unknown)  



                                                  versus              



                                                  hydrosalpinx and           



                                                  tubo-ovarian           



                                                  abscess.?           

 

           (unknown)  (no       (unknown)  (unknown)  homogeneous. ?  (units    

(unknown)



                    date)                         The endometrium unknown)  



                                                  measures 10.9 mm           



                                                  combined            



                                                  thickness.           

 

           (unknown)  (no       (unknown)  (unknown)  household  (units    (unkn

own)



                    date)                         members: friend(s) unknown)  

 

           (unknown)  (no       (unknown)  (unknown)  hydrocodone 5  (units    (

unknown)



                    date)                         mg-acetaminophen unknown)  



                                                  325 mg tablet 1           



                                                  tab PO Q4-6H PRN           



                                                  pain #10 tabs           

 

           (unknown)  (no       (unknown)  (unknown)  hydromorphone 4  (units   

 (unknown)



                    date)                         mg tablet unknown)  



                                                  (Dilaudid) 4 mg PO           



                                                  Q4-6H PRN pain #20           



                                                  tabs 22           

 

           (unknown)  (no       (unknown)  (unknown)  improved but she  (units  

  (unknown)



                    date)                         continues to have unknown)  



                                                  mild LLQ pain with           



                                                  certain activities           



                                                  but                 

 

           (unknown)  (no       (unknown)  (unknown)  in the    (units    (unkno

wn)



                    date)                                   unknown)  

 

           (unknown)  (no       (unknown)  (unknown)  include complex  (units   

 (unknown)



                    date)                         ovarian cyst and unknown)  



                                                  tubo-ovarian           



                                                  abscess.?           



                                                  Recommend pelvic           

 

           (unknown)  (no       (unknown)  (unknown)  is a trace  (units    (unk

nown)



                    date)                                   unknown)  

 

           (unknown)  (no       (unknown)  (unknown)  ketorolac 10 mg  (units   

 (unknown)



                    date)                         tablet 10 mg PO unknown)  



                                                  Q6H PRN pain #14           



                                                  tabs 22 [Rx           



                                                  Confirmed           

 

           (unknown)  (no       (unknown)  (unknown)  left adnexa  (units    (un

known)



                    date)                         measuring 4.0 x unknown)  



                                                  5.3 x 5.2 cm.?           



                                                  Right ovary is           



                                                  unremarkable.?           



                                                  There               

 

           (unknown)  (no       (unknown)  (unknown)  lung bases to the  (units 

   (unknown)



                    date)                         pubic symphysis.? unknown)  



                                                  Coronal and           



                                                  sagittal reformats           



                                                  were                

 

           (unknown)  (no       (unknown)  (unknown)  may occur.  (units    (unk

nown)



                    date)                         Occasional unknown)  



                                                  wrong-word or           



                                                  'sound-alike'           



                                                  substitutions may           



                                                  have                

 

           (unknown)  (no       (unknown)  (unknown)  moderate amount  (units   

 (unknown)



                    date)                         of stool in colon. unknown)  

 

           (unknown)  (no       (unknown)  (unknown)  myometrium is  (units    (

unknown)



                    date)                                   unknown)  

 

           (unknown)  (no       (unknown)  (unknown)  occurred due to  (units   

 (unknown)



                    date)                         the inherent unknown)  



                                                  limitations of           



                                                  voice recognition           



                                                  software. Please           

 

           (unknown)  (no       (unknown)  (unknown)  of 6.3 cc. The  (units    

(unknown)



                    date)                         left ovary unknown)  



                                                  measures 5.1 x 4.5           



                                                  x 4 point cm, with           



                                                  a calculated           

 

           (unknown)  (no       (unknown)  (unknown)  of mA and/or kV  (units   

 (unknown)



                    date)                         according to unknown)  



                                                  patient size.           

 

           (unknown)  (no       (unknown)  (unknown)  on  due to  (units   

 (unknown)



                    date)                         extreme pain pt unknown)  



                                                  had a pelvic US           



                                                  and pelvic CT done           

 

           (unknown)  (no       (unknown)  (unknown)  ondansetron 4 mg  (units  

  (unknown)



                    date)                         disintegrating unknown)  



                                                  tablet 4 mg PO           



                                                  TID-QID PRN nausea           



                                                  and vomiting           

 

           (unknown)  (no       (unknown)  (unknown)  otherwise is  (units    (u

nknown)



                    date)                         doing well. unknown)  

 

           (unknown)  (no       (unknown)  (unknown)  ovarian   (units    (unkno

wn)



                    date)                                   unknown)  

 

           (unknown)  (no       (unknown)  (unknown)  ovary,    (units    (unkno

wn)



                    date)                                   unknown)  

 

           (unknown)  (no       (unknown)  (unknown)  ovary.? No  (units    (unk

nown)



                    date)                         adnexal masses are unknown)  



                                                  seen.               

 

           (unknown)  (no       (unknown)  (unknown)  oxycodone 5 mg  (units    

(unknown)



                    date)                         tablet 5 mg PO Q4H unknown)  



                                                  PRN pain #20 tabs           



                                                  22 [Rx           



                                                  Confirmed           

 

           (unknown)  (no       (unknown)  (unknown)  performed.? For  (units   

 (unknown)



                    date)                                   unknown)  

 

           (unknown)  (no       (unknown)  (unknown)  radiation dose  (units    

(unknown)



                    date)                         reduction, the unknown)  



                                                  following was           



                                                  used:? automated           



                                                  exposure control,           

 

           (unknown)  (no       (unknown)  (unknown)  rash      (units    (unkno

wn)



                    date)                                   unknown)  

 

           (unknown)  (no       (unknown)  (unknown)  read the note  (units    (

unknown)



                    date)                         carefully and unknown)  



                                                  recognize, using           



                                                  context, where           



                                                  these               



                                                  substitutions           

 

           (unknown)  (no       (unknown)  (unknown)  sentences  (units    (unkn

own)



                    date)                                   unknown)  

 

           (unknown)  (no       (unknown)  (unknown)  software.  (units    (unkn

own)



                    date)                         Although every unknown)  



                                                  effort is made to           



                                                  edit content,           



                                                  transcription           



                                                  errors              

 

           (unknown)  (no       (unknown)  (unknown)  suggested.  (units    (unk

nown)



                    date)                                   unknown)  

 

           (unknown)  (no       (unknown)  (unknown)  the       (units    (unkno

wn)



                    date)                                   unknown)  

 

           (unknown)  (no       (unknown)  (unknown) there is anechoic  (units  

  (unknown)



                    date)                         fluid filled unknown)  



                                                  structure.? Less           



                                                  than 12 follicles           



                                                  can be seen in           

 

           (unknown)  (no       (unknown)  (unknown)  ultrasound and  (units    

(unknown)



                    date)                                   unknown)  

 

           (unknown)  (no       (unknown)  (unknown) volume of 59.1  (units    (

unknown)



                    date)                         cc.? There is a unknown)  



                                                  complex cyst in           



                                                  left ovary.?           



                                                  Superior to the           



                                                  left                

 

           (unknown)  (no       (unknown)  (unknown)  volume    (units    (unkno

wn)



                    date)                                   unknown)  

 

           (unknown)  (no       (unknown)  (unknown)  vomitt    (units    (unkno

wn)



                    date)                                   unknown)  









                                         Result panel 559









           (unknown)  (no       (unknown)  (unknown)  (no value)  (units    (unk

nown)



                    date)                                   unknown)  

 

           (unknown)  (no       (unknown)  (unknown)  #10 tabs 22  (units 

   (unknown)



                    date)                         [Rx Confirmed unknown)  



                                                  22]           

 

           (unknown)  (no       (unknown)  (unknown)  (1) Hemorrhagic  (units   

 (unknown)



                    date)                         cyst of left unknown)  



                                                  ovary:              

 

           (unknown)  (no       (unknown)  (unknown)  (2) Chronic  (units    (un

known)



                    date)                         pelvic pain in unknown)  



                                                  female:             

 

           (unknown)  (no       (unknown)  (unknown)  [id number]  (units    (unkno

wn)



                    date)                                   unknown)  

 

           (unknown)  (no       (unknown)  (unknown)  1. A 4.0 x 5.3 x  (units  

  (unknown)



                    date)                         5.2 cm complex unknown)  



                                                  cystic mass in           



                                                  left adnexa.?           



                                                  Differential           

 

           (unknown)  (no       (unknown)  (unknown)  1. The left ovary  (units 

   (unknown)



                    date)                         is enlarged.? unknown)  



                                                  There is a complex           



                                                  cyst in the left           



                                                  ovary.?             

 

           (unknown)  (no       (unknown)  (unknown)  22 [Rx  (units    (u

nknown)



                    date)                         Confirmed unknown)  



                                                  22]           

 

           (unknown)  (no       (unknown)  (unknown)  22. Labs  (units    

(unknown)



                    date)                         including CBC and unknown)  



                                                  CMP were normal.           



                                                  Imaging studies           



                                                  included            

 

           (unknown)  (no       (unknown)  (unknown)  22 1728  (units    (

unknown)



                    date)                                   unknown)  

 

           (unknown)  (no       (unknown)  (unknown)  22  (units    (unkno

wn)



                    date)                                   unknown)  

 

           (unknown)  (no       (unknown)  (unknown)  22]  (units    (unkn

own)



                    date)                                   unknown)  

 

           (unknown)  (no       (unknown)  (unknown)  14:51     (units    (unkno

wn)



                    date)                                   unknown)  

 

           (unknown)  (no       (unknown)  (unknown)  2. Normal right  (units   

 (unknown)



                    date)                         ovary and uterus. unknown)  

 

           (unknown)  (no       (unknown)  (unknown)  2.? There is a  (units    

(unknown)



                    date)                         trace amount of unknown)  



                                                  free fluid in the           



                                                  cul-de-sac.           

 

           (unknown)  (no       (unknown)  (unknown)  ?         (units    (unkno

wn)



                    date)                                   unknown)  

 

           (unknown)  (no       (unknown)  (unknown)  ??        (units    (unkno

wn)



                    date)                                   unknown)  

 

           (unknown)  (no       (unknown)  (unknown)  ABD/PELVIC CT  (units    (

unknown)



                    date)                         which showed: unknown)  

 

           (unknown)  (no       (unknown)  (unknown)  ABDOMEN:  (units    (unkno

wn)



                    date)                                   unknown)  

 

           (unknown)  (no       (unknown)  (unknown) Abdominal Nodes:?  (units  

  (unknown)



                    date)                         No retroperitoneal unknown)  



                                                  or mesenteric           



                                                  adenopathy by size           



                                                  criteria.?           

 

           (unknown)  (no       (unknown)  (unknown)  Acne (-2016)  (units    (u

nknown)



                    date)                                   unknown)  

 

           (unknown)  (no       (unknown)  (unknown) Additional  (units    (unkn

own)



                    date)                         endovaginal unknown)  



                                                  scanning was           



                                                  necessary due to           



                                                  incomplete           



                                                  visualization of           

 

           (unknown)  (no       (unknown)  (unknown)  Adrenal Glands:?  (units  

  (unknown)



                    date)                         Unremarkable.? ? unknown)  

 

           (unknown)  (no       (unknown)  (unknown)  Affect: normal  (units    

(unknown)



                    date)                         affect    unknown)  

 

           (unknown)  (no       (unknown)  (unknown)  After the  (units    (unkn

own)



                    date)                         administration of unknown)  



                                                  oral and IV           



                                                  contrast, axial           



                                                  sections were           



                                                  acquired            

 

           (unknown)  (no       (unknown)  (unknown)  Age/Sex: 17 / F  (units   

 (unknown)



                    date)                         Date of Service: unknown)  

 

           (unknown)  (no       (unknown)  (unknown)  Allergies  (units    (unkn

own)



                    date)                                   unknown)  

 

           (unknown)  (no       (unknown)  (unknown)  [place]  (units    (

unknown)



                    date)                         [id number]     unknown)  

 

           (unknown)  (no       (unknown)  (unknown)  Anesthesia  (units    (unk

nown)



                    date)                                   unknown)  

 

           (unknown)  (no       (unknown)  (unknown)  Appearance:  (units    (un

known)



                    date)                         grossly normal unknown)  

 

           (unknown)  (no       (unknown)  (unknown)  Assessment + Plan  (units 

   (unknown)



                    date)                                   unknown)  

 

           (unknown)  (no       (unknown)  (unknown)  Attending Dr:  (units    (

unknown)



                    date)                         Jose Almodovar MD unknown)  

 

           (unknown)  (no       (unknown)  (unknown)  Attitude:  (units    (unkn

own)



                    date)                         cooperative unknown)  

 

           (unknown)  (no       (unknown)  (unknown)  BMI 27.1  (units    (unkno

wn)



                    date)                                   unknown)  

 

           (unknown)  (no       (unknown)  (unknown)  /62  (units    (unkn

own)



                    date)                                   unknown)  

 

           (unknown)  (no       (unknown)  (unknown)  Biliary ducts:?  (units   

 (unknown)



                    date)                         Unremarkable.? ? unknown)  

 

           (unknown)  (no       (unknown)  (unknown)  Bladder:?  (units    (unkn

own)



                    date)                         Unremarkable.? ? unknown)  

 

           (unknown)  (no       (unknown)  (unknown)  Blood Pressure  (units    

(unknown)



                    date)                         Location Rt unknown)  



                                                  brachial            

 

           (unknown)  (no       (unknown)  (unknown)  Bones:?   (units    (unkno

wn)



                    date)                         Unremarkable.?? unknown)  

 

           (unknown)  (no       (unknown)  (unknown)  COMPARISON:?  (units    (u

nknown)



                    date)                         [facility], unknown)  



                                                  CT, CT ABDOMEN           



                                                  PELVIS W CON,           



                                                  2022, 3:28.           

 

           (unknown)  (no       (unknown)  (unknown)  COMPARISON:?  (units    (u

nknown)



                    date)                         [facility], unknown)  



                                                  US, US PELVIC           



                                                  COMPLETE,           



                                                  2022, 5:30.           

 

           (unknown)  (no       (unknown)  (unknown)  Chief Complaint  (units   

 (unknown)



                    date)                                   unknown)  

 

           (unknown)  (no       (unknown)  (unknown)  Chief Complaint:  (units  

  (unknown)



                    date)                         ED follow-up unknown)  

 

           (unknown)  (no       (unknown)  (unknown)  Chlamydia  (units    (unkn

own)



                    date)                         infection () unknown)  

 

           (unknown)  (no       (unknown)  (unknown)  Conjunctivae:  (units    (

unknown)



                    date)                         conjunctivae unknown)  



                                                  normal              

 

           (unknown)  (no       (unknown)  (unknown)  Const     (units    (unkno

wn)



                    date)                                   unknown)  

 

           (unknown)  (no       (unknown)  (unknown)  Counseling and  (units    

(unknown)



                    date)                         educating the unknown)  



                                                  patient/family/car           



                                                  egiver: 10           

 

           (unknown)  (no       (unknown)  (unknown)  : 2005  (units   

 (unknown)



                    date)                         Acct:[id number] unknown)  

 

           (unknown)  (no       (unknown)  (unknown)  Date of Last  (units    (u

nknown)



                    date)                         Menstrual Period: unknown)  



                                                  22            

 

           (unknown)  (no       (unknown)  (unknown)  Dept at   (units    (unkno

wn)



                    date)                         [phone]. unknown)  

 

           (unknown)  (no       (unknown)  (unknown)  Details:  (units    (unkno

wn)



                    date)                                   unknown)  

 

           (unknown)  (no       (unknown)  (unknown)  Documented By:  (units    

(unknown)



                    date)                         Jose Almodovar MD unknown)  



                                                  22 1450           

 

           (unknown)  (no       (unknown)  (unknown)  Documenting  (units    (un

known)



                    date)                         clinical  unknown)  



                                                  information in           



                                                  EHR/Medical           



                                                  record: 10           

 

           (unknown)  (no       (unknown)  (unknown)  EOM: EOM intact  (units   

 (unknown)



                    date)                         bilaterally unknown)  

 

           (unknown)  (no       (unknown)  (unknown)  Ears: hearing  (units    (

unknown)



                    date)                         grossly normal unknown)  



                                                  bilaterally           

 

           (unknown)  (no       (unknown)  (unknown)  Effort +  (units    (unkno

wn)



                    date)                         Inspection: normal unknown)  



                                                  respiratory effort           



                                                  and able to speak           



                                                  in complete           

 

           (unknown)  (no       (unknown)  (unknown)  Endometriosis  (units    (

unknown)



                    date)                         ()   unknown)  

 

           (unknown)  (no       (unknown)  (unknown)  Exam      (units    (unkno

wn)



                    date)                                   unknown)  

 

           (unknown)  (no       (unknown)  (unknown)  Eyes      (units    (unkno

wn)



                    date)                                   unknown)  

 

           (unknown)  (no       (unknown)  (unknown)  FINDINGS:?  (units    (unk

nown)



                    date)                                   unknown)  

 

           (unknown)  (no       (unknown)  (unknown)  Face and sinus:  (units   

 (unknown)



                    date)                         face symmetric unknown)  

 

           (unknown)  (no       (unknown)  (unknown)  [facility]  (units   

 (unknown)



                    date)                         [facility] unknown)  

 

           (unknown)  (no       (unknown)  (unknown)          (units    (unkno

wn)



                    date)                                   unknown)  

 

           (unknown)  (no       (unknown)  (unknown)  Gallbladder:?  (units    (

unknown)



                    date)                         Unremarkable.? ? unknown)  

 

           (unknown)  (no       (unknown)  (unknown)  General:  (units    (unkno

wn)



                    date)                         appearance normal, unknown)  



                                                  both eyes and all           



                                                  related structures           

 

           (unknown)  (no       (unknown)  (unknown)  General:  (units    (unkno

wn)



                    date)                         cooperative, unknown)  



                                                  comfortable and no           



                                                  acute distress           

 

           (unknown)  (no       (unknown)  (unknown)  General: deferred  (units 

   (unknown)



                    date)                                   unknown)  

 

           (unknown)  (no       (unknown)  (unknown)  Given the  (units    (unkn

own)



                    date)                         patient's unknown)  



                                                  significant           



                                                  improvement since           



                                                  2022, no           



                                                  emergent            

 

           (unknown)  (no       (unknown)  (unknown) Lesvia was seen in  (units  

  (unknown)



                    date)                         [facility]           



                                                  pain early on the           



                                                  morning of           

 

           (unknown)  (no       (unknown)  (unknown)  Gynecology Visit  (units  

  (unknown)



                    date)                                   unknown)  

 

           (unknown)  (no       (unknown)  (unknown)  HENMT     (units    (unkno

wn)



                    date)                                   unknown)  

 

           (unknown)  (no       (unknown)  (unknown)  HPI       (units    (unkno

wn)



                    date)                                   unknown)  

 

           (unknown)  (no       (unknown)  (unknown)  Head: normal to  (units   

 (unknown)



                    date)                         inspection, unknown)  



                                                  normocephalic and           



                                                  atraumatic           

 

           (unknown)  (no       (unknown)  (unknown)  Heart:? No  (units    (unk

nown)



                    date)                         significant unknown)  



                                                  findings.           

 

           (unknown)  (no       (unknown)  (unknown)  Heavy menstrual  (units   

 (unknown)



                    date)                         period (-2019) unknown)  

 

           (unknown)  (no       (unknown)  (unknown)  Height 5 ft 3 in  (units  

  (unknown)



                    date)                                   unknown)  

 

           (unknown)  (no       (unknown)  (unknown)  IMPRESSION:?  (units    (u

nknown)



                    date)                                   unknown)  

 

           (unknown)  (no       (unknown)  (unknown)  INDICATIONS:?  (units    (

unknown)



                    date)                         LEFT LOWER unknown)  



                                                  QUADRANT PAIN.           



                                                  FOLLOW UP CT.           

 

           (unknown)  (no       (unknown)  (unknown)  INDICATIONS:?  (units    (

unknown)



                    date)                         severe LLQ pain, unknown)  



                                                  recent pelvic           



                                                  surgery, hx           



                                                  abscess             

 

           (unknown)  (no       (unknown)  (unknown)  Image quality:?  (units   

 (unknown)



                    date)                         Excellent.? unknown)  

 

           (unknown)  (no       (unknown)  (unknown)  Intake Note:  (units    (u

nknown)



                    date)                                   unknown)  

 

           (unknown)  (no       (unknown)  (unknown)  Intake performed  (units  

  (unknown)



                    date)                         by: Harshal Gipson unknown)  

 

           (unknown)  (no       (unknown)  (unknown)  Intake    (units    (unkno

wn)



                    date)                                   unknown)  

 

           (unknown)  (no       (unknown)  (unknown)  Intake- Clincial  (units  

  (unknown)



                    date)                         Staff     unknown)  

 

           (unknown)  (no       (unknown)  (unknown)  Irregular  (units    (unkn

own)



                    date)                         menstrual cycle unknown)  



                                                  ()             

 

           (unknown)  (no       (unknown)  (unknown)  Judgment:  (units    (unkn

own)



                    date)                         judgment good unknown)  

 

           (unknown)  (no       (unknown)  (unknown)  Kidneys and  (units    (un

known)



                    date)                         Ureters:? unknown)  



                                                  Unremarkable.? ?           

 

           (unknown)  (no       (unknown)  (unknown)  Last Menstural  (units    

(unknown)



                    date)                         Cycle + Details unknown)  

 

           (unknown)  (no       (unknown)  (unknown)  Liver:?   (units    (unkno

wn)



                    date)                         Unremarkable.? ? unknown)  

 

           (unknown)  (no       (unknown)  (unknown)  Loc: FMA  (units    (unkno

wn)



                    date)                                   unknown)  

 

           (unknown)  (no       (unknown)  (unknown)  Lung bases:?  (units    (u

nknown)



                    date)                         Unremarkable.? ? unknown)  

 

           (unknown)  (no       (unknown)  (unknown)  Lymphangioma  (units    (u

nknown)



                    date)                                   unknown)  

 

           (unknown)  (no       (unknown)  (unknown)  Medical History  (units   

 (unknown)



                    date)                         (Reviewed 22 unknown)  



                                                  @ 05:36 by Dashawn Quintero DO)           

 

           (unknown)  (no       (unknown)  (unknown)  Medications  (units    (un

known)



                    date)                                   unknown)  

 

           (unknown)  (no       (unknown)  (unknown)  Mental Status:  (units    

(unknown)



                    date)                         mental status unknown)  



                                                  grossly normal           

 

           (unknown)  (no       (unknown)  (unknown)  Miscellaneous: No  (units 

   (unknown)



                    date)                         inguinal hernias unknown)  



                                                  are seen. ? ?           

 

           (unknown)  (no       (unknown)  (unknown)  Mood: congruent  (units   

 (unknown)



                    date)                         mood      unknown)  

 

           (unknown)  (no       (unknown)  (unknown)  Neck      (units    (unkno

wn)



                    date)                                   unknown)  

 

           (unknown)  (no       (unknown)  (unknown)  Neck: normal  (units    (u

nknown)



                    date)                         visual inspection unknown)  

 

           (unknown)  (no       (unknown)  (unknown)  Nutritional  (units    (un

known)



                    date)                         Appearance: unknown)  



                                                  average body           



                                                  habitus             

 

           (unknown)  (no       (unknown)  (unknown)  Obtaining and/or  (units  

  (unknown)



                    date)                         reviewing unknown)  



                                                  separately           



                                                  obtained history:           



                                                  5                   

 

           (unknown)  (no       (unknown)  (unknown)  Opioids -  (units    (unkn

own)



                    date)                         Morphine Analogues unknown)  



                                                  Allergy             



                                                  (Intermediate,           



                                                  Verified 22           



                                                  14:51)              

 

           (unknown)  (no       (unknown)  (unknown)  Ordering  (units    (unkno

wn)



                    date)                         medications, unknown)  



                                                  tests, or           



                                                  procedures: 5           

 

           (unknown)  (no       (unknown)  (unknown)  Orders    (units    (unkno

wn)



                    date)                                   unknown)  

 

           (unknown)  (no       (unknown)  (unknown)  Orders:   (units    (unkno

wn)



                    date)                                   unknown)  

 

           (unknown)  (no       (unknown)  (unknown)  Orientation:  (units    (u

nknown)



                    date)                         alert and oriented unknown)  



                                                  x3                  

 

           (unknown)  (no       (unknown)  (unknown)  Other Menstrual  (units   

 (unknown)



                    date)                         Period: Other unknown)  

 

           (unknown)  (no       (unknown)  (unknown)  Other:? No  (units    (unk

nown)



                    date)                         pathologic free unknown)  



                                                  abdominal or           



                                                  pelvic fluid.           

 

           (unknown)  (no       (unknown)  (unknown)  Ovarian cyst  (units    (u

nknown)



                    date)                                   unknown)  

 

           (unknown)  (no       (unknown)  (unknown) Ovaries:? The  (units    (u

nknown)



                    date)                         right ovary unknown)  



                                                  measures 3.0 x 1.5           



                                                  x 2.7 cm, with a           



                                                  calculated ovarian           

 

           (unknown)  (no       (unknown)  (unknown)  PELVIS:   (units    (unkno

wn)



                    date)                                   unknown)  

 

           (unknown)  (no       (unknown)  (unknown)  PFSH      (units    (unkno

wn)



                    date)                                   unknown)  

 

           (unknown)  (no       (unknown)  (unknown)  PROCEDURE:? CT  (units    

(unknown)



                    date)                         ABDOMEN PELVIS W unknown)  



                                                  CON                 

 

           (unknown)  (no       (unknown)  (unknown)  PROCEDURE:? US  (units    

(unknown)



                    date)                         PELVIC COMPLETE unknown)  

 

           (unknown)  (no       (unknown)  (unknown)  Painful menstrual  (units 

   (unknown)



                    date)                         periods (-) unknown)  

 

           (unknown)  (no       (unknown)  (unknown)  Pancreas:?  (units    (unk

nown)



                    date)                         Unremarkable.? ? unknown)  

 

           (unknown)  (no       (unknown)  (unknown)  Patient will  (units    (u

nknown)



                    date)                         remain in contact unknown)  



                                                  regarding her           



                                                  status via the           



                                                  patient portal and           

 

           (unknown)  (no       (unknown)  (unknown)  Patient:  (units    (unkno

wn)



                    date)                         Lesvia Aguero E unknown)  



                                                  MR#: [id number]             

 

           (unknown)  (no       (unknown)  (unknown)  Pelvic Nodes: No  (units  

  (unknown)



                    date)                         enlarged lymph unknown)  



                                                  nodes.?             

 

           (unknown)  (no       (unknown)  (unknown) Pelvic Organs:?  (units    

(unknown)



                    date)                         Unremarkable.? unknown)  



                                                  Uterus is normal.?           



                                                  There is a complex           



                                                  cystic mass           

 

           (unknown)  (no       (unknown)  (unknown)  Pelvic US  (units    (unkn

own)



                    date)                         performed shortly unknown)  



                                                  thereafter showed:           

 

           (unknown)  (no       (unknown)  (unknown)  Peritoneum:? No  (units   

 (unknown)



                    date)                         abnormal  unknown)  



                                                  intraperitoneal           



                                                  fluid.? No free           



                                                  air.?               

 

           (unknown)  (no       (unknown)  (unknown)  Plan      (units    (unkno

wn)



                    date)                                   unknown)  

 

           (unknown)  (no       (unknown)  (unknown)  Position Sitting  (units  

  (unknown)



                    date)                                   unknown)  

 

           (unknown)  (no       (unknown)  (unknown)  Preparing to see  (units  

  (unknown)



                    date)                         the patient, i.e., unknown)  



                                                  chart review,           



                                                  review of tests: 5           

 

           (unknown)  (no       (unknown)  (unknown)  Problem-specific  (units  

  (unknown)



                    date)                         ROS positives unknown)  



                                                  included with the           



                                                  HPI                 

 

           (unknown)  (no       (unknown)  (unknown)  Psych     (units    (unkno

wn)



                    date)                                   unknown)  

 

           (unknown)  (no       (unknown)  (unknown) Pt here to  (units    (unkn

own)



                    date)                         follow-up on her unknown)  



                                                  hemorrhagic           



                                                  ovarian cyst pt           



                                                  was seen in the ED           



                                                  at                

 

           (unknown)  (no       (unknown)  (unknown)  ROS Narrative  (units    (

unknown)



                    date)                                   unknown)  

 

           (unknown)  (no       (unknown)  (unknown)  ROS Narrative:  (units    

(unknown)



                    date)                                   unknown)  

 

           (unknown)  (no       (unknown)  (unknown)  ROS       (units    (unkno

wn)



                    date)                                   unknown)  

 

           (unknown)  (no       (unknown)  (unknown)  Real-time  (units    (unkn

own)



                    date)                         scanning was unknown)  



                                                  performed of the           



                                                  pelvic organs,           



                                                  with image           

 

           (unknown)  (no       (unknown)  (unknown)  Reason For Visit  (units  

  (unknown)



                    date)                                   unknown)  

 

           (unknown)  (no       (unknown)  (unknown)  Recommend  (units    (unkn

own)



                    date)                                   unknown)  

 

           (unknown)  (no       (unknown)  (unknown)  Resp      (units    (unkno

wn)



                    date)                                   unknown)  

 

           (unknown)  (no       (unknown)  (unknown)  S/P ACL   (units    (unkno

wn)



                    date)                         reconstruction unknown)  



                                                  (-21)           

 

           (unknown)  (no       (unknown)  (unknown)  Sclera: sclerae  (units   

 (unknown)



                    date)                         normal    unknown)  

 

           (unknown)  (no       (unknown)  (unknown)  Signed By:  (units    (unk

nown)



                    date)                         <Electronically unknown)  



                                                  signed by Jose Almodovar MD>           

 

           (unknown)  (no       (unknown)  (unknown)  Signed    (units    (unkno

wn)



                    date)                                   unknown)  

 

           (unknown)  (no       (unknown)  (unknown)  Since her ED  (units    (u

nknown)



                    date)                         visit on  unknown)  



                                                  2022, the           



                                                  patient's symptoms           



                                                  have significantly           

 

           (unknown)  (no       (unknown)  (unknown)  Smoking Status:  (units   

 (unknown)



                    date)                         Current some day unknown)  



                                                  smoker              

 

           (unknown)  (no       (unknown)  (unknown)  Social History  (units    

(unknown)



                    date)                                   unknown)  

 

           (unknown)  (no       (unknown)  (unknown)  Speech and  (units    (unk

nown)



                    date)                         Movement: speech unknown)  



                                                  and movement           



                                                  normal              

 

           (unknown)  (no       (unknown)  (unknown)  Spleen:?  (units    (unkno

wn)



                    date)                         Unremarkable.? ? unknown)  

 

           (unknown)  (no       (unknown)  (unknown)  Status: Acute  (units    (

unknown)



                    date)                                   unknown)  

 

           (unknown)  (no       (unknown)  (unknown)  Stomach and  (units    (un

known)



                    date)                         Bowel:? Stomach, unknown)  



                                                  small bowel loops,           



                                                  and colon are           



                                                  unremarkable.?           

 

           (unknown)  (no       (unknown)  (unknown)  Surgical History  (units  

  (unknown)



                    date)                         (Reviewed 22 unknown)  



                                                  @ 05:36 by Dashawn Quintero DO)           

 

           (unknown)  (no       (unknown)  (unknown)  TECHNIQUE:?  (units    (un

known)



                    date)                                   unknown)  

 

           (unknown)  (no       (unknown)  (unknown)  TOA (tubo-ovarian  (units 

   (unknown)



                    date)                         abscess)  unknown)  



                                                  (-21)           

 

           (unknown)  (no       (unknown)  (unknown)  There is a  (units    (unk

nown)



                    date)                                   unknown)  

 

           (unknown)  (no       (unknown)  (unknown)  This note may  (units    (

unknown)



                    date)                         have been all or unknown)  



                                                  partially           



                                                  generated using           



                                                  voice recognition           

 

           (unknown)  (no       (unknown)  (unknown)  Thought Content:  (units  

  (unknown)



                    date)                         normal    unknown)  

 

           (unknown)  (no       (unknown)  (unknown)  Thought Process:  (units  

  (unknown)



                    date)                         normal    unknown)  

 

           (unknown)  (no       (unknown)  (unknown)  Time Coding  (units    (un

known)



                    date)                         Minutes Spent: unknown)  



                                                  (must be on same           



                                                  date of             



                                                  service/appointmen           



                                                  t)                  

 

           (unknown)  (no       (unknown)  (unknown)  Time Spent  (units    (unk

nown)



                    date)                                   unknown)  

 

           (unknown)  (no       (unknown)  (unknown)  Tobacco +  (units    (unkn

own)



                    date)                         Substance Use unknown)  

 

           (unknown)  (no       (unknown)  (unknown)  Tobacco Status  (units    

(unknown)



                    date)                                   unknown)  

 

           (unknown)  (no       (unknown)  (unknown)  Total Time: 35  (units    

(unknown)



                    date)                                   unknown)  

 

           (unknown)  (no       (unknown)  (unknown)  US pelvic  (units    (unkn

own)



                    date)                         complete 3 Months unknown)  



                                                  G89.29 - Other           



                                                  chronic pain,           



                                                  N83.202 -           



                                                  Unspecified           

 

           (unknown)  (no       (unknown)  (unknown)  Uterus:? Uterus  (units   

 (unknown)



                    date)                         is anteverted and unknown)  



                                                  normal in size at           



                                                  7.1 x 5.2 x 3.8           



                                                  cm. The             

 

           (unknown)  (no       (unknown)  (unknown)  Ventral Wall: ?  (units   

 (unknown)



                    date)                         No hernia.? unknown)  

 

           (unknown)  (no       (unknown)  (unknown)  Vessels:? Aorta  (units   

 (unknown)



                    date)                         and inferior vena unknown)  



                                                  cava are normal in           



                                                  size.?              

 

           (unknown)  (no       (unknown)  (unknown)  Visit Reasons:  (units    

(unknown)



                    date)                         Hemorrhagic unknown)  



                                                  Ovarian Cyst F/U           

 

           (unknown)  (no       (unknown)  (unknown)  Vitals    (units    (unkno

wn)



                    date)                                   unknown)  

 

           (unknown)  (no       (unknown)  (unknown)  We also had an  (units    

(unknown)



                    date)                         extensive unknown)  



                                                  discussion about           



                                                  the findings at           



                                                  the time of her           

 

           (unknown)  (no       (unknown)  (unknown)  Weight 153 lb  (units    (

unknown)



                    date)                                   unknown)  

 

           (unknown)  (no       (unknown)  (unknown)  [Rx Confirmed  (units    (

unknown)



                    date)                         22] unknown)  

 

           (unknown)  (no       (unknown)  (unknown)  adjustment  (units    (unk

nown)



                    date)                                   unknown)  

 

           (unknown)  (no       (unknown)  (unknown)  adnexal and  (units    (un

known)



                    date)                         endometrial unknown)  



                                                  structures by           



                                                  transabdominal           



                                                  scanning.?           

 

           (unknown)  (no       (unknown)  (unknown)  alcohol intake:  (units   

 (unknown)



                    date)                         current   unknown)  

 

           (unknown)  (no       (unknown)  (unknown)  amount of free  (units    

(unknown)



                    date)                         fluid in the unknown)  



                                                  cul-de-sac.           

 

           (unknown)  (no       (unknown)  (unknown)  as the    (units    (unkno

wn)



                    date)                         possibility of unknown)  



                                                  putting her on           



                                                  oral                



                                                  contraceptives for           



                                                  ovarian cyst           

 

           (unknown)  (no       (unknown)  (unknown) at time of  (units    (unkn

own)



                    date)                         laparoscopy, the unknown)  



                                                  maribel-tubal           



                                                  adhesions and what           



                                                  appeared to be a           



                                                  small               

 

           (unknown)  (no       (unknown)  (unknown)  ay occur.  (units    (unkn

own)



                    date)                         Occasional unknown)  



                                                  wrong-word or           



                                                  'sound-alike'           



                                                  substitutions may           



                                                  have                

 

           (unknown)  (no       (unknown)  (unknown)  clindamycin HCl  (units   

 (unknown)



                    date)                         150 mg capsule 150 unknown)  



                                                  mg PO DAILY #30           



                                                  caps 22 [Rx           



                                                  Confirmed           

 

           (unknown)  (no       (unknown)  (unknown)  clinical  (units    (unkno

wn)



                    date)                         correlation.? unknown)  



                                                  After adequate           



                                                  treatment, a           



                                                  follow-up           



                                                  ultrasound is           

 

           (unknown)  (no       (unknown)  (unknown)  diagnoses  (units    (unkn

own)



                    date)                                   unknown)  

 

           (unknown)  (no       (unknown)  (unknown)  documentation.?  (units   

 (unknown)



                    date)                                   unknown)  

 

           (unknown)  (no       (unknown)  (unknown)  doxycycline  (units    (un

known)



                    date)                         Adverse Reaction unknown)  



                                                  (Intermediate,           



                                                  Verified 22           



                                                  14:51)              

 

           (unknown)  (no       (unknown)  (unknown)  each      (units    (unkno

wn)



                    date)                                   unknown)  

 

           (unknown)  (no       (unknown)  (unknown)  evaluation/treatm  (units 

   (unknown)



                    date)                         ent should post unknown)  



                                                  salpingitis tubal           



                                                  infertility be           



                                                  present             

 

           (unknown)  (no       (unknown)  (unknown)  fallopian tube.  (units   

 (unknown)



                    date)                         We discussed the unknown)  



                                                  diagnosis of           



                                                  infertility and           



                                                  methods of           

 

           (unknown)  (no       (unknown)  (unknown)  fluid filled  (units    (u

nknown)



                    date)                         structure adjacent unknown)  



                                                  to the left           



                                                  ovary.?             



                                                  Differential           



                                                  diagnoses are           

 

           (unknown)  (no       (unknown)  (unknown)  follow-up/further  (units 

   (unknown)



                    date)                         evaluation will be unknown)  



                                                  coordinated           



                                                  accordingly.           

 

           (unknown)  (no       (unknown)  (unknown) following up the  (units   

 (unknown)



                    date)                         left ovarian cyst unknown)  



                                                  noted on imaging           



                                                  studies at that           



                                                  time as well           

 

           (unknown)  (no       (unknown)  (unknown)  from the  (units    (unkno

wn)



                    date)                                   unknown)  

 

           (unknown)  (no       (unknown)  (unknown)  gynecological  (units    (

unknown)



                    date)                         follow-up.? unknown)  

 

           (unknown)  (no       (unknown)  (unknown)  have occurred. If  (units 

   (unknown)



                    date)                         there are any unknown)  



                                                  questions, please           



                                                  contact the           



                                                  Medical Records           

 

           (unknown)  (no       (unknown)  (unknown)  hemorrhagic  (units    (un

known)



                    date)                         ovarian cyst unknown)  



                                                  versus              



                                                  hydrosalpinx and           



                                                  tubo-ovarian           



                                                  abscess.?           

 

           (unknown)  (no       (unknown)  (unknown)  her. Instead will  (units 

   (unknown)



                    date)                         follow-up with a unknown)  



                                                  repeat pelvic           



                                                  ultrasound in 3           



                                                  months to make           

 

           (unknown)  (no       (unknown)  (unknown)  homogeneous. ?  (units    

(unknown)



                    date)                         The endometrium unknown)  



                                                  measures 10.9 mm           



                                                  combined            



                                                  thickness.           

 

           (unknown)  (no       (unknown)  (unknown)  household  (units    (unkn

own)



                    date)                         members: friend(s) unknown)  

 

           (unknown)  (no       (unknown)  (unknown)  hydrocodone 5  (units    (

unknown)



                    date)                         mg-acetaminophen unknown)  



                                                  325 mg tablet 1           



                                                  tab PO Q4-6H PRN           



                                                  pain #10 tabs           

 

           (unknown)  (no       (unknown)  (unknown)  hydromorphone 4  (units   

 (unknown)



                    date)                         mg tablet unknown)  



                                                  (Dilaudid) 4 mg PO           



                                                  Q4-6H PRN pain #20           



                                                  tabs 22           

 

           (unknown)  (no       (unknown)  (unknown)  hydrosalpinx  (units    (u

nknown)



                    date)                         would suggest unknown)  



                                                  significant damage           



                                                  may have been done           



                                                  to the left           

 

           (unknown)  (no       (unknown)  (unknown)  improved but she  (units  

  (unknown)



                    date)                         continues to have unknown)  



                                                  mild LLQ pain with           



                                                  certain activities           



                                                  but                 

 

           (unknown)  (no       (unknown)  (unknown)  in the    (units    (unkno

wn)



                    date)                                   unknown)  

 

           (unknown)  (no       (unknown)  (unknown)  include complex  (units   

 (unknown)



                    date)                         ovarian cyst and unknown)  



                                                  tubo-ovarian           



                                                  abscess.?           



                                                  Recommend pelvic           

 

           (unknown)  (no       (unknown)  (unknown)  including but not  (units 

   (unknown)



                    date)                         limited to in unknown)  



                                                  vitro               



                                                  fertilization,           



                                                  lysis of            



                                                  adhesions,           

 

           (unknown)  (no       (unknown)  (unknown)  infected the left  (units 

   (unknown)



                    date)                         adnexa and unknown)  



                                                  although the           



                                                  patency of the           



                                                  tube was not           



                                                  assessed            

 

           (unknown)  (no       (unknown)  (unknown)  intervention is  (units   

 (unknown)



                    date)                         warranted at this unknown)  



                                                  time. We discussed           



                                                  longer-term plans           



                                                  for                 

 

           (unknown)  (no       (unknown)  (unknown)  is a trace  (units    (unk

nown)



                    date)                                   unknown)  

 

           (unknown)  (no       (unknown)  (unknown)  ketorolac 10 mg  (units   

 (unknown)



                    date)                         tablet 10 mg PO unknown)  



                                                  Q6H PRN pain #14           



                                                  tabs 22 [Rx           



                                                  Confirmed           

 

           (unknown)  (no       (unknown)  (unknown)  laparoscopy and  (units   

 (unknown)



                    date)                         potential unknown)  



                                                  implications for           



                                                  fertility. She was           



                                                  advised that the           

 

           (unknown)  (no       (unknown)  (unknown)  left adnexa  (units    (un

known)



                    date)                         measuring 4.0 x unknown)  



                                                  5.3 x 5.2 cm.?           



                                                  Right ovary is           



                                                  unremarkable.?           



                                                  There               

 

           (unknown)  (no       (unknown)  (unknown)  lung bases to the  (units 

   (unknown)



                    date)                         pubic symphysis.? unknown)  



                                                  Coronal and           



                                                  sagittal reformats           



                                                  were                

 

           (unknown)  (no       (unknown)  (unknown)  moderate amount  (units   

 (unknown)



                    date)                         of stool in colon. unknown)  

 

           (unknown)  (no       (unknown)  (unknown)  myometrium is  (units    (

unknown)



                    date)                                   unknown)  

 

           (unknown)  (no       (unknown)  (unknown)  occurred due to  (units   

 (unknown)



                    date)                         the inherent unknown)  



                                                  limitations of           



                                                  voice recognition           



                                                  software. Please           

 

           (unknown)  (no       (unknown)  (unknown)  of 6.3 cc. The  (units    

(unknown)



                    date)                         left ovary unknown)  



                                                  measures 5.1 x 4.5           



                                                  x 4 point cm, with           



                                                  a calculated           

 

           (unknown)  (no       (unknown)  (unknown)  of mA and/or kV  (units   

 (unknown)



                    date)                         according to unknown)  



                                                  patient size.           

 

           (unknown)  (no       (unknown)  (unknown)  on  due to  (units   

 (unknown)



                    date)                         extreme pain pt unknown)  



                                                  had a pelvic US           



                                                  and pelvic CT done           

 

           (unknown)  (no       (unknown)  (unknown)  ondansetron 4 mg  (units  

  (unknown)



                    date)                         disintegrating unknown)  



                                                  tablet 4 mg PO           



                                                  TID-QID PRN nausea           



                                                  and vomiting           

 

           (unknown)  (no       (unknown)  (unknown)  otherwise is  (units    (u

nknown)



                    date)                         doing well. unknown)  

 

           (unknown)  (no       (unknown)  (unknown)  ovarian cyst,  (units    (

unknown)



                    date)                         left side, R10.2 - unknown)  



                                                  Pelvic and           



                                                  perineal pain           

 

           (unknown)  (no       (unknown)  (unknown)  ovarian   (units    (unkno

wn)



                    date)                                   unknown)  

 

           (unknown)  (no       (unknown)  (unknown)  ovary,    (units    (unkno

wn)



                    date)                                   unknown)  

 

           (unknown)  (no       (unknown)  (unknown)  ovary.? No  (units    (unk

nown)



                    date)                         adnexal masses are unknown)  



                                                  seen.               

 

           (unknown)  (no       (unknown)  (unknown)  oxycodone 5 mg  (units    

(unknown)



                    date)                         tablet 5 mg PO Q4H unknown)  



                                                  PRN pain #20 tabs           



                                                  22 [Rx           



                                                  Confirmed           

 

           (unknown)  (no       (unknown)  (unknown)  pelvic infection  (units  

  (unknown)



                    date)                         that resulted in a unknown)  



                                                  right-sided           



                                                  tubo-ovarian           



                                                  abscess and also           

 

           (unknown)  (no       (unknown)  (unknown)  performed.? For  (units   

 (unknown)



                    date)                                   unknown)  

 

           (unknown)  (no       (unknown)  (unknown)  radiation dose  (units    

(unknown)



                    date)                         reduction, the unknown)  



                                                  following was           



                                                  used:? automated           



                                                  exposure control,           

 

           (unknown)  (no       (unknown)  (unknown)  rash      (units    (unkno

wn)



                    date)                                   unknown)  

 

           (unknown)  (no       (unknown)  (unknown)  read the note  (units    (

unknown)



                    date)                         carefully and unknown)  



                                                  recognize, using           



                                                  context, where           



                                                  these               



                                                  substitutions           

 

           (unknown)  (no       (unknown)  (unknown)  salpingostomy,  (units    

(unknown)



                    date)                         and other unknown)  



                                                  interventions.           

 

           (unknown)  (no       (unknown)  (unknown)  sentences  (units    (unkn

own)



                    date)                                   unknown)  

 

           (unknown)  (no       (unknown)  (unknown)  software.  (units    (unkn

own)



                    date)                         Although every unknown)  



                                                  effort is made to           



                                                  edit content,           



                                                  transcription           



                                                  errors m            

 

           (unknown)  (no       (unknown)  (unknown)  suggested.  (units    (unk

nown)



                    date)                                   unknown)  

 

           (unknown)  (no       (unknown)  (unknown)  suppression.  (units    (u

nknown)



                    date)                         Unfortunately she unknown)  



                                                  is been on birth           



                                                  control pills           



                                                  several times in           

 

           (unknown)  (no       (unknown)  (unknown)  sure that the  (units    (

unknown)



                    date)                         ovarian cyst has unknown)  



                                                  resolved.           

 

           (unknown)  (no       (unknown)  (unknown)  the past and did  (units  

  (unknown)



                    date)                         not tolerate them unknown)  



                                                  very well so that           



                                                  is not a good           



                                                  option for           

 

           (unknown)  (no       (unknown)  (unknown)  the       (units    (unkno

wn)



                    date)                                   unknown)  

 

           (unknown)  (no       (unknown)  (unknown) there is anechoic  (units  

  (unknown)



                    date)                         fluid filled unknown)  



                                                  structure.? Less           



                                                  than 12 follicles           



                                                  can be seen in           

 

           (unknown)  (no       (unknown)  (unknown)  ultrasound and  (units    

(unknown)



                    date)                                   unknown)  

 

           (unknown)  (no       (unknown)  (unknown) volume of 59.1  (units    (

unknown)



                    date)                         cc.? There is a unknown)  



                                                  complex cyst in           



                                                  left ovary.?           



                                                  Superior to the           



                                                  left                

 

           (unknown)  (no       (unknown)  (unknown)  volume    (units    (unkno

wn)



                    date)                                   unknown)  

 

           (unknown)  (no       (unknown)  (unknown)  vomitt    (units    (unkno

wn)



                    date)                                   unknown)  









                                         Result panel 560









           (unknown)  (no       (unknown)  (unknown)  (no value)  (units    (unk

nown)



                    date)                                   unknown)  

 

           (unknown)  (no       (unknown)  (unknown)  [id number]  (units    (unkn

own)



                    date)                                   unknown)  

 

           (unknown)  (no       (unknown)  (unknown)  23  (units    (unkno

wn)



                    date)                                   unknown)  

 

           (unknown)  (no       (unknown)  (unknown)  1. Left ovarian  (units   

 (unknown)



                    date)                         3.7 cm    unknown)  



                                                  hemorrhagic cyst.           

 

           (unknown)  (no       (unknown)  (unknown)  [address] [place] [address]  (units  

  (unknown)



                    date)                                   unknown)  

 

           (unknown)  (no       (unknown)  (unknown)  2. No     (units    (unkno

wn)



                    date)                         sonographic signs unknown)  



                                                  of ovarian           



                                                  torsion.            

 

           (unknown)  (no       (unknown)  (unknown)  Accession  (units    (unkn

own)



                    date)                         Number:   unknown)  



                                                  [id number]           

 

           (unknown)  (no       (unknown)  (unknown)  Additional  (units    (unk

nown)



                    date)                         endovaginal unknown)  



                                                  scanning was           



                                                  necessary due to           



                                                  incomplete           



                                                  visualization           

 

           (unknown)  (no       (unknown)  (unknown)  Age/Sex: 17 / F  (units   

 (unknown)



                    date)                         Date of Service: unknown)  

 

           (unknown)  (no       (unknown)  (unknown)  [place], [place]  (units    (

unknown)



                    date)                         [id number]     unknown)  

 

           (unknown)  (no       (unknown)  (unknown)  Approved by:  (units    (u

nknown)



                    date)                         alley Hampton M.D. on 2023           



                                                  at 8:17             

 

           (unknown)  (no       (unknown)  (unknown)  COMPARISON:  (units    (un

known)



                    date)                         [facility], unknown)  



                                                  US, US PELVIC           



                                                  COMPLETE,           



                                                  2022, 5:30.           

 

           (unknown)  (no       (unknown)  (unknown)  : 2005  (units   

 (unknown)



                    date)                         Acct:[id number] unknown)  

 

           (unknown)  (no       (unknown)  (unknown)  FINDINGS:  (units    (unkn

own)



                    date)                                   unknown)  

 

           (unknown)  (no       (unknown)  (unknown)  IMPRESSION:  (units    (un

known)



                    date)                                   unknown)  

 

           (unknown)  (no       (unknown)  (unknown)  INDICATIONS:  (units    (u

nknown)



                    date)                         LEFT PELVIC PAIN unknown)  

 

           (unknown)  (no       (unknown)  (unknown)  [facility]  (units   

 (unknown)



                    date)                                   unknown)  

 

           (unknown)  (no       (unknown)  (unknown)  Loc: ED   (units    (unkno

wn)



                    date)                                   unknown)  

 

           (unknown)  (no       (unknown)  (unknown)  Ordering  (units    (unkno

wn)



                    date)                         Provider: unknown)  



                                                  Terrell Arnold MD           

 

           (unknown)  (no       (unknown)  (unknown)  Other: Trace  (units    (u

nknown)



                    date)                         free fluid is unknown)  



                                                  seen in the left           



                                                  adnexa which is           



                                                  within              



                                                  physiologic           

 

           (unknown)  (no       (unknown)  (unknown)  Ovaries: The  (units    (u

nknown)



                    date)                         right ovary unknown)  



                                                  measures 1.5 x           



                                                  2.9 x 2.1 cm,           



                                                  with a calculated           

 

           (unknown)  (no       (unknown)  (unknown)  PROCEDURE: US  (units    (

unknown)



                    date)                         PELVIC COMPLETE unknown)  

 

           (unknown)  (no       (unknown)  (unknown)  Patient:  (units    (unkno

wn)



                    date)                         Lesvia Aguero unknown)  



                                                  MR#: [id number]              

 

           (unknown)  (no       (unknown)  (unknown)  Procedure: US  (units    (

unknown)



                    date)                         pelvic complete unknown)  

 

           (unknown)  (no       (unknown)  (unknown)  Real-time  (units    (unkn

own)



                    date)                         scanning was unknown)  



                                                  performed of the           



                                                  pelvic organs,           



                                                  with image           

 

           (unknown)  (no       (unknown)  (unknown)  Signed    (units    (unkno

wn)



                    date)                                   unknown)  

 

           (unknown)  (no       (unknown)  (unknown)  TECHNIQUE:  (units    (unk

nown)



                    date)                                   unknown)  

 

           (unknown)  (no       (unknown)  (unknown)  There is no  (units    (un

known)



                    date)                         significant unknown)  



                                                  discrepancy when           



                                                  compared to the           



                                                  overnight           



                                                  preliminary           

 

           (unknown)  (no       (unknown)  (unknown)  To assist  (units    (unkn

own)



                    date)                                   unknown)  

 

           (unknown)  (no       (unknown)  (unknown)  Ultrasound  (units    (unk

nown)



                    date)                         Report    unknown)  

 

           (unknown)  (no       (unknown)  (unknown)  Uterus: Uterus  (units    

(unknown)



                    date)                         is anteverted and unknown)  



                                                  normal in size at           



                                                  7.8 x 3.5 x 4.4           



                                                  cm. The             

 

           (unknown)  (no       (unknown)  (unknown)  We strive to  (units    (u

nknown)



                    date)                         produce accurate, unknown)  



                                                  complete, and           



                                                  clear reports of           



                                                  imaging services.           

 

           (unknown)  (no       (unknown)  (unknown)  adnexal and  (units    (un

known)



                    date)                         endometrial unknown)  



                                                  structures by           



                                                  transabdominal           



                                                  scanning.           

 

           (unknown)  (no       (unknown)  (unknown)  adnexal   (units    (unkno

wn)



                    date)                                   unknown)  

 

           (unknown)  (no       (unknown)  (unknown)  and voice  (units    (unkn

own)



                    date)                         recognition unknown)  



                                                  software.           



                                                  Therefore, it may           



                                                  contain abnormal           



                                                  punctuation,           

 

           (unknown)  (no       (unknown)  (unknown)  compared to  (units    (un

known)



                    date)                                   unknown)  

 

           (unknown)  (no       (unknown)  (unknown)  documentation.  (units    

(unknown)



                    date)                                   unknown)  

 

           (unknown)  (no       (unknown)  (unknown)  homogeneous. The  (units  

  (unknown)



                    date)                         endometrium unknown)  



                                                  measures 14 mm           



                                                  combined            



                                                  thickness.           

 

           (unknown)  (no       (unknown)  (unknown)  inaccuracies.  (units    (

unknown)



                    date)                                   unknown)  

 

           (unknown)  (no       (unknown)  (unknown)  insertions  (units    (unk

nown)



                    date)                         and/or omissions. unknown)  



                                                  Occasional           



                                                  wrong-word or           



                                                  sound-alike           



                                                  substitutions           

 

           (unknown)  (no       (unknown)  (unknown)  internal  (units    (unkno

wn)



                    date)                         septations and unknown)  



                                                  hypoechoic           



                                                  contents. Cyst           



                                                  size has            



                                                  decreased when           

 

           (unknown)  (no       (unknown)  (unknown)  lace-like  (units    (unkn

own)



                    date)                                   unknown)  

 

           (unknown)  (no       (unknown)  (unknown)  limits.   (units    (unkno

wn)



                    date)                                   unknown)  

 

           (unknown)  (no       (unknown)  (unknown)  masses are seen.  (units  

  (unknown)



                    date)                         Normal Doppler unknown)  



                                                  flow is seen to           



                                                  each ovary.           

 

           (unknown)  (no       (unknown)  (unknown)  may       (units    (unkno

wn)



                    date)                                   unknown)  

 

           (unknown)  (no       (unknown)  (unknown)  myometrium is  (units    (

unknown)



                    date)                                   unknown)  

 

           (unknown)  (no       (unknown)  (unknown)  occur. Though we  (units  

  (unknown)



                    date)                         review the report unknown)  



                                                  and make efforts           



                                                  to correct it, we           



                                                  do                  

 

           (unknown)  (no       (unknown)  (unknown)  of 35.8 cc.  (units    (un

known)



                    date)                         There is a left unknown)  



                                                  ovarian cyst           



                                                  measuring 3.7 x           



                                                  3.4 x 3.3 cm with           

 

           (unknown)  (no       (unknown)  (unknown)  of 4.7 cc. The  (units    

(unknown)



                    date)                         left ovary unknown)  



                                                  measures 4.5 x           



                                                  3.7 x 4.1 cm,           



                                                  with a calculated           

 

           (unknown)  (no       (unknown)  (unknown)  of the    (units    (unkno

wn)



                    date)                                   unknown)  

 

           (unknown)  (no       (unknown)  (unknown)  ovarian volume  (units    

(unknown)



                    date)                                   unknown)  

 

           (unknown)  (no       (unknown)  (unknown)  recommend that  (units    

(unknown)



                    date)                                   unknown)  

 

           (unknown)  (no       (unknown)  (unknown)  report.   (units    (unkno

wn)



                    date)                                   unknown)  

 

           (unknown)  (no       (unknown)  (unknown)  templates  (units    (unkn

own)



                    date)                                   unknown)  

 

           (unknown)  (no       (unknown)  (unknown)  the report be  (units    (

unknown)



                    date)                         read carefully in unknown)  



                                                  proper context to           



                                                  recognize any           



                                                  text                

 

           (unknown)  (no       (unknown)  (unknown)  the ultrasound  (units    

(unknown)



                    date)                         from 2022. unknown)  



                                                  The right ovary           



                                                  is normal in           



                                                  appearance. No           

 

           (unknown)  (no       (unknown)  (unknown)  us in improving  (units   

 (unknown)



                    date)                         patient care, unknown)  



                                                  this report was           



                                                  composed using           



                                                  standard report           









                                         Result panel 561









           (unknown)  (no       (unknown)  (unknown)  (no value)  (units    (unk

nown)



                    date)                                   unknown)  

 

           (unknown)  (no       (unknown)  (unknown)  (Dilaudid)  (units    (unk

nown)



                    date)                                   unknown)  

 

           (unknown)  (no       (unknown)  (unknown)  [id number]  (units    (unkno

wn)



                    date)                                   unknown)  

 

           (unknown)  (no       (unknown)  (unknown)  23 01:48  (units    

(unknown)



                    date)                                   unknown)  

 

           (unknown)  (no       (unknown)  (unknown)  23 01:49  (units    

(unknown)



                    date)                                   unknown)  

 

           (unknown)  (no       (unknown)  (unknown)  1 tab PO Q4-6H  (units    

(unknown)



                    date)                         PRN (Reason: unknown)  



                                                  pain) Qty: 10 0RF           

 

           (unknown)  (no       (unknown)  (unknown)  10 mg PO Q6H PRN  (units  

  (unknown)



                    date)                         (Reason: pain) unknown)  



                                                  Qty: 14 0RF           

 

           (unknown)  (no       (unknown)  (unknown)  150 mg PO DAILY  (units   

 (unknown)



                    date)                         Qty: 30 0RF unknown)  

 

           (unknown)  (no       (unknown)  (unknown)  4 mg PO Q4-6H  (units    (

unknown)



                    date)                         PRN (Reason: unknown)  



                                                  pain) Qty: 20 0RF           

 

           (unknown)  (no       (unknown)  (unknown)  4 mg PO TID-QID  (units   

 (unknown)



                    date)                         PRN (Reason: unknown)  



                                                  nausea and           



                                                  vomiting) Qty: 10           



                                                  0RF                 

 

           (unknown)  (no       (unknown)  (unknown)  5 mg PO Q4H PRN  (units   

 (unknown)



                    date)                         (Reason: pain) unknown)  



                                                  Qty: 20 0RF           

 

           (unknown)  (no       (unknown)  (unknown)  Acne (-)  (units    (u

nknown)



                    date)                                   unknown)  

 

           (unknown)  (no       (unknown)  (unknown)  Age/Sex: 17 / F  (units   

 (unknown)



                    date)                                   unknown)  

 

           (unknown)  (no       (unknown)  (unknown)  Alcohol type:  (units    (

unknown)



                    date)                         wine      unknown)  

 

           (unknown)  (no       (unknown)  (unknown)  Allergies  (units    (unkn

own)



                    date)                                   unknown)  

 

           (unknown)  (no       (unknown)  (unknown)  Allergy/AdvReac  (units   

 (unknown)



                    date)                         Type Severity unknown)  



                                                  Reaction Status           



                                                  Date / Time           

 

           (unknown)  (no       (unknown)  (unknown)  Anesthesia  (units    (unk

nown)



                    date)                                   unknown)  

 

           (unknown)  (no       (unknown)  (unknown)  CBC Auto Diff  (units    (

unknown)



                    date)                         [Complete Blood unknown)  



                                                  Count AUTO DIFF]           



                                                  Stat                

 

           (unknown)  (no       (unknown)  (unknown)  CMP       (units    (unkno

wn)



                    date)                         [Comprehensive unknown)  



                                                  Metabolic Panel]           



                                                  Stat                

 

           (unknown)  (no       (unknown)  (unknown)  Chlamydia  (units    (unkn

own)



                    date)                         infection () unknown)  

 

           (unknown)  (no       (unknown)  (unknown)  Course    (units    (unkno

wn)



                    date)                                   unknown)  

 

           (unknown)  (no       (unknown)  (unknown)  : 2003  (units   

 (unknown)



                    date)                         Acct:[id number] unknown)  

 

           (unknown)  (no       (unknown)  (unknown)  Date of Service:  (units  

  (unknown)



                    date)                         23  unknown)  

 

           (unknown)  (no       (unknown)  (unknown)  Departure  (units    (unkn

own)



                    date)                                   unknown)  

 

           (unknown)  (no       (unknown)  (unknown)  Discharge Plan  (units    

(unknown)



                    date)                                   unknown)  

 

           (unknown)  (no       (unknown)  (unknown)  Discontinued  (units    (u

nknown)



                    date)                         Medications unknown)  

 

           (unknown)  (no       (unknown)  (unknown)  ED Orders  (units    (unkn

own)



                    date)                                   unknown)  

 

           (unknown)  (no       (unknown)  (unknown)  ER Physician:  (units    (

unknown)



                    date)                         Terrell Arnold MD unknown)  

 

           (unknown)  (no       (unknown)  (unknown)  Emergency Report  (units  

  (unknown)



                    date)                                   unknown)  

 

           (unknown)  (no       (unknown)  (unknown)  Endometriosis  (units    (

unknown)



                    date)                         ()   unknown)  

 

           (unknown)  (no       (unknown)  (unknown)  General   (units    (unkno

wn)



                    date)                                   unknown)  

 

           (unknown)  (no       (unknown)  (unknown)  HPI - Abdominal  (units   

 (unknown)



                    date)                         Pain      unknown)  

 

           (unknown)  (no       (unknown)  (unknown)  Heavy menstrual  (units   

 (unknown)



                    date)                         period (-) unknown)  

 

           (unknown)  (no       (unknown)  (unknown)  Irregular  (units    (unkn

own)



                    date)                         menstrual cycle unknown)  



                                                  ()             

 

           (unknown)  (no       (unknown)  (unknown)  [facility]  (units   

 (unknown)



                    date)                         [address] [place] [address] Street unknown)  



                                                  [place]           



                                                  [id number]               

 

           (unknown)  (no       (unknown)  (unknown)  Arcenio Darnell  (units  

  (unknown)



                    date)                         HOLLY kim [Primary unknown)  



                                                  Care Provider]           

 

           (unknown)  (no       (unknown)  (unknown)  Lymphangioma  (units    (u

nknown)



                    date)                                   unknown)  

 

           (unknown)  (no       (unknown)  (unknown)  Medical History  (units   

 (unknown)



                    date)                         (Reviewed unknown)  



                                                  22 @ 05:36           



                                                  by Dashawn Quintero DO)                 

 

           (unknown)  (no       (unknown)  (unknown)  Medication  (units    (unk

nown)



                    date)                         Instructions unknown)  



                                                  Recorded            

 

           (unknown)  (no       (unknown)  (unknown)  No Action  (units    (unkn

own)



                    date)                                   unknown)  

 

           (unknown)  (no       (unknown)  (unknown)  Ondansetron HCl  (units   

 (unknown)



                    date)                         (Ondansetron 4 unknown)  



                                                  Mg/2 Ml Inj) 4 mg           



                                                  IV NOW ONE           

 

           (unknown)  (no       (unknown)  (unknown)  Opioids -  (units    (unkn

own)



                    date)                         Morphine  unknown)  



                                                  Analogues Allergy           



                                                  Intermediate rash           



                                                  Verified 22           



                                                  14:51               

 

           (unknown)  (no       (unknown)  (unknown)  Ordered:  (units    (unkno

wn)



                    date)                                   unknown)  

 

           (unknown)  (no       (unknown)  (unknown)  Orders    (units    (unkno

wn)



                    date)                                   unknown)  

 

           (unknown)  (no       (unknown)  (unknown)  Ovarian cyst  (units    (u

nknown)



                    date)                                   unknown)  

 

           (unknown)  (no       (unknown)  (unknown)  Painful   (units    (unkno

wn)



                    date)                         menstrual periods unknown)  



                                                  ()             

 

           (unknown)  (no       (unknown)  (unknown)  Patient History  (units   

 (unknown)



                    date)                                   unknown)  

 

           (unknown)  (no       (unknown)  (unknown)  Patient:  (units    (unkno

wn)



                    date)                         Lesvia Aguero E unknown)  



                                                  MR#: [id number]             

 

           (unknown)  (no       (unknown)  (unknown)  Prescriptions:  (units    

(unknown)



                    date)                                   unknown)  

 

           (unknown)  (no       (unknown)  (unknown)  Previous Rx's  (units    (

unknown)



                    date)                                   unknown)  

 

           (unknown)  (no       (unknown)  (unknown)  Referrals:  (units    (unk

nown)



                    date)                                   unknown)  

 

           (unknown)  (no       (unknown)  (unknown)  Related Data  (units    (u

nknown)



                    date)                                   unknown)  

 

           (unknown)  (no       (unknown)  (unknown)  S/P ACL   (units    (unkno

wn)



                    date)                         reconstruction unknown)  



                                                  (-21)           

 

           (unknown)  (no       (unknown)  (unknown)  Signed By:  (units    (unk

nown)



                    date)                                   unknown)  

 

           (unknown)  (no       (unknown)  (unknown)  Smoking Status:  (units   

 (unknown)



                    date)                         Current some day unknown)  



                                                  smoker              

 

           (unknown)  (no       (unknown)  (unknown)  Social History  (units    

(unknown)



                    date)                         (Reviewed unknown)  



                                                  22 @ 05:36           



                                                  by Dashawn Quintero DO)                 

 

           (unknown)  (no       (unknown)  (unknown)  Sodium Chloride  (units   

 (unknown)



                    date)                         (Normal Saline unknown)  



                                                  0.9%) 1,000 mls @           



                                                  1,000 mls/hr IV           



                                                  BOLUS ONE           

 

           (unknown)  (no       (unknown)  (unknown)  Stated    (units    (unkno

wn)



                    date)                         Complaint: N/V/D unknown)  



                                                  ABD PAIN            

 

           (unknown)  (no       (unknown)  (unknown)  Stop: 23  (units    

(unknown)



                    date)                         01:50     unknown)  

 

           (unknown)  (no       (unknown)  (unknown)  Stop: 23  (units    

(unknown)



                    date)                         02:48     unknown)  

 

           (unknown)  (no       (unknown)  (unknown)  Substance Use  (units    (

unknown)



                    date)                         Type: does not unknown)  



                                                  use                 

 

           (unknown)  (no       (unknown)  (unknown)  Surgical History  (units  

  (unknown)



                    date)                         (Reviewed unknown)  



                                                  22 @ 05:36           



                                                  by Dashawn Quintero DO)                 

 

           (unknown)  (no       (unknown)  (unknown)  TOA       (units    (unkno

wn)



                    date)                         (tubo-ovarian unknown)  



                                                  abscess)            



                                                  (-21)           

 

           (unknown)  (no       (unknown)  (unknown)  Time Seen by  (units    (u

nknown)



                    date)                         Provider: unknown)  



                                                  23 01:26           

 

           (unknown)  (no       (unknown)  (unknown)  US pelvic  (units    (unkn

own)



                    date)                         complete Stat unknown)  

 

           (unknown)  (no       (unknown)  (unknown)  alcohol intake  (units    

(unknown)



                    date)                         frequency: unknown)  



                                                  holidays/special           



                                                  occasions only           

 

           (unknown)  (no       (unknown)  (unknown)  alcohol intake:  (units   

 (unknown)



                    date)                         current   unknown)  

 

           (unknown)  (no       (unknown)  (unknown)  clindamycin HCl  (units   

 (unknown)



                    date)                         150 mg capsule unknown)  



                                                  150 mg PO DAILY           



                                                  #30 caps 22           

 

           (unknown)  (no       (unknown)  (unknown)  clindamycin HCl  (units   

 (unknown)



                    date)                         150 mg capsule unknown)  

 

           (unknown)  (no       (unknown)  (unknown)  doxycycline  (units    (un

known)



                    date)                         AdvReac   unknown)  



                                                  Intermediate           



                                                  vomitt Verified           



                                                  22 14:51           

 

           (unknown)  (no       (unknown)  (unknown)  household  (units    (unkn

own)



                    date)                         members:  unknown)  



                                                  friend(s)           

 

           (unknown)  (no       (unknown)  (unknown)  hydrocodone 5  (units    (

unknown)



                    date)                         mg-acetaminophen unknown)  



                                                  325 1 tab PO           



                                                  Q4-6H PRN pain           



                                                  #10 tabs 22           

 

           (unknown)  (no       (unknown)  (unknown)  hydrocodone-acet  (units  

  (unknown)



                    date)                         aminophen 5-325 unknown)  



                                                  mg tablet           

 

           (unknown)  (no       (unknown)  (unknown)  hydromorphone 4  (units   

 (unknown)



                    date)                         mg tablet 4 mg PO unknown)  



                                                  Q4-6H PRN pain           



                                                  #20 tabs 22           

 

           (unknown)  (no       (unknown)  (unknown)  hydromorphone  (units    (

unknown)



                    date)                         [Dilaudid] 4 mg unknown)  



                                                  tablet              

 

           (unknown)  (no       (unknown)  (unknown)  ketorolac 10 mg  (units   

 (unknown)



                    date)                         tablet 10 mg PO unknown)  



                                                  Q6H PRN pain #14           



                                                  tabs 22           

 

           (unknown)  (no       (unknown)  (unknown)  ketorolac 10 mg  (units   

 (unknown)



                    date)                         tablet    unknown)  

 

           (unknown)  (no       (unknown)  (unknown)  mg tablet  (units    (unkn

own)



                    date)                                   unknown)  

 

           (unknown)  (no       (unknown)  (unknown)  ondansetron 4 mg  (units  

  (unknown)



                    date)                         disintegrating 4 unknown)  



                                                  mg PO TID-QID PRN           



                                                  nausea and           



                                                  22            

 

           (unknown)  (no       (unknown)  (unknown)  ondansetron 4 mg  (units  

  (unknown)



                    date)                         tablet,disintegra unknown)  



                                                  ting                

 

           (unknown)  (no       (unknown)  (unknown)  oxycodone 5 mg  (units    

(unknown)



                    date)                         tablet 5 mg PO unknown)  



                                                  Q4H PRN pain #20           



                                                  tabs 22           

 

           (unknown)  (no       (unknown)  (unknown)  oxycodone 5 mg  (units    

(unknown)



                    date)                         tablet    unknown)  

 

           (unknown)  (no       (unknown)  (unknown)  tablet vomiting  (units   

 (unknown)



                    date)                         #10 tabs  unknown)  

 

           (unknown)  (no       (unknown)  (unknown)  tobacco type:  (units    (

unknown)



                    date)                         vaping    unknown)  









                                         Result panel [id number]









           (unknown)  (no       (unknown)  (unknown)  (no value)  (units    (unk

nown)



                    date)                                   unknown)  

 

           (unknown)  (no       (unknown)  (unknown)  (Dilaudid)  (units    (unk

nown)



                    date)                                   unknown)  

 

           (unknown)  (no       (unknown)  (unknown)  [id number]  (units    (unkno

wn)



                    date)                                   unknown)  

 

           (unknown)  (no       (unknown)  (unknown)  23 01:48  (units    

(unknown)



                    date)                                   unknown)  

 

           (unknown)  (no       (unknown)  (unknown)  23 01:49  (units    

(unknown)



                    date)                                   unknown)  

 

           (unknown)  (no       (unknown)  (unknown)  1 tab PO Q4-6H  (units    

(unknown)



                    date)                         PRN (Reason: unknown)  



                                                  pain) Qty: 10 0RF           

 

           (unknown)  (no       (unknown)  (unknown)  10 mg PO Q6H PRN  (units  

  (unknown)



                    date)                         (Reason: pain) unknown)  



                                                  Qty: 14 0RF           

 

           (unknown)  (no       (unknown)  (unknown)  150 mg PO DAILY  (units   

 (unknown)



                    date)                         Qty: 30 0RF unknown)  

 

           (unknown)  (no       (unknown)  (unknown)  4 mg PO Q4-6H  (units    (

unknown)



                    date)                         PRN (Reason: unknown)  



                                                  pain) Qty: 20 0RF           

 

           (unknown)  (no       (unknown)  (unknown)  4 mg PO TID-QID  (units   

 (unknown)



                    date)                         PRN (Reason: unknown)  



                                                  nausea and           



                                                  vomiting) Qty: 10           



                                                  0RF                 

 

           (unknown)  (no       (unknown)  (unknown)  5 mg PO Q4H PRN  (units   

 (unknown)



                    date)                         (Reason: pain) unknown)  



                                                  Qty: 20 0RF           

 

           (unknown)  (no       (unknown)  (unknown)  Acne (-2016)  (units    (u

nknown)



                    date)                                   unknown)  

 

           (unknown)  (no       (unknown)  (unknown)  Age/Sex: 17 / F  (units   

 (unknown)



                    date)                                   unknown)  

 

           (unknown)  (no       (unknown)  (unknown)  Alcohol type:  (units    (

unknown)



                    date)                         wine      unknown)  

 

           (unknown)  (no       (unknown)  (unknown)  Allergies  (units    (unkn

own)



                    date)                                   unknown)  

 

           (unknown)  (no       (unknown)  (unknown)  Allergy/AdvReac  (units   

 (unknown)



                    date)                         Type Severity unknown)  



                                                  Reaction Status           



                                                  Date / Time           

 

           (unknown)  (no       (unknown)  (unknown)  Anesthesia  (units    (unk

nown)



                    date)                                   unknown)  

 

           (unknown)  (no       (unknown)  (unknown)  CBC Auto Diff  (units    (

unknown)



                    date)                         [Complete Blood unknown)  



                                                  Count AUTO DIFF]           



                                                  Stat                

 

           (unknown)  (no       (unknown)  (unknown)  CMP       (units    (unkno

wn)



                    date)                         [Comprehensive unknown)  



                                                  Metabolic Panel]           



                                                  Stat                

 

           (unknown)  (no       (unknown)  (unknown)  Chlamydia  (units    (unkn

own)



                    date)                         infection () unknown)  

 

           (unknown)  (no       (unknown)  (unknown)  Course    (units    (unkno

wn)



                    date)                                   unknown)  

 

           (unknown)  (no       (unknown)  (unknown)  : 2005  (units   

 (unknown)



                    date)                         Acct:[id number] unknown)  

 

           (unknown)  (no       (unknown)  (unknown)  Date of Service:  (units  

  (unknown)



                    date)                         23  unknown)  

 

           (unknown)  (no       (unknown)  (unknown)  Departure  (units    (unkn

own)



                    date)                                   unknown)  

 

           (unknown)  (no       (unknown)  (unknown)  Discharge Plan  (units    

(unknown)



                    date)                                   unknown)  

 

           (unknown)  (no       (unknown)  (unknown)  Discontinued  (units    (u

nknown)



                    date)                         Medications unknown)  

 

           (unknown)  (no       (unknown)  (unknown)  ED Orders  (units    (unkn

own)



                    date)                                   unknown)  

 

           (unknown)  (no       (unknown)  (unknown)  ER Physician:  (units    (

unknown)



                    date)                         Terrell Arnold MD unknown)  

 

           (unknown)  (no       (unknown)  (unknown)  Emergency Report  (units  

  (unknown)



                    date)                                   unknown)  

 

           (unknown)  (no       (unknown)  (unknown)  Endometriosis  (units    (

unknown)



                    date)                         ()   unknown)  

 

           (unknown)  (no       (unknown)  (unknown)  General   (units    (unkno

wn)



                    date)                                   unknown)  

 

           (unknown)  (no       (unknown)  (unknown)  HPI - Abdominal  (units   

 (unknown)



                    date)                         Pain      unknown)  

 

           (unknown)  (no       (unknown)  (unknown)  HPI narrative:  (units    

(unknown)



                    date)                                   unknown)  

 

           (unknown)  (no       (unknown)  (unknown)  Heavy menstrual  (units   

 (unknown)



                    date)                         period (-2019) unknown)  

 

           (unknown)  (no       (unknown)  (unknown)  History of  (units    (unk

nown)



                    date)                         Present Illness unknown)  

 

           (unknown)  (no       (unknown)  (unknown)  Irregular  (units    (unkn

own)



                    date)                         menstrual cycle unknown)  



                                                  ()             

 

           (unknown)  (no       (unknown)  (unknown)  [facility]  (units   

 (unknown)



                    date)                         [address] unknown)  



                                                  [place]           



                                                  [id number]               

 

           (unknown)  (no       (unknown)  (unknown)  Arcenio Darnell  (units  

  (unknown)



                    date)                         a, ARNP [Primary unknown)  



                                                  Care Provider]           

 

           (unknown)  (no       (unknown)  (unknown)  Limitations: no  (units   

 (unknown)



                    date)                         limitations unknown)  

 

           (unknown)  (no       (unknown)  (unknown)  Lymphangioma  (units    (u

nknown)



                    date)                                   unknown)  

 

           (unknown)  (no       (unknown)  (unknown)  Medical History  (units   

 (unknown)



                    date)                         (Reviewed unknown)  



                                                  22 @ 05:36           



                                                  by Dashawn Quintero DO)                 

 

           (unknown)  (no       (unknown)  (unknown)  Medication  (units    (unk

nown)



                    date)                         Instructions unknown)  



                                                  Recorded            

 

           (unknown)  (no       (unknown)  (unknown)  Mode of arrival:  (units  

  (unknown)



                    date)                         Family Vehicle unknown)  

 

           (unknown)  (no       (unknown)  (unknown)  No Action  (units    (unkn

own)



                    date)                                   unknown)  

 

           (unknown)  (no       (unknown)  (unknown)  No vaginal  (units    (unk

nown)



                    date)                         bleeding or unknown)  



                                                  discharge.           



                                                  Patient has had           



                                                  hydrocodone and           



                                                  Toradol for           

 

           (unknown)  (no       (unknown)  (unknown)  Ondansetron HCl  (units   

 (unknown)



                    date)                         (Ondansetron 4 unknown)  



                                                  Mg/2 Ml Inj) 4 mg           



                                                  IV NOW ONE           

 

           (unknown)  (no       (unknown)  (unknown)  Opioids -  (units    (unkn

own)



                    date)                         Morphine  unknown)  



                                                  Analogues Allergy           



                                                  Intermediate rash           



                                                  Verified 22           



                                                  14:51               

 

           (unknown)  (no       (unknown)  (unknown)  Ordered:  (units    (unkno

wn)



                    date)                                   unknown)  

 

           (unknown)  (no       (unknown)  (unknown)  Orders    (units    (unkno

wn)



                    date)                                   unknown)  

 

           (unknown)  (no       (unknown)  (unknown)  Ovarian cyst  (units    (u

nknown)



                    date)                                   unknown)  

 

           (unknown)  (no       (unknown)  (unknown)  Painful   (units    (unkno

wn)



                    date)                         menstrual periods unknown)  



                                                  ()             

 

           (unknown)  (no       (unknown)  (unknown)  Patient History  (units   

 (unknown)



                    date)                                   unknown)  

 

           (unknown)  (no       (unknown)  (unknown)  Patient here  (units    (u

nknown)



                    date)                         with fiancee. Has unknown)  



                                                  history of left           



                                                  ovarian cyst.           



                                                  Seen by her OBGYN           

 

           (unknown)  (no       (unknown)  (unknown)  Patient:  (units    (unkno

wn)



                    date)                         Lesvia Aguero E unknown)  



                                                  MR#: [id number]             

 

           (unknown)  (no       (unknown)  (unknown)  Prescriptions:  (units    

(unknown)



                    date)                                   unknown)  

 

           (unknown)  (no       (unknown)  (unknown)  Previous Rx's  (units    (

unknown)



                    date)                                   unknown)  

 

           (unknown)  (no       (unknown)  (unknown)  Referrals:  (units    (unk

nown)



                    date)                                   unknown)  

 

           (unknown)  (no       (unknown)  (unknown)  Related Data  (units    (u

nknown)



                    date)                                   unknown)  

 

           (unknown)  (no       (unknown)  (unknown)  S/P ACL   (units    (unkno

wn)



                    date)                         reconstruction unknown)  



                                                  ()           

 

           (unknown)  (no       (unknown)  (unknown)  Signed By:  (units    (unk

nown)



                    date)                                   unknown)  

 

           (unknown)  (no       (unknown)  (unknown)  Smoking Status:  (units   

 (unknown)



                    date)                         Current some day unknown)  



                                                  smoker              

 

           (unknown)  (no       (unknown)  (unknown)  Social History  (units    

(unknown)



                    date)                         (Reviewed unknown)  



                                                  22 @ 05:36           



                                                  by Dashawn Quintero DO)                 

 

           (unknown)  (no       (unknown)  (unknown)  Sodium Chloride  (units   

 (unknown)



                    date)                         (Normal Saline unknown)  



                                                  0.9%) 1,000 mls @           



                                                  1,000 mls/hr IV           



                                                  BOLUS ONE           

 

           (unknown)  (no       (unknown)  (unknown)  Source: patient  (units   

 (unknown)



                    date)                         and family unknown)  

 

           (unknown)  (no       (unknown)  (unknown)  Stated    (units    (unkno

wn)



                    date)                         Complaint: N/V/D unknown)  



                                                  ABD PAIN            

 

           (unknown)  (no       (unknown)  (unknown)  Stop: 23  (units    

(unknown)



                    date)                         01:50     unknown)  

 

           (unknown)  (no       (unknown)  (unknown)  Stop: 23  (units    

(unknown)



                    date)                         02:48     unknown)  

 

           (unknown)  (no       (unknown)  (unknown)  Substance Use  (units    (

unknown)



                    date)                         Type: does not unknown)  



                                                  use                 

 

           (unknown)  (no       (unknown)  (unknown)  Surgical History  (units  

  (unknown)



                    date)                         (Reviewed unknown)  



                                                  22 @ 05:36           



                                                  by Dashawn Quintero DO)                 

 

           (unknown)  (no       (unknown)  (unknown)  TOA       (units    (unkno

wn)



                    date)                         (tubo-ovarian unknown)  



                                                  abscess)            



                                                  ()           

 

           (unknown)  (no       (unknown)  (unknown)  Time Seen by  (units    (u

nknown)



                    date)                         Provider: unknown)  



                                                  23 01:26           

 

           (unknown)  (no       (unknown)  (unknown)  US pelvic  (units    (unkn

own)



                    date)                         complete Stat unknown)  

 

           (unknown)  (no       (unknown)  (unknown)  [facility] her OBGYN  (units  

  (unknown)



                    date)                         and her have unknown)  



                                                  decided to try           



                                                  managed             



                                                  conservatively,           



                                                  no birth            

 

           (unknown)  (no       (unknown)  (unknown)  alcohol intake  (units    

(unknown)



                    date)                         frequency: unknown)  



                                                  holidays/special           



                                                  occasions only           

 

           (unknown)  (no       (unknown)  (unknown)  alcohol intake:  (units   

 (unknown)



                    date)                         current   unknown)  

 

           (unknown)  (no       (unknown)  (unknown)  and he as well  (units    

(unknown)



                    date)                         as this   unknown)  



                                                  department back           



                                                  in November for           



                                                  the same            



                                                  complaint.            

 

           (unknown)  (no       (unknown)  (unknown)  been doing well  (units   

 (unknown)



                    date)                         until tonight at unknown)  



                                                  8:00 p.m. had           



                                                  pain in the left           



                                                  pelvis. Had           

 

           (unknown)  (no       (unknown)  (unknown)  clindamycin HCl  (units   

 (unknown)



                    date)                         150 mg capsule unknown)  



                                                  150 mg PO DAILY           



                                                  #30 caps 22           

 

           (unknown)  (no       (unknown)  (unknown)  clindamycin HCl  (units   

 (unknown)



                    date)                         150 mg capsule unknown)  

 

           (unknown)  (no       (unknown)  (unknown)  control pills  (units    (

unknown)



                    date)                         and no surgery at unknown)  



                                                  this time. She           



                                                  has history of           



                                                  removal fallopian           

 

           (unknown)  (no       (unknown)  (unknown)  doxycycline  (units    (un

known)



                    date)                         AdvReac   unknown)  



                                                  Intermediate           



                                                  vomitt Verified           



                                                  22 14:51           

 

           (unknown)  (no       (unknown)  (unknown)  household  (units    (unkn

own)



                    date)                         members:  unknown)  



                                                  friend(s)           

 

           (unknown)  (no       (unknown)  (unknown)  hydrocodone 5  (units    (

unknown)



                    date)                         mg-acetaminophen unknown)  



                                                  325 1 tab PO           



                                                  Q4-6H PRN pain           



                                                  #10 tabs 22           

 

           (unknown)  (no       (unknown)  (unknown)  hydrocodone-acet  (units  

  (unknown)



                    date)                         aminophen 5-325 unknown)  



                                                  mg tablet           

 

           (unknown)  (no       (unknown)  (unknown)  hydromorphone 4  (units   

 (unknown)



                    date)                         mg tablet 4 mg PO unknown)  



                                                  Q4-6H PRN pain           



                                                  #20 tabs 22           

 

           (unknown)  (no       (unknown)  (unknown)  hydromorphone  (units    (

unknown)



                    date)                         [Dilaudid] 4 mg unknown)  



                                                  tablet              

 

           (unknown)  (no       (unknown)  (unknown)  ketorolac 10 mg  (units   

 (unknown)



                    date)                         tablet 10 mg PO unknown)  



                                                  Q6H PRN pain #14           



                                                  tabs 22           

 

           (unknown)  (no       (unknown)  (unknown)  ketorolac 10 mg  (units   

 (unknown)



                    date)                         tablet    unknown)  

 

           (unknown)  (no       (unknown)  (unknown)  mg tablet  (units    (unkn

own)



                    date)                                   unknown)  

 

           (unknown)  (no       (unknown)  (unknown)  nausea and  (units    (unk

nown)



                    date)                         vomiting. Pain unknown)  



                                                  radiates to the           



                                                  left lower back.           



                                                  Denies pregnancy.           

 

           (unknown)  (no       (unknown)  (unknown)  ondansetron 4 mg  (units  

  (unknown)



                    date)                         disintegrating 4 unknown)  



                                                  mg PO TID-QID PRN           



                                                  nausea and           



                                                  22            

 

           (unknown)  (no       (unknown)  (unknown)  ondansetron 4 mg  (units  

  (unknown)



                    date)                         tablet,disintegra unknown)  



                                                  ting                

 

           (unknown)  (no       (unknown)  (unknown)  oxycodone 5 mg  (units    

(unknown)



                    date)                         tablet 5 mg PO unknown)  



                                                  Q4H PRN pain #20           



                                                  tabs 22           

 

           (unknown)  (no       (unknown)  (unknown)  oxycodone 5 mg  (units    

(unknown)



                    date)                         tablet    unknown)  

 

           (unknown)  (no       (unknown)  (unknown)  relief in the  (units    (

unknown)



                    date)                         past      unknown)  

 

           (unknown)  (no       (unknown)  (unknown)  tablet vomiting  (units   

 (unknown)



                    date)                         #10 tabs  unknown)  

 

           (unknown)  (no       (unknown)  (unknown)  tobacco type:  (units    (

unknown)



                    date)                         vaping    unknown)  

 

           (unknown)  (no       (unknown)  (unknown)  tube on the  (units    (un

known)



                    date)                         right side due to unknown)  



                                                  tubo-ovarian           



                                                  abscess. Two           



                                                  years ago.           



                                                  Patient has           









                                         Result panel [id number]









           (unknown)  (no       (unknown)  (unknown)  (no value)  (units    (unk

nown)



                    date)                                   unknown)  

 

           (unknown)  (no       (unknown)  (unknown)  (Dilaudid)  (units    (unk

nown)



                    date)                                   unknown)  

 

           (unknown)  (no       (unknown)  (unknown)  [id number]  (units    (unkno

wn)



                    date)                                   unknown)  

 

           (unknown)  (no       (unknown)  (unknown)  23 01:48  (units    

(unknown)



                    date)                                   unknown)  

 

           (unknown)  (no       (unknown)  (unknown)  23 01:49  (units    

(unknown)



                    date)                                   unknown)  

 

           (unknown)  (no       (unknown)  (unknown)  1 tab PO Q4-6H  (units    

(unknown)



                    date)                         PRN (Reason: unknown)  



                                                  pain) Qty: 10 0RF           

 

           (unknown)  (no       (unknown)  (unknown)  10 mg PO Q6H PRN  (units  

  (unknown)



                    date)                         (Reason: pain) unknown)  



                                                  Qty: 14 0RF           

 

           (unknown)  (no       (unknown)  (unknown)  150 mg PO DAILY  (units   

 (unknown)



                    date)                         Qty: 30 0RF unknown)  

 

           (unknown)  (no       (unknown)  (unknown)  4 mg PO Q4-6H  (units    (

unknown)



                    date)                         PRN (Reason: unknown)  



                                                  pain) Qty: 20 0RF           

 

           (unknown)  (no       (unknown)  (unknown)  4 mg PO TID-QID  (units   

 (unknown)



                    date)                         PRN (Reason: unknown)  



                                                  nausea and           



                                                  vomiting) Qty: 10           



                                                  0RF                 

 

           (unknown)  (no       (unknown)  (unknown)  5 mg PO Q4H PRN  (units   

 (unknown)



                    date)                         (Reason: pain) unknown)  



                                                  Qty: 20 0RF           

 

           (unknown)  (no       (unknown)  (unknown)  Acne (-2016)  (units    (u

nknown)



                    date)                                   unknown)  

 

           (unknown)  (no       (unknown)  (unknown) After history and  (units  

  (unknown)



                    date)                         exam, have unknown)  



                                                  decided order CBC           



                                                  CMP urinalysis           



                                                  pregnancy test           



                                                  and                 

 

           (unknown)  (no       (unknown)  (unknown)  Age/Sex: 17 / F  (units   

 (unknown)



                    date)                                   unknown)  

 

           (unknown)  (no       (unknown)  (unknown)  Alcohol type:  (units    (

unknown)



                    date)                         wine      unknown)  

 

           (unknown)  (no       (unknown)  (unknown)  Allergies  (units    (unkn

own)



                    date)                                   unknown)  

 

           (unknown)  (no       (unknown)  (unknown)  Allergy/AdvReac  (units   

 (unknown)



                    date)                         Type Severity unknown)  



                                                  Reaction Status           



                                                  Date / Time           

 

           (unknown)  (no       (unknown)  (unknown)  Anesthesia  (units    (unk

nown)



                    date)                                   unknown)  

 

           (unknown)  (no       (unknown)  (unknown)  BACK: No flank  (units    

(unknown)



                    date)                         tenderness. unknown)  

 

           (unknown)  (no       (unknown)  (unknown)  CARDIOVASCULAR:  (units   

 (unknown)



                    date)                         Regular rate and unknown)  



                                                  rhythm without           



                                                  murmurs             

 

           (unknown)  (no       (unknown)  (unknown)  CARDIOVASCULAR:  (units   

 (unknown)



                    date)                         negative chest unknown)  



                                                  pain,               



                                                  palpitations           

 

           (unknown)  (no       (unknown)  (unknown)  CBC Auto Diff  (units    (

unknown)



                    date)                         [Complete Blood unknown)  



                                                  Count AUTO DIFF]           



                                                  Stat                

 

           (unknown)  (no       (unknown)  (unknown)  CMP       (units    (unkno

wn)



                    date)                         [Comprehensive unknown)  



                                                  Metabolic Panel]           



                                                  Stat                

 

           (unknown)  (no       (unknown)  (unknown)  Chlamydia  (units    (unkn

own)



                    date)                         infection () unknown)  

 

           (unknown)  (no       (unknown)  (unknown)  Chronic   (units    (unkno

wn)



                    date)                         Condition is unknown)  



                                                  having:: Moderate           



                                                  exacerbation           

 

           (unknown)  (no       (unknown)  (unknown)  Condition is::  (units    

(unknown)



                    date)                         Well Controlled unknown)  

 

           (unknown)  (no       (unknown)  (unknown)  Course    (units    (unkno

wn)



                    date)                                   unknown)  

 

           (unknown)  (no       (unknown)  (unknown)  : 2005  (units   

 (unknown)



                    date)                         Acct:[id number] unknown)  

 

           (unknown)  (no       (unknown)  (unknown)  Date of Service:  (units  

  (unknown)



                    date)                         23  unknown)  

 

           (unknown)  (no       (unknown)  (unknown)  Departure  (units    (unkn

own)



                    date)                                   unknown)  

 

           (unknown)  (no       (unknown)  (unknown)  Differential  (units    (u

nknown)



                    date)                         Diagnosis unknown)  

 

           (unknown)  (no       (unknown)  (unknown) Differential  (units    (un

known)



                    date)                         diagnosis: Likely unknown)  



                                                  abdominal pain,           



                                                  calculus of           



                                                  kidney,             



                                                  endometriosis           

 

           (unknown)  (no       (unknown)  (unknown)  Discharge Plan  (units    

(unknown)



                    date)                                   unknown)  

 

           (unknown)  (no       (unknown)  (unknown)  Discontinued  (units    (u

nknown)



                    date)                         Medications unknown)  

 

           (unknown)  (no       (unknown)  (unknown)  ED Orders  (units    (unkn

own)



                    date)                                   unknown)  

 

           (unknown)  (no       (unknown)  (unknown)  ER Physician:  (units    (

unknown)



                    date)                         Terrell Arnold MD unknown)  

 

           (unknown)  (no       (unknown)  (unknown)  EXTREMITIES: No  (units   

 (unknown)



                    date)                         gross     unknown)  



                                                  deformities.           

 

           (unknown)  (no       (unknown)  (unknown)  EYES: Pupils  (units    (u

nknown)



                    date)                         equal round unknown)  

 

           (unknown)  (no       (unknown)  (unknown)  Emergency Report  (units  

  (unknown)



                    date)                                   unknown)  

 

           (unknown)  (no       (unknown)  (unknown)  Endometriosis  (units    (

unknown)



                    date)                         ()   unknown)  

 

           (unknown)  (no       (unknown)  (unknown)  Exam Narrative:  (units   

 (unknown)



                    date)                                   unknown)  

 

           (unknown)  (no       (unknown)  (unknown)  Exam      (units    (unkno

wn)



                    date)                                   unknown)  

 

           (unknown)  (no       (unknown)  (unknown)  GASTROINTESTINAL  (units  

  (unknown)



                    date)                         : Abdomen soft, unknown)  



                                                  reproducible left           



                                                  lower quadrant           



                                                  tenderness no           

 

           (unknown)  (no       (unknown)  (unknown)  GASTROINTESTINAL  (units  

  (unknown)



                    date)                         : Positive unknown)  



                                                  nausea, vomiting,           



                                                  abdominal pain           

 

           (unknown)  (no       (unknown)  (unknown)  GENERAL: in no  (units    

(unknown)



                    date)                         distress, not unknown)  



                                                  toxic not           



                                                  dyspneic            

 

           (unknown)  (no       (unknown)  (unknown)  GENERAL:  (units    (unkno

wn)



                    date)                         negative chills, unknown)  



                                                  fatigue, malaise,           



                                                  fever, sweats.           

 

           (unknown)  (no       (unknown)  (unknown)  : negative  (units    (u

nknown)



                    date)                         dysuria,  unknown)  



                                                  frequency,           



                                                  hematuria           

 

           (unknown)  (no       (unknown)  (unknown)  General   (units    (unkno

wn)



                    date)                                   unknown)  

 

           (unknown)  (no       (unknown)  (unknown)  HEAD:     (units    (unkno

wn)



                    date)                         Normocephalic. unknown)  

 

           (unknown)  (no       (unknown)  (unknown)  HEENT: negative  (units   

 (unknown)



                    date)                         sinus pain, ear unknown)  



                                                  pain, sore throat           

 

           (unknown)  (no       (unknown)  (unknown)  HPI - Abdominal  (units   

 (unknown)



                    date)                         Pain      unknown)  

 

           (unknown)  (no       (unknown)  (unknown)  HPI narrative:  (units    

(unknown)



                    date)                                   unknown)  

 

           (unknown)  (no       (unknown)  (unknown)  Heavy menstrual  (units   

 (unknown)



                    date)                         period () unknown)  

 

           (unknown)  (no       (unknown)  (unknown)  History of  (units    (unk

nown)



                    date)                         Present Illness unknown)  

 

           (unknown)  (no       (unknown)  (unknown)  I did review  (units    (u

nknown)



                    date)                         medical records unknown)  



                                                  from 2022 as well as           



                                                  pelvic ultrasound           

 

           (unknown)  (no       (unknown)  (unknown)  Irregular  (units    (unkn

own)



                    date)                         menstrual cycle unknown)  



                                                  ()             

 

           (unknown)  (no       (unknown)  (unknown)  [facility]  (units   

 (unknown)



                    date)                         [address] unknown)  



                                                  [place]           



                                                  [id number]               

 

           (unknown)  (no       (unknown)  (unknown)  Arcenio Darnell  (units  

  (unknown)



                    date)                         julio ARNP [Primary unknown)  



                                                  Care Provider]           

 

           (unknown)  (no       (unknown)  (unknown)  Limitations: no  (units   

 (unknown)



                    date)                         limitations unknown)  

 

           (unknown)  (no       (unknown)  (unknown)  Lymphangioma  (units    (u

nknown)



                    date)                                   unknown)  

 

           (unknown)  (no       (unknown)  (unknown)  MDM - Abdominal  (units   

 (unknown)



                    date)                         Pain      unknown)  

 

           (unknown)  (no       (unknown)  (unknown)  MDM Narrative  (units    (

unknown)



                    date)                                   unknown)  

 

           (unknown)  (no       (unknown)  (unknown)  MUSCULOSKELETAL:  (units  

  (unknown)



                    date)                         negative muscle unknown)  



                                                  or bony pain           

 

           (unknown)  (no       (unknown)  (unknown)  Medical History  (units   

 (unknown)



                    date)                         (Reviewed unknown)  



                                                  23 @ 02:02           



                                                  by Terrell Arnold MD)           

 

           (unknown)  (no       (unknown)  (unknown)  Medical Records  (units   

 (unknown)



                    date)                                   unknown)  

 

           (unknown)  (no       (unknown)  (unknown)  Medical decision  (units  

  (unknown)



                    date)                         making narrative: unknown)  

 

           (unknown)  (no       (unknown)  (unknown)  Medical records  (units   

 (unknown)



                    date)                         narrative: unknown)  

 

           (unknown)  (no       (unknown)  (unknown)  Medication  (units    (unk

nown)



                    date)                         Instructions unknown)  



                                                  Recorded            

 

           (unknown)  (no       (unknown)  (unknown)  Mode of arrival:  (units  

  (unknown)



                    date)                         Family Vehicle unknown)  

 

           (unknown)  (no       (unknown)  (unknown)  NECK: Trachea  (units    (

unknown)



                    date)                         midline.  unknown)  

 

           (unknown)  (no       (unknown)  (unknown)  NEURO: AOx4.  (units    (u

nknown)



                    date)                                   unknown)  

 

           (unknown)  (no       (unknown)  (unknown)  NEUROLOGIC:  (units    (un

known)



                    date)                         negative  unknown)  



                                                  weakness,           



                                                  numbness            

 

           (unknown)  (no       (unknown)  (unknown)  Narrative  (units    (unkn

own)



                    date)                                   unknown)  

 

           (unknown)  (no       (unknown)  (unknown)  Narrative:  (units    (unk

nown)



                    date)                                   unknown)  

 

           (unknown)  (no       (unknown)  (unknown)  No Action  (units    (unkn

own)



                    date)                                   unknown)  

 

           (unknown)  (no       (unknown)  (unknown)  No vaginal  (units    (unk

nown)



                    date)                         bleeding or unknown)  



                                                  discharge.           



                                                  Patient has had           



                                                  hydrocodone and           



                                                  Toradol for           

 

           (unknown)  (no       (unknown)  (unknown)  Ondansetron HCl  (units   

 (unknown)



                    date)                         (Ondansetron 4 unknown)  



                                                  Mg/2 Ml Inj) 4 mg           



                                                  IV NOW ONE           

 

           (unknown)  (no       (unknown)  (unknown)  Opioids -  (units    (unkn

own)



                    date)                         Morphine  unknown)  



                                                  Analogues Allergy           



                                                  Intermediate rash           



                                                  Verified 22           



                                                  14:51               

 

           (unknown)  (no       (unknown)  (unknown)  Ordered:  (units    (unkno

wn)



                    date)                                   unknown)  

 

           (unknown)  (no       (unknown)  (unknown)  Orders    (units    (unkno

wn)



                    date)                                   unknown)  

 

           (unknown)  (no       (unknown)  (unknown)  Ovarian cyst  (units    (u

nknown)



                    date)                                   unknown)  

 

           (unknown)  (no       (unknown)  (unknown)  PSYCH: Not  (units    (unk

nown)



                    date)                         anxious, is unknown)  



                                                  cooperative           

 

           (unknown)  (no       (unknown)  (unknown)  Painful   (units    (unkno

wn)



                    date)                         menstrual periods unknown)  



                                                  ()             

 

           (unknown)  (no       (unknown)  (unknown)  Patient History  (units   

 (unknown)



                    date)                                   unknown)  

 

           (unknown)  (no       (unknown)  (unknown)  Patient here  (units    (u

nknown)



                    date)                         with fiancee. Has unknown)  



                                                  history of left           



                                                  ovarian cyst.           



                                                  Seen by her OBGYN           

 

           (unknown)  (no       (unknown)  (unknown)  Patient:  (units    (unkno

wn)



                    date)                         Lesvia Aguero E unknown)  



                                                  MR#: [id number]             

 

           (unknown)  (no       (unknown)  (unknown)  Prescriptions:  (units    

(unknown)



                    date)                                   unknown)  

 

           (unknown)  (no       (unknown)  (unknown)  Previous Rx's  (units    (

unknown)



                    date)                                   unknown)  

 

           (unknown)  (no       (unknown)  (unknown) RESPIRATORY:  (units    (un

known)



                    date)                         Clear to  unknown)  



                                                  auscultation.           



                                                  Breath sounds           



                                                  equal               



                                                  bilaterally. No           



                                                  wheezes,            

 

           (unknown)  (no       (unknown)  (unknown)  RESPIRATORY:  (units    (u

nknown)



                    date)                         negative dyspnea, unknown)  



                                                  cough               

 

           (unknown)  (no       (unknown)  (unknown) ROS Unobtainable:  (units  

  (unknown)



                    date)                         All systems unknown)  



                                                  reviewed + are           



                                                  unremarkable           



                                                  except as noted           



                                                  in HPI              

 

           (unknown)  (no       (unknown)  (unknown)  Referrals:  (units    (unk

nown)



                    date)                                   unknown)  

 

           (unknown)  (no       (unknown)  (unknown)  Related Data  (units    (u

nknown)



                    date)                                   unknown)  

 

           (unknown)  (no       (unknown)  (unknown)  Review of  (units    (unkn

own)



                    date)                         Systems   unknown)  

 

           (unknown)  (no       (unknown)  (unknown)  S/P ACL   (units    (unkno

wn)



                    date)                         reconstruction unknown)  



                                                  (-21)           

 

           (unknown)  (no       (unknown)  (unknown)  SKIN: Warm and  (units    

(unknown)



                    date)                         dry       unknown)  

 

           (unknown)  (no       (unknown)  (unknown)  SKIN: negative  (units    

(unknown)



                    date)                         rash, skin unknown)  



                                                  lesions             

 

           (unknown)  (no       (unknown)  (unknown)  Signed By:  (units    (unk

nown)



                    date)                                   unknown)  

 

           (unknown)  (no       (unknown)  (unknown)  Smoking Status:  (units   

 (unknown)



                    date)                         Current some day unknown)  



                                                  smoker              

 

           (unknown)  (no       (unknown)  (unknown)  Social History  (units    

(unknown)



                    date)                         (Reviewed unknown)  



                                                  23 @ 02:02           



                                                  by Terrell Arnold MD)           

 

           (unknown)  (no       (unknown)  (unknown)  Sodium Chloride  (units   

 (unknown)



                    date)                         (Normal Saline unknown)  



                                                  0.9%) 1,000 mls @           



                                                  1,000 mls/hr IV           



                                                  BOLUS ONE           

 

           (unknown)  (no       (unknown)  (unknown)  Source: patient  (units   

 (unknown)



                    date)                         and family unknown)  

 

           (unknown)  (no       (unknown)  (unknown)  Stated    (units    (unkno

wn)



                    date)                         Complaint: N/V/D unknown)  



                                                  ABD PAIN            

 

           (unknown)  (no       (unknown)  (unknown)  Stop: 23  (units    

(unknown)



                    date)                         01:50     unknown)  

 

           (unknown)  (no       (unknown)  (unknown)  Stop: 23  (units    

(unknown)



                    date)                         02:48     unknown)  

 

           (unknown)  (no       (unknown)  (unknown)  Substance Use  (units    (

unknown)



                    date)                         Type: does not unknown)  



                                                  use                 

 

           (unknown)  (no       (unknown)  (unknown)  Surgical History  (units  

  (unknown)



                    date)                         (Reviewed unknown)  



                                                  23 @ 02:02           



                                                  by Terrell Arnold MD)           

 

           (unknown)  (no       (unknown)  (unknown)  TOA       (units    (unkno

wn)



                    date)                         (tubo-ovarian unknown)  



                                                  abscess)            



                                                  (-21)           

 

           (unknown)  (no       (unknown)  (unknown)  Time Seen by  (units    (u

nknown)



                    date)                         Provider: unknown)  



                                                  23 01:26           

 

           (unknown)  (no       (unknown)  (unknown)  US pelvic  (units    (unkn

own)



                    date)                         complete Stat unknown)  

 

           (unknown)  (no       (unknown)  (unknown)  [facility] her OBGYN  (units  

  (unknown)



                    date)                         and her have unknown)  



                                                  decided to try           



                                                  managed             



                                                  conservatively,           



                                                  no birth            

 

           (unknown)  (no       (unknown)  (unknown)  alcohol intake  (units    

(unknown)



                    date)                         frequency: unknown)  



                                                  holidays/special           



                                                  occasions only           

 

           (unknown)  (no       (unknown)  (unknown)  alcohol intake:  (units   

 (unknown)



                    date)                         current   unknown)  

 

           (unknown)  (no       (unknown)  (unknown)  and below  (units    (unkn

own)



                    date)                                   unknown)  

 

           (unknown)  (no       (unknown)  (unknown)  and he as well  (units    

(unknown)



                    date)                         as this   unknown)  



                                                  department back           



                                                  in November for           



                                                  the same            



                                                  complaint.            

 

           (unknown)  (no       (unknown)  (unknown)  and other  (units    (unkn

own)



                    date)                         (UTI/ovarian unknown)  



                                                  torsion/ovarian           



                                                  cyst)               

 

           (unknown)  (no       (unknown)  (unknown)  been doing well  (units   

 (unknown)



                    date)                         until tonight at unknown)  



                                                  8:00 p.m. had           



                                                  pain in the left           



                                                  pelvis. Had           

 

           (unknown)  (no       (unknown)  (unknown)  clindamycin HCl  (units   

 (unknown)



                    date)                         150 mg capsule unknown)  



                                                  150 mg PO DAILY           



                                                  #30 caps 22           

 

           (unknown)  (no       (unknown)  (unknown)  clindamycin HCl  (units   

 (unknown)



                    date)                         150 mg capsule unknown)  

 

           (unknown)  (no       (unknown)  (unknown)  control pills  (units    (

unknown)



                    date)                         and no surgery at unknown)  



                                                  this time. She           



                                                  has history of           



                                                  removal fallopian           

 

           (unknown)  (no       (unknown)  (unknown)  doxycycline  (units    (un

known)



                    date)                         AdvReac   unknown)  



                                                  Intermediate           



                                                  vomitt Verified           



                                                  22 14:51           

 

           (unknown)  (no       (unknown)  (unknown)  has tolerated  (units    (

unknown)



                    date)                         hydrocodone in unknown)  



                                                  the past without           



                                                  difficulty.           



                                                  Patient denies           

 

           (unknown)  (no       (unknown)  (unknown)  household  (units    (unkn

own)



                    date)                         members:  unknown)  



                                                  friend(s)           

 

           (unknown)  (no       (unknown)  (unknown)  hydrocodone 5  (units    (

unknown)



                    date)                         mg-acetaminophen unknown)  



                                                  325 1 tab PO           



                                                  Q4-6H PRN pain           



                                                  #10 tabs 22           

 

           (unknown)  (no       (unknown)  (unknown)  hydrocodone-acet  (units  

  (unknown)



                    date)                         aminophen 5-325 unknown)  



                                                  mg tablet           

 

           (unknown)  (no       (unknown)  (unknown)  hydromorphone 4  (units   

 (unknown)



                    date)                         mg tablet 4 mg PO unknown)  



                                                  Q4-6H PRN pain           



                                                  #20 tabs 22           

 

           (unknown)  (no       (unknown)  (unknown)  hydromorphone  (units    (

unknown)



                    date)                         [Dilaudid] 4 mg unknown)  



                                                  tablet              

 

           (unknown)  (no       (unknown)  (unknown)  ketorolac 10 mg  (units   

 (unknown)



                    date)                         tablet 10 mg PO unknown)  



                                                  Q6H PRN pain #14           



                                                  tabs 22           

 

           (unknown)  (no       (unknown)  (unknown)  ketorolac 10 mg  (units   

 (unknown)



                    date)                         tablet    unknown)  

 

           (unknown)  (no       (unknown)  (unknown)  mg tablet  (units    (unkn

own)



                    date)                                   unknown)  

 

           (unknown)  (no       (unknown)  (unknown)  nausea and  (units    (unk

nown)



                    date)                         vomiting. Pain unknown)  



                                                  radiates to the           



                                                  left lower back.           



                                                  Denies pregnancy.           

 

           (unknown)  (no       (unknown)  (unknown)  ondansetron 4 mg  (units  

  (unknown)



                    date)                         disintegrating 4 unknown)  



                                                  mg PO TID-QID PRN           



                                                  nausea and           



                                                  22            

 

           (unknown)  (no       (unknown)  (unknown)  ondansetron 4 mg  (units  

  (unknown)



                    date)                         tablet,disintegra unknown)  



                                                  ting                

 

           (unknown)  (no       (unknown)  (unknown)  oxycodone 5 mg  (units    

(unknown)



                    date)                         tablet 5 mg PO unknown)  



                                                  Q4H PRN pain #20           



                                                  tabs 22           

 

           (unknown)  (no       (unknown)  (unknown)  oxycodone 5 mg  (units    

(unknown)



                    date)                         tablet    unknown)  

 

           (unknown)  (no       (unknown)  (unknown)  pelvic    (units    (unkno

wn)



                    date)                         ultrasound. unknown)  



                                                  Patient states           



                                                  feels the same as           



                                                  past ovarian           



                                                  cyst. Patient           

 

           (unknown)  (no       (unknown)  (unknown)  peritoneal signs  (units  

  (unknown)



                    date)                         bowel sounds unknown)  



                                                  present. No CVA           



                                                  tenderness           

 

           (unknown)  (no       (unknown)  (unknown)  pregnancy.  (units    (unk

nown)



                    date)                                   unknown)  

 

           (unknown)  (no       (unknown)  (unknown)  rales, or  (units    (unkn

own)



                    date)                         rhonchi.  unknown)  

 

           (unknown)  (no       (unknown)  (unknown)  relief in the  (units    (

unknown)



                    date)                         past      unknown)  

 

           (unknown)  (no       (unknown)  (unknown)  tablet vomiting  (units   

 (unknown)



                    date)                         #10 tabs  unknown)  

 

           (unknown)  (no       (unknown)  (unknown)  tobacco type:  (units    (

unknown)



                    date)                         vaping    unknown)  

 

           (unknown)  (no       (unknown)  (unknown)  tube on the  (units    (un

known)



                    date)                         right side due to unknown)  



                                                  tubo-ovarian           



                                                  abscess. Two           



                                                  years ago.           



                                                  Patient has           









                                         Result panel 564









           (unknown)  (no date)  (unknown)  (unknown)  > 60      ml/min    (unkn

own)

 

           (unknown)  (no date)  (unknown)  (unknown)  > 60      ml/min    (unkn

own)

 

           (unknown)  (no date)  (unknown)  (unknown)  0.4       mg/dl     (unkn

own)

 

           (unknown)  (no date)  (unknown)  (unknown)  0.80      mg/dl     (unkn

own)

 

           (unknown)  (no date)  (unknown)  (unknown)  1.6       (units unknown)

  (unknown)

 

           (unknown)  (no date)  (unknown)  (unknown)  10        mg/dl     (unkn

own)

 

           (unknown)  (no date)  (unknown)  (unknown)  103       mmol/l    (unkn

own)

 

           (unknown)  (no date)  (unknown)  (unknown)  12.5      (units unknown)

  (unknown)

 

           (unknown)  (no date)  (unknown)  (unknown)  136       mmol/l    (unkn

own)

 

           (unknown)  (no date)  (unknown)  (unknown)  136       mmol/l    (unkn

own)

 

           (unknown)  (no date)  (unknown)  (unknown)  17        iu/l      (unkn

own)

 

           (unknown)  (no date)  (unknown)  (unknown)  2.5       g/dl      (unkn

own)

 

           (unknown)  (no date)  (unknown)  (unknown)  26        iu/l      (unkn

own)

 

           (unknown)  (no date)  (unknown)  (unknown)  26        mmol/l    (unkn

own)

 

           (unknown)  (no date)  (unknown)  (unknown)  3.7       mmol/l    (unkn

own)

 

           (unknown)  (no date)  (unknown)  (unknown)  4.1       g/dl      (unkn

own)

 

           (unknown)  (no date)  (unknown)  (unknown)  6.6       g/dl      (unkn

own)

 

           (unknown)  (no date)  (unknown)  (unknown)  79        u/l       (unkn

own)

 

           (unknown)  (no date)  (unknown)  (unknown)  8.6       mg/dl     (unkn

own)

 

           (unknown)  (no date)  (unknown)  (unknown)  84        mg/dl     (unkn

own)

 

           (unknown)  (no date)  (unknown)  (unknown)  84        mg/dl     (unkn

own)









                                         Result panel 565









           (unknown)  (no date)  (unknown)  (unknown)  0         /ul       (unkn

own)

 

           (unknown)  (no date)  (unknown)  (unknown)  0.4       %         (unkn

own)

 

           (unknown)  (no date)  (unknown)  (unknown)  12.2      g/dl      (unkn

own)

 

           (unknown)  (no date)  (unknown)  (unknown)  13.4      %         (unkn

own)

 

           (unknown)  (no date)  (unknown)  (unknown)  2400      /ul       (unkn

own)

 

           (unknown)  (no date)  (unknown)  (unknown)  29.4      pg        (unkn

own)

 

           (unknown)  (no date)  (unknown)  (unknown)  3.5       %         (unkn

own)

 

           (unknown)  (no date)  (unknown)  (unknown)  300       /ul       (unkn

own)

 

           (unknown)  (no date)  (unknown)  (unknown)  33.0      %         (unkn

own)

 

           (unknown)  (no date)  (unknown)  (unknown)  33.4      %         (unkn

own)

 

           (unknown)  (no date)  (unknown)  (unknown)  37.1      %         (unkn

own)

 

           (unknown)  (no date)  (unknown)  (unknown)  372       x10 3/ul  (unkn

own)

 

           (unknown)  (no date)  (unknown)  (unknown)  3800      /ul       (unkn

own)

 

           (unknown)  (no date)  (unknown)  (unknown)  4.15      x10 6/ul  (unkn

own)

 

           (unknown)  (no date)  (unknown)  (unknown)  53.3      %         (unkn

own)

 

           (unknown)  (no date)  (unknown)  (unknown)  7.2       x10 3/ul  (unkn

own)

 

           (unknown)  (no date)  (unknown)  (unknown)  700       /ul       (unkn

own)

 

           (unknown)  (no date)  (unknown)  (unknown)  89.2      fl        (unkn

own)

 

           (unknown)  (no date)  (unknown)  (unknown)  9.4       %         (unkn

own)









                                         Result panel 566









           (unknown)  (no       (unknown)  (unknown)  (no value)  (units    (unk

nown)



                    date)                                   unknown)  

 

           (unknown)  (no       (unknown)  (unknown)  (Dilaudid)  (units    (unk

nown)



                    date)                                   unknown)  

 

           (unknown)  (no       (unknown)  (unknown)  [id number]  (units    (unkno

wn)



                    date)                                   unknown)  

 

           (unknown)  (no       (unknown)  (unknown)  23  (units    (unkno

wn)



                    date)                         23  unknown)  



                                                  Range/Units           

 

           (unknown)  (no       (unknown)  (unknown)  23 01:48  (units    

(unknown)



                    date)                                   unknown)  

 

           (unknown)  (no       (unknown)  (unknown)  23 02:05  (units    

(unknown)



                    date)                                   unknown)  

 

           (unknown)  (no       (unknown)  (unknown)  23  (units    (unkno

wn)



                    date)                                   unknown)  

 

           (unknown)  (no       (unknown)  (unknown)  01:30     (units    (unkno

wn)



                    date)                                   unknown)  

 

           (unknown)  (no       (unknown)  (unknown)  02:05 02:05  (units    (un

known)



                    date)                                   unknown)  

 

           (unknown)  (no       (unknown)  (unknown)  1 tab PO Q4-6H  (units    

(unknown)



                    date)                         PRN (Reason: unknown)  



                                                  pain) Qty: 10 0RF           

 

           (unknown)  (no       (unknown)  (unknown)  10 mg PO Q6H PRN  (units  

  (unknown)



                    date)                         (Reason: pain) unknown)  



                                                  Qty: 14 0RF           

 

           (unknown)  (no       (unknown)  (unknown)  150 mg PO DAILY  (units   

 (unknown)



                    date)                         Qty: 30 0RF unknown)  

 

           (unknown)  (no       (unknown)  (unknown)  4 mg PO Q4-6H  (units    (

unknown)



                    date)                         PRN (Reason: unknown)  



                                                  pain) Qty: 20 0RF           

 

           (unknown)  (no       (unknown)  (unknown)  4 mg PO TID-QID  (units   

 (unknown)



                    date)                         PRN (Reason: unknown)  



                                                  nausea and           



                                                  vomiting) Qty: 10           



                                                  0RF                 

 

           (unknown)  (no       (unknown)  (unknown)  5 mg PO Q4H PRN  (units   

 (unknown)



                    date)                         (Reason: pain) unknown)  



                                                  Qty: 20 0RF           

 

           (unknown)  (no       (unknown)  (unknown)  ALT 17 (<35)  (units    (u

nknown)



                    date)                         IU/L      unknown)  

 

           (unknown)  (no       (unknown)  (unknown)  AST 26 (14-36)  (units    

(unknown)



                    date)                         IU/L      unknown)  

 

           (unknown)  (no       (unknown)  (unknown)  Acne (-2016)  (units    (u

nknown)



                    date)                                   unknown)  

 

           (unknown)  (no       (unknown)  (unknown)  Activity  (units    (unkno

wn)



                    date)                         Restrictions/Juan R unknown)  



                                                  tional              



                                                  Instructions:           

 

           (unknown)  (no       (unknown)  (unknown) After history and  (units  

  (unknown)



                    date)                         exam, have unknown)  



                                                  decided order CBC           



                                                  CMP urinalysis           



                                                  pregnancy test           



                                                  and                 

 

           (unknown)  (no       (unknown)  (unknown)  Age/Sex: 17 / F  (units   

 (unknown)



                    date)                                   unknown)  

 

           (unknown)  (no       (unknown)  (unknown)  Albumin 4.1  (units    (un

known)



                    date)                         (3.5-5.0) g/dL unknown)  

 

           (unknown)  (no       (unknown)  (unknown)  Albumin/Globulin  (units  

  (unknown)



                    date)                         Ratio 1.6 unknown)  



                                                  (1.0-2.8)           

 

           (unknown)  (no       (unknown)  (unknown)  Alcohol type:  (units    (

unknown)



                    date)                         wine      unknown)  

 

           (unknown)  (no       (unknown)  (unknown)  Alkaline  (units    (unkno

wn)



                    date)                         Phosphatase 79 unknown)  



                                                  () U/L           

 

           (unknown)  (no       (unknown)  (unknown)  Allergies  (units    (unkn

own)



                    date)                                   unknown)  

 

           (unknown)  (no       (unknown)  (unknown)  Allergy/AdvReac  (units   

 (unknown)



                    date)                         Type Severity unknown)  



                                                  Reaction Status           



                                                  Date / Time           

 

           (unknown)  (no       (unknown)  (unknown)  Anesthesia  (units    (unk

nown)



                    date)                                   unknown)  

 

           (unknown)  (no       (unknown)  (unknown)  BACK: No flank  (units    

(unknown)



                    date)                         tenderness. unknown)  

 

           (unknown)  (no       (unknown)  (unknown)  BUN 10 (7-17)  (units    (

unknown)



                    date)                         mg/dL     unknown)  

 

           (unknown)  (no       (unknown)  (unknown)  BUN/Creatinine  (units    

(unknown)



                    date)                         Ratio 12.5 (6-22) unknown)  

 

           (unknown)  (no       (unknown)  (unknown)  Baso # (Auto) 0  (units   

 (unknown)



                    date)                         (0-100) /uL unknown)  

 

           (unknown)  (no       (unknown)  (unknown)  Baso % (Auto)  (units    (

unknown)



                    date)                         0.4 (0-2) % unknown)  

 

           (unknown)  (no       (unknown)  (unknown)  Bedside Urine  (units    (

unknown)



                    date)                         Bilirubin - unknown)  



                                                  Negative            

 

           (unknown)  (no       (unknown)  (unknown)  Bedside Urine  (units    (

unknown)



                    date)                         Glucose Negative unknown)  

 

           (unknown)  (no       (unknown)  (unknown)  Bedside Urine  (units    (

unknown)



                    date)                         Ketone - Negative unknown)  

 

           (unknown)  (no       (unknown)  (unknown)  Bedside Urine  (units    (

unknown)



                    date)                         Leukocytes - unknown)  



                                                  Negative            

 

           (unknown)  (no       (unknown)  (unknown)  Bedside Urine  (units    (

unknown)



                    date)                         Nitrite - unknown)  



                                                  Negative            

 

           (unknown)  (no       (unknown)  (unknown)  Bedside Urine  (units    (

unknown)



                    date)                         Occult Blood - unknown)  



                                                  Negative            

 

           (unknown)  (no       (unknown)  (unknown)  Bedside Urine  (units    (

unknown)



                    date)                         Protein - unknown)  



                                                  Negative            

 

           (unknown)  (no       (unknown)  (unknown)  Bedside Urine  (units    (

unknown)



                    date)                         Urobilinogen - unknown)  



                                                  Negative            

 

           (unknown)  (no       (unknown)  (unknown)  Bedside Urine pH  (units  

  (unknown)



                    date)                         6.5       unknown)  

 

           (unknown)  (no       (unknown)  (unknown)  Blood Pressure  (units    

(unknown)



                    date)                         119/59 L 23 unknown)  



                                                  01:30               

 

           (unknown)  (no       (unknown)  (unknown)  Blood Pressure  (units    

(unknown)



                    date)                         119/59 L  unknown)  

 

           (unknown)  (no       (unknown)  (unknown)  CARDIOVASCULAR:  (units   

 (unknown)



                    date)                         Regular rate and unknown)  



                                                  rhythm without           



                                                  murmurs             

 

           (unknown)  (no       (unknown)  (unknown)  CARDIOVASCULAR:  (units   

 (unknown)



                    date)                         negative chest unknown)  



                                                  pain,               



                                                  palpitations           

 

           (unknown)  (no       (unknown)  (unknown)  CBC Auto Diff  (units    (

unknown)



                    date)                         [Complete Blood unknown)  



                                                  Count AUTO DIFF]           



                                                  Stat                

 

           (unknown)  (no       (unknown)  (unknown)  CMP       (units    (unkno

wn)



                    date)                         [Comprehensive unknown)  



                                                  Metabolic Panel]           



                                                  Stat                

 

           (unknown)  (no       (unknown)  (unknown)  Calcium 8.6  (units    (un

known)



                    date)                         (8.4-10.2) mg/dL unknown)  

 

           (unknown)  (no       (unknown)  (unknown)  Carbon Dioxide  (units    

(unknown)



                    date)                         26 (22-32) mmol/L unknown)  

 

           (unknown)  (no       (unknown)  (unknown)  Chief Complaint:  (units  

  (unknown)



                    date)                         Abdominal Pain unknown)  

 

           (unknown)  (no       (unknown)  (unknown)  Chlamydia  (units    (unkn

own)



                    date)                         infection () unknown)  

 

           (unknown)  (no       (unknown)  (unknown)  Chloride 103  (units    (u

nknown)



                    date)                         () mmol/L unknown)  

 

           (unknown)  (no       (unknown)  (unknown)  Chronic   (units    (unkno

wn)



                    date)                         Condition is unknown)  



                                                  having:: Moderate           



                                                  exacerbation           

 

           (unknown)  (no       (unknown)  (unknown)  Clinical  (units    (unkno

wn)



                    date)                         Impression: unknown)  

 

           (unknown)  (no       (unknown)  (unknown)  Condition is::  (units    

(unknown)



                    date)                         Well Controlled unknown)  

 

           (unknown)  (no       (unknown)  (unknown)  Course    (units    (unkno

wn)



                    date)                                   unknown)  

 

           (unknown)  (no       (unknown)  (unknown)  Creatinine 0.80  (units   

 (unknown)



                    date)                         (0.52-1.04) mg/dL unknown)  

 

           (unknown)  (no       (unknown)  (unknown)  : 2005  (units   

 (unknown)



                    date)                         Acct:[id number] unknown)  

 

           (unknown)  (no       (unknown)  (unknown)  Date of Service:  (units  

  (unknown)



                    date)                         23  unknown)  

 

           (unknown)  (no       (unknown)  (unknown)  Departure  (units    (unkn

own)



                    date)                                   unknown)  

 

           (unknown)  (no       (unknown)  (unknown)  Differential  (units    (u

nknown)



                    date)                         Diagnosis unknown)  

 

           (unknown)  (no       (unknown)  (unknown) Differential  (units    (un

known)



                    date)                         diagnosis: Likely unknown)  



                                                  abdominal pain,           



                                                  calculus of           



                                                  kidney,             



                                                  endometriosis           

 

           (unknown)  (no       (unknown)  (unknown)  Discharge Plan  (units    

(unknown)



                    date)                                   unknown)  

 

           (unknown)  (no       (unknown)  (unknown)  Discontinued  (units    (u

nknown)



                    date)                         Medications unknown)  

 

           (unknown)  (no       (unknown)  (unknown)  Documented By:  (units    

(unknown)



                    date)                         BS        unknown)  

 

           (unknown)  (no       (unknown)  (unknown)  ED Orders  (units    (unkn

own)



                    date)                                   unknown)  

 

           (unknown)  (no       (unknown)  (unknown)  ER Physician:  (units    (

unknown)



                    date)                         Terrell Arnold MD unknown)  

 

           (unknown)  (no       (unknown)  (unknown)  EXTREMITIES: No  (units   

 (unknown)



                    date)                         gross     unknown)  



                                                  deformities.           

 

           (unknown)  (no       (unknown)  (unknown)  EYES: Pupils  (units    (u

nknown)



                    date)                         equal round unknown)  

 

           (unknown)  (no       (unknown)  (unknown)  Emergency Report  (units  

  (unknown)



                    date)                                   unknown)  

 

           (unknown)  (no       (unknown)  (unknown)  Endometriosis  (units    (

unknown)



                    date)                         (-)   unknown)  

 

           (unknown)  (no       (unknown)  (unknown)  Eos # (Auto) 300  (units  

  (unknown)



                    date)                         (0-450) /uL unknown)  

 

           (unknown)  (no       (unknown)  (unknown)  Eos % (Auto) 3.5  (units  

  (unknown)



                    date)                         (2-4) %   unknown)  

 

           (unknown)  (no       (unknown)  (unknown)  Esterase  (units    (unkno

wn)



                    date)                                   unknown)  

 

           (unknown)  (no       (unknown)  (unknown)  Estimated GFR >  (units   

 (unknown)



                    date)                         60 (>60) mL/min unknown)  

 

           (unknown)  (no       (unknown)  (unknown)  Exam Narrative:  (units   

 (unknown)



                    date)                                   unknown)  

 

           (unknown)  (no       (unknown)  (unknown)  Exam      (units    (unkno

wn)



                    date)                                   unknown)  

 

           (unknown)  (no       (unknown)  (unknown)  GASTROINTESTINAL  (units  

  (unknown)



                    date)                         : Abdomen soft, unknown)  



                                                  reproducible left           



                                                  lower quadrant           



                                                  tenderness no           

 

           (unknown)  (no       (unknown)  (unknown)  GASTROINTESTINAL  (units  

  (unknown)



                    date)                         : Positive unknown)  



                                                  nausea, vomiting,           



                                                  abdominal pain           

 

           (unknown)  (no       (unknown)  (unknown)  GENERAL: in no  (units    

(unknown)



                    date)                         distress, not unknown)  



                                                  toxic not           



                                                  dyspneic            

 

           (unknown)  (no       (unknown)  (unknown)  GENERAL:  (units    (unkno

wn)



                    date)                         negative chills, unknown)  



                                                  fatigue, malaise,           



                                                  fever, sweats.           

 

           (unknown)  (no       (unknown)  (unknown)  : negative  (units    (u

nknown)



                    date)                         dysuria,  unknown)  



                                                  frequency,           



                                                  hematuria           

 

           (unknown)  (no       (unknown)  (unknown)  General   (units    (unkno

wn)



                    date)                                   unknown)  

 

           (unknown)  (no       (unknown)  (unknown)  Globulin 2.5  (units    (u

nknown)



                    date)                         (1.7-4.1) g/dL unknown)  

 

           (unknown)  (no       (unknown)  (unknown)  Glucose 84  (units    (unk

nown)



                    date)                         () mg/dL unknown)  

 

           (unknown)  (no       (unknown)  (unknown)  HEAD:     (units    (unkno

wn)



                    date)                         Normocephalic. unknown)  

 

           (unknown)  (no       (unknown)  (unknown)  HEENT: negative  (units   

 (unknown)



                    date)                         sinus pain, ear unknown)  



                                                  pain, sore throat           

 

           (unknown)  (no       (unknown)  (unknown)  HPI - Abdominal  (units   

 (unknown)



                    date)                         Pain      unknown)  

 

           (unknown)  (no       (unknown)  (unknown)  HPI narrative:  (units    

(unknown)



                    date)                                   unknown)  

 

           (unknown)  (no       (unknown)  (unknown)  Hct 37.1 (36-46)  (units  

  (unknown)



                    date)                         %         unknown)  

 

           (unknown)  (no       (unknown)  (unknown)  Heavy menstrual  (units   

 (unknown)



                    date)                         period (-2019) unknown)  

 

           (unknown)  (no       (unknown)  (unknown)  Hgb 12.2  (units    (unkno

wn)



                    date)                         (12.0-16.0) g/dL unknown)  

 

           (unknown)  (no       (unknown)  (unknown)  History of  (units    (unk

nown)



                    date)                         Present Illness unknown)  

 

           (unknown)  (no       (unknown)  (unknown)  Hydrocodone  (units    (un

known)



                    date)                         Bitart/Acetaminop unknown)  



                                                  hen                 



                                                  (Hydrocodone/Acet           



                                                  5/325 Tablet) 1           



                                                  tab PO NOW           

 

           (unknown)  (no       (unknown)  (unknown)  Hydrocodone  (units    (un

known)



                    date)                         Bitart/Acetaminop unknown)  



                                                  hen                 



                                                  (Hydrocodone/Acet           



                                                  5/325 Tablet) 2           



                                                  tab PO NOW           

 

           (unknown)  (no       (unknown)  (unknown)  I did review  (units    (u

nknown)



                    date)                         medical records unknown)  



                                                  from 2022 as well as           



                                                  pelvic ultrasound           

 

           (unknown)  (no       (unknown)  (unknown)  Initial Vital  (units    (

unknown)



                    date)                         Signs     unknown)  

 

           (unknown)  (no       (unknown)  (unknown)  Initial Vital  (units    (

unknown)



                    date)                         Signs:    unknown)  

 

           (unknown)  (no       (unknown)  (unknown)  Instructions: DI  (units  

  (unknown)



                    date)                         for Ovarian Cyst unknown)  

 

           (unknown)  (no       (unknown)  (unknown)  Irregular  (units    (unkn

own)



                    date)                         menstrual cycle unknown)  



                                                  ()             

 

           (unknown)  (no       (unknown)  (unknown)  [facility]  (units   

 (unknown)



                    date)                         [address] unknown)  



                                                  [place]           



                                                  [id number]               

 

           (unknown)  (no       (unknown)  (unknown)  Arcenio Darnell  (units  

  (unknown)



                    date)                         a, ARNP [Primary unknown)  



                                                  Care Provider]           

 

           (unknown)  (no       (unknown)  (unknown)  Ketorolac  (units    (unkn

own)



                    date)                         Tromethamine unknown)  



                                                  (Ketorolac 30           



                                                  Mg/Ml Vial) 15 mg           



                                                  IV NOW ONE           

 

           (unknown)  (no       (unknown)  (unknown)  Lab Data  (units    (unkno

wn)



                    date)                                   unknown)  

 

           (unknown)  (no       (unknown)  (unknown)  Lab Results  (units    (un

known)



                    date)                                   unknown)  

 

           (unknown)  (no       (unknown)  (unknown)  Labs:     (units    (unkno

wn)



                    date)                                   unknown)  

 

           (unknown)  (no       (unknown)  (unknown)  Last Admin:  (units    (un

known)



                    date)                         23 02:38 unknown)  



                                                  Dose: 15 mg           

 

           (unknown)  (no       (unknown)  (unknown)  Last Admin:  (units    (un

known)



                    date)                         23 02:39 unknown)  



                                                  Dose: 2 tab           

 

           (unknown)  (no       (unknown)  (unknown)  Last Admin:  (units    (un

known)



                    date)                         23 02:39 unknown)  



                                                  Dose: 4 mg           

 

           (unknown)  (no       (unknown)  (unknown)  Last Admin:  (units    (un

known)



                    date)                         23 02:40 unknown)  



                                                  Dose: 1,000           



                                                  mls/hr              

 

           (unknown)  (no       (unknown)  (unknown)  Last Admin:  (units    (un

known)



                    date)                         23 02:41 unknown)  



                                                  Dose: Not Given           

 

           (unknown)  (no       (unknown)  (unknown)  Limitations: no  (units   

 (unknown)



                    date)                         limitations unknown)  

 

           (unknown)  (no       (unknown)  (unknown)  Lymph # (Auto)  (units    

(unknown)



                    date)                         2400 (1427-9563) unknown)  



                                                  /uL                 

 

           (unknown)  (no       (unknown)  (unknown)  Lymph % (Auto)  (units    

(unknown)



                    date)                         33.4 (25-40) % unknown)  

 

           (unknown)  (no       (unknown)  (unknown)  Lymphangioma  (units    (u

nknown)



                    date)                                   unknown)  

 

           (unknown)  (no       (unknown)  (unknown)  MCH 29.4 (26-34)  (units  

  (unknown)



                    date)                         PG        unknown)  

 

           (unknown)  (no       (unknown)  (unknown)  MCHC 33.0  (units    (unkn

own)



                    date)                         (30-36) % unknown)  

 

           (unknown)  (no       (unknown)  (unknown)  MCV 89.2  (units    (unkno

wn)



                    date)                         () fL unknown)  

 

           (unknown)  (no       (unknown)  (unknown)  MDM - Abdominal  (units   

 (unknown)



                    date)                         Pain      unknown)  

 

           (unknown)  (no       (unknown)  (unknown)  MDM Narrative  (units    (

unknown)



                    date)                                   unknown)  

 

           (unknown)  (no       (unknown)  (unknown)  MUSCULOSKELETAL:  (units  

  (unknown)



                    date)                         negative muscle unknown)  



                                                  or bony pain           

 

           (unknown)  (no       (unknown)  (unknown)  Medical History  (units   

 (unknown)



                    date)                         (Reviewed unknown)  



                                                  23 @ 02:02           



                                                  by Terrell Arnold MD)           

 

           (unknown)  (no       (unknown)  (unknown)  Medical Records  (units   

 (unknown)



                    date)                                   unknown)  

 

           (unknown)  (no       (unknown)  (unknown)  Medical decision  (units  

  (unknown)



                    date)                         making narrative: unknown)  

 

           (unknown)  (no       (unknown)  (unknown)  Medical records  (units   

 (unknown)



                    date)                         narrative: unknown)  

 

           (unknown)  (no       (unknown)  (unknown)  Medication  (units    (unk

nown)



                    date)                         Instructions unknown)  



                                                  Recorded            

 

           (unknown)  (no       (unknown)  (unknown)  Mode of arrival:  (units  

  (unknown)



                    date)                         Family Vehicle unknown)  

 

           (unknown)  (no       (unknown)  (unknown)  Mono # (Auto)  (units    (

unknown)



                    date)                         700 (0-900) /uL unknown)  

 

           (unknown)  (no       (unknown)  (unknown)  Mono % (Auto)  (units    (

unknown)



                    date)                         9.4 (3-14) % unknown)  

 

           (unknown)  (no       (unknown)  (unknown)  NECK: Trachea  (units    (

unknown)



                    date)                         midline.  unknown)  

 

           (unknown)  (no       (unknown)  (unknown)  NEURO: AOx4.  (units    (u

nknown)



                    date)                                   unknown)  

 

           (unknown)  (no       (unknown)  (unknown)  NEUROLOGIC:  (units    (un

known)



                    date)                         negative  unknown)  



                                                  weakness,           



                                                  numbness            

 

           (unknown)  (no       (unknown)  (unknown)  Narrative  (units    (unkn

own)



                    date)                                   unknown)  

 

           (unknown)  (no       (unknown)  (unknown)  Narrative:  (units    (unk

nown)



                    date)                                   unknown)  

 

           (unknown)  (no       (unknown)  (unknown)  Neut # (Auto)  (units    (

unknown)



                    date)                         3800 (3240-2757) unknown)  



                                                  /uL                 

 

           (unknown)  (no       (unknown)  (unknown)  Neut % (Auto)  (units    (

unknown)



                    date)                         53.3 (50-75) % unknown)  

 

           (unknown)  (no       (unknown)  (unknown)  No Action  (units    (unkn

own)



                    date)                                   unknown)  

 

           (unknown)  (no       (unknown)  (unknown)  No driving or  (units    (

unknown)



                    date)                         operating unknown)  



                                                  machinery this           



                                                  morning/today or           



                                                  when taking           



                                                  prescribed           

 

           (unknown)  (no       (unknown)  (unknown)  No vaginal  (units    (unk

nown)



                    date)                         bleeding or unknown)  



                                                  discharge.           



                                                  Patient has had           



                                                  hydrocodone and           



                                                  Toradol for           

 

           (unknown)  (no       (unknown)  (unknown)  ONE       (units    (unkno

wn)



                    date)                                   unknown)  

 

           (unknown)  (no       (unknown)  (unknown)  Ondansetron HCl  (units   

 (unknown)



                    date)                         (Ondansetron 4 unknown)  



                                                  Mg/2 Ml Inj) 4 mg           



                                                  IV NOW ONE           

 

           (unknown)  (no       (unknown)  (unknown)  Opioids -  (units    (unkn

own)



                    date)                         Morphine  unknown)  



                                                  Analogues Allergy           



                                                  Intermediate rash           



                                                  Verified 22           



                                                  14:51               

 

           (unknown)  (no       (unknown)  (unknown)  Ordered:  (units    (unkno

wn)



                    date)                                   unknown)  

 

           (unknown)  (no       (unknown)  (unknown)  Orders    (units    (unkno

wn)



                    date)                                   unknown)  

 

           (unknown)  (no       (unknown)  (unknown)  Ovarian cyst  (units    (u

nknown)



                    date)                                   unknown)  

 

           (unknown)  (no       (unknown)  (unknown)  Oxygen Delivery  (units   

 (unknown)



                    date)                         Method 23 unknown)  



                                                  01:30               

 

           (unknown)  (no       (unknown)  (unknown)  Oxygen Delivery  (units   

 (unknown)



                    date)                         Method Room Air unknown)  

 

           (unknown)  (no       (unknown)  (unknown)  PSYCH: Not  (units    (unk

nown)



                    date)                         anxious, is unknown)  



                                                  cooperative           

 

           (unknown)  (no       (unknown)  (unknown)  Painful   (units    (unkno

wn)



                    date)                         menstrual periods unknown)  



                                                  (-)             

 

           (unknown)  (no       (unknown)  (unknown)  Patient   (units    (unkno

wn)



                    date)                         Disposition: Home unknown)  

 

           (unknown)  (no       (unknown)  (unknown)  Patient History  (units   

 (unknown)



                    date)                                   unknown)  

 

           (unknown)  (no       (unknown)  (unknown)  Patient has  (units    (un

known)



                    date)                         tolerated unknown)  



                                                  hydrocodone in           



                                                  the past without           



                                                  difficulty.           



                                                  Patient             

 

           (unknown)  (no       (unknown)  (unknown)  Patient here  (units    (u

nknown)



                    date)                         with fiancee. Has unknown)  



                                                  history of left           



                                                  ovarian cyst.           



                                                  Seen by her OBGYN           

 

           (unknown)  (no       (unknown)  (unknown)  Patient:  (units    (unkno

wn)



                    date)                         Lesvia Aguero unknown)  



                                                  MR#: [id number]             

 

           (unknown)  (no       (unknown)  (unknown)  Plt Count 372  (units    (

unknown)



                    date)                         (150-400) X103/uL unknown)  

 

           (unknown)  (no       (unknown)  (unknown)  Point of Care  (units    (

unknown)



                    date)                         Testing   unknown)  

 

           (unknown)  (no       (unknown)  (unknown)  Point of care  (units    (

unknown)



                    date)                         testing:  unknown)  

 

           (unknown)  (no       (unknown)  (unknown)  Potassium 3.7  (units    (

unknown)



                    date)                         (3.4-5.1) mmol/L unknown)  

 

           (unknown)  (no       (unknown)  (unknown)  Pregnancy Test  (units    

(unknown)



                    date)                         Results Negative unknown)  

 

           (unknown)  (no       (unknown)  (unknown)  Prescriptions:  (units    

(unknown)



                    date)                                   unknown)  

 

           (unknown)  (no       (unknown)  (unknown)  Previous Rx's  (units    (

unknown)



                    date)                                   unknown)  

 

           (unknown)  (no       (unknown)  (unknown)  Pulse Oximetry  (units    

(unknown)



                    date)                         98 23 01:30 unknown)  

 

           (unknown)  (no       (unknown)  (unknown)  Pulse Oximetry  (units    

(unknown)



                    date)                         98        unknown)  

 

           (unknown)  (no       (unknown)  (unknown)  Pulse Rate 75  (units    (

unknown)



                    date)                         23 01:30 unknown)  

 

           (unknown)  (no       (unknown)  (unknown)  Pulse Rate 75  (units    (

unknown)



                    date)                                   unknown)  

 

           (unknown)  (no       (unknown)  (unknown)  RBC 4.15  (units    (unkno

wn)



                    date)                         (4.0-5.2) X106/uL unknown)  

 

           (unknown)  (no       (unknown)  (unknown)  RDW 13.4  (units    (unkno

wn)



                    date)                         (11.6-14.8) % unknown)  

 

           (unknown)  (no       (unknown)  (unknown) RESPIRATORY:  (units    (un

known)



                    date)                         Clear to  unknown)  



                                                  auscultation.           



                                                  Breath sounds           



                                                  equal               



                                                  bilaterally. No           



                                                  wheezes,            

 

           (unknown)  (no       (unknown)  (unknown)  RESPIRATORY:  (units    (u

nknown)



                    date)                         negative dyspnea, unknown)  



                                                  cough               

 

           (unknown)  (no       (unknown)  (unknown) ROS Unobtainable:  (units  

  (unknown)



                    date)                         All systems unknown)  



                                                  reviewed + are           



                                                  unremarkable           



                                                  except as noted           



                                                  in HPI              

 

           (unknown)  (no       (unknown)  (unknown)  Referrals:  (units    (unk

nown)



                    date)                                   unknown)  

 

           (unknown)  (no       (unknown)  (unknown)  Related Data  (units    (u

nknown)



                    date)                                   unknown)  

 

           (unknown)  (no       (unknown)  (unknown)  Respiratory Rate  (units  

  (unknown)



                    date)                         16 23 01:30 unknown)  

 

           (unknown)  (no       (unknown)  (unknown)  Respiratory Rate  (units  

  (unknown)



                    date)                         16        unknown)  

 

           (unknown)  (no       (unknown)  (unknown)  Result diagrams:  (units  

  (unknown)



                    date)                                   unknown)  

 

           (unknown)  (no       (unknown)  (unknown)  Return if worse  (units   

 (unknown)



                    date)                         if any questions unknown)  



                                                  or concerns.           

 

           (unknown)  (no       (unknown)  (unknown)  Review of  (units    (unkn

own)



                    date)                         Systems   unknown)  

 

           (unknown)  (no       (unknown)  (unknown)  S/P ACL   (units    (unkno

wn)



                    date)                         reconstruction unknown)  



                                                  (-21)           

 

           (unknown)  (no       (unknown)  (unknown)  SKIN: Warm and  (units    

(unknown)



                    date)                         dry       unknown)  

 

           (unknown)  (no       (unknown)  (unknown)  SKIN: negative  (units    

(unknown)



                    date)                         rash, skin unknown)  



                                                  lesions             

 

           (unknown)  (no       (unknown)  (unknown)  Signed By:  (units    (unk

nown)



                    date)                                   unknown)  

 

           (unknown)  (no       (unknown)  (unknown)  Smoking Status:  (units   

 (unknown)



                    date)                         Current some day unknown)  



                                                  smoker              

 

           (unknown)  (no       (unknown)  (unknown)  Social History  (units    

(unknown)



                    date)                         (Reviewed unknown)  



                                                  23 @ 02:02           



                                                  by Terrell Arnold MD)           

 

           (unknown)  (no       (unknown)  (unknown)  Sodium 136 L  (units    (u

nknown)



                    date)                         (137-145) mmol/L unknown)  

 

           (unknown)  (no       (unknown)  (unknown)  Sodium Chloride  (units   

 (unknown)



                    date)                         (Normal Saline unknown)  



                                                  0.9%) 1,000 mls @           



                                                  1,000 mls/hr IV           



                                                  BOLUS ONE           

 

           (unknown)  (no       (unknown)  (unknown)  Source: patient  (units   

 (unknown)



                    date)                         and family unknown)  

 

           (unknown)  (no       (unknown)  (unknown)  Stand Alone  (units    (un

known)



                    date)                         Forms: Patient unknown)  



                                                  Portal/API, Work           



                                                  Release Note           

 

           (unknown)  (no       (unknown)  (unknown)  Stated    (units    (unkno

wn)



                    date)                         Complaint: N/V/D unknown)  



                                                  ABD PAIN            

 

           (unknown)  (no       (unknown)  (unknown)  Stop: 23  (units    

(unknown)



                    date)                         01:50     unknown)  

 

           (unknown)  (no       (unknown)  (unknown)  Stop: 23  (units    

(unknown)



                    date)                         02:01     unknown)  

 

           (unknown)  (no       (unknown)  (unknown)  Stop: 23  (units    

(unknown)



                    date)                         02:32     unknown)  

 

           (unknown)  (no       (unknown)  (unknown)  Stop: 23  (units    

(unknown)



                    date)                         02:48     unknown)  

 

           (unknown)  (no       (unknown)  (unknown)  Substance Use  (units    (

unknown)



                    date)                         Type: does not unknown)  



                                                  use                 

 

           (unknown)  (no       (unknown)  (unknown)  Surgical History  (units  

  (unknown)



                    date)                         (Reviewed unknown)  



                                                  23 @ 02:02           



                                                  by Terrell Arnold MD)           

 

           (unknown)  (no       (unknown)  (unknown)  TOA       (units    (unkno

wn)



                    date)                         (tubo-ovarian unknown)  



                                                  abscess)            



                                                  (-21)           

 

           (unknown)  (no       (unknown)  (unknown)  Time Seen by  (units    (u

nknown)



                    date)                         Provider: unknown)  



                                                  23 01:26           

 

           (unknown)  (no       (unknown)  (unknown)  Total Bilirubin  (units   

 (unknown)



                    date)                         0.4 (0.2-1.3) unknown)  



                                                  mg/dL               

 

           (unknown)  (no       (unknown)  (unknown)  Total Protein  (units    (

unknown)



                    date)                         6.6 (6.3-8.2) unknown)  



                                                  g/dL                

 

           (unknown)  (no       (unknown)  (unknown)  US pelvic  (units    (unkn

own)



                    date)                         complete Stat unknown)  

 

           (unknown)  (no       (unknown)  (unknown)  Urine Dip  (units    (unkn

own)



                    date)                                   unknown)  

 

           (unknown)  (no       (unknown)  (unknown)  Urine Specific  (units    

(unknown)



                    date)                         Gravity 1.010 unknown)  

 

           (unknown)  (no       (unknown)  (unknown)  Vital Signs - 8  (units   

 (unknown)



                    date)                         hr        unknown)  

 

           (unknown)  (no       (unknown)  (unknown)  Vital Signs  (units    (un

known)



                    date)                                   unknown)  

 

           (unknown)  (no       (unknown)  (unknown)  Vital signs:  (units    (u

nknown)



                    date)                                   unknown)  

 

           (unknown)  (no       (unknown)  (unknown)  WBC 7.2   (units    (unkno

wn)



                    date)                         (4.5-11.0) unknown)  



                                                  X103/uL             

 

           (unknown)  (no       (unknown)  (unknown)  [facility] her [facility]BG[facility]  (units  

  (unknown)



                    date)                         and her have unknown)  



                                                  decided to try           



                                                  managed             



                                                  conservatively,           



                                                  no birth            

 

           (unknown)  (no       (unknown)  (unknown)  Jose Almodovar,  (units  

  (unknown)



                    date)                         MD [Physician] unknown)  

 

           (unknown)  (no       (unknown)  (unknown)  [Embedded Image  (units   

 (unknown)



                    date)                         Not Available] unknown)  

 

           (unknown)  (no       (unknown)  (unknown)  alcohol intake  (units    

(unknown)



                    date)                         frequency: unknown)  



                                                  holidays/special           



                                                  occasions only           

 

           (unknown)  (no       (unknown)  (unknown)  alcohol intake:  (units   

 (unknown)



                    date)                         current   unknown)  

 

           (unknown)  (no       (unknown)  (unknown)  and below  (units    (unkn

own)



                    date)                                   unknown)  

 

           (unknown)  (no       (unknown)  (unknown)  and he as well  (units    

(unknown)



                    date)                         as this   unknown)  



                                                  department back           



                                                  in November for           



                                                  the same            



                                                  complaint.            

 

           (unknown)  (no       (unknown)  (unknown)  and other  (units    (unkn

own)



                    date)                         (UTI/ovarian unknown)  



                                                  torsion/ovarian           



                                                  cyst)               

 

           (unknown)  (no       (unknown)  (unknown) appointment for  (units    

(unknown)



                    date)                         re-evaluation and unknown)  



                                                  continue            



                                                  treatment plan           



                                                  for your ovarian           



                                                  cyst.               

 

           (unknown)  (no       (unknown)  (unknown)  been doing well  (units   

 (unknown)



                    date)                         until tonight at unknown)  



                                                  8:00 p.m. had           



                                                  pain in the left           



                                                  pelvis. Had           

 

           (unknown)  (no       (unknown)  (unknown)  clindamycin HCl  (units   

 (unknown)



                    date)                         150 mg capsule unknown)  



                                                  150 mg PO DAILY           



                                                  #30 caps 22           

 

           (unknown)  (no       (unknown)  (unknown)  clindamycin HCl  (units   

 (unknown)



                    date)                         150 mg capsule unknown)  

 

           (unknown)  (no       (unknown)  (unknown)  control pills  (units    (

unknown)



                    date)                         and no surgery at unknown)  



                                                  this time. She           



                                                  has history of           



                                                  removal fallopian           

 

           (unknown)  (no       (unknown)  (unknown)  denies    (units    (unkno

wn)



                    date)                         pregnancy. unknown)  

 

           (unknown)  (no       (unknown)  (unknown)  doxycycline  (units    (un

known)



                    date)                         AdvReac   unknown)  



                                                  Intermediate           



                                                  vomitt Verified           



                                                  22 14:51           

 

           (unknown)  (no       (unknown)  (unknown)  has been doing  (units    

(unknown)



                    date)                         well until unknown)  



                                                  tonight at 8:00           



                                                  p.m. had pain in           



                                                  the left pelvis.           



                                                  Had                 

 

           (unknown)  (no       (unknown)  (unknown)  household  (units    (unkn

own)



                    date)                         members:  unknown)  



                                                  friend(s)           

 

           (unknown)  (no       (unknown)  (unknown)  hydrocodone 5  (units    (

unknown)



                    date)                         mg-acetaminophen unknown)  



                                                  325 1 tab PO           



                                                  Q4-6H PRN pain           



                                                  #10 tabs 22           

 

           (unknown)  (no       (unknown)  (unknown)  hydrocodone-acet  (units  

  (unknown)



                    date)                         aminophen 5-325 unknown)  



                                                  mg tablet           

 

           (unknown)  (no       (unknown)  (unknown)  hydromorphone 4  (units   

 (unknown)



                    date)                         mg tablet 4 mg PO unknown)  



                                                  Q4-6H PRN pain           



                                                  #20 tabs 22           

 

           (unknown)  (no       (unknown)  (unknown)  hydromorphone  (units    (

unknown)



                    date)                         [Dilaudid] 4 mg unknown)  



                                                  tablet              

 

           (unknown)  (no       (unknown)  (unknown)  ketorolac 10 mg  (units   

 (unknown)



                    date)                         tablet 10 mg PO unknown)  



                                                  Q6H PRN pain #14           



                                                  tabs 22           

 

           (unknown)  (no       (unknown)  (unknown)  ketorolac 10 mg  (units   

 (unknown)



                    date)                         tablet    unknown)  

 

           (unknown)  (no       (unknown)  (unknown)  mg tablet  (units    (unkn

own)



                    date)                                   unknown)  

 

           (unknown)  (no       (unknown)  (unknown)  nausea and  (units    (unk

nown)



                    date)                         vomiting. Pain unknown)  



                                                  radiates to the           



                                                  left lower back.           



                                                  Denies pregnancy.           

 

           (unknown)  (no       (unknown)  (unknown)  ondansetron 4 mg  (units  

  (unknown)



                    date)                         disintegrating 4 unknown)  



                                                  mg PO TID-QID PRN           



                                                  nausea and           



                                                  22            

 

           (unknown)  (no       (unknown)  (unknown)  ondansetron 4 mg  (units  

  (unknown)



                    date)                         tablet,disintegra unknown)  



                                                  ting                

 

           (unknown)  (no       (unknown)  (unknown)  oxycodone 5 mg  (units    

(unknown)



                    date)                         tablet 5 mg PO unknown)  



                                                  Q4H PRN pain #20           



                                                  tabs 22           

 

           (unknown)  (no       (unknown)  (unknown)  oxycodone 5 mg  (units    

(unknown)



                    date)                         tablet    unknown)  

 

           (unknown)  (no       (unknown)  (unknown)  pain medication.  (units  

  (unknown)



                    date)                         Call Dr. Almodovar, unknown)  



                                                  your OBGYN doctor           



                                                  today for office           

 

           (unknown)  (no       (unknown)  (unknown)  pelvic    (units    (unkno

wn)



                    date)                         ultrasound. unknown)  



                                                  Patient states           



                                                  feels the same as           



                                                  past ovarian           



                                                  cyst.               

 

           (unknown)  (no       (unknown)  (unknown)  peritoneal signs  (units  

  (unknown)



                    date)                         bowel sounds unknown)  



                                                  present. No CVA           



                                                  tenderness           

 

           (unknown)  (no       (unknown)  (unknown)  rales, or  (units    (unkn

own)



                    date)                         rhonchi.  unknown)  

 

           (unknown)  (no       (unknown)  (unknown)  relief in the  (units    (

unknown)



                    date)                         past      unknown)  

 

           (unknown)  (no       (unknown)  (unknown)  tablet vomiting  (units   

 (unknown)



                    date)                         #10 tabs  unknown)  

 

           (unknown)  (no       (unknown)  (unknown)  tobacco type:  (units    (

unknown)



                    date)                         vaping    unknown)  

 

           (unknown)  (no       (unknown)  (unknown)  tube on the  (units    (un

known)



                    date)                         right side due to unknown)  



                                                  tubo-ovarian           



                                                  abscess. Two           



                                                  years ago.           



                                                  Patient has           

 

           (unknown)  (no       (unknown)  (unknown)  tube on the  (units    (un

known)



                    date)                         right side due to unknown)  



                                                  tubo-ovarian           



                                                  abscess. Two           



                                                  years ago.           



                                                  Patient             









                                         Result panel [id number]









           (unknown)  (no       (unknown)  (unknown)  (no value)  (units    (unk

nown)



                    date)                                   unknown)  

 

           (unknown)  (no       (unknown)  (unknown)  (Dilaudid)  (units    (unk

nown)



                    date)                                   unknown)  

 

           (unknown)  (no       (unknown)  (unknown)  [id number]  (units    (unkno

wn)



                    date)                                   unknown)  

 

           (unknown)  (no       (unknown)  (unknown)  23  (units    (unkno

wn)



                    date)                         23  unknown)  



                                                  Range/Units           

 

           (unknown)  (no       (unknown)  (unknown)  23 01:48  (units    

(unknown)



                    date)                                   unknown)  

 

           (unknown)  (no       (unknown)  (unknown)  23 02:05  (units    

(unknown)



                    date)                                   unknown)  

 

           (unknown)  (no       (unknown)  (unknown)  23  (units    (unkno

wn)



                    date)                                   unknown)  

 

           (unknown)  (no       (unknown)  (unknown)  01:30     (units    (unkno

wn)



                    date)                                   unknown)  

 

           (unknown)  (no       (unknown)  (unknown)  02:05 02:05  (units    (un

known)



                    date)                                   unknown)  

 

           (unknown)  (no       (unknown)  (unknown)  1 tab PO Q4-6H  (units    

(unknown)



                    date)                         PRN (Reason: unknown)  



                                                  pain) Qty: 10 0RF           

 

           (unknown)  (no       (unknown)  (unknown)  10 mg PO Q6H PRN  (units  

  (unknown)



                    date)                         (Reason: pain) unknown)  



                                                  Qty: 14 0RF           

 

           (unknown)  (no       (unknown)  (unknown)  150 mg PO DAILY  (units   

 (unknown)



                    date)                         Qty: 30 0RF unknown)  

 

           (unknown)  (no       (unknown)  (unknown)  3:26 a.m.. I  (units    (u

nknown)



                    date)                         have reviewed CBC unknown)  



                                                  CMP urinalysis           



                                                  and pelvic           



                                                  ultrasound           



                                                  results.            

 

           (unknown)  (no       (unknown)  (unknown)  4 mg PO Q4-6H  (units    (

unknown)



                    date)                         PRN (Reason: unknown)  



                                                  pain) Qty: 20 0RF           

 

           (unknown)  (no       (unknown)  (unknown)  4 mg PO TID-QID  (units   

 (unknown)



                    date)                         PRN (Reason: unknown)  



                                                  nausea and           



                                                  vomiting) Qty: 10           



                                                  0RF                 

 

           (unknown)  (no       (unknown)  (unknown)  5 mg PO Q4H PRN  (units   

 (unknown)



                    date)                         (Reason: pain) unknown)  



                                                  Qty: 20 0RF           

 

           (unknown)  (no       (unknown)  (unknown)  ALT 17 (<35)  (units    (u

nknown)



                    date)                         IU/L      unknown)  

 

           (unknown)  (no       (unknown)  (unknown)  AST 26 (14-36)  (units    

(unknown)



                    date)                         IU/L      unknown)  

 

           (unknown)  (no       (unknown)  (unknown)  Acne (-2016)  (units    (u

nknown)



                    date)                                   unknown)  

 

           (unknown)  (no       (unknown)  (unknown)  Activity  (units    (unkno

wn)



                    date)                         Restrictions/Juan R unknown)  



                                                  tional              



                                                  Instructions:           

 

           (unknown)  (no       (unknown)  (unknown) After history and  (units  

  (unknown)



                    date)                         exam, have unknown)  



                                                  decided order CBC           



                                                  CMP urinalysis           



                                                  pregnancy test           



                                                  and                 

 

           (unknown)  (no       (unknown)  (unknown)  Age/Sex: 17 / F  (units   

 (unknown)



                    date)                                   unknown)  

 

           (unknown)  (no       (unknown)  (unknown)  Albumin 4.1  (units    (un

known)



                    date)                         (3.5-5.0) g/dL unknown)  

 

           (unknown)  (no       (unknown)  (unknown)  Albumin/Globulin  (units  

  (unknown)



                    date)                         Ratio 1.6 unknown)  



                                                  (1.0-2.8)           

 

           (unknown)  (no       (unknown)  (unknown)  Alcohol type:  (units    (

unknown)



                    date)                         wine      unknown)  

 

           (unknown)  (no       (unknown)  (unknown)  Alkaline  (units    (unkno

wn)



                    date)                         Phosphatase 79 unknown)  



                                                  () U/L           

 

           (unknown)  (no       (unknown)  (unknown)  Allergies  (units    (unkn

own)



                    date)                                   unknown)  

 

           (unknown)  (no       (unknown)  (unknown)  Allergy/AdvReac  (units   

 (unknown)



                    date)                         Type Severity unknown)  



                                                  Reaction Status           



                                                  Date / Time           

 

           (unknown)  (no       (unknown)  (unknown)  Anesthesia  (units    (unk

nown)



                    date)                                   unknown)  

 

           (unknown)  (no       (unknown)  (unknown)  Appropriate for  (units   

 (unknown)



                    date)                         discharge home. unknown)  



                                                  Laboratory           



                                                  studies otherwise           



                                                  reassuring. Work           

 

           (unknown)  (no       (unknown)  (unknown)  At this time  (units    (u

nknown)



                    date)                         left ovarian cyst unknown)  



                                                  does measure           



                                                  smaller zone this           



                                                  past November.           

 

           (unknown)  (no       (unknown)  (unknown)  BACK: No flank  (units    

(unknown)



                    date)                         tenderness. unknown)  

 

           (unknown)  (no       (unknown)  (unknown)  BUN 10 (7-17)  (units    (

unknown)



                    date)                         mg/dL     unknown)  

 

           (unknown)  (no       (unknown)  (unknown)  BUN/Creatinine  (units    

(unknown)



                    date)                         Ratio 12.5 (6-22) unknown)  

 

           (unknown)  (no       (unknown)  (unknown)  Baso # (Auto) 0  (units   

 (unknown)



                    date)                         (0-100) /uL unknown)  

 

           (unknown)  (no       (unknown)  (unknown)  Baso % (Auto)  (units    (

unknown)



                    date)                         0.4 (0-2) % unknown)  

 

           (unknown)  (no       (unknown)  (unknown)  Bedside Urine  (units    (

unknown)



                    date)                         Bilirubin - unknown)  



                                                  Negative            

 

           (unknown)  (no       (unknown)  (unknown)  Bedside Urine  (units    (

unknown)



                    date)                         Glucose Negative unknown)  

 

           (unknown)  (no       (unknown)  (unknown)  Bedside Urine  (units    (

unknown)



                    date)                         Ketone - Negative unknown)  

 

           (unknown)  (no       (unknown)  (unknown)  Bedside Urine  (units    (

unknown)



                    date)                         Leukocytes - unknown)  



                                                  Negative            

 

           (unknown)  (no       (unknown)  (unknown)  Bedside Urine  (units    (

unknown)



                    date)                         Nitrite - unknown)  



                                                  Negative            

 

           (unknown)  (no       (unknown)  (unknown)  Bedside Urine  (units    (

unknown)



                    date)                         Occult Blood - unknown)  



                                                  Negative            

 

           (unknown)  (no       (unknown)  (unknown)  Bedside Urine  (units    (

unknown)



                    date)                         Protein - unknown)  



                                                  Negative            

 

           (unknown)  (no       (unknown)  (unknown)  Bedside Urine  (units    (

unknown)



                    date)                         Urobilinogen - unknown)  



                                                  Negative            

 

           (unknown)  (no       (unknown)  (unknown)  Bedside Urine pH  (units  

  (unknown)



                    date)                         6.5       unknown)  

 

           (unknown)  (no       (unknown)  (unknown)  Blood Pressure  (units    

(unknown)



                    date)                         119/59 L 23 unknown)  



                                                  01:30               

 

           (unknown)  (no       (unknown)  (unknown)  Blood Pressure  (units    

(unknown)



                    date)                         119/59 L  unknown)  

 

           (unknown)  (no       (unknown)  (unknown)  CARDIOVASCULAR:  (units   

 (unknown)



                    date)                         Regular rate and unknown)  



                                                  rhythm without           



                                                  murmurs             

 

           (unknown)  (no       (unknown)  (unknown)  CARDIOVASCULAR:  (units   

 (unknown)



                    date)                         negative chest unknown)  



                                                  pain,               



                                                  palpitations           

 

           (unknown)  (no       (unknown)  (unknown)  CBC Auto Diff  (units    (

unknown)



                    date)                         [Complete Blood unknown)  



                                                  Count AUTO DIFF]           



                                                  Stat                

 

           (unknown)  (no       (unknown)  (unknown)  CMP       (units    (unkno

wn)



                    date)                         [Comprehensive unknown)  



                                                  Metabolic Panel]           



                                                  Stat                

 

           (unknown)  (no       (unknown)  (unknown)  Calcium 8.6  (units    (un

known)



                    date)                         (8.4-10.2) mg/dL unknown)  

 

           (unknown)  (no       (unknown)  (unknown)  Carbon Dioxide  (units    

(unknown)



                    date)                         26 (22-32) mmol/L unknown)  

 

           (unknown)  (no       (unknown)  (unknown)  Chief Complaint:  (units  

  (unknown)



                    date)                         Abdominal Pain unknown)  

 

           (unknown)  (no       (unknown)  (unknown)  Chlamydia  (units    (unkn

own)



                    date)                         infection () unknown)  

 

           (unknown)  (no       (unknown)  (unknown)  Chloride 103  (units    (u

nknown)



                    date)                         () mmol/L unknown)  

 

           (unknown)  (no       (unknown)  (unknown)  Chronic   (units    (unkno

wn)



                    date)                         Condition is unknown)  



                                                  having:: Moderate           



                                                  exacerbation           

 

           (unknown)  (no       (unknown)  (unknown)  Clinical  (units    (unkno

wn)



                    date)                         Impression: unknown)  

 

           (unknown)  (no       (unknown)  (unknown)  Condition is::  (units    

(unknown)



                    date)                         Well Controlled unknown)  

 

           (unknown)  (no       (unknown)  (unknown)  Course    (units    (unkno

wn)



                    date)                                   unknown)  

 

           (unknown)  (no       (unknown)  (unknown)  Creatinine 0.80  (units   

 (unknown)



                    date)                         (0.52-1.04) mg/dL unknown)  

 

           (unknown)  (no       (unknown)  (unknown)  : 2005  (units   

 (unknown)



                    date)                         Acct:[id number] unknown)  

 

           (unknown)  (no       (unknown)  (unknown)  Date of Service:  (units  

  (unknown)



                    date)                         23  unknown)  

 

           (unknown)  (no       (unknown)  (unknown)  Departure  (units    (unkn

own)



                    date)                                   unknown)  

 

           (unknown)  (no       (unknown)  (unknown)  Differential  (units    (u

nknown)



                    date)                         Diagnosis unknown)  

 

           (unknown)  (no       (unknown)  (unknown) Differential  (units    (un

known)



                    date)                         diagnosis: Likely unknown)  



                                                  abdominal pain,           



                                                  calculus of           



                                                  kidney,             



                                                  endometriosis           

 

           (unknown)  (no       (unknown)  (unknown)  Discharge Plan  (units    

(unknown)



                    date)                                   unknown)  

 

           (unknown)  (no       (unknown)  (unknown)  Discontinued  (units    (u

nknown)



                    date)                         Medications unknown)  

 

           (unknown)  (no       (unknown)  (unknown)  Documented By:  (units    

(unknown)



                    date)                         BS        unknown)  

 

           (unknown)  (no       (unknown)  (unknown)  ED Orders  (units    (unkn

own)



                    date)                                   unknown)  

 

           (unknown)  (no       (unknown)  (unknown)  ER Physician:  (units    (

unknown)



                    date)                         Terrell Arnold MD unknown)  

 

           (unknown)  (no       (unknown)  (unknown)  EXTREMITIES: No  (units   

 (unknown)



                    date)                         gross     unknown)  



                                                  deformities.           

 

           (unknown)  (no       (unknown)  (unknown)  EYES: Pupils  (units    (u

nknown)



                    date)                         equal round unknown)  

 

           (unknown)  (no       (unknown)  (unknown)  Emergency Report  (units  

  (unknown)



                    date)                                   unknown)  

 

           (unknown)  (no       (unknown)  (unknown)  Endometriosis  (units    (

unknown)



                    date)                         (-)   unknown)  

 

           (unknown)  (no       (unknown)  (unknown)  Eos # (Auto) 300  (units  

  (unknown)



                    date)                         (0-450) /uL unknown)  

 

           (unknown)  (no       (unknown)  (unknown)  Eos % (Auto) 3.5  (units  

  (unknown)



                    date)                         (2-4) %   unknown)  

 

           (unknown)  (no       (unknown)  (unknown)  Esterase  (units    (unkno

wn)



                    date)                                   unknown)  

 

           (unknown)  (no       (unknown)  (unknown)  Estimated GFR >  (units   

 (unknown)



                    date)                         60 (>60) mL/min unknown)  

 

           (unknown)  (no       (unknown)  (unknown)  Exam Narrative:  (units   

 (unknown)



                    date)                                   unknown)  

 

           (unknown)  (no       (unknown)  (unknown)  Exam      (units    (unkno

wn)



                    date)                                   unknown)  

 

           (unknown)  (no       (unknown)  (unknown)  GASTROINTESTINAL  (units  

  (unknown)



                    date)                         : Abdomen soft, unknown)  



                                                  reproducible left           



                                                  lower quadrant           



                                                  tenderness no           

 

           (unknown)  (no       (unknown)  (unknown)  GASTROINTESTINAL  (units  

  (unknown)



                    date)                         : Positive unknown)  



                                                  nausea, vomiting,           



                                                  abdominal pain           

 

           (unknown)  (no       (unknown)  (unknown)  GENERAL: in no  (units    

(unknown)



                    date)                         distress, not unknown)  



                                                  toxic not           



                                                  dyspneic            

 

           (unknown)  (no       (unknown)  (unknown)  GENERAL:  (units    (unkno

wn)



                    date)                         negative chills, unknown)  



                                                  fatigue, malaise,           



                                                  fever, sweats.           

 

           (unknown)  (no       (unknown)  (unknown)  : negative  (units    (u

nknown)



                    date)                         dysuria,  unknown)  



                                                  frequency,           



                                                  hematuria           

 

           (unknown)  (no       (unknown)  (unknown)  General   (units    (unkno

wn)



                    date)                                   unknown)  

 

           (unknown)  (no       (unknown)  (unknown)  Globulin 2.5  (units    (u

nknown)



                    date)                         (1.7-4.1) g/dL unknown)  

 

           (unknown)  (no       (unknown)  (unknown)  Glucose 84  (units    (unk

nown)



                    date)                         () mg/dL unknown)  

 

           (unknown)  (no       (unknown)  (unknown)  HEAD:     (units    (o

wn)



                    date)                         Normocephalic. unknown)  

 

           (unknown)  (no       (unknown)  (unknown)  HEENT: negative  (units   

 (unknown)



                    date)                         sinus pain, ear unknown)  



                                                  pain, sore throat           

 

           (unknown)  (no       (unknown)  (unknown)  HPI - Abdominal  (units   

 (unknown)



                    date)                         Pain      unknown)  

 

           (unknown)  (no       (unknown)  (unknown)  HPI narrative:  (units    

(unknown)



                    date)                                   unknown)  

 

           (unknown)  (no       (unknown)  (unknown)  Hct 37.1 (36-46)  (units  

  (unknown)



                    date)                         %         unknown)  

 

           (unknown)  (no       (unknown)  (unknown)  Heavy menstrual  (units   

 (unknown)



                    date)                         period (-2019) unknown)  

 

           (unknown)  (no       (unknown)  (unknown)  Hgb 12.2  (units    (unkno

wn)



                    date)                         (12.0-16.0) g/dL unknown)  

 

           (unknown)  (no       (unknown)  (unknown)  History of  (units    (unk

nown)



                    date)                         Present Illness unknown)  

 

           (unknown)  (no       (unknown)  (unknown)  Hydrocodone  (units    (un

known)



                    date)                         Bitart/Acetaminop unknown)  



                                                  hen                 



                                                  (Hydrocodone/Acet           



                                                  5/325 Tablet) 1           



                                                  tab PO NOW           

 

           (unknown)  (no       (unknown)  (unknown)  Hydrocodone  (units    (un

known)



                    date)                         Bitart/Acetaminop unknown)  



                                                  hen                 



                                                  (Hydrocodone/Acet           



                                                  5/325 Tablet) 2           



                                                  tab PO NOW           

 

           (unknown)  (no       (unknown)  (unknown)  I did review  (units    (u

nknown)



                    date)                         medical records unknown)  



                                                  from 2022 as well as           



                                                  pelvic ultrasound           

 

           (unknown)  (no       (unknown)  (unknown)  Imaging Data  (units    (u

nknown)



                    date)                                   unknown)  

 

           (unknown)  (no       (unknown)  (unknown)  Initial Vital  (units    (

unknown)



                    date)                         Signs     unknown)  

 

           (unknown)  (no       (unknown)  (unknown)  Initial Vital  (units    (

unknown)



                    date)                         Signs:    unknown)  

 

           (unknown)  (no       (unknown)  (unknown)  Instructions: DI  (units  

  (unknown)



                    date)                         for Ovarian Cyst unknown)  

 

           (unknown)  (no       (unknown)  (unknown)  Irregular  (units    (unkn

own)



                    date)                         menstrual cycle unknown)  



                                                  ()             

 

           (unknown)  (no       (unknown)  (unknown)  [facility]  (units   

 (unknown)



                    date)                         [address] unknown)  



                                                  [place]           



                                                  [id number]               

 

           (unknown)  (no       (unknown)  (unknown)  Arcenio Darnell  (units  

  (unknown)



                    date)                         HOLLY kim [Primary unknown)  



                                                  Care Provider]           

 

           (unknown)  (no       (unknown)  (unknown)  Ketorolac  (units    (unkn

own)



                    date)                         Tromethamine unknown)  



                                                  (Ketorolac 30           



                                                  Mg/Ml Vial) 15 mg           



                                                  IV NOW ONE           

 

           (unknown)  (no       (unknown)  (unknown)  Lab Data  (units    (unkno

wn)



                    date)                                   unknown)  

 

           (unknown)  (no       (unknown)  (unknown)  Lab Results  (units    (un

known)



                    date)                                   unknown)  

 

           (unknown)  (no       (unknown)  (unknown)  Labs:     (units    (unkno

wn)



                    date)                                   unknown)  

 

           (unknown)  (no       (unknown)  (unknown)  Last Admin:  (units    (un

known)



                    date)                         23 02:38 unknown)  



                                                  Dose: 15 mg           

 

           (unknown)  (no       (unknown)  (unknown)  Last Admin:  (units    (un

known)



                    date)                         23 02:39 unknown)  



                                                  Dose: 2 tab           

 

           (unknown)  (no       (unknown)  (unknown)  Last Admin:  (units    (un

known)



                    date)                         23 02:39 unknown)  



                                                  Dose: 4 mg           

 

           (unknown)  (no       (unknown)  (unknown)  Last Admin:  (units    (un

known)



                    date)                         23 02:40 unknown)  



                                                  Dose: 1,000           



                                                  mls/hr              

 

           (unknown)  (no       (unknown)  (unknown)  Last Admin:  (units    (un

known)



                    date)                         23 02:41 unknown)  



                                                  Dose: Not Given           

 

           (unknown)  (no       (unknown)  (unknown)  Limitations: no  (units   

 (unknown)



                    date)                         limitations unknown)  

 

           (unknown)  (no       (unknown)  (unknown)  Lymph # (Auto)  (units    

(unknown)



                    date)                         2400 (1538-7406) unknown)  



                                                  /uL                 

 

           (unknown)  (no       (unknown)  (unknown)  Lymph % (Auto)  (units    

(unknown)



                    date)                         33.4 (25-40) % unknown)  

 

           (unknown)  (no       (unknown)  (unknown)  Lymphangioma  (units    (u

nknown)



                    date)                                   unknown)  

 

           (unknown)  (no       (unknown)  (unknown)  MCH 29.4 (26-34)  (units  

  (unknown)



                    date)                         PG        unknown)  

 

           (unknown)  (no       (unknown)  (unknown)  MCHC 33.0  (units    (unkn

own)



                    date)                         (30-36) % unknown)  

 

           (unknown)  (no       (unknown)  (unknown)  MCV 89.2  (units    (unkno

wn)



                    date)                         () fL unknown)  

 

           (unknown)  (no       (unknown)  (unknown)  MDM - Abdominal  (units   

 (unknown)



                    date)                         Pain      unknown)  

 

           (unknown)  (no       (unknown)  (unknown)  MDM Narrative  (units    (

unknown)



                    date)                                   unknown)  

 

           (unknown)  (no       (unknown)  (unknown)  MUSCULOSKELETAL:  (units  

  (unknown)



                    date)                         negative muscle unknown)  



                                                  or bony pain           

 

           (unknown)  (no       (unknown)  (unknown)  Medical History  (units   

 (unknown)



                    date)                         (Reviewed unknown)  



                                                  23 @ 02:02           



                                                  by Terrell Arnold MD)           

 

           (unknown)  (no       (unknown)  (unknown)  Medical Records  (units   

 (unknown)



                    date)                                   unknown)  

 

           (unknown)  (no       (unknown)  (unknown)  Medical decision  (units  

  (unknown)



                    date)                         making narrative: unknown)  

 

           (unknown)  (no       (unknown)  (unknown)  Medical records  (units   

 (unknown)



                    date)                         narrative: unknown)  

 

           (unknown)  (no       (unknown)  (unknown)  Medication  (units    (unk

nown)



                    date)                         Instructions unknown)  



                                                  Recorded            

 

           (unknown)  (no       (unknown)  (unknown)  Mode of arrival:  (units  

  (unknown)



                    date)                         Family Vehicle unknown)  

 

           (unknown)  (no       (unknown)  (unknown)  Mono # (Auto)  (units    (

unknown)



                    date)                         700 (0-900) /uL unknown)  

 

           (unknown)  (no       (unknown)  (unknown)  Mono % (Auto)  (units    (

unknown)



                    date)                         9.4 (3-14) % unknown)  

 

           (unknown)  (no       (unknown)  (unknown)  NECK: Trachea  (units    (

unknown)



                    date)                         midline.  unknown)  

 

           (unknown)  (no       (unknown)  (unknown)  NEURO: AOx4.  (units    (u

nknown)



                    date)                                   unknown)  

 

           (unknown)  (no       (unknown)  (unknown)  NEUROLOGIC:  (units    (un

known)



                    date)                         negative  unknown)  



                                                  weakness,           



                                                  numbness            

 

           (unknown)  (no       (unknown)  (unknown)  Narrative  (units    (unkn

own)



                    date)                                   unknown)  

 

           (unknown)  (no       (unknown)  (unknown)  Narrative:  (units    (unk

nown)



                    date)                                   unknown)  

 

           (unknown)  (no       (unknown)  (unknown)  Neut # (Auto)  (units    (

unknown)



                    date)                         3800 ([id number]) unknown)  



                                                  /uL                 

 

           (unknown)  (no       (unknown)  (unknown)  Neut % (Auto)  (units    (

unknown)



                    date)                         53.3 (50-75) % unknown)  

 

           (unknown)  (no       (unknown)  (unknown)  No Action  (units    (unkn

own)



                    date)                                   unknown)  

 

           (unknown)  (no       (unknown)  (unknown)  No driving or  (units    (

unknown)



                    date)                         operating unknown)  



                                                  machinery this           



                                                  morning/today or           



                                                  when taking           



                                                  prescribed           

 

           (unknown)  (no       (unknown)  (unknown)  No vaginal  (units    (unk

nown)



                    date)                         bleeding or unknown)  



                                                  discharge.           



                                                  Patient has had           



                                                  hydrocodone and           



                                                  Toradol for           

 

           (unknown)  (no       (unknown)  (unknown)  ONE       (units    (unkno

wn)



                    date)                                   unknown)  

 

           (unknown)  (no       (unknown)  (unknown)  Ondansetron HCl  (units   

 (unknown)



                    date)                         (Ondansetron 4 unknown)  



                                                  Mg/2 Ml Inj) 4 mg           



                                                  IV NOW ONE           

 

           (unknown)  (no       (unknown)  (unknown)  Opioids -  (units    (unkn

own)



                    date)                         Morphine  unknown)  



                                                  Analogues Allergy           



                                                  Intermediate rash           



                                                  Verified 22           



                                                  14:51               

 

           (unknown)  (no       (unknown)  (unknown)  Ordered:  (units    (unkno

wn)



                    date)                                   unknown)  

 

           (unknown)  (no       (unknown)  (unknown)  Orders    (units    (unkno

wn)



                    date)                                   unknown)  

 

           (unknown)  (no       (unknown)  (unknown)  Ovarian cyst  (units    (u

nknown)



                    date)                                   unknown)  

 

           (unknown)  (no       (unknown)  (unknown)  Oxygen Delivery  (units   

 (unknown)



                    date)                         Method 23 unknown)  



                                                  01:30               

 

           (unknown)  (no       (unknown)  (unknown)  Oxygen Delivery  (units   

 (unknown)



                    date)                         Method Room Air unknown)  

 

           (unknown)  (no       (unknown)  (unknown)  PSYCH: Not  (units    (unk

nown)



                    date)                         anxious, is unknown)  



                                                  cooperative           

 

           (unknown)  (no       (unknown)  (unknown)  Painful   (units    (unkno

wn)



                    date)                         menstrual periods unknown)  



                                                  ()             

 

           (unknown)  (no       (unknown)  (unknown)  Patient   (units    (unkno

wn)



                    date)                         Disposition: Home unknown)  

 

           (unknown)  (no       (unknown)  (unknown)  Patient History  (units   

 (unknown)



                    date)                                   unknown)  

 

           (unknown)  (no       (unknown)  (unknown)  Patient here  (units    (u

nknown)



                    date)                         with sam. Has unknown)  



                                                  history of left           



                                                  ovarian cyst.           



                                                  Seen by her OBGYN           

 

           (unknown)  (no       (unknown)  (unknown)  Patient:  (units    (unkno

wn)



                    date)                         Lesvia Aguero [facility] unknown)  



                                                  MR#: [id number]             

 

           (unknown)  (no       (unknown)  (unknown)  Plt Count 372  (units    (

unknown)



                    date)                         (150-400) X103/uL unknown)  

 

           (unknown)  (no       (unknown)  (unknown)  Point of Care  (units    (

unknown)



                    date)                         Testing   unknown)  

 

           (unknown)  (no       (unknown)  (unknown)  Point of care  (units    (

unknown)



                    date)                         testing:  unknown)  

 

           (unknown)  (no       (unknown)  (unknown)  Potassium 3.7  (units    (

unknown)



                    date)                         (3.4-5.1) mmol/L unknown)  

 

           (unknown)  (no       (unknown)  (unknown)  Pregnancy Test  (units    

(unknown)



                    date)                         Results Negative unknown)  

 

           (unknown)  (no       (unknown)  (unknown)  Prescriptions:  (units    

(unknown)



                    date)                                   unknown)  

 

           (unknown)  (no       (unknown)  (unknown)  Previous Rx's  (units    (

unknown)



                    date)                                   unknown)  

 

           (unknown)  (no       (unknown)  (unknown)  Pulse Oximetry  (units    

(unknown)



                    date)                         98 23 01:30 unknown)  

 

           (unknown)  (no       (unknown)  (unknown)  Pulse Oximetry  (units    

(unknown)



                    date)                         98        unknown)  

 

           (unknown)  (no       (unknown)  (unknown)  Pulse Rate 75  (units    (

unknown)



                    date)                         23 01:30 unknown)  

 

           (unknown)  (no       (unknown)  (unknown)  Pulse Rate 75  (units    (

unknown)



                    date)                                   unknown)  

 

           (unknown)  (no       (unknown)  (unknown)  RBC 4.15  (units    (unkno

wn)



                    date)                         (4.0-5.2) X106/uL unknown)  

 

           (unknown)  (no       (unknown)  (unknown)  RDW 13.4  (units    (unkno

wn)



                    date)                         (11.6-14.8) % unknown)  

 

           (unknown)  (no       (unknown)  (unknown) RESPIRATORY:  (units    (un

known)



                    date)                         Clear to  unknown)  



                                                  auscultation.           



                                                  Breath sounds           



                                                  equal               



                                                  bilaterally. No           



                                                  wheezes,            

 

           (unknown)  (no       (unknown)  (unknown)  RESPIRATORY:  (units    (u

nknown)



                    date)                         negative dyspnea, unknown)  



                                                  cough               

 

           (unknown)  (no       (unknown)  (unknown) ROS Unobtainable:  (units  

  (unknown)



                    date)                         All systems unknown)  



                                                  reviewed + are           



                                                  unremarkable           



                                                  except as noted           



                                                  in HPI              

 

           (unknown)  (no       (unknown)  (unknown)  Radiologist's  (units    (

unknown)



                    date)                         Impression: unknown)  

 

           (unknown)  (no       (unknown)  (unknown)  Read by   (units    (unkno

wn)



                    date)                         overnight unknown)  



                                                  radiologist.           



                                                  Impression           



                                                  complex             



                                                  hemorrhagic/prote           



                                                  inaceous            

 

           (unknown)  (no       (unknown)  (unknown)  Referrals:  (units    (unk

nown)



                    date)                                   unknown)  

 

           (unknown)  (no       (unknown)  (unknown)  Related Data  (units    (u

nknown)



                    date)                                   unknown)  

 

           (unknown)  (no       (unknown)  (unknown)  Respiratory Rate  (units  

  (unknown)



                    date)                         16 23 01:30 unknown)  

 

           (unknown)  (no       (unknown)  (unknown)  Respiratory Rate  (units  

  (unknown)



                    date)                         16        unknown)  

 

           (unknown)  (no       (unknown)  (unknown)  Result diagrams:  (units  

  (unknown)



                    date)                                   unknown)  

 

           (unknown)  (no       (unknown)  (unknown)  Return if worse  (units   

 (unknown)



                    date)                         if any questions unknown)  



                                                  or concerns.           

 

           (unknown)  (no       (unknown)  (unknown)  Review of  (units    (unkn

own)



                    date)                         Systems   unknown)  

 

           (unknown)  (no       (unknown)  (unknown)  S/P ACL   (units    (unkno

wn)



                    date)                         reconstruction unknown)  



                                                  (-21)           

 

           (unknown)  (no       (unknown)  (unknown)  SKIN: Warm and  (units    

(unknown)



                    date)                         dry       unknown)  

 

           (unknown)  (no       (unknown)  (unknown)  SKIN: negative  (units    

(unknown)



                    date)                         rash, skin unknown)  



                                                  lesions             

 

           (unknown)  (no       (unknown)  (unknown)  Signed By:  (units    (unk

nown)



                    date)                                   unknown)  

 

           (unknown)  (no       (unknown)  (unknown)  Smoking Status:  (units   

 (unknown)



                    date)                         Current some day unknown)  



                                                  smoker              

 

           (unknown)  (no       (unknown)  (unknown)  Social History  (units    

(unknown)



                    date)                         (Reviewed unknown)  



                                                  23 @ 02:02           



                                                  by Terrell Arnold MD)           

 

           (unknown)  (no       (unknown)  (unknown)  Sodium 136 L  (units    (u

nknown)



                    date)                         (137-145) mmol/L unknown)  

 

           (unknown)  (no       (unknown)  (unknown)  Sodium Chloride  (units   

 (unknown)



                    date)                         (Normal Saline unknown)  



                                                  0.9%) 1,000 mls @           



                                                  1,000 mls/hr IV           



                                                  BOLUS ONE           

 

           (unknown)  (no       (unknown)  (unknown)  Source: patient  (units   

 (unknown)



                    date)                         and family unknown)  

 

           (unknown)  (no       (unknown)  (unknown)  Stand Alone  (units    (un

known)



                    date)                         Forms: Patient unknown)  



                                                  Portal/API, Work           



                                                  Release Note           

 

           (unknown)  (no       (unknown)  (unknown)  Stated    (units    (unkno

wn)



                    date)                         Complaint: N/V/D unknown)  



                                                  ABD PAIN            

 

           (unknown)  (no       (unknown)  (unknown)  Stop: 23  (units    

(unknown)



                    date)                         01:50     unknown)  

 

           (unknown)  (no       (unknown)  (unknown)  Stop: 23  (units    

(unknown)



                    date)                         02:01     unknown)  

 

           (unknown)  (no       (unknown)  (unknown)  Stop: 23  (units    

(unknown)



                    date)                         02:32     unknown)  

 

           (unknown)  (no       (unknown)  (unknown)  Stop: 23  (units    

(unknown)



                    date)                         02:48     unknown)  

 

           (unknown)  (no       (unknown)  (unknown)  Substance Use  (units    (

unknown)



                    date)                         Type: does not unknown)  



                                                  use                 

 

           (unknown)  (no       (unknown)  (unknown)  Surgical History  (units  

  (unknown)



                    date)                         (Reviewed unknown)  



                                                  23 @ 02:02           



                                                  by Terrell Arnodl MD)           

 

           (unknown)  (no       (unknown)  (unknown)  TOA       (units    (unkno

wn)



                    date)                         (tubo-ovarian unknown)  



                                                  abscess)            



                                                  (-21)           

 

           (unknown)  (no       (unknown)  (unknown)  There is blood  (units    

(unknown)



                    date)                         flow to each unknown)  



                                                  ovary. I did           



                                                  review results           



                                                  with patient and           

 

           (unknown)  (no       (unknown)  (unknown)  Time Seen by  (units    (u

nknown)



                    date)                         Provider: unknown)  



                                                  23 01:26           

 

           (unknown)  (no       (unknown)  (unknown)  Total Bilirubin  (units   

 (unknown)



                    date)                         0.4 (0.2-1.3) unknown)  



                                                  mg/dL               

 

           (unknown)  (no       (unknown)  (unknown)  Total Protein  (units    (

unknown)



                    date)                         6.6 (6.3-8.2) unknown)  



                                                  g/dL                

 

           (unknown)  (no       (unknown)  (unknown)  US - GYN:  (units    (unkn

own)



                    date)                                   unknown)  

 

           (unknown)  (no       (unknown)  (unknown)  US pelvic  (units    (unkn

own)



                    date)                         complete Stat unknown)  

 

           (unknown)  (no       (unknown)  (unknown)  Urine Dip  (units    (unkn

own)



                    date)                                   unknown)  

 

           (unknown)  (no       (unknown)  (unknown)  Urine Specific  (units    

(unknown)



                    date)                         Gravity 1.010 unknown)  

 

           (unknown)  (no       (unknown)  (unknown)  Vital Signs - 8  (units   

 (unknown)



                    date)                         hr        unknown)  

 

           (unknown)  (no       (unknown)  (unknown)  Vital Signs  (units    (un

known)



                    date)                                   unknown)  

 

           (unknown)  (no       (unknown)  (unknown)  Vital signs:  (units    (u

nknown)



                    date)                                   unknown)  

 

           (unknown)  (no       (unknown)  (unknown)  WBC 7.2   (units    (unkno

wn)



                    date)                         (4.5-11.0) unknown)  



                                                  X103/uL             

 

           (unknown)  (no       (unknown)  (unknown)  [facility] her OBGYN  (units  

  (unknown)



                    date)                         and her have unknown)  



                                                  decided to try           



                                                  managed             



                                                  conservatively,           



                                                  no birth            

 

           (unknown)  (no       (unknown)  (unknown)  Jose Almodovar,  (units  

  (unknown)



                    date)                         MD [Physician] unknown)  

 

           (unknown)  (no       (unknown)  (unknown)  [Embedded Image  (units   

 (unknown)



                    date)                         Not Available] unknown)  

 

           (unknown)  (no       (unknown)  (unknown)  alcohol intake  (units    

(unknown)



                    date)                         frequency: unknown)  



                                                  holidays/special           



                                                  occasions only           

 

           (unknown)  (no       (unknown)  (unknown)  alcohol intake:  (units   

 (unknown)



                    date)                         current   unknown)  

 

           (unknown)  (no       (unknown)  (unknown)  and below  (units    (unkn

own)



                    date)                                   unknown)  

 

           (unknown)  (no       (unknown)  (unknown)  and he as well  (units    

(unknown)



                    date)                         as this   unknown)  



                                                  department back           



                                                  in November for           



                                                  the same            



                                                  complaint.            

 

           (unknown)  (no       (unknown)  (unknown)  and other  (units    (unkn

own)



                    date)                         (UTI/ovarian unknown)  



                                                  torsion/ovarian           



                                                  cyst)               

 

           (unknown)  (no       (unknown)  (unknown) appointment for  (units    

(unknown)



                    date)                         re-evaluation and unknown)  



                                                  continue            



                                                  treatment plan           



                                                  for your ovarian           



                                                  cyst.               

 

           (unknown)  (no       (unknown)  (unknown)  atient has  (units    (unk

nown)



                    date)                         tolerated unknown)  



                                                  hydrocodone in           



                                                  the past without           



                                                  difficulty.           



                                                  Patient denies           

 

           (unknown)  (no       (unknown)  (unknown)  been doing well  (units   

 (unknown)



                    date)                         until tonight at unknown)  



                                                  8:00 p.m. had           



                                                  pain in the left           



                                                  pelvis. Had           

 

           (unknown)  (no       (unknown)  (unknown)  clindamycin HCl  (units   

 (unknown)



                    date)                         150 mg capsule unknown)  



                                                  150 mg PO DAILY           



                                                  #30 caps 22           

 

           (unknown)  (no       (unknown)  (unknown)  clindamycin HCl  (units   

 (unknown)



                    date)                         150 mg capsule unknown)  

 

           (unknown)  (no       (unknown)  (unknown)  control pills  (units    (

unknown)



                    date)                         and no surgery at unknown)  



                                                  this time. She           



                                                  has history of           



                                                  removal fallopian           

 

           (unknown)  (no       (unknown)  (unknown)  cyst within the  (units   

 (unknown)



                    date)                         left ovary unknown)  



                                                  measuring 3.7 x           



                                                  3.4 x 3.3 cm.           



                                                  Minimal anechoic           



                                                  fluid               

 

           (unknown)  (no       (unknown)  (unknown)  cysts/tubo-ovari  (units  

  (unknown)



                    date)                         an        unknown)  



                                                  abscess/ovarian           



                                                  torsion/UTI/ectop           



                                                  ic pregnancy           

 

           (unknown)  (no       (unknown)  (unknown)  doxycycline  (units    (un

known)



                    date)                         AdvReac   unknown)  



                                                  Intermediate           



                                                  vomitt Verified           



                                                  22 14:51           

 

           (unknown)  (no       (unknown)  (unknown)  fiancee. Patient  (units  

  (unknown)



                    date)                         states the unknown)  



                                                  Toradol and           



                                                  hydrocodone           



                                                  medication did           



                                                  not help            

 

           (unknown)  (no       (unknown)  (unknown)  has been doing  (units    

(unknown)



                    date)                         well until unknown)  



                                                  tonight at 8:00           



                                                  p.m. had pain in           



                                                  the left pelvis.           



                                                  Had                 

 

           (unknown)  (no       (unknown)  (unknown)  household  (units    (unkn

own)



                    date)                         members:  unknown)  



                                                  friend(s)           

 

           (unknown)  (no       (unknown)  (unknown)  hydrocodone 5  (units    (

unknown)



                    date)                         mg-acetaminophen unknown)  



                                                  325 1 tab PO           



                                                  Q4-6H PRN pain           



                                                  #10 tabs 22           

 

           (unknown)  (no       (unknown)  (unknown)  hydrocodone  (units    (un

known)



                    date)                         would be  unknown)  



                                                  effective           



                                                  afterwards. We           



                                                  did agree for           



                                                  short course           

 

           (unknown)  (no       (unknown)  (unknown)  hydrocodone-acet  (units  

  (unknown)



                    date)                         aminophen 5-325 unknown)  



                                                  mg tablet           

 

           (unknown)  (no       (unknown)  (unknown)  hydromorphone 4  (units   

 (unknown)



                    date)                         mg tablet 4 mg PO unknown)  



                                                  Q4-6H PRN pain           



                                                  #20 tabs 22           

 

           (unknown)  (no       (unknown)  (unknown)  hydromorphone  (units    (

unknown)



                    date)                         [Dilaudid] 4 mg unknown)  



                                                  tablet              

 

           (unknown)  (no       (unknown)  (unknown)  ketorolac 10 mg  (units   

 (unknown)



                    date)                         tablet 10 mg PO unknown)  



                                                  Q6H PRN pain #14           



                                                  tabs 22           

 

           (unknown)  (no       (unknown)  (unknown)  ketorolac 10 mg  (units   

 (unknown)



                    date)                         tablet    unknown)  

 

           (unknown)  (no       (unknown)  (unknown)  mg tablet  (units    (unkn

own)



                    date)                                   unknown)  

 

           (unknown)  (no       (unknown)  (unknown)  nausea and  (units    (unk

nown)



                    date)                         vomiting. Pain unknown)  



                                                  radiates to the           



                                                  left lower back.           



                                                  Denies pregnancy.           

 

           (unknown)  (no       (unknown)  (unknown)  note provided.  (units    

(unknown)



                    date)                         She desires unknown)  



                                                  discharge home.           

 

           (unknown)  (no       (unknown)  (unknown)  ondansetron 4 mg  (units  

  (unknown)



                    date)                         disintegrating 4 unknown)  



                                                  mg PO TID-QID PRN           



                                                  nausea and           



                                                  22            

 

           (unknown)  (no       (unknown)  (unknown)  ondansetron 4 mg  (units  

  (unknown)



                    date)                         tablet,disintegra unknown)  



                                                  ting                

 

           (unknown)  (no       (unknown)  (unknown)  ovaries. Normal  (units   

 (unknown)



                    date)                         color Doppler unknown)  



                                                  with                



                                                  arterial/venous           



                                                  spectral tracing           



                                                  of both             

 

           (unknown)  (no       (unknown)  (unknown)  ovaries.  (units    (unkno

wn)



                    date)                                   unknown)  

 

           (unknown)  (no       (unknown)  (unknown)  oxycodone 5 mg  (units    

(unknown)



                    date)                         tablet 5 mg PO unknown)  



                                                  Q4H PRN pain #20           



                                                  tabs 22           

 

           (unknown)  (no       (unknown)  (unknown)  oxycodone 5 mg  (units    

(unknown)



                    date)                         tablet    unknown)  

 

           (unknown)  (no       (unknown)  (unknown)  pain level. She  (units   

 (unknown)



                    date)                         is had Dilaudid unknown)  



                                                  in the past which           



                                                  did break her           



                                                  pain and home           

 

           (unknown)  (no       (unknown)  (unknown)  pain medication.  (units  

  (unknown)



                    date)                         Call Dr. Almodovar, unknown)  



                                                  your OBGYN doctor           



                                                  today for office           

 

           (unknown)  (no       (unknown)  (unknown)  pelvic    (units    (unkno

wn)



                    date)                         ultrasound. unknown)  



                                                  Patient states           



                                                  feels the same as           



                                                  past ovarian           



                                                  cyst. P             

 

           (unknown)  (no       (unknown)  (unknown)  peritoneal signs  (units  

  (unknown)



                    date)                         bowel sounds unknown)  



                                                  present. No CVA           



                                                  tenderness           

 

           (unknown)  (no       (unknown)  (unknown)  pregnancy.  (units    (unk

nown)



                    date)                         Differential unknown)  



                                                  diagnosis           



                                                  includes but not           



                                                  limited to           



                                                  ovarian             

 

           (unknown)  (no       (unknown)  (unknown)  prepack of  (units    (unk

nown)



                    date)                         hydrocodone. She unknown)  



                                                  will follow up           



                                                  with Dr. Almodovar           



                                                  for more            



                                                  definitive           

 

           (unknown)  (no       (unknown)  (unknown)  rales, or  (units    (unkn

own)



                    date)                         rhonchi.  unknown)  

 

           (unknown)  (no       (unknown)  (unknown)  relief in the  (units    (

unknown)



                    date)                         past      unknown)  

 

           (unknown)  (no       (unknown)  (unknown)  tablet vomiting  (units   

 (unknown)



                    date)                         #10 tabs  unknown)  

 

           (unknown)  (no       (unknown)  (unknown)  tobacco type:  (units    (

unknown)



                    date)                         vaping    unknown)  

 

           (unknown)  (no       (unknown)  (unknown)  treatment plan  (units    

(unknown)



                    date)                         for her ovarian unknown)  



                                                  cyst. Return           



                                                  precautions           



                                                  reviewed with           



                                                  her.                

 

           (unknown)  (no       (unknown)  (unknown)  tube on the  (units    (un

known)



                    date)                         right side due to unknown)  



                                                  tubo-ovarian           



                                                  abscess. Two           



                                                  years ago.           



                                                  Patient has           

 

           (unknown)  (no       (unknown)  (unknown)  tube on the  (units    (un

known)



                    date)                         right side due to unknown)  



                                                  tubo-ovarian           



                                                  abscess. Two           



                                                  years ago.           



                                                  Patient             

 

           (unknown)  (no       (unknown)  (unknown)  within the left  (units   

 (unknown)



                    date)                         adnexa. Doppler unknown)  



                                                  and spectral           



                                                  tracing within           



                                                  the bilateral           









                                         Result panel 568









           (unknown)  (no       (unknown)  (unknown)  (no value)  (units    (unk

nown)



                    date)                                   unknown)  

 

           (unknown)  (no       (unknown)  (unknown)  <Electronically  (units   

 (unknown)



                    date)                         signed by Terrell unknown)  



                                                  MD Clayton>           

 

           (unknown)  (no       (unknown)  (unknown)  (Dilaudid)  (units    (unk

nown)



                    date)                                   unknown)  

 

           (unknown)  (no       (unknown)  (unknown)  [id number]  (units    (unkno

wn)



                    date)                                   unknown)  

 

           (unknown)  (no       (unknown)  (unknown)  23  (units    (unkno

wn)



                    date)                         23  unknown)  



                                                  Range/Units           

 

           (unknown)  (no       (unknown)  (unknown)  23 01:48  (units    

(unknown)



                    date)                                   unknown)  

 

           (unknown)  (no       (unknown)  (unknown)  23 02:05  (units    

(unknown)



                    date)                                   unknown)  

 

           (unknown)  (no       (unknown)  (unknown)  23 0613  (units    (

unknown)



                    date)                                   unknown)  

 

           (unknown)  (no       (unknown)  (unknown)  23  (units    (unkno

wn)



                    date)                                   unknown)  

 

           (unknown)  (no       (unknown)  (unknown)  01:30 23  (units    

(unknown)



                    date)                                   unknown)  

 

           (unknown)  (no       (unknown)  (unknown)  02:05 02:05  (units    (un

known)



                    date)                                   unknown)  

 

           (unknown)  (no       (unknown)  (unknown)  03:45     (units    (unkno

wn)



                    date)                                   unknown)  

 

           (unknown)  (no       (unknown)  (unknown)  1 tab PO Q4-6H  (units    

(unknown)



                    date)                         PRN (Reason: unknown)  



                                                  pain) Qty: 10 0RF           

 

           (unknown)  (no       (unknown)  (unknown)  10 mg PO Q6H PRN  (units  

  (unknown)



                    date)                         (Reason: pain) unknown)  



                                                  Qty: 14 0RF           

 

           (unknown)  (no       (unknown)  (unknown)  150 mg PO DAILY  (units   

 (unknown)



                    date)                         Qty: 30 0RF unknown)  

 

           (unknown)  (no       (unknown)  (unknown)  3:26 a.m.. I  (units    (u

nknown)



                    date)                         have reviewed CBC unknown)  



                                                  CMP urinalysis           



                                                  and pelvic           



                                                  ultrasound           



                                                  results.            

 

           (unknown)  (no       (unknown)  (unknown)  4 mg PO Q4-6H  (units    (

unknown)



                    date)                         PRN (Reason: unknown)  



                                                  pain) Qty: 20 0RF           

 

           (unknown)  (no       (unknown)  (unknown)  4 mg PO TID-QID  (units   

 (unknown)



                    date)                         PRN (Reason: unknown)  



                                                  nausea and           



                                                  vomiting) Qty: 10           



                                                  0RF                 

 

           (unknown)  (no       (unknown)  (unknown)  5 mg PO Q4H PRN  (units   

 (unknown)



                    date)                         (Reason: pain) unknown)  



                                                  Qty: 20 0RF           

 

           (unknown)  (no       (unknown)  (unknown)  ALT 17 (<35)  (units    (u

nknown)



                    date)                         IU/L      unknown)  

 

           (unknown)  (no       (unknown)  (unknown)  AST 26 (14-36)  (units    

(unknown)



                    date)                         IU/L      unknown)  

 

           (unknown)  (no       (unknown)  (unknown)  Acne (-2016)  (units    (u

nknown)



                    date)                                   unknown)  

 

           (unknown)  (no       (unknown)  (unknown)  Activity  (units    (unkno

wn)



                    date)                         Restrictions/Juan R unknown)  



                                                  tional              



                                                  Instructions:           

 

           (unknown)  (no       (unknown)  (unknown)  Admin: 23  (units   

 (unknown)



                    date)                         02:40 Dose: 1,000 unknown)  



                                                  mls/hr              

 

           (unknown)  (no       (unknown)  (unknown) After history and  (units  

  (unknown)



                    date)                         exam, have unknown)  



                                                  decided order CBC           



                                                  CMP urinalysis           



                                                  pregnancy test           



                                                  and                 

 

           (unknown)  (no       (unknown)  (unknown)  Age/Sex: 17 / F  (units   

 (unknown)



                    date)                                   unknown)  

 

           (unknown)  (no       (unknown)  (unknown)  Albumin 4.1  (units    (un

known)



                    date)                         (3.5-5.0) g/dL unknown)  

 

           (unknown)  (no       (unknown)  (unknown)  Albumin/Globulin  (units  

  (unknown)



                    date)                         Ratio 1.6 unknown)  



                                                  (1.0-2.8)           

 

           (unknown)  (no       (unknown)  (unknown)  Alcohol type:  (units    (

unknown)



                    date)                         wine      unknown)  

 

           (unknown)  (no       (unknown)  (unknown)  Alkaline  (units    (unkno

wn)



                    date)                         Phosphatase 79 unknown)  



                                                  () U/L           

 

           (unknown)  (no       (unknown)  (unknown)  Allergies  (units    (unkn

own)



                    date)                                   unknown)  

 

           (unknown)  (no       (unknown)  (unknown)  Allergy/AdvReac  (units   

 (unknown)



                    date)                         Type Severity unknown)  



                                                  Reaction Status           



                                                  Date / Time           

 

           (unknown)  (no       (unknown)  (unknown)  Anesthesia  (units    (unk

nown)



                    date)                                   unknown)  

 

           (unknown)  (no       (unknown)  (unknown)  Appropriate for  (units   

 (unknown)



                    date)                         discharge home. unknown)  



                                                  Laboratory           



                                                  studies otherwise           



                                                  reassuring. Work           

 

           (unknown)  (no       (unknown)  (unknown)  At this time  (units    (u

nknown)



                    date)                         left ovarian cyst unknown)  



                                                  does measure           



                                                  smaller zone this           



                                                  past November.           

 

           (unknown)  (no       (unknown)  (unknown)  BACK: No flank  (units    

(unknown)



                    date)                         tenderness. unknown)  

 

           (unknown)  (no       (unknown)  (unknown)  BUN 10 (7-17)  (units    (

unknown)



                    date)                         mg/dL     unknown)  

 

           (unknown)  (no       (unknown)  (unknown)  BUN/Creatinine  (units    

(unknown)



                    date)                         Ratio 12.5 (6-22) unknown)  

 

           (unknown)  (no       (unknown)  (unknown)  Baso # (Auto) 0  (units   

 (unknown)



                    date)                         (0-100) /uL unknown)  

 

           (unknown)  (no       (unknown)  (unknown)  Baso % (Auto)  (units    (

unknown)



                    date)                         0.4 (0-2) % unknown)  

 

           (unknown)  (no       (unknown)  (unknown)  Bedside Urine  (units    (

unknown)



                    date)                         Bilirubin - unknown)  



                                                  Negative            

 

           (unknown)  (no       (unknown)  (unknown)  Bedside Urine  (units    (

unknown)



                    date)                         Glucose Negative unknown)  

 

           (unknown)  (no       (unknown)  (unknown)  Bedside Urine  (units    (

unknown)



                    date)                         Ketone - Negative unknown)  

 

           (unknown)  (no       (unknown)  (unknown)  Bedside Urine  (units    (

unknown)



                    date)                         Leukocytes - unknown)  



                                                  Negative            

 

           (unknown)  (no       (unknown)  (unknown)  Bedside Urine  (units    (

unknown)



                    date)                         Nitrite - unknown)  



                                                  Negative            

 

           (unknown)  (no       (unknown)  (unknown)  Bedside Urine  (units    (

unknown)



                    date)                         Occult Blood - unknown)  



                                                  Negative            

 

           (unknown)  (no       (unknown)  (unknown)  Bedside Urine  (units    (

unknown)



                    date)                         Protein - unknown)  



                                                  Negative            

 

           (unknown)  (no       (unknown)  (unknown)  Bedside Urine  (units    (

unknown)



                    date)                         Urobilinogen - unknown)  



                                                  Negative            

 

           (unknown)  (no       (unknown)  (unknown)  Bedside Urine pH  (units  

  (unknown)



                    date)                         6.5       unknown)  

 

           (unknown)  (no       (unknown)  (unknown)  Blood Pressure  (units    

(unknown)



                    date)                         119/59 L 23 unknown)  



                                                  01:30               

 

           (unknown)  (no       (unknown)  (unknown)  Blood Pressure  (units    

(unknown)



                    date)                         119/59 L 103/57 L unknown)  

 

           (unknown)  (no       (unknown)  (unknown)  CARDIOVASCULAR:  (units   

 (unknown)



                    date)                         Regular rate and unknown)  



                                                  rhythm without           



                                                  murmurs             

 

           (unknown)  (no       (unknown)  (unknown)  CARDIOVASCULAR:  (units   

 (unknown)



                    date)                         negative chest unknown)  



                                                  pain,               



                                                  palpitations           

 

           (unknown)  (no       (unknown)  (unknown)  CBC Auto Diff  (units    (

unknown)



                    date)                         [Complete Blood unknown)  



                                                  Count AUTO DIFF]           



                                                  Stat                

 

           (unknown)  (no       (unknown)  (unknown)  CMP       (units    (unkno

wn)



                    date)                         [Comprehensive unknown)  



                                                  Metabolic Panel]           



                                                  Stat                

 

           (unknown)  (no       (unknown)  (unknown)  Calcium 8.6  (units    (un

known)



                    date)                         (8.4-10.2) mg/dL unknown)  

 

           (unknown)  (no       (unknown)  (unknown)  Carbon Dioxide  (units    

(unknown)



                    date)                         26 (22-32) mmol/L unknown)  

 

           (unknown)  (no       (unknown)  (unknown)  Chief Complaint:  (units  

  (unknown)



                    date)                         Abdominal Pain unknown)  

 

           (unknown)  (no       (unknown)  (unknown)  Chlamydia  (units    (unkn

own)



                    date)                         infection (-) unknown)  

 

           (unknown)  (no       (unknown)  (unknown)  Chloride 103  (units    (u

nknown)



                    date)                         () mmol/L unknown)  

 

           (unknown)  (no       (unknown)  (unknown)  Chronic   (units    (unkno

wn)



                    date)                         Condition is unknown)  



                                                  having:: Moderate           



                                                  exacerbation           

 

           (unknown)  (no       (unknown)  (unknown)  Clinical  (units    (unkno

wn)



                    date)                         Impression: unknown)  

 

           (unknown)  (no       (unknown)  (unknown)  Condition is::  (units    

(unknown)



                    date)                         Well Controlled unknown)  

 

           (unknown)  (no       (unknown)  (unknown)  Course    (units    (unkno

wn)



                    date)                                   unknown)  

 

           (unknown)  (no       (unknown)  (unknown)  Creatinine 0.80  (units   

 (unknown)



                    date)                         (0.52-1.04) mg/dL unknown)  

 

           (unknown)  (no       (unknown)  (unknown)  : 2005  (units   

 (unknown)



                    date)                         Acct:[id number] unknown)  

 

           (unknown)  (no       (unknown)  (unknown)  Date of Service:  (units  

  (unknown)



                    date)                         23  unknown)  

 

           (unknown)  (no       (unknown)  (unknown)  Departure  (units    (unkn

own)



                    date)                                   unknown)  

 

           (unknown)  (no       (unknown)  (unknown)  Differential  (units    (u

nknown)



                    date)                         Diagnosis unknown)  

 

           (unknown)  (no       (unknown)  (unknown) Differential  (units    (un

known)



                    date)                         diagnosis: Likely unknown)  



                                                  abdominal pain,           



                                                  calculus of           



                                                  kidney,             



                                                  endometriosis           

 

           (unknown)  (no       (unknown)  (unknown)  Discharge Plan  (units    

(unknown)



                    date)                                   unknown)  

 

           (unknown)  (no       (unknown)  (unknown)  Discontinued  (units    (u

nknown)



                    date)                         Medications unknown)  

 

           (unknown)  (no       (unknown)  (unknown)  Documented By:  (units    

(unknown)



                    date)                         BS        unknown)  

 

           (unknown)  (no       (unknown)  (unknown)  ED Orders  (units    (unkn

own)



                    date)                                   unknown)  

 

           (unknown)  (no       (unknown)  (unknown)  ER Physician:  (units    (

unknown)



                    date)                         Terrell Arnold MD unknown)  

 

           (unknown)  (no       (unknown)  (unknown)  EXTREMITIES: No  (units   

 (unknown)



                    date)                         gross     unknown)  



                                                  deformities.           

 

           (unknown)  (no       (unknown)  (unknown)  EYES: Pupils  (units    (u

nknown)



                    date)                         equal round unknown)  

 

           (unknown)  (no       (unknown)  (unknown)  Emergency Report  (units  

  (unknown)



                    date)                                   unknown)  

 

           (unknown)  (no       (unknown)  (unknown)  Endometriosis  (units    (

unknown)



                    date)                         (-)   unknown)  

 

           (unknown)  (no       (unknown)  (unknown)  Eos # (Auto) 300  (units  

  (unknown)



                    date)                         (0-450) /uL unknown)  

 

           (unknown)  (no       (unknown)  (unknown)  Eos % (Auto) 3.5  (units  

  (unknown)



                    date)                         (2-4) %   unknown)  

 

           (unknown)  (no       (unknown)  (unknown)  Esterase  (units    (unkno

wn)



                    date)                                   unknown)  

 

           (unknown)  (no       (unknown)  (unknown)  Estimated GFR >  (units   

 (unknown)



                    date)                         60 (>60) mL/min unknown)  

 

           (unknown)  (no       (unknown)  (unknown)  Exam Narrative:  (units   

 (unknown)



                    date)                                   unknown)  

 

           (unknown)  (no       (unknown)  (unknown)  Exam      (units    (unkno

wn)



                    date)                                   unknown)  

 

           (unknown)  (no       (unknown)  (unknown)  GASTROINTESTINAL  (units  

  (unknown)



                    date)                         : Abdomen soft, unknown)  



                                                  reproducible left           



                                                  lower quadrant           



                                                  tenderness no           

 

           (unknown)  (no       (unknown)  (unknown)  GASTROINTESTINAL  (units  

  (unknown)



                    date)                         : Positive unknown)  



                                                  nausea, vomiting,           



                                                  abdominal pain           

 

           (unknown)  (no       (unknown)  (unknown)  GENERAL: in no  (units    

(unknown)



                    date)                         distress, not unknown)  



                                                  toxic not           



                                                  dyspneic            

 

           (unknown)  (no       (unknown)  (unknown)  GENERAL:  (units    (unkno

wn)



                    date)                         negative chills, unknown)  



                                                  fatigue, malaise,           



                                                  fever, sweats.           

 

           (unknown)  (no       (unknown)  (unknown)  : negative  (units    (u

nknown)



                    date)                         dysuria,  unknown)  



                                                  frequency,           



                                                  hematuria           

 

           (unknown)  (no       (unknown)  (unknown)  General   (units    (unkno

wn)



                    date)                                   unknown)  

 

           (unknown)  (no       (unknown)  (unknown)  Globulin 2.5  (units    (u

nknown)



                    date)                         (1.7-4.1) g/dL unknown)  

 

           (unknown)  (no       (unknown)  (unknown)  Glucose 84  (units    (unk

nown)



                    date)                         () mg/dL unknown)  

 

           (unknown)  (no       (unknown)  (unknown)  HEAD:     (units    (unkno

wn)



                    date)                         Normocephalic. unknown)  

 

           (unknown)  (no       (unknown)  (unknown)  HEENT: negative  (units   

 (unknown)



                    date)                         sinus pain, ear unknown)  



                                                  pain, sore throat           

 

           (unknown)  (no       (unknown)  (unknown)  HPI - Abdominal  (units   

 (unknown)



                    date)                         Pain      unknown)  

 

           (unknown)  (no       (unknown)  (unknown)  HPI narrative:  (units    

(unknown)



                    date)                                   unknown)  

 

           (unknown)  (no       (unknown)  (unknown)  Hct 37.1 (36-46)  (units  

  (unknown)



                    date)                         %         unknown)  

 

           (unknown)  (no       (unknown)  (unknown)  Heavy menstrual  (units   

 (unknown)



                    date)                         period (-2019) unknown)  

 

           (unknown)  (no       (unknown)  (unknown)  Hgb 12.2  (units    (unkno

wn)



                    date)                         (12.0-16.0) g/dL unknown)  

 

           (unknown)  (no       (unknown)  (unknown)  History of  (units    (unk

nown)



                    date)                         Present Illness unknown)  

 

           (unknown)  (no       (unknown)  (unknown)  Hydrocodone  (units    (un

known)



                    date)                         Bitart/Acetaminop unknown)  



                                                  hen                 



                                                  (Hydrocodone/Acet           



                                                  5/325 Prepack) 1           



                                                  bottle MISC           

 

           (unknown)  (no       (unknown)  (unknown)  Hydrocodone  (units    (un

known)



                    date)                         Bitart/Acetaminop unknown)  



                                                  hen                 



                                                  (Hydrocodone/Acet           



                                                  5/325 Tablet) 1           



                                                  tab PO NOW           

 

           (unknown)  (no       (unknown)  (unknown)  Hydrocodone  (units    (un

known)



                    date)                         Bitart/Acetaminop unknown)  



                                                  hen                 



                                                  (Hydrocodone/Acet           



                                                  5/325 Tablet) 2           



                                                  tab PO NOW           

 

           (unknown)  (no       (unknown)  (unknown)  Hydromorphone  (units    (

unknown)



                    date)                         HCl       unknown)  



                                                  (Hydromorphone 1           



                                                  Mg Inj) 1 mg IV           



                                                  NOW ONE             

 

           (unknown)  (no       (unknown)  (unknown)  I did review  (units    (u

nknown)



                    date)                         medical records unknown)  



                                                  from 2022 as well as           



                                                  pelvic ultrasound           

 

           (unknown)  (no       (unknown)  (unknown)  Imaging Data  (units    (u

nknown)



                    date)                                   unknown)  

 

           (unknown)  (no       (unknown)  (unknown)  Initial Vital  (units    (

unknown)



                    date)                         Signs     unknown)  

 

           (unknown)  (no       (unknown)  (unknown)  Initial Vital  (units    (

unknown)



                    date)                         Signs:    unknown)  

 

           (unknown)  (no       (unknown)  (unknown)  Instructions: DI  (units  

  (unknown)



                    date)                         for Ovarian Cyst unknown)  

 

           (unknown)  (no       (unknown)  (unknown)  Irregular  (units    (unkn

own)



                    date)                         menstrual cycle unknown)  



                                                  ()             

 

           (unknown)  (no       (unknown)  (unknown)  [facility]  (units   

 (unknown)



                    date)                         [address] unknown)  



                                                  [place]           



                                                  [id number]               

 

           (unknown)  (no       (unknown)  (unknown)  Arcenio Darnell  (units  

  (unknown)



                    date)                         HOLLY kim [Primary unknown)  



                                                  Care Provider]           

 

           (unknown)  (no       (unknown)  (unknown)  Ketorolac  (units    (unkn

own)



                    date)                         Tromethamine unknown)  



                                                  (Ketorolac 30           



                                                  Mg/Ml Vial) 15 mg           



                                                  IV NOW ONE           

 

           (unknown)  (no       (unknown)  (unknown)  Lab Data  (units    (unkno

wn)



                    date)                                   unknown)  

 

           (unknown)  (no       (unknown)  (unknown)  Lab Results  (units    (un

known)



                    date)                                   unknown)  

 

           (unknown)  (no       (unknown)  (unknown)  Labs:     (units    (unkno

wn)



                    date)                                   unknown)  

 

           (unknown)  (no       (unknown)  (unknown)  Last Admin:  (units    (un

known)



                    date)                         23 02:38 unknown)  



                                                  Dose: 15 mg           

 

           (unknown)  (no       (unknown)  (unknown)  Last Admin:  (units    (un

known)



                    date)                         23 02:39 unknown)  



                                                  Dose: 2 tab           

 

           (unknown)  (no       (unknown)  (unknown)  Last Admin:  (units    (un

known)



                    date)                         23 02:39 unknown)  



                                                  Dose: 4 mg           

 

           (unknown)  (no       (unknown)  (unknown)  Last Admin:  (units    (un

known)



                    date)                         23 02:41 unknown)  



                                                  Dose: Not Given           

 

           (unknown)  (no       (unknown)  (unknown)  Last Admin:  (units    (un

known)



                    date)                         23 03:39 unknown)  



                                                  Dose: 1 bottle           

 

           (unknown)  (no       (unknown)  (unknown)  Last Admin:  (units    (un

known)



                    date)                         23 03:40 unknown)  



                                                  Dose: 1 mg           

 

           (unknown)  (no       (unknown)  (unknown)  Last Infusion:  (units    

(unknown)



                    date)                         23 03:41 unknown)  



                                                  Dose: 0 mls/hr           

 

           (unknown)  (no       (unknown)  (unknown)  Limitations: no  (units   

 (unknown)



                    date)                         limitations unknown)  

 

           (unknown)  (no       (unknown)  (unknown)  Lymph # (Auto)  (units    

(unknown)



                    date)                         2400 (0017-0824) unknown)  



                                                  /uL                 

 

           (unknown)  (no       (unknown)  (unknown)  Lymph % (Auto)  (units    

(unknown)



                    date)                         33.4 (25-40) % unknown)  

 

           (unknown)  (no       (unknown)  (unknown)  Lymphangioma  (units    (u

nknown)



                    date)                                   unknown)  

 

           (unknown)  (no       (unknown)  (unknown)  MCH 29.4 (26-34)  (units  

  (unknown)



                    date)                         PG        unknown)  

 

           (unknown)  (no       (unknown)  (unknown)  MCHC 33.0  (units    (unkn

own)



                    date)                         (30-36) % unknown)  

 

           (unknown)  (no       (unknown)  (unknown)  MCV 89.2  (units    (unkno

wn)



                    date)                         () fL unknown)  

 

           (unknown)  (no       (unknown)  (unknown)  MDM - Abdominal  (units   

 (unknown)



                    date)                         Pain      unknown)  

 

           (unknown)  (no       (unknown)  (unknown)  MDM Narrative  (units    (

unknown)



                    date)                                   unknown)  

 

           (unknown)  (no       (unknown)  (unknown)  MUSCULOSKELETAL:  (units  

  (unknown)



                    date)                         negative muscle unknown)  



                                                  or bony pain           

 

           (unknown)  (no       (unknown)  (unknown)  Medical History  (units   

 (unknown)



                    date)                         (Reviewed unknown)  



                                                  23 @ 02:02           



                                                  by Terrell Aronld MD)           

 

           (unknown)  (no       (unknown)  (unknown)  Medical Records  (units   

 (unknown)



                    date)                                   unknown)  

 

           (unknown)  (no       (unknown)  (unknown)  Medical decision  (units  

  (unknown)



                    date)                         making narrative: unknown)  

 

           (unknown)  (no       (unknown)  (unknown)  Medical records  (units   

 (unknown)



                    date)                         narrative: unknown)  

 

           (unknown)  (no       (unknown)  (unknown)  Medication  (units    (unk

nown)



                    date)                         Instructions unknown)  



                                                  Recorded            

 

           (unknown)  (no       (unknown)  (unknown)  Mode of arrival:  (units  

  (unknown)



                    date)                         Family Vehicle unknown)  

 

           (unknown)  (no       (unknown)  (unknown)  Mono # (Auto)  (units    (

unknown)



                    date)                         700 (0-900) /uL unknown)  

 

           (unknown)  (no       (unknown)  (unknown)  Mono % (Auto)  (units    (

unknown)



                    date)                         9.4 (3-14) % unknown)  

 

           (unknown)  (no       (unknown)  (unknown)  NECK: Trachea  (units    (

unknown)



                    date)                         midline.  unknown)  

 

           (unknown)  (no       (unknown)  (unknown)  NEURO: AOx4.  (units    (u

nknown)



                    date)                                   unknown)  

 

           (unknown)  (no       (unknown)  (unknown)  NEUROLOGIC:  (units    (un

known)



                    date)                         negative  unknown)  



                                                  weakness,           



                                                  numbness            

 

           (unknown)  (no       (unknown)  (unknown)  Narrative  (units    (unkn

own)



                    date)                                   unknown)  

 

           (unknown)  (no       (unknown)  (unknown)  Narrative:  (units    (unk

nown)



                    date)                                   unknown)  

 

           (unknown)  (no       (unknown)  (unknown)  Neut # (Auto)  (units    (

unknown)



                    date)                         3800 (5752-9692) unknown)  



                                                  /uL                 

 

           (unknown)  (no       (unknown)  (unknown)  Neut % (Auto)  (units    (

unknown)



                    date)                         53.3 (50-75) % unknown)  

 

           (unknown)  (no       (unknown)  (unknown)  No Action  (units    (unkn

own)



                    date)                                   unknown)  

 

           (unknown)  (no       (unknown)  (unknown)  No driving or  (units    (

unknown)



                    date)                         operating unknown)  



                                                  machinery this           



                                                  morning/today or           



                                                  when taking           



                                                  prescribed           

 

           (unknown)  (no       (unknown)  (unknown)  No vaginal  (units    (unk

nown)



                    date)                         bleeding or unknown)  



                                                  discharge.           



                                                  Patient has had           



                                                  hydrocodone and           



                                                  Toradol for           

 

           (unknown)  (no       (unknown)  (unknown)  ONE       (units    (unkno

wn)



                    date)                                   unknown)  

 

           (unknown)  (no       (unknown)  (unknown)  Ondansetron HCl  (units   

 (unknown)



                    date)                         (Ondansetron 4 Mg unknown)  



                                                  Odt Prepack) 1           



                                                  bottle MISC           



                                                  SEEINSTR ONE           

 

           (unknown)  (no       (unknown)  (unknown)  Ondansetron HCl  (units   

 (unknown)



                    date)                         (Ondansetron 4 unknown)  



                                                  Mg/2 Ml Inj) 4 mg           



                                                  IV NOW ONE           

 

           (unknown)  (no       (unknown)  (unknown)  Opioids -  (units    (unkn

own)



                    date)                         Morphine  unknown)  



                                                  Analogues Allergy           



                                                  Intermediate rash           



                                                  Verified 22           



                                                  14:51               

 

           (unknown)  (no       (unknown)  (unknown)  Ordered:  (units    (unkno

wn)



                    date)                                   unknown)  

 

           (unknown)  (no       (unknown)  (unknown)  Orders    (units    (unkno

wn)



                    date)                                   unknown)  

 

           (unknown)  (no       (unknown)  (unknown)  Ovarian cyst  (units    (u

nknown)



                    date)                                   unknown)  

 

           (unknown)  (no       (unknown)  (unknown)  Oxygen Delivery  (units   

 (unknown)



                    date)                         Method 23 unknown)  



                                                  01:30               

 

           (unknown)  (no       (unknown)  (unknown)  Oxygen Delivery  (units   

 (unknown)



                    date)                         Method Room Air unknown)  



                                                  Room Air            

 

           (unknown)  (no       (unknown)  (unknown)  PSYCH: Not  (units    (unk

nown)



                    date)                         anxious, is unknown)  



                                                  cooperative           

 

           (unknown)  (no       (unknown)  (unknown)  Painful   (units    (unkno

wn)



                    date)                         menstrual periods unknown)  



                                                  ()             

 

           (unknown)  (no       (unknown)  (unknown)  Patient   (units    (unkno

wn)



                    date)                         Disposition: Home unknown)  

 

           (unknown)  (no       (unknown)  (unknown)  Patient History  (units   

 (unknown)



                    date)                                   unknown)  

 

           (unknown)  (no       (unknown)  (unknown)  Patient has  (units    (un

known)



                    date)                         tolerated unknown)  



                                                  hydrocodone in           



                                                  the past without           



                                                  difficulty.           



                                                  Patient             

 

           (unknown)  (no       (unknown)  (unknown)  Patient here  (units    (u

nknown)



                    date)                         with fiancee. Has unknown)  



                                                  history of left           



                                                  ovarian cyst.           



                                                  Seen by her OBGYN           

 

           (unknown)  (no       (unknown)  (unknown)  Patient:  (units    (unkno

wn)



                    date)                         Lesvia Aguero unknown)  



                                                  MR#: [id number]             

 

           (unknown)  (no       (unknown)  (unknown)  Plt Count 372  (units    (

unknown)



                    date)                         (150-400) X103/uL unknown)  

 

           (unknown)  (no       (unknown)  (unknown)  Point of Care  (units    (

unknown)



                    date)                         Testing   unknown)  

 

           (unknown)  (no       (unknown)  (unknown)  Point of care  (units    (

unknown)



                    date)                         testing:  unknown)  

 

           (unknown)  (no       (unknown)  (unknown)  Potassium 3.7  (units    (

unknown)



                    date)                         (3.4-5.1) mmol/L unknown)  

 

           (unknown)  (no       (unknown)  (unknown)  Pregnancy Test  (units    

(unknown)



                    date)                         Results Negative unknown)  

 

           (unknown)  (no       (unknown)  (unknown)  Prescriptions:  (units    

(unknown)



                    date)                                   unknown)  

 

           (unknown)  (no       (unknown)  (unknown)  Previous Rx's  (units    (

unknown)



                    date)                                   unknown)  

 

           (unknown)  (no       (unknown)  (unknown)  Pulse Oximetry  (units    

(unknown)



                    date)                         98 23 01:30 unknown)  

 

           (unknown)  (no       (unknown)  (unknown)  Pulse Oximetry  (units    

(unknown)



                    date)                         98 98     unknown)  

 

           (unknown)  (no       (unknown)  (unknown)  Pulse Rate 75  (units    (

unknown)



                    date)                         23 01:30 unknown)  

 

           (unknown)  (no       (unknown)  (unknown)  Pulse Rate 75 65  (units  

  (unknown)



                    date)                                   unknown)  

 

           (unknown)  (no       (unknown)  (unknown)  RBC 4.15  (units    (unkno

wn)



                    date)                         (4.0-5.2) X106/uL unknown)  

 

           (unknown)  (no       (unknown)  (unknown)  RDW 13.4  (units    (unkno

wn)



                    date)                         (11.6-14.8) % unknown)  

 

           (unknown)  (no       (unknown)  (unknown) RESPIRATORY:  (units    (un

known)



                    date)                         Clear to  unknown)  



                                                  auscultation.           



                                                  Breath sounds           



                                                  equal               



                                                  bilaterally. No           



                                                  wheezes,            

 

           (unknown)  (no       (unknown)  (unknown)  RESPIRATORY:  (units    (u

nknown)



                    date)                         negative dyspnea, unknown)  



                                                  cough               

 

           (unknown)  (no       (unknown)  (unknown) ROS Unobtainable:  (units  

  (unknown)



                    date)                         All systems unknown)  



                                                  reviewed + are           



                                                  unremarkable           



                                                  except as noted           



                                                  in HPI              

 

           (unknown)  (no       (unknown)  (unknown)  Radiologist's  (units    (

unknown)



                    date)                         Impression: unknown)  

 

           (unknown)  (no       (unknown)  (unknown)  Read by   (units    (unkno

wn)



                    date)                         overnight unknown)  



                                                  radiologist.           



                                                  Impression           



                                                  complex             



                                                  hemorrhagic/prote           



                                                  inaceous            

 

           (unknown)  (no       (unknown)  (unknown)  Referrals:  (units    (unk

nown)



                    date)                                   unknown)  

 

           (unknown)  (no       (unknown)  (unknown)  Related Data  (units    (u

nknown)



                    date)                                   unknown)  

 

           (unknown)  (no       (unknown)  (unknown)  Respiratory Rate  (units  

  (unknown)



                    date)                         16 23 01:30 unknown)  

 

           (unknown)  (no       (unknown)  (unknown)  Respiratory Rate  (units  

  (unknown)



                    date)                         16 17     unknown)  

 

           (unknown)  (no       (unknown)  (unknown)  Result diagrams:  (units  

  (unknown)



                    date)                                   unknown)  

 

           (unknown)  (no       (unknown)  (unknown)  Return if worse  (units   

 (unknown)



                    date)                         if any questions unknown)  



                                                  or concerns.           

 

           (unknown)  (no       (unknown)  (unknown)  Review of  (units    (unkn

own)



                    date)                         Systems   unknown)  

 

           (unknown)  (no       (unknown)  (unknown)  S/P ACL   (units    (unkno

wn)



                    date)                         reconstruction unknown)  



                                                  (-21)           

 

           (unknown)  (no       (unknown)  (unknown)  SEEINSTR ONE  (units    (u

nknown)



                    date)                                   unknown)  

 

           (unknown)  (no       (unknown)  (unknown)  SKIN: Warm and  (units    

(unknown)



                    date)                         dry       unknown)  

 

           (unknown)  (no       (unknown)  (unknown)  SKIN: negative  (units    

(unknown)



                    date)                         rash, skin unknown)  



                                                  lesions             

 

           (unknown)  (no       (unknown)  (unknown)  Signed By:  (units    (unk

nown)



                    date)                                   unknown)  

 

           (unknown)  (no       (unknown)  (unknown)  Smoking Status:  (units   

 (unknown)



                    date)                         Current some day unknown)  



                                                  smoker              

 

           (unknown)  (no       (unknown)  (unknown)  Social History  (units    

(unknown)



                    date)                         (Reviewed unknown)  



                                                  23 @ 02:02           



                                                  by Terrell Arnold MD)           

 

           (unknown)  (no       (unknown)  (unknown)  Sodium 136 L  (units    (u

nknown)



                    date)                         (137-145) mmol/L unknown)  

 

           (unknown)  (no       (unknown)  (unknown)  Sodium Chloride  (units   

 (unknown)



                    date)                         (Normal Saline unknown)  



                                                  0.9%) 1,000 mls @           



                                                  1,000 mls/hr IV           



                                                  BOLUS ONE           

 

           (unknown)  (no       (unknown)  (unknown)  Source: patient  (units   

 (unknown)



                    date)                         and family unknown)  

 

           (unknown)  (no       (unknown)  (unknown)  Stand Alone  (units    (un

known)



                    date)                         Forms: Patient unknown)  



                                                  Portal/API, Work           



                                                  Release Note           

 

           (unknown)  (no       (unknown)  (unknown)  Stated    (units    (unkno

wn)



                    date)                         Complaint: N/V/D unknown)  



                                                  ABD PAIN            

 

           (unknown)  (no       (unknown)  (unknown)  Stop: 23  (units    

(unknown)



                    date)                         01:50     unknown)  

 

           (unknown)  (no       (unknown)  (unknown)  Stop: 23  (units    

(unknown)



                    date)                         02:01     unknown)  

 

           (unknown)  (no       (unknown)  (unknown)  Stop: 23  (units    

(unknown)



                    date)                         02:32     unknown)  

 

           (unknown)  (no       (unknown)  (unknown)  Stop: 23  (units    

(unknown)



                    date)                         02:48     unknown)  

 

           (unknown)  (no       (unknown)  (unknown)  Stop: 23  (units    

(unknown)



                    date)                         03:23     unknown)  

 

           (unknown)  (no       (unknown)  (unknown)  Stop: 23  (units    

(unknown)



                    date)                         03:26     unknown)  

 

           (unknown)  (no       (unknown)  (unknown)  Substance Use  (units    (

unknown)



                    date)                         Type: does not unknown)  



                                                  use                 

 

           (unknown)  (no       (unknown)  (unknown)  Surgical History  (units  

  (unknown)



                    date)                         (Reviewed unknown)  



                                                  23 @ 02:02           



                                                  by Terrell Arnold MD)           

 

           (unknown)  (no       (unknown)  (unknown)  TOA       (units    (unkno

wn)



                    date)                         (tubo-ovarian unknown)  



                                                  abscess)            



                                                  (-21)           

 

           (unknown)  (no       (unknown)  (unknown)  There is blood  (units    

(unknown)



                    date)                         flow to each unknown)  



                                                  ovary. I did           



                                                  review results           



                                                  with patient and           

 

           (unknown)  (no       (unknown)  (unknown)  Time Seen by  (units    (u

nknown)



                    date)                         Provider: unknown)  



                                                  23 01:26           

 

           (unknown)  (no       (unknown)  (unknown)  Total Bilirubin  (units   

 (unknown)



                    date)                         0.4 (0.2-1.3) unknown)  



                                                  mg/dL               

 

           (unknown)  (no       (unknown)  (unknown)  Total Protein  (units    (

unknown)



                    date)                         6.6 (6.3-8.2) unknown)  



                                                  g/dL                

 

           (unknown)  (no       (unknown)  (unknown)  US - GYN:  (units    (unkn

own)



                    date)                                   unknown)  

 

           (unknown)  (no       (unknown)  (unknown)  US pelvic  (units    (unkn

own)



                    date)                         complete Stat unknown)  

 

           (unknown)  (no       (unknown)  (unknown)  Urine Dip  (units    (unkn

own)



                    date)                                   unknown)  

 

           (unknown)  (no       (unknown)  (unknown)  Urine Specific  (units    

(unknown)



                    date)                         Gravity 1.010 unknown)  

 

           (unknown)  (no       (unknown)  (unknown)  Vital Signs - 8  (units   

 (unknown)



                    date)                         hr        unknown)  

 

           (unknown)  (no       (unknown)  (unknown)  Vital Signs  (units    (un

known)



                    date)                                   unknown)  

 

           (unknown)  (no       (unknown)  (unknown)  Vital signs:  (units    (u

nknown)



                    date)                                   unknown)  

 

           (unknown)  (no       (unknown)  (unknown)  WBC 7.2   (units    (unkno

wn)



                    date)                         (4.5-11.0) unknown)  



                                                  X103/uL             

 

           (unknown)  (no       (unknown)  (unknown)  [facility] her [facility]BGYN  (units  

  (unknown)



                    date)                         and her have unknown)  



                                                  decided to try           



                                                  managed             



                                                  conservatively,           



                                                  no birth            

 

           (unknown)  (no       (unknown)  (unknown)  Jose Almodovar,  (units  

  (unknown)



                    date)                         MD [Physician] unknown)  

 

           (unknown)  (no       (unknown)  (unknown)  [Embedded Image  (units   

 (unknown)



                    date)                         Not Available] unknown)  

 

           (unknown)  (no       (unknown)  (unknown)  alcohol intake  (units    

(unknown)



                    date)                         frequency: unknown)  



                                                  holidays/special           



                                                  occasions only           

 

           (unknown)  (no       (unknown)  (unknown)  alcohol intake:  (units   

 (unknown)



                    date)                         current   unknown)  

 

           (unknown)  (no       (unknown)  (unknown)  and below  (units    (unkn

own)



                    date)                                   unknown)  

 

           (unknown)  (no       (unknown)  (unknown)  and he as well  (units    

(unknown)



                    date)                         as this   unknown)  



                                                  department back           



                                                  in November for           



                                                  the same            



                                                  complaint.            

 

           (unknown)  (no       (unknown)  (unknown)  and other  (units    (unkn

own)



                    date)                         (UTI/ovarian unknown)  



                                                  torsion/ovarian           



                                                  cyst)               

 

           (unknown)  (no       (unknown)  (unknown) appointment for  (units    

(unknown)



                    date)                         re-evaluation and unknown)  



                                                  continue            



                                                  treatment plan           



                                                  for your ovarian           



                                                  cyst.               

 

           (unknown)  (no       (unknown)  (unknown)  been doing well  (units   

 (unknown)



                    date)                         until tonight at unknown)  



                                                  8:00 p.m. had           



                                                  pain in the left           



                                                  pelvis. Had           

 

           (unknown)  (no       (unknown)  (unknown)  clindamycin HCl  (units   

 (unknown)



                    date)                         150 mg capsule unknown)  



                                                  150 mg PO DAILY           



                                                  #30 caps 22           

 

           (unknown)  (no       (unknown)  (unknown)  clindamycin HCl  (units   

 (unknown)



                    date)                         150 mg capsule unknown)  

 

           (unknown)  (no       (unknown)  (unknown)  control pills  (units    (

unknown)



                    date)                         and no surgery at unknown)  



                                                  this time. She           



                                                  has history of           



                                                  removal fallopian           

 

           (unknown)  (no       (unknown)  (unknown)  cyst within the  (units   

 (unknown)



                    date)                         left ovary unknown)  



                                                  measuring 3.7 x           



                                                  3.4 x 3.3 cm.           



                                                  Minimal anechoic           



                                                  fluid               

 

           (unknown)  (no       (unknown)  (unknown)  cysts/tubo-ovari  (units  

  (unknown)



                    date)                         an        unknown)  



                                                  abscess/ovarian           



                                                  torsion/UTI/ectop           



                                                  ic pregnancy           

 

           (unknown)  (no       (unknown)  (unknown)  decompensation/d  (units  

  (unknown)



                    date)                         econditioning/wor unknown)  



                                                  sening symptoms.           



                                                  Symptoms are not           



                                                  new. She            

 

           (unknown)  (no       (unknown)  (unknown)  denies    (units    (unkno

wn)



                    date)                         pregnancy. unknown)  



                                                  Differential           



                                                  diagnosis           



                                                  includes but not           



                                                  limited to           



                                                  ovarian             

 

           (unknown)  (no       (unknown)  (unknown)  doxycycline  (units    (un

known)



                    date)                         AdvReac   unknown)  



                                                  Intermediate           



                                                  vomitt Verified           



                                                  22 14:51           

 

           (unknown)  (no       (unknown)  (unknown)  fiancee. Patient  (units  

  (unknown)



                    date)                         states the unknown)  



                                                  Toradol and           



                                                  hydrocodone           



                                                  medication did           



                                                  not help            

 

           (unknown)  (no       (unknown)  (unknown)  has appropriate  (units   

 (unknown)



                    date)                         follow-up unknown)  



                                                  established           



                                                  OBGYN. Return           



                                                  precautions           



                                                  reviewed with           

 

           (unknown)  (no       (unknown)  (unknown)  has been doing  (units    

(unknown)



                    date)                         well until unknown)  



                                                  tonight at 8:00           



                                                  p.m. had pain in           



                                                  the left pelvis.           



                                                  Had                 

 

           (unknown)  (no       (unknown)  (unknown)  her. Work note  (units    

(unknown)



                    date)                         provided. She unknown)  



                                                  desires discharge           



                                                  home. Pain           



                                                  controlled at           



                                                  time                

 

           (unknown)  (no       (unknown)  (unknown)  household  (units    (unkn

own)



                    date)                         members:  unknown)  



                                                  friend(s)           

 

           (unknown)  (no       (unknown)  (unknown)  hydrocodone 5  (units    (

unknown)



                    date)                         mg-acetaminophen unknown)  



                                                  325 1 tab PO           



                                                  Q4-6H PRN pain           



                                                  #10 tabs 22           

 

           (unknown)  (no       (unknown)  (unknown)  hydrocodone  (units    (un

known)



                    date)                         would be  unknown)  



                                                  effective           



                                                  afterwards. We           



                                                  did agree for           



                                                  short course           

 

           (unknown)  (no       (unknown)  (unknown)  hydrocodone-acet  (units  

  (unknown)



                    date)                         aminophen 5-325 unknown)  



                                                  mg tablet           

 

           (unknown)  (no       (unknown)  (unknown)  hydromorphone 4  (units   

 (unknown)



                    date)                         mg tablet 4 mg PO unknown)  



                                                  Q4-6H PRN pain           



                                                  #20 tabs 09/23/22           

 

           (unknown)  (no       (unknown)  (unknown)  hydromorphone  (units    (

unknown)



                    date)                         [Dilaudid] 4 mg unknown)  



                                                  tablet              

 

           (unknown)  (no       (unknown)  (unknown)  ketorolac 10 mg  (units   

 (unknown)



                    date)                         tablet 10 mg PO unknown)  



                                                  Q6H PRN pain #14           



                                                  tabs 22           

 

           (unknown)  (no       (unknown)  (unknown)  ketorolac 10 mg  (units   

 (unknown)



                    date)                         tablet    unknown)  

 

           (unknown)  (no       (unknown)  (unknown)  mg tablet  (units    (unkn

own)



                    date)                                   unknown)  

 

           (unknown)  (no       (unknown)  (unknown)  nausea and  (units    (unk

nown)



                    date)                         vomiting. Pain unknown)  



                                                  radiates to the           



                                                  left lower back.           



                                                  Denies pregnancy.           

 

           (unknown)  (no       (unknown)  (unknown)  note provided.  (units    

(unknown)



                    date)                         She desires unknown)  



                                                  discharge home.           



                                                  Patient has have           



                                                  low risk            

 

           (unknown)  (no       (unknown)  (unknown)  of discharge.  (units    (

unknown)



                    date)                                   unknown)  

 

           (unknown)  (no       (unknown)  (unknown)  ondansetron 4 mg  (units  

  (unknown)



                    date)                         disintegrating 4 unknown)  



                                                  mg PO TID-QID PRN           



                                                  nausea and           



                                                  22            

 

           (unknown)  (no       (unknown)  (unknown)  ondansetron 4 mg  (units  

  (unknown)



                    date)                         tablet,disintegra unknown)  



                                                  ting                

 

           (unknown)  (no       (unknown)  (unknown)  ovaries. Normal  (units   

 (unknown)



                    date)                         color Doppler unknown)  



                                                  with                



                                                  arterial/venous           



                                                  spectral tracing           



                                                  of both             

 

           (unknown)  (no       (unknown)  (unknown)  ovaries.  (units    (unkno

wn)



                    date)                                   unknown)  

 

           (unknown)  (no       (unknown)  (unknown)  oxycodone 5 mg  (units    

(unknown)



                    date)                         tablet 5 mg PO unknown)  



                                                  Q4H PRN pain #20           



                                                  tabs 22           

 

           (unknown)  (no       (unknown)  (unknown)  oxycodone 5 mg  (units    

(unknown)



                    date)                         tablet    unknown)  

 

           (unknown)  (no       (unknown)  (unknown)  pain level. She  (units   

 (unknown)



                    date)                         is had Dilaudid unknown)  



                                                  in the past which           



                                                  did break her           



                                                  pain and home           

 

           (unknown)  (no       (unknown)  (unknown)  pain medication.  (units  

  (unknown)



                    date)                         Call Dr. Almodovar, unknown)  



                                                  your OBGYN doctor           



                                                  today for office           

 

           (unknown)  (no       (unknown)  (unknown)  pelvic    (units    (unkno

wn)



                    date)                         ultrasound. unknown)  



                                                  Patient states           



                                                  feels the same as           



                                                  past ovarian           



                                                  cyst.               

 

           (unknown)  (no       (unknown)  (unknown)  peritoneal signs  (units  

  (unknown)



                    date)                         bowel sounds unknown)  



                                                  present. No CVA           



                                                  tenderness           

 

           (unknown)  (no       (unknown)  (unknown)  prepack of  (units    (unk

nown)



                    date)                         hydrocodone. She unknown)  



                                                  will follow up           



                                                  with Dr. Almodovar           



                                                  for more            



                                                  definitive           

 

           (unknown)  (no       (unknown)  (unknown)  rales, or  (units    (unkn

own)



                    date)                         rhonchi.  unknown)  

 

           (unknown)  (no       (unknown)  (unknown)  relief in the  (units    (

unknown)



                    date)                         past      unknown)  

 

           (unknown)  (no       (unknown)  (unknown)  tablet vomiting  (units   

 (unknown)



                    date)                         #10 tabs  unknown)  

 

           (unknown)  (no       (unknown)  (unknown)  tobacco type:  (units    (

unknown)



                    date)                         vaping    unknown)  

 

           (unknown)  (no       (unknown)  (unknown)  treatment plan  (units    

(unknown)



                    date)                         for her ovarian unknown)  



                                                  cyst. Return           



                                                  precautions           



                                                  reviewed with           



                                                  her.                

 

           (unknown)  (no       (unknown)  (unknown)  tube on the  (units    (un

known)



                    date)                         right side due to unknown)  



                                                  tubo-ovarian           



                                                  abscess. Two           



                                                  years ago.           



                                                  Patient has           

 

           (unknown)  (no       (unknown)  (unknown)  tube on the  (units    (un

known)



                    date)                         right side due to unknown)  



                                                  tubo-ovarian           



                                                  abscess. Two           



                                                  years ago.           



                                                  Patient             

 

           (unknown)  (no       (unknown)  (unknown)  within the left  (units   

 (unknown)



                    date)                         adnexa. Doppler unknown)  



                                                  and spectral           



                                                  tracing within           



                                                  the bilateral           









                                         Result panel 569









           (unknown)  (no       (unknown)  (unknown)  (no value)  (units    (unk

nown)



                    date)                                   unknown)  

 

           (unknown)  (no       (unknown)  (unknown)  [id number]  (units    (unkno

wn)



                    date)                                   unknown)  

 

           (unknown)  (no       (unknown)  (unknown)  02/15/23  (units    (unkno

wn)



                    date)                                   unknown)  

 

           (unknown)  (no       (unknown)  (unknown)  14:13     (units    (unkno

wn)



                    date)                                   unknown)  

 

           (unknown)  (no       (unknown)  (unknown)  Acne (-2016)  (units    (u

nknown)



                    date)                                   unknown)  

 

           (unknown)  (no       (unknown)  (unknown)  Age/Sex: 17 / F  (units   

 (unknown)



                    date)                         Date of Service: unknown)  

 

           (unknown)  (no       (unknown)  (unknown)  Allergies  (units    (unkn

own)



                    date)                                   unknown)  

 

           (unknown)  (no       (unknown)  (unknown)  [place]  (units    (

unknown)



                    date)                         [id number]     unknown)  

 

           (unknown)  (no       (unknown)  (unknown)  Anesthesia  (units    (unk

nown)



                    date)                                   unknown)  

 

           (unknown)  (no       (unknown)  (unknown)  Attending Dr:  (units    (

unknown)



                    date)                         Jose Almodovar unknown)  



                                                  MD                  

 

           (unknown)  (no       (unknown)  (unknown)  BMI 28.7  (units    (unkno

wn)



                    date)                                   unknown)  

 

           (unknown)  (no       (unknown)  (unknown)  /68  (units    (unkn

own)



                    date)                                   unknown)  

 

           (unknown)  (no       (unknown)  (unknown)  Blood Pressure  (units    

(unknown)



                    date)                         Location Rt unknown)  



                                                  brachial            

 

           (unknown)  (no       (unknown)  (unknown)  Chlamydia  (units    (unkn

own)



                    date)                         infection () unknown)  

 

           (unknown)  (no       (unknown)  (unknown)  : 2005  (units   

 (unknown)



                    date)                         Acct:[id number] unknown)  

 

           (unknown)  (no       (unknown)  (unknown)  Dept at   (units    (unkno

wn)



                    date)                         [phone]. unknown)  

 

           (unknown)  (no       (unknown)  (unknown)  Documented By:  (units    

(unknown)



                    date)                         Jose Almodovar unknown)  



                                                  MD 02/15/23 1413           

 

           (unknown)  (no       (unknown)  (unknown)  Draft     (units    (unkno

wn)



                    date)                                   unknown)  

 

           (unknown)  (no       (unknown)  (unknown)  Endometriosis  (units    (

unknown)



                    date)                         ()   unknown)  

 

           (unknown)  (no       (unknown)  (unknown)  [facility]  (units   

 (unknown)



                    date)                         Associates unknown)  

 

           (unknown)  (no       (unknown)  (unknown)  Gynecology Visit  (units  

  (unknown)



                    date)                                   unknown)  

 

           (unknown)  (no       (unknown)  (unknown)  Heavy menstrual  (units   

 (unknown)



                    date)                         period (-) unknown)  

 

           (unknown)  (no       (unknown)  (unknown)  Height 5 ft 3 in  (units  

  (unknown)



                    date)                                   unknown)  

 

           (unknown)  (no       (unknown)  (unknown)  Intake Note:  (units    (u

nknown)



                    date)                                   unknown)  

 

           (unknown)  (no       (unknown)  (unknown)  Intake performed  (units  

  (unknown)



                    date)                         by: Harshal Gipson unknown)  

 

           (unknown)  (no       (unknown)  (unknown)  Intake    (units    (unkno

wn)



                    date)                                   unknown)  

 

           (unknown)  (no       (unknown)  (unknown)  Intake- Clincial  (units  

  (unknown)



                    date)                         Staff     unknown)  

 

           (unknown)  (no       (unknown)  (unknown)  Irregular  (units    (unkn

own)



                    date)                         menstrual cycle unknown)  



                                                  ()             

 

           (unknown)  (no       (unknown)  (unknown)  Last Menstural  (units    

(unknown)



                    date)                         Cycle + Details unknown)  

 

           (unknown)  (no       (unknown)  (unknown)  Loc: [facility]  (units    (unkno

wn)



                    date)                                   unknown)  

 

           (unknown)  (no       (unknown)  (unknown)  Lymphangioma  (units    (u

nknown)



                    date)                                   unknown)  

 

           (unknown)  (no       (unknown)  (unknown)  Medical History  (units   

 (unknown)



                    date)                         (Reviewed unknown)  



                                                  23 @ 02:02           



                                                  by Terrell Arnold MD)           

 

           (unknown)  (no       (unknown)  (unknown)  Opioids -  (units    (unkn

own)



                    date)                         Morphine  unknown)  



                                                  Analogues Allergy           



                                                  (Intermediate,           



                                                  Verified 22           



                                                  14:51)              

 

           (unknown)  (no       (unknown)  (unknown)  Other Menstrual  (units   

 (unknown)



                    date)                         Period: Other unknown)  

 

           (unknown)  (no       (unknown)  (unknown)  Ovarian cyst  (units    (u

nknown)



                    date)                                   unknown)  

 

           (unknown)  (no       (unknown)  (unknown)  PFSH      (units    (unkno

wn)



                    date)                                   unknown)  

 

           (unknown)  (no       (unknown)  (unknown)  Painful   (units    (unkno

wn)



                    date)                         menstrual periods unknown)  



                                                  ()             

 

           (unknown)  (no       (unknown)  (unknown)  Patient:  (units    (unkno

wn)



                    date)                         Lesvia Aguero unknown)  



                                                  MR#: [id number]             

 

           (unknown)  (no       (unknown)  (unknown)  Position Sitting  (units  

  (unknown)



                    date)                                   unknown)  

 

           (unknown)  (no       (unknown)  (unknown)  Pt here for ED  (units    

(unknown)



                    date)                         follow-up and unknown)  



                                                  positive            



                                                  pregnancy test           

 

           (unknown)  (no       (unknown)  (unknown)  Reason For Visit  (units  

  (unknown)



                    date)                                   unknown)  

 

           (unknown)  (no       (unknown)  (unknown)  S/P ACL   (units    (unkno

wn)



                    date)                         reconstruction unknown)  



                                                  (-21)           

 

           (unknown)  (no       (unknown)  (unknown)  Signed By:  (units    (unk

nown)



                    date)                                   unknown)  

 

           (unknown)  (no       (unknown)  (unknown)  Smoking Status:  (units   

 (unknown)



                    date)                         Current some day unknown)  



                                                  smoker              

 

           (unknown)  (no       (unknown)  (unknown)  Social History  (units    

(unknown)



                    date)                                   unknown)  

 

           (unknown)  (no       (unknown)  (unknown)  Surgical History  (units  

  (unknown)



                    date)                         (Reviewed unknown)  



                                                  23 @ 02:02           



                                                  by Terrell Arnold MD)           

 

           (unknown)  (no       (unknown)  (unknown)  TOA       (units    (unkno

wn)



                    date)                         (tubo-ovarian unknown)  



                                                  abscess)            



                                                  ()           

 

           (unknown)  (no       (unknown)  (unknown)  This note may  (units    (

unknown)



                    date)                         have been all or unknown)  



                                                  partially           



                                                  generated using           



                                                  voice recognition           

 

           (unknown)  (no       (unknown)  (unknown)  Tobacco +  (units    (unkn

own)



                    date)                         Substance Use unknown)  

 

           (unknown)  (no       (unknown)  (unknown)  Tobacco Status  (units    

(unknown)



                    date)                                   unknown)  

 

           (unknown)  (no       (unknown)  (unknown)  Visit Reasons:  (units    

(unknown)



                    date)                         IH ER F/U, unknown)  



                                                  bleeding and           



                                                  postive pregnacy           



                                                  test                

 

           (unknown)  (no       (unknown)  (unknown)  Vitals    (units    (unkno

wn)



                    date)                                   unknown)  

 

           (unknown)  (no       (unknown)  (unknown)  Weight 162 lb  (units    (

unknown)



                    date)                                   unknown)  

 

           (unknown)  (no       (unknown)  (unknown)  alcohol intake:  (units   

 (unknown)



                    date)                         current   unknown)  

 

           (unknown)  (no       (unknown)  (unknown)  doxycycline  (units    (un

known)



                    date)                         Adverse Reaction unknown)  



                                                  (Intermediate,           



                                                  Verified 22           



                                                  14:51)              

 

           (unknown)  (no       (unknown)  (unknown)  have occurred.  (units    

(unknown)



                    date)                         If there are any unknown)  



                                                  questions, please           



                                                  contact the           



                                                  Medical Records           

 

           (unknown)  (no       (unknown)  (unknown)  household  (units    (unkn

own)



                    date)                         members:  unknown)  



                                                  friend(s)           

 

           (unknown)  (no       (unknown)  (unknown)  may occur.  (units    (unk

nown)



                    date)                         Occasional unknown)  



                                                  wrong-word or           



                                                  'sound-alike'           



                                                  substitutions may           



                                                  have                

 

           (unknown)  (no       (unknown)  (unknown)  occurred due to  (units   

 (unknown)



                    date)                         the inherent unknown)  



                                                  limitations of           



                                                  voice recognition           



                                                  software. Please           

 

           (unknown)  (no       (unknown)  (unknown)  rash      (units    (unkno

wn)



                    date)                                   unknown)  

 

           (unknown)  (no       (unknown)  (unknown)  read the note  (units    (

unknown)



                    date)                         carefully and unknown)  



                                                  recognize, using           



                                                  context, where           



                                                  these               



                                                  substitutions           

 

           (unknown)  (no       (unknown)  (unknown)  software.  (units    (unkn

own)



                    date)                         Although every unknown)  



                                                  effort is made to           



                                                  edit content,           



                                                  transcription           



                                                  errors              

 

           (unknown)  (no       (unknown)  (unknown)  vomitt    (units    (unkno

wn)



                    date)                                   unknown)  









                                         Result panel 570









           (unknown)  (no       (unknown)  (unknown)  (no value)  (units    (unk

nown)



                    date)                                   unknown)  

 

           (unknown)  (no       (unknown)  (unknown)  [id number]  (units    (unkno

wn)



                    date)                                   unknown)  

 

           (unknown)  (no       (unknown)  (unknown)  02/15/23  (units    (unkno

wn)



                    date)                                   unknown)  

 

           (unknown)  (no       (unknown)  (unknown)  14:13     (units    (unkno

wn)



                    date)                                   unknown)  

 

           (unknown)  (no       (unknown)  (unknown)  Acne (-)  (units    (u

nknown)



                    date)                                   unknown)  

 

           (unknown)  (no       (unknown)  (unknown)  Age/Sex: 17 / F  (units   

 (unknown)



                    date)                         Date of Service: unknown)  

 

           (unknown)  (no       (unknown)  (unknown)  Allergies  (units    (unkn

own)



                    date)                                   unknown)  

 

           (unknown)  (no       (unknown)  (unknown)  [place]  (units    (

unknown)



                    date)                         [id number]     unknown)  

 

           (unknown)  (no       (unknown)  (unknown)  Anesthesia  (units    (unk

nown)



                    date)                                   unknown)  

 

           (unknown)  (no       (unknown)  (unknown)  Assessment +  (units    (u

nknown)



                    date)                         Plan      unknown)  

 

           (unknown)  (no       (unknown)  (unknown)  Attending Dr:  (units    (

unknown)



                    date)                         Jose Almodovar unknown)  



                                                  MD                  

 

           (unknown)  (no       (unknown)  (unknown)  BMI 28.7  (units    (unkno

wn)



                    date)                                   unknown)  

 

           (unknown)  (no       (unknown)  (unknown)  /68  (units    (unkn

own)



                    date)                                   unknown)  

 

           (unknown)  (no       (unknown)  (unknown)  Blood Pressure  (units    

(unknown)



                    date)                         Location Rt unknown)  



                                                  brachial            

 

           (unknown)  (no       (unknown)  (unknown)  Chlamydia  (units    (unkn

own)



                    date)                         infection () unknown)  

 

           (unknown)  (no       (unknown)  (unknown)  : 2005  (units   

 (unknown)



                    date)                         Acct:[id number] unknown)  

 

           (unknown)  (no       (unknown)  (unknown)  Dept at   (units    (unkno

wn)



                    date)                         [phone]. unknown)  

 

           (unknown)  (no       (unknown)  (unknown)  Documented By:  (units    

(unknown)



                    date)                         Jose Almodovar unknown)  



                                                  MD 02/15/23 1413           

 

           (unknown)  (no       (unknown)  (unknown)  Draft     (units    (unkno

wn)



                    date)                                   unknown)  

 

           (unknown)  (no       (unknown)  (unknown)  Endometriosis  (units    (

unknown)



                    date)                         ()   unknown)  

 

           (unknown)  (no       (unknown)  (unknown)  [facility]  (units   

 (unknown)



                    date)                         Associates unknown)  

 

           (unknown)  (no       (unknown)  (unknown)  Gynecology Visit  (units  

  (unknown)



                    date)                                   unknown)  

 

           (unknown)  (no       (unknown)  (unknown)  Heavy menstrual  (units   

 (unknown)



                    date)                         period () unknown)  

 

           (unknown)  (no       (unknown)  (unknown)  Height 5 ft 3 in  (units  

  (unknown)



                    date)                                   unknown)  

 

           (unknown)  (no       (unknown)  (unknown)  Intake Note:  (units    (u

nknown)



                    date)                                   unknown)  

 

           (unknown)  (no       (unknown)  (unknown)  Intake performed  (units  

  (unknown)



                    date)                         by: Harshal Gipson unknown)  

 

           (unknown)  (no       (unknown)  (unknown)  Intake    (units    (unkno

wn)



                    date)                                   unknown)  

 

           (unknown)  (no       (unknown)  (unknown)  Intake- Clincial  (units  

  (unknown)



                    date)                         Staff     unknown)  

 

           (unknown)  (no       (unknown)  (unknown)  Irregular  (units    (unkn

own)



                    date)                         menstrual cycle unknown)  



                                                  ()             

 

           (unknown)  (no       (unknown)  (unknown)  Last Menstural  (units    

(unknown)



                    date)                         Cycle + Details unknown)  

 

           (unknown)  (no       (unknown)  (unknown)  Loc: [facility]  (units    (unkno

wn)



                    date)                                   unknown)  

 

           (unknown)  (no       (unknown)  (unknown)  Lymphangioma  (units    (u

nknown)



                    date)                                   unknown)  

 

           (unknown)  (no       (unknown)  (unknown)  Medical History  (units   

 (unknown)



                    date)                         (Reviewed unknown)  



                                                  23 @ 02:02           



                                                  by Terrell Arnold MD)           

 

           (unknown)  (no       (unknown)  (unknown)  Opioids -  (units    (unkn

own)



                    date)                         Morphine  unknown)  



                                                  Analogues Allergy           



                                                  (Intermediate,           



                                                  Verified 22           



                                                  14:51)              

 

           (unknown)  (no       (unknown)  (unknown)  Orders    (units    (unkno

wn)



                    date)                                   unknown)  

 

           (unknown)  (no       (unknown)  (unknown)  Orders:   (units    (unkno

wn)



                    date)                                   unknown)  

 

           (unknown)  (no       (unknown)  (unknown)  Other Menstrual  (units   

 (unknown)



                    date)                         Period: Other unknown)  

 

           (unknown)  (no       (unknown)  (unknown)  Ovarian cyst  (units    (u

nknown)



                    date)                                   unknown)  

 

           (unknown)  (no       (unknown)  (unknown)  PFSH      (units    (unkno

wn)



                    date)                                   unknown)  

 

           (unknown)  (no       (unknown)  (unknown)  POC PREG  (units    (unkno

wn)



                    date)                                   unknown)  

 

           (unknown)  (no       (unknown)  (unknown)  POC Preg Test  (units    (

unknown)



                    date)                         Negative Last unknown)  



                                                  Edit by Harshal Gipson MA on           



                                                  02/15/23 14:25           

 

           (unknown)  (no       (unknown)  (unknown)  POC Urine  (units    (unkn

own)



                    date)                         Pregnancy Test unknown)  



                                                  Today R10.2 -           



                                                  Pelvic and           



                                                  perineal pain           

 

           (unknown)  (no       (unknown)  (unknown)  Painful   (units    (unkno

wn)



                    date)                         menstrual periods unknown)  



                                                  ()             

 

           (unknown)  (no       (unknown)  (unknown)  Patient:  (units    (unkno

wn)



                    date)                         Lesvia Aguero E unknown)  



                                                  MR#: [id number]             

 

           (unknown)  (no       (unknown)  (unknown)  Position Sitting  (units  

  (unknown)



                    date)                                   unknown)  

 

           (unknown)  (no       (unknown)  (unknown)  Preg Expiration  (units   

 (unknown)



                    date)                         [id number] Last Edit unknown)  



                                                  by Harshal Gipson MA on 02/15/23           



                                                  14:25               

 

           (unknown)  (no       (unknown)  (unknown)  Preg Lot #  (units    (unk

nown)



                    date)                         [id number] Last unknown)  



                                                  Edit by Harshal Gipson MA on           



                                                  02/15/23 14:25           

 

           (unknown)  (no       (unknown)  (unknown)  Preg QC   (units    (unkno

wn)



                    date)                         Acceptable? Yes unknown)  



                                                  Last Edit by           



                                                  Harshal Gipson MA           



                                                  on 02/15/23 14:25           

 

           (unknown)  (no       (unknown)  (unknown)  Pt here for ED  (units    

(unknown)



                    date)                         follow-up and unknown)  



                                                  positive            



                                                  pregnancy test           

 

           (unknown)  (no       (unknown)  (unknown)  Reason For Visit  (units  

  (unknown)



                    date)                                   unknown)  

 

           (unknown)  (no       (unknown)  (unknown)  Results   (units    (unkno

wn)



                    date)                                   unknown)  

 

           (unknown)  (no       (unknown)  (unknown)  S/P ACL   (units    (unkno

wn)



                    date)                         reconstruction unknown)  



                                                  ()           

 

           (unknown)  (no       (unknown)  (unknown)  Signed By:  (units    (unk

nown)



                    date)                                   unknown)  

 

           (unknown)  (no       (unknown)  (unknown)  Smoking Status:  (units   

 (unknown)



                    date)                         Current some day unknown)  



                                                  smoker              

 

           (unknown)  (no       (unknown)  (unknown)  Social History  (units    

(unknown)



                    date)                                   unknown)  

 

           (unknown)  (no       (unknown)  (unknown)  Surgical History  (units  

  (unknown)



                    date)                         (Reviewed unknown)  



                                                  23 @ 02:02           



                                                  by Terrell Arnold MD)           

 

           (unknown)  (no       (unknown)  (unknown)  TOA       (units    (unkno

wn)



                    date)                         (tubo-ovarian unknown)  



                                                  abscess)            



                                                  ()           

 

           (unknown)  (no       (unknown)  (unknown)  This note may  (units    (

unknown)



                    date)                         have been all or unknown)  



                                                  partially           



                                                  generated using           



                                                  voice recognition           

 

           (unknown)  (no       (unknown)  (unknown)  Tobacco +  (units    (unkn

own)



                    date)                         Substance Use unknown)  

 

           (unknown)  (no       (unknown)  (unknown)  Tobacco Status  (units    

(unknown)



                    date)                                   unknown)  

 

           (unknown)  (no       (unknown)  (unknown)  Visit Reasons:  (units    

(unknown)



                    date)                         IH ER F/U, unknown)  



                                                  bleeding and           



                                                  postive pregnacy           



                                                  test                

 

           (unknown)  (no       (unknown)  (unknown)  Vitals    (units    (unkno

wn)



                    date)                                   unknown)  

 

           (unknown)  (no       (unknown)  (unknown)  Weight 162 lb  (units    (

unknown)



                    date)                                   unknown)  

 

           (unknown)  (no       (unknown)  (unknown)  alcohol intake:  (units   

 (unknown)



                    date)                         current   unknown)  

 

           (unknown)  (no       (unknown)  (unknown)  doxycycline  (units    (un

known)



                    date)                         Adverse Reaction unknown)  



                                                  (Intermediate,           



                                                  Verified 22           



                                                  14:51)              

 

           (unknown)  (no       (unknown)  (unknown)  have occurred.  (units    

(unknown)



                    date)                         If there are any unknown)  



                                                  questions, please           



                                                  contact the           



                                                  Medical Records           

 

           (unknown)  (no       (unknown)  (unknown)  household  (units    (unkn

own)



                    date)                         members:  unknown)  



                                                  friend(s)           

 

           (unknown)  (no       (unknown)  (unknown)  may occur.  (units    (unk

nown)



                    date)                         Occasional unknown)  



                                                  wrong-word or           



                                                  'sound-alike'           



                                                  substitutions may           



                                                  have                

 

           (unknown)  (no       (unknown)  (unknown)  occurred due to  (units   

 (unknown)



                    date)                         the inherent unknown)  



                                                  limitations of           



                                                  voice recognition           



                                                  software. Please           

 

           (unknown)  (no       (unknown)  (unknown)  rash      (units    (unkno

wn)



                    date)                                   unknown)  

 

           (unknown)  (no       (unknown)  (unknown)  read the note  (units    (

unknown)



                    date)                         carefully and unknown)  



                                                  recognize, using           



                                                  context, where           



                                                  these               



                                                  substitutions           

 

           (unknown)  (no       (unknown)  (unknown)  software.  (units    (unkn

own)



                    date)                         Although every unknown)  



                                                  effort is made to           



                                                  edit content,           



                                                  transcription           



                                                  errors              

 

           (unknown)  (no       (unknown)  (unknown)  vomitt    (units    (unkno

wn)



                    date)                                   unknown)  









                                         Result panel [id number]









           (unknown)  (no       (unknown)  (unknown)  (no value)  (units    (unk

nown)



                    date)                                   unknown)  

 

           (unknown)  (no       (unknown)  (unknown)  [id number]  (units    (unkno

wn)



                    date)                                   unknown)  

 

           (unknown)  (no       (unknown)  (unknown)  02/15/23  (units    (unkno

wn)



                    date)                                   unknown)  

 

           (unknown)  (no       (unknown)  (unknown)  14:13     (units    (unkno

wn)



                    date)                                   unknown)  

 

           (unknown)  (no       (unknown)  (unknown)  Acne (-2016)  (units    (u

nknown)



                    date)                                   unknown)  

 

           (unknown)  (no       (unknown)  (unknown)  Age/Sex: 17 / F  (units   

 (unknown)



                    date)                         Date of Service: unknown)  

 

           (unknown)  (no       (unknown)  (unknown)  Allergies  (units    (unkn

own)



                    date)                                   unknown)  

 

           (unknown)  (no       (unknown)  (unknown)  [place]  (units    (

unknown)



                    date)                         [id number]     unknown)  

 

           (unknown)  (no       (unknown)  (unknown)  Anesthesia  (units    (unk

nown)



                    date)                                   unknown)  

 

           (unknown)  (no       (unknown)  (unknown)  Assessment +  (units    (u

nknown)



                    date)                         Plan      unknown)  

 

           (unknown)  (no       (unknown)  (unknown)  Attending Dr:  (units    (

unknown)



                    date)                         Jose Almodovar unknown)  



                                                  MD                  

 

           (unknown)  (no       (unknown)  (unknown)  BMI 28.7  (units    (unkno

wn)



                    date)                                   unknown)  

 

           (unknown)  (no       (unknown)  (unknown)  /68  (units    (unkn

own)



                    date)                                   unknown)  

 

           (unknown)  (no       (unknown)  (unknown)  Blood Pressure  (units    

(unknown)



                    date)                         Location Rt unknown)  



                                                  brachial            

 

           (unknown)  (no       (unknown)  (unknown)  Chief Complaint  (units   

 (unknown)



                    date)                                   unknown)  

 

           (unknown)  (no       (unknown)  (unknown)  Chief Complaint:  (units  

  (unknown)



                    date)                         LLQ/pelvic pain, unknown)  



                                                  abnormal uterine           



                                                  bleeding, + hCG           



                                                  at home             

 

           (unknown)  (no       (unknown)  (unknown)  Chlamydia  (units    (unkn

own)



                    date)                         infection () unknown)  

 

           (unknown)  (no       (unknown)  (unknown)  : 2005  (units   

 (unknown)



                    date)                         Acct:[id number] unknown)  

 

           (unknown)  (no       (unknown)  (unknown)  Dept at   (units    (unkno

wn)



                    date)                         [phone]. unknown)  

 

           (unknown)  (no       (unknown)  (unknown)  Details:  (units    (unkno

wn)



                    date)                                   unknown)  

 

           (unknown)  (no       (unknown)  (unknown)  Documented By:  (units    

(unknown)



                    date)                         Jose Almodovar unknown)  



                                                  MD 02/15/23 1413           

 

           (unknown)  (no       (unknown)  (unknown)  [employer]     (units    (unkno

wn)



                    date)                                   unknown)  

 

           (unknown)  (no       (unknown)  (unknown)  Endometriosis  (units    (

unknown)



                    date)                         ()   unknown)  

 

           (unknown)  (no       (unknown)  (unknown)  [facility]  (units   

 (unknown)



                    date)                         [facility] unknown)  

 

           (unknown)  (no       (unknown)  (unknown)  Lesvia is a  (units    (unk

nown)



                    date)                         17-year-old unknown)  



                                                  nulligravida who           



                                                  returns today for           



                                                  evaluation with a           



                                                  3                   

 

           (unknown)  (no       (unknown)  (unknown)  Gynecology Visit  (units  

  (unknown)



                    date)                                   unknown)  

 

           (unknown)  (no       (unknown)  (unknown)  HPI       (units    (unkno

wn)



                    date)                                   unknown)  

 

           (unknown)  (no       (unknown)  (unknown)  Heavy menstrual  (units   

 (unknown)



                    date)                         period (-2019) unknown)  

 

           (unknown)  (no       (unknown)  (unknown)  Height 5 ft 3 in  (units  

  (unknown)



                    date)                                   unknown)  

 

           (unknown)  (no       (unknown)  (unknown)  Intake Note:  (units    (u

nknown)



                    date)                                   unknown)  

 

           (unknown)  (no       (unknown)  (unknown)  Intake performed  (units  

  (unknown)



                    date)                         by: Harshal Gipson unknown)  

 

           (unknown)  (no       (unknown)  (unknown)  Intake    (units    (unkno

wn)



                    date)                                   unknown)  

 

           (unknown)  (no       (unknown)  (unknown)  Intake- Clincial  (units  

  (unknown)



                    date)                         Staff     unknown)  

 

           (unknown)  (no       (unknown)  (unknown)  Irregular  (units    (unkn

own)



                    date)                         menstrual cycle unknown)  



                                                  ()             

 

           (unknown)  (no       (unknown)  (unknown)  Last Menstural  (units    

(unknown)



                    date)                         Cycle + Details unknown)  

 

           (unknown)  (no       (unknown)  (unknown)  Loc: [facility]  (units    (unkno

wn)



                    date)                                   unknown)  

 

           (unknown)  (no       (unknown)  (unknown)  Lymphangioma  (units    (u

nknown)



                    date)                                   unknown)  

 

           (unknown)  (no       (unknown)  (unknown)  Medical History  (units   

 (unknown)



                    date)                         (Reviewed unknown)  



                                                  23 @ 02:02           



                                                  by Terrell Arnold MD)           

 

           (unknown)  (no       (unknown)  (unknown)  Opioids -  (units    (unkn

own)



                    date)                         Morphine  unknown)  



                                                  Analogues Allergy           



                                                  (Intermediate,           



                                                  Verified 22           



                                                  14:51)              

 

           (unknown)  (no       (unknown)  (unknown)  Orders    (units    (unkno

wn)



                    date)                                   unknown)  

 

           (unknown)  (no       (unknown)  (unknown)  Orders:   (units    (unkno

wn)



                    date)                                   unknown)  

 

           (unknown)  (no       (unknown)  (unknown)  Other Menstrual  (units   

 (unknown)



                    date)                         Period: Other unknown)  

 

           (unknown)  (no       (unknown)  (unknown)  Ovarian cyst  (units    (u

nknown)



                    date)                                   unknown)  

 

           (unknown)  (no       (unknown)  (unknown)  PFSH      (units    (unkno

wn)



                    date)                                   unknown)  

 

           (unknown)  (no       (unknown)  (unknown)  POC PREG  (units    (unkno

wn)



                    date)                                   unknown)  

 

           (unknown)  (no       (unknown)  (unknown)  POC Preg Test  (units    (

unknown)



                    date)                         Negative Last unknown)  



                                                  Edit by Harshal Gipson MA on           



                                                  02/15/23 14:25           

 

           (unknown)  (no       (unknown)  (unknown)  POC Urine  (units    (unkn

own)



                    date)                         Pregnancy Test unknown)  



                                                  Today R10.2 -           



                                                  Pelvic and           



                                                  perineal pain           

 

           (unknown)  (no       (unknown)  (unknown)  Painful   (units    (unkno

wn)



                    date)                         menstrual periods unknown)  



                                                  ()             

 

           (unknown)  (no       (unknown)  (unknown)  Patient:  (units    (unkno

wn)



                    date)                         Lesvia Aguero E unknown)  



                                                  MR#: [id number]             

 

           (unknown)  (no       (unknown)  (unknown)  Position Sitting  (units  

  (unknown)



                    date)                                   unknown)  

 

           (unknown)  (no       (unknown)  (unknown)  Preg Expiration  (units   

 (unknown)



                    date)                         [id number] Last Edit unknown)  



                                                  by Harshal Gipson MA on 02/15/23           



                                                  14:25               

 

           (unknown)  (no       (unknown)  (unknown)  Preg Lot #  (units    (unk

nown)



                    date)                         [id number] Last unknown)  



                                                  Edit by Harshal Gipson MA on           



                                                  02/15/23 14:25           

 

           (unknown)  (no       (unknown)  (unknown)  Preg QC   (units    (unkno

wn)



                    date)                         Acceptable? Yes unknown)  



                                                  Last Edit by           



                                                  Harshal Gipson MA           



                                                  on 02/15/23 14:25           

 

           (unknown)  (no       (unknown)  (unknown)  Pt here for ED  (units    

(unknown)



                    date)                         follow-up and unknown)  



                                                  positive            



                                                  pregnancy test           

 

           (unknown)  (no       (unknown)  (unknown)  Reason For Visit  (units  

  (unknown)



                    date)                                   unknown)  

 

           (unknown)  (no       (unknown)  (unknown)  Results   (units    (unkno

wn)



                    date)                                   unknown)  

 

           (unknown)  (no       (unknown)  (unknown)  S/P ACL   (units    (unkno

wn)



                    date)                         reconstruction unknown)  



                                                  (-21)           

 

           (unknown)  (no       (unknown)  (unknown)  Signed By:  (units    (unk

nown)



                    date)                                   unknown)  

 

           (unknown)  (no       (unknown)  (unknown)  Smoking Status:  (units   

 (unknown)



                    date)                         Current some day unknown)  



                                                  smoker              

 

           (unknown)  (no       (unknown)  (unknown)  Social History  (units    

(unknown)



                    date)                                   unknown)  

 

           (unknown)  (no       (unknown)  (unknown)  Surgical History  (units  

  (unknown)



                    date)                         (Reviewed unknown)  



                                                  23 @ 02:02           



                                                  by Terrell Arnold MD)           

 

           (unknown)  (no       (unknown)  (unknown)  TOA       (units    (unkno

wn)



                    date)                         (tubo-ovarian unknown)  



                                                  abscess)            



                                                  (-21)           

 

           (unknown)  (no       (unknown)  (unknown)  This note may  (units    (

unknown)



                    date)                         have been all or unknown)  



                                                  partially           



                                                  generated using           



                                                  voice recognition           

 

           (unknown)  (no       (unknown)  (unknown)  Tobacco +  (units    (unkn

own)



                    date)                         Substance Use unknown)  

 

           (unknown)  (no       (unknown)  (unknown)  Tobacco Status  (units    

(unknown)



                    date)                                   unknown)  

 

           (unknown)  (no       (unknown)  (unknown)  Visit Reasons:  (units    

(unknown)



                    date)                         IH ER F/U, unknown)  



                                                  bleeding and           



                                                  postive pregnacy           



                                                  test                

 

           (unknown)  (no       (unknown)  (unknown)  Vitals    (units    (unkno

wn)



                    date)                                   unknown)  

 

           (unknown)  (no       (unknown)  (unknown)  Weight 162 lb  (units    (

unknown)



                    date)                                   unknown)  

 

           (unknown)  (no       (unknown)  (unknown)  alcohol intake:  (units   

 (unknown)



                    date)                         current   unknown)  

 

           (unknown)  (no       (unknown)  (unknown) and half month  (units    (

unknown)



                    date)                         history of unknown)  



                                                  persistent left           



                                                  lower quadrant           



                                                  pain which is           



                                                  sharp and           

 

           (unknown)  (no       (unknown)  (unknown)  doxycycline  (units    (un

known)



                    date)                         Adverse Reaction unknown)  



                                                  (Intermediate,           



                                                  Verified 22           



                                                  14:51)              

 

           (unknown)  (no       (unknown)  (unknown)  have occurred.  (units    

(unknown)



                    date)                         If there are any unknown)  



                                                  questions, please           



                                                  contact the           



                                                  Medical Records           

 

           (unknown)  (no       (unknown)  (unknown)  household  (units    (unkn

own)



                    date)                         members:  unknown)  



                                                  friend(s)           

 

           (unknown)  (no       (unknown)  (unknown)  may occur.  (units    (unk

nown)



                    date)                         Occasional unknown)  



                                                  wrong-word or           



                                                  'sound-alike'           



                                                  substitutions may           



                                                  have                

 

           (unknown)  (no       (unknown)  (unknown)  now       (units    (unkno

wn)



                    date)                                   unknown)  

 

           (unknown)  (no       (unknown)  (unknown)  occurred due to  (units   

 (unknown)



                    date)                         the inherent unknown)  



                                                  limitations of           



                                                  voice recognition           



                                                  software. Please           

 

           (unknown)  (no       (unknown)  (unknown)  rash      (units    (unkno

wn)



                    date)                                   unknown)  

 

           (unknown)  (no       (unknown)  (unknown)  read the note  (units    (

unknown)



                    date)                         carefully and unknown)  



                                                  recognize, using           



                                                  context, where           



                                                  these               



                                                  substitutions           

 

           (unknown)  (no       (unknown)  (unknown)  she is been  (units    (un

known)



                    date)                         bleeding for the unknown)  



                                                  last 2 weeks and           



                                                  her pain has been           



                                                  worse during           

 

           (unknown)  (no       (unknown)  (unknown)  software.  (units    (unkn

own)



                    date)                         Although every unknown)  



                                                  effort is made to           



                                                  edit content,           



                                                  transcription           



                                                  errors              

 

           (unknown)  (no       (unknown)  (unknown)  stabbing, and  (units    (

unknown)



                    date)                         intermittent. It unknown)  



                                                  does not seem to           



                                                  be related to her           



                                                  cycles but           

 

           (unknown)  (no       (unknown)  (unknown)  that period of  (units    

(unknown)



                    date)                         time. Her changed unknown)  



                                                  so entered FIGO           



                                                  whenever you           



                                                  dosage of earlier           

 

           (unknown)  (no       (unknown)  (unknown)  vomitt    (units    (unkno

wn)



                    date)                                   unknown)  









                                         Result panel [id number]









           (unknown)  (no       (unknown)  (unknown)  (no value)  (units    (unk

nown)



                    date)                                   unknown)  

 

           (unknown)  (no       (unknown)  (unknown)  [id number]  (units    (unkno

wn)



                    date)                                   unknown)  

 

           (unknown)  (no       (unknown)  (unknown)  02/15/23  (units    (unkno

wn)



                    date)                                   unknown)  

 

           (unknown)  (no       (unknown)  (unknown)  14:13     (units    (unkno

wn)



                    date)                                   unknown)  

 

           (unknown)  (no       (unknown)  (unknown)  Acne (-2016)  (units    (u

nknown)



                    date)                                   unknown)  

 

           (unknown)  (no       (unknown)  (unknown)  Age/Sex: 17 / F  (units   

 (unknown)



                    date)                         Date of Service: unknown)  

 

           (unknown)  (no       (unknown)  (unknown)  Allergies  (units    (unkn

own)



                    date)                                   unknown)  

 

           (unknown)  (no       (unknown)  (unknown)  [place]  (units    (

unknown)



                    date)                         [id number]     unknown)  

 

           (unknown)  (no       (unknown)  (unknown)  Anesthesia  (units    (unk

nown)



                    date)                                   unknown)  

 

           (unknown)  (no       (unknown)  (unknown)  Assessment + Plan  (units 

   (unknown)



                    date)                                   unknown)  

 

           (unknown)  (no       (unknown)  (unknown)  Attending Dr:  (units    (

unknown)



                    date)                         Jose Almodovar MD unknown)  

 

           (unknown)  (no       (unknown)  (unknown)  BMI 28.7  (units    (unkno

wn)



                    date)                                   unknown)  

 

           (unknown)  (no       (unknown)  (unknown)  /68  (units    (unkn

own)



                    date)                                   unknown)  

 

           (unknown)  (no       (unknown)  (unknown)  Blood Pressure  (units    

(unknown)



                    date)                         Location Rt unknown)  



                                                  brachial            

 

           (unknown)  (no       (unknown)  (unknown)  Chief Complaint  (units   

 (unknown)



                    date)                                   unknown)  

 

           (unknown)  (no       (unknown)  (unknown)  Chief Complaint:  (units  

  (unknown)



                    date)                         LLQ/pelvic pain, unknown)  



                                                  abnormal uterine           



                                                  bleeding, + hCG at           



                                                  home                

 

           (unknown)  (no       (unknown)  (unknown)  Chlamydia  (units    (unkn

own)



                    date)                         infection () unknown)  

 

           (unknown)  (no       (unknown)  (unknown)  Chlamydia/Gonoc/M  (units 

   (unknown)



                    date)                         yco Genital Today unknown)  



                                                  N89.8 - Other           



                                                  specified           



                                                  noninflammatory           

 

           (unknown)  (no       (unknown)  (unknown)  : 2005  (units   

 (unknown)



                    date)                         Acct:[id number] unknown)  

 

           (unknown)  (no       (unknown)  (unknown)  Dept at   (units    (unkno

wn)



                    date)                         [phone]. unknown)  

 

           (unknown)  (no       (unknown)  (unknown)  Details:  (units    (unkno

wn)



                    date)                                   unknown)  

 

           (unknown)  (no       (unknown)  (unknown)  Documented By:  (units    

(unknown)



                    date)                         Jose Almodovar MD unknown)  



                                                  02/15/23 1413           

 

           (unknown)  (no       (unknown)  (unknown)  [employer]     (units    (unkno

wn)



                    date)                                   unknown)  

 

           (unknown)  (no       (unknown)  (unknown)  Endometriosis  (units    (

unknown)



                    date)                         ()   unknown)  

 

           (unknown)  (no       (unknown)  (unknown)  [facility]  (units   

 (unknown)



                    date)                         [facility] unknown)  

 

           (unknown)  (no       (unknown)  (unknown)  [facility]  (units  

  (unknown)



                    date)                         ED and according unknown)  



                                                  to the patient an           



                                                  hCG was done in           



                                                  January             

 

           (unknown)  (no       (unknown)  (unknown)  Lesvia is a  (units    (unk

nown)



                    date)                         17-year-old unknown)  



                                                  nulligravida who           



                                                  returns today for           



                                                  evaluation with a           



                                                  3                   

 

           (unknown)  (no       (unknown)  (unknown)  Gynecology Visit  (units  

  (unknown)



                    date)                                   unknown)  

 

           (unknown)  (no       (unknown)  (unknown)  HPI       (units    (unkno

wn)



                    date)                                   unknown)  

 

           (unknown)  (no       (unknown)  (unknown)  Heavy menstrual  (units   

 (unknown)



                    date)                         period (-) unknown)  

 

           (unknown)  (no       (unknown)  (unknown)  Height 5 ft 3 in  (units  

  (unknown)



                    date)                                   unknown)  

 

           (unknown)  (no       (unknown)  (unknown)  Intake Note:  (units    (u

nknown)



                    date)                                   unknown)  

 

           (unknown)  (no       (unknown)  (unknown)  Intake performed  (units  

  (unknown)



                    date)                         by: Harshal Gipson unknown)  

 

           (unknown)  (no       (unknown)  (unknown)  Intake    (units    (unkno

wn)



                    date)                                   unknown)  

 

           (unknown)  (no       (unknown)  (unknown)  Intake- Clincial  (units  

  (unknown)



                    date)                         Staff     unknown)  

 

           (unknown)  (no       (unknown)  (unknown)  Irregular  (units    (unkn

own)



                    date)                         menstrual cycle unknown)  



                                                  ()             

 

           (unknown)  (no       (unknown)  (unknown)  Last Menstural  (units    

(unknown)



                    date)                         Cycle + Details unknown)  

 

           (unknown)  (no       (unknown)  (unknown)  Loc: [facility]  (units    (unkno

wn)



                    date)                                   unknown)  

 

           (unknown)  (no       (unknown)  (unknown)  Lymphangioma  (units    (u

nknown)



                    date)                                   unknown)  

 

           (unknown)  (no       (unknown)  (unknown)  Medical History  (units   

 (unknown)



                    date)                         (Reviewed 23 unknown)  



                                                  @ 02:02 by Terrell Arnold MD)           

 

           (unknown)  (no       (unknown)  (unknown)  Opioids -  (units    (unkn

own)



                    date)                         Morphine Analogues unknown)  



                                                  Allergy             



                                                  (Intermediate,           



                                                  Verified 22           



                                                  14:51)              

 

           (unknown)  (no       (unknown)  (unknown)  Orders    (units    (unkno

wn)



                    date)                                   unknown)  

 

           (unknown)  (no       (unknown)  (unknown)  Orders:   (units    (unkno

wn)



                    date)                                   unknown)  

 

           (unknown)  (no       (unknown)  (unknown)  Other Menstrual  (units   

 (unknown)



                    date)                         Period: Other unknown)  

 

           (unknown)  (no       (unknown)  (unknown)  Ovarian cyst  (units    (u

nknown)



                    date)                                   unknown)  

 

           (unknown)  (no       (unknown)  (unknown)  PFSH      (units    (unkno

wn)



                    date)                                   unknown)  

 

           (unknown)  (no       (unknown)  (unknown)  POC PREG  (units    (unkno

wn)



                    date)                                   unknown)  

 

           (unknown)  (no       (unknown)  (unknown)  POC Preg Test  (units    (

unknown)



                    date)                         Negative Last Edit unknown)  



                                                  by Harshal Gipson MA on 02/15/23           



                                                  14:25               

 

           (unknown)  (no       (unknown)  (unknown)  POC Urine  (units    (unkn

own)



                    date)                         Pregnancy Test unknown)  



                                                  Today R10.2 -           



                                                  Pelvic and           



                                                  perineal pain           

 

           (unknown)  (no       (unknown)  (unknown)  Painful menstrual  (units 

   (unknown)



                    date)                         periods () unknown)  

 

           (unknown)  (no       (unknown)  (unknown)  Patient:  (units    (unkno

wn)



                    date)                         Lesvia Aguero unknown)  



                                                  MR#: [id number]             

 

           (unknown)  (no       (unknown)  (unknown)  Position Sitting  (units  

  (unknown)



                    date)                                   unknown)  

 

           (unknown)  (no       (unknown)  (unknown)  Preg Expiration  (units   

 (unknown)



                    date)                         [id number] Last Edit unknown)  



                                                  by Harshal Gipson MA on 02/15/23           



                                                  14:25               

 

           (unknown)  (no       (unknown)  (unknown)  Preg Lot #  (units    (unk

nown)



                    date)                         [id number] Last unknown)  



                                                  Edit by Harshal Gipson MA on           



                                                  02/15/23 14:25           

 

           (unknown)  (no       (unknown)  (unknown)  Preg QC   (units    (unkno

wn)



                    date)                         Acceptable? Yes unknown)  



                                                  Last Edit by           



                                                  Harshal Gipson MA           



                                                  on 02/15/23 14:25           

 

           (unknown)  (no       (unknown)  (unknown)  Pt here for ED  (units    

(unknown)



                    date)                         follow-up and unknown)  



                                                  positive pregnancy           



                                                  test                

 

           (unknown)  (no       (unknown)  (unknown)  Reason For Visit  (units  

  (unknown)



                    date)                                   unknown)  

 

           (unknown)  (no       (unknown)  (unknown)  Results   (units    (unkno

wn)



                    date)                                   unknown)  

 

           (unknown)  (no       (unknown)  (unknown)  S/P ACL   (units    (unkno

wn)



                    date)                         reconstruction unknown)  



                                                  (-21)           

 

           (unknown)  (no       (unknown)  (unknown)  Signed By:  (units    (unk

nown)



                    date)                                   unknown)  

 

           (unknown)  (no       (unknown)  (unknown)  Smoking Status:  (units   

 (unknown)



                    date)                         Current some day unknown)  



                                                  smoker              

 

           (unknown)  (no       (unknown)  (unknown)  Social History  (units    

(unknown)



                    date)                                   unknown)  

 

           (unknown)  (no       (unknown)  (unknown)  Surgical History  (units  

  (unknown)



                    date)                         (Reviewed 23 unknown)  



                                                  @ 02:02 by Terrell Arnold MD)           

 

           (unknown)  (no       (unknown)  (unknown)  TOA (tubo-ovarian  (units 

   (unknown)



                    date)                         abscess)  unknown)  



                                                  (-21)           

 

           (unknown)  (no       (unknown)  (unknown)  This note may  (units    (

unknown)



                    date)                         have been all or unknown)  



                                                  partially           



                                                  generated using           



                                                  voice recognition           

 

           (unknown)  (no       (unknown)  (unknown)  Tobacco +  (units    (unkn

own)



                    date)                         Substance Use unknown)  

 

           (unknown)  (no       (unknown)  (unknown)  Tobacco Status  (units    

(unknown)



                    date)                                   unknown)  

 

           (unknown)  (no       (unknown)  (unknown)  Visit Reasons: IH  (units 

   (unknown)



                    date)                         ER F/U, bleeding unknown)  



                                                  and postive           



                                                  pregnacy test           

 

           (unknown)  (no       (unknown)  (unknown)  Vitals    (units    (unkno

wn)



                    date)                                   unknown)  

 

           (unknown)  (no       (unknown)  (unknown)  Weight 162 lb  (units    (

unknown)



                    date)                                   unknown)  

 

           (unknown)  (no       (unknown)  (unknown)  alcohol intake:  (units   

 (unknown)



                    date)                         current   unknown)  

 

           (unknown)  (no       (unknown)  (unknown)  disorders of  (units    (u

nknown)



                    date)                         vagina, R10.2 - unknown)  



                                                  Pelvic and           



                                                  perineal pain,           



                                                  Z11.3 - Encounter           



                                                  for                 

 

           (unknown)  (no       (unknown)  (unknown)  doxycycline  (units    (un

known)



                    date)                         Adverse Reaction unknown)  



                                                  (Intermediate,           



                                                  Verified 22           



                                                  14:51)              

 

           (unknown)  (no       (unknown)  (unknown)  have a bowel  (units    (u

nknown)



                    date)                         movement or unknown)  



                                                  empties her           



                                                  bladder. Patient           



                                                  has been seen at           



                                                  [facility]             

 

           (unknown)  (no       (unknown)  (unknown)  have occurred. If  (units 

   (unknown)



                    date)                         there are any unknown)  



                                                  questions, please           



                                                  contact the           



                                                  Medical Records           

 

           (unknown)  (no       (unknown)  (unknown)  household  (units    (unkn

own)



                    date)                         members: friend(s) unknown)  

 

           (unknown)  (no       (unknown)  (unknown)  may occur.  (units    (unk

nown)



                    date)                         Occasional unknown)  



                                                  wrong-word or           



                                                  'sound-alike'           



                                                  substitutions may           



                                                  have                

 

           (unknown)  (no       (unknown)  (unknown)  month history of  (units  

  (unknown)



                    date)                         persistent left unknown)  



                                                  lower quadrant           



                                                  pain which is           



                                                  sharp and           

 

           (unknown)  (no       (unknown)  (unknown)  negative but this  (units 

   (unknown)



                    date)                         morning she had a unknown)  



                                                  positive home           



                                                  pregnancy test.           

 

           (unknown)  (no       (unknown)  (unknown)  occurred due to  (units   

 (unknown)



                    date)                         the inherent unknown)  



                                                  limitations of           



                                                  voice recognition           



                                                  software. Please           

 

           (unknown)  (no       (unknown)  (unknown)  rash      (units    (unkno

wn)



                    date)                                   unknown)  

 

           (unknown)  (no       (unknown)  (unknown)  read the note  (units    (

unknown)



                    date)                         carefully and unknown)  



                                                  recognize, using           



                                                  context, where           



                                                  these               



                                                  substitutions           

 

           (unknown)  (no       (unknown)  (unknown)  screening for  (units    (

unknown)



                    date)                         infections with a unknown)  



                                                  predominantly           



                                                  sexual mode of           



                                                  transmission           

 

           (unknown)  (no       (unknown)  (unknown)  she is been  (units    (un

known)



                    date)                         bleeding for the unknown)  



                                                  last 2 weeks and           



                                                  her pain has been           



                                                  worse during           

 

           (unknown)  (no       (unknown)  (unknown)  software.  (units    (unkn

own)



                    date)                         Although every unknown)  



                                                  effort is made to           



                                                  edit content,           



                                                  transcription           



                                                  errors              

 

           (unknown)  (no       (unknown)  (unknown)  stabbing, and  (units    (

unknown)



                    date)                         intermittent. It unknown)  



                                                  does not seem to           



                                                  be related to her           



                                                  cycles but           

 

           (unknown)  (no       (unknown)  (unknown)  that period of  (units    

(unknown)



                    date)                         time. In addition unknown)  



                                                  she has             



                                                  significant pain           



                                                  when she strains           



                                                  to                  

 

           (unknown)  (no       (unknown)  (unknown)  vomitt    (units    (unkno

wn)



                    date)                                   unknown)  

 

           (unknown)  (no       (unknown)  (unknown)  which was  (units    (unkn

own)



                    date)                         negative but no unknown)  



                                                  records are           



                                                  available for           



                                                  review. Urine hCG           



                                                  today is            









                                         Result panel 573









           (unknown)  (no       (unknown)  (unknown)  (no value)  (units    (unk

nown)



                    date)                                   unknown)  

 

           (unknown)  (no       (unknown)  (unknown)  [id number]  (units    (unkn

own)



                    date)                                   unknown)  

 

           (unknown)  (no       (unknown)  (unknown)  02/15/23  (units    (unkno

wn)



                    date)                                   unknown)  

 

           (unknown)  (no       (unknown)  (unknown)  [address] [place] [address]  (units  

  (unknown)



                    date)                                   unknown)  

 

           (unknown)  (no       (unknown)  (unknown)  Accession  (units    (unkn

own)



                    date)                         Number:   unknown)  



                                                  [id number]           

 

           (unknown)  (no       (unknown)  (unknown)  Additional  (units    (unk

nown)



                    date)                         endovaginal unknown)  



                                                  scanning was           



                                                  necessary due to           



                                                  incomplete           



                                                  visualization           

 

           (unknown)  (no       (unknown)  (unknown)  Age/Sex: 17 / F  (units   

 (unknown)



                    date)                         Date of Service: unknown)  

 

           (unknown)  (no       (unknown)  (unknown)  [place], [place]  (units    (

unknown)



                    date)                         [id number]     unknown)  

 

           (unknown)  (no       (unknown)  (unknown)  Approved by:  (units    (u

nknown)



                    date)                         afsaneh King)  



                                                  M.D. on 2/15/2023           



                                                  at 17:05            

 

           (unknown)  (no       (unknown)  (unknown)  Bilateral  (units    (unkn

own)



                    date)                         ovarian   unknown)  



                                                  hypoechoic masses           



                                                  with peripheral           



                                                  vascularity.           



                                                  Ectopic             

 

           (unknown)  (no       (unknown)  (unknown)  COMPARISON:  (units    (un

known)



                    date)                         [facility], unknown)  



                                                  US, US PELVIC           



                                                  COMPLETE,           



                                                  2023, 2:14.           

 

           (unknown)  (no       (unknown)  (unknown)  Continued  (units    (unkn

own)



                    date)                         surveillance and unknown)  



                                                  correlation with           



                                                  serial serum           



                                                  beta-hCG            



                                                  measurements           

 

           (unknown)  (no       (unknown)  (unknown)  : 2005  (units   

 (unknown)



                    date)                         Acct:[id number] unknown)  

 

           (unknown)  (no       (unknown)  (unknown)  Dictated by:  (units    (u

nknown)



                    date)                         afsaneh King)  



                                                  M.D. on 2/15/2023           



                                                  at 17:02            

 

           (unknown)  (no       (unknown)  (unknown)  FINDINGS:  (units    (unkn

own)



                    date)                                   unknown)  

 

           (unknown)  (no       (unknown)  (unknown)  IMPRESSION:  (units    (un

known)



                    date)                                   unknown)  

 

           (unknown)  (no       (unknown)  (unknown)  INDICATIONS:  (units    (u

nknown)



                    date)                         BLEEDING/PELVIC unknown)  



                                                  PAIN. HOME           



                                                  POSITIVE            



                                                  PREGNANCY TEST           



                                                  TODAY.              

 

           (unknown)  (no       (unknown)  (unknown)  [facility]  (units   

 (unknown)



                    date)                                   unknown)  

 

           (unknown)  (no       (unknown)  (unknown)  Loc: US   (units    (unkno

wn)



                    date)                                   unknown)  

 

           (unknown)  (no       (unknown)  (unknown)  No convincing  (units    (

unknown)



                    date)                         evidence of unknown)  



                                                  intrauterine           



                                                  pregnancy.           

 

           (unknown)  (no       (unknown)  (unknown)  Ordering  (units    (unkno

wn)



                    date)                         Provider: unknown)  



                                                  Jose Almodovar MD                  

 

           (unknown)  (no       (unknown)  (unknown)  PROCEDURE: US  (units    (

unknown)



                    date)                         PELVIC COMPLETE unknown)  

 

           (unknown)  (no       (unknown)  (unknown)  Patient:  (units    (unkno

wn)



                    date)                         Lesvia Aguero [facility] unknown)  



                                                  MR#: [id number]              

 

           (unknown)  (no       (unknown)  (unknown)  Procedure: US  (units    (

unknown)



                    date)                         pelvic complete unknown)  

 

           (unknown)  (no       (unknown)  (unknown)  Real-time  (units    (unkn

own)



                    date)                         scanning was unknown)  



                                                  performed of the           



                                                  pelvic organs,           



                                                  with image           

 

           (unknown)  (no       (unknown)  (unknown)  Signed    (units    (unkno

wn)



                    date)                                   unknown)  

 

           (unknown)  (no       (unknown)  (unknown)  TECHNIQUE:  (units    (unk

nown)



                    date)                                   unknown)  

 

           (unknown)  (no       (unknown)  (unknown)  To assist  (units    (unkn

own)



                    date)                                   unknown)  

 

           (unknown)  (no       (unknown)  (unknown)  Ultrasound  (units    (unk

nown)



                    date)                         Report    unknown)  

 

           (unknown)  (no       (unknown)  (unknown)  Uterine body is  (units   

 (unknown)



                    date)                         normal in size. unknown)  



                                                  Echogenic fluid           



                                                  in the uterine           



                                                  cavity may           

 

           (unknown)  (no       (unknown)  (unknown)  We strive to  (units    (u

nknown)



                    date)                         produce accurate, unknown)  



                                                  complete, and           



                                                  clear reports of           



                                                  imaging services.           

 

           (unknown)  (no       (unknown)  (unknown)  adnexal and  (units    (un

known)



                    date)                         endometrial unknown)  



                                                  structures by           



                                                  transabdominal           



                                                  scanning.           

 

           (unknown)  (no       (unknown)  (unknown)  although a  (units    (unk

nown)



                    date)                                   unknown)  

 

           (unknown)  (no       (unknown)  (unknown)  and voice  (units    (unkn

own)



                    date)                         recognition unknown)  



                                                  software.           



                                                  Therefore, it may           



                                                  contain abnormal           



                                                  punctuation,           

 

           (unknown)  (no       (unknown)  (unknown)  and       (units    (unkno

wn)



                    date)                                   unknown)  

 

           (unknown)  (no       (unknown)  (unknown)  blood products.  (units   

 (unknown)



                    date)                         There is no unknown)  



                                                  definite evidence           



                                                  of gestational           



                                                  sac. There is a           

 

           (unknown)  (no       (unknown)  (unknown)  but smaller 1.5  (units   

 (unknown)



                    date)                         cm hypoechoic unknown)  



                                                  area in the left           



                                                  ovary.              

 

           (unknown)  (no       (unknown)  (unknown)  cannot be  (units    (unkn

own)



                    date)                         strictly excluded unknown)  



                                                  for either these           



                                                  locations.           

 

           (unknown)  (no       (unknown)  (unknown)  clinical  (units    (unkno

wn)



                    date)                         symptoms  unknown)  



                                                  recommended.           

 

           (unknown)  (no       (unknown)  (unknown)  corpus luteum or  (units  

  (unknown)



                    date)                         hemorrhagic cyst unknown)  



                                                  could have a           



                                                  similar             



                                                  appearance. There           



                                                  is a                

 

           (unknown)  (no       (unknown)  (unknown)  documentation.  (units    

(unknown)



                    date)                                   unknown)  

 

           (unknown)  (no       (unknown)  (unknown)  inaccuracies.  (units    (

unknown)



                    date)                                   unknown)  

 

           (unknown)  (no       (unknown)  (unknown)  insertions  (units    (unk

nown)



                    date)                         and/or omissions. unknown)  



                                                  Occasional           



                                                  wrong-word or           



                                                  sound-alike           



                                                  substitutions           

 

           (unknown)  (no       (unknown)  (unknown)  may       (units    (unkno

wn)



                    date)                                   unknown)  

 

           (unknown)  (no       (unknown)  (unknown)  occur. Though we  (units  

  (unknown)



                    date)                         review the report unknown)  



                                                  and make efforts           



                                                  to correct it, we           



                                                  do                  

 

           (unknown)  (no       (unknown)  (unknown)  of the    (units    (unkno

wn)



                    date)                                   unknown)  

 

           (unknown)  (no       (unknown)  (unknown)  peripheral  (units    (unk

nown)



                    date)                         vascularity. This unknown)  



                                                  could potentially           



                                                  represent an           



                                                  ectopic pregnancy           

 

           (unknown)  (no       (unknown)  (unknown)  pregnancy  (units    (unkn

own)



                    date)                                   unknown)  

 

           (unknown)  (no       (unknown)  (unknown)  recommend that  (units    

(unknown)



                    date)                                   unknown)  

 

           (unknown)  (no       (unknown)  (unknown)  represent  (units    (unkn

own)



                    date)                                   unknown)  

 

           (unknown)  (no       (unknown)  (unknown)  similar   (units    (unkno

wn)



                    date)                                   unknown)  

 

           (unknown)  (no       (unknown)  (unknown)  templates  (units    (unkn

own)



                    date)                                   unknown)  

 

           (unknown)  (no       (unknown)  (unknown)  the report be  (units    (

unknown)



                    date)                         read carefully in unknown)  



                                                  proper context to           



                                                  recognize any           



                                                  text                

 

           (unknown)  (no       (unknown)  (unknown)  thick-walled 2.7  (units  

  (unknown)



                    date)                         cm heterogeneous unknown)  



                                                  complex             



                                                  hypoechoic mass           



                                                  in the left ovary           

 

           (unknown)  (no       (unknown)  (unknown)  us in improving  (units   

 (unknown)



                    date)                         patient care, unknown)  



                                                  this report was           



                                                  composed using           



                                                  standard report           

 

           (unknown)  (no       (unknown)  (unknown)  with      (units    (unkno

wn)



                    date)                                   unknown)  









                                         Result panel 574









           (unknown)  (no date)  (unknown)  (unknown)  < 2.4     miu/ml    (unkn

own)

 

           (unknown)  (no date)  (unknown)  (unknown)  < 2.4     miu/ml    (unkn

own)









                                         Result panel 575









           (unknown)  (no       (unknown)  (unknown)  (no value)  (units    (unk

nown)



                    date)                                   unknown)  

 

           (unknown)  (no       (unknown)  (unknown)  [id number]  (units    (unkno

wn)



                    date)                                   unknown)  

 

           (unknown)  (no       (unknown)  (unknown)  02/15/23  (units    (unkno

wn)



                    date)                                   unknown)  

 

           (unknown)  (no       (unknown)  (unknown)  14:13     (units    (unkno

wn)



                    date)                                   unknown)  

 

           (unknown)  (no       (unknown)  (unknown)  Acne (-)  (units    (u

nknown)



                    date)                                   unknown)  

 

           (unknown)  (no       (unknown)  (unknown)  Age/Sex: 17 / F  (units   

 (unknown)



                    date)                         Date of Service: unknown)  

 

           (unknown)  (no       (unknown)  (unknown)  Allergies  (units    (unkn

own)



                    date)                                   unknown)  

 

           (unknown)  (no       (unknown)  (unknown)  [place]  (units    (

unknown)



                    date)                         [id number]     unknown)  

 

           (unknown)  (no       (unknown)  (unknown)  Anesthesia  (units    (unk

nown)



                    date)                                   unknown)  

 

           (unknown)  (no       (unknown)  (unknown)  Assessment + Plan  (units 

   (unknown)



                    date)                                   unknown)  

 

           (unknown)  (no       (unknown)  (unknown)  Attending Dr:  (units    (

unknown)



                    date)                         Jose Almodovar MD unknown)  

 

           (unknown)  (no       (unknown)  (unknown)  BMI 28.7  (units    (unkno

wn)



                    date)                                   unknown)  

 

           (unknown)  (no       (unknown)  (unknown)  /68  (units    (unkn

own)



                    date)                                   unknown)  

 

           (unknown)  (no       (unknown)  (unknown)  Blood Pressure  (units    

(unknown)



                    date)                         Location Rt unknown)  



                                                  brachial            

 

           (unknown)  (no       (unknown)  (unknown)  Chief Complaint  (units   

 (unknown)



                    date)                                   unknown)  

 

           (unknown)  (no       (unknown)  (unknown)  Chief Complaint:  (units  

  (unknown)



                    date)                         LLQ/pelvic pain, unknown)  



                                                  abnormal uterine           



                                                  bleeding, + hCG at           



                                                  home                

 

           (unknown)  (no       (unknown)  (unknown)  Chlamydia  (units    (unkn

own)



                    date)                         infection () unknown)  

 

           (unknown)  (no       (unknown)  (unknown)  Chlamydia/Gonoc/M  (units 

   (unknown)



                    date)                         [facility] unknown)  



                                                  02/15/23 N89.8 -           



                                                  Other specified           



                                                  noninflammatory           

 

           (unknown)  (no       (unknown)  (unknown)  : 2005  (units   

 (unknown)



                    date)                         Acct:[id number] unknown)  

 

           (unknown)  (no       (unknown)  (unknown)  Dept at   (units    (unkno

wn)



                    date)                         [phone] unknown)  

 

           (unknown)  (no       (unknown)  (unknown)  Details:  (units    (unkno

wn)



                    date)                                   unknown)  

 

           (unknown)  (no       (unknown)  (unknown)  Documented By:  (units    

(unknown)



                    date)                         Jose Almodovar MD unknown)  



                                                  02/15/23 1413           

 

           (unknown)  (no       (unknown)  (unknown)  [employer]     (units    (unkno

wn)



                    date)                                   unknown)  

 

           (unknown)  (no       (unknown)  (unknown)  Endometriosis  (units    (

unknown)



                    date)                         ()   unknown)  

 

           (unknown)  (no       (unknown)  (unknown)  [facility]  (units   

 (unknown)



                    date)                         [facility] unknown)  

 

           (unknown)  (no       (unknown)  (unknown)  [facility]  (units  

  (unknown)



                    date)                         ED and according unknown)  



                                                  to the patient an           



                                                  hCG was done in           



                                                  January             

 

           (unknown)  (no       (unknown)  (unknown)  Lesvia is a  (units    (unk

nown)



                    date)                         17-year-old unknown)  



                                                  nulligravida who           



                                                  returns today for           



                                                  evaluation with a           



                                                  3                   

 

           (unknown)  (no       (unknown)  (unknown)  Gynecology Visit  (units  

  (unknown)



                    date)                                   unknown)  

 

           (unknown)  (no       (unknown)  (unknown)  HPI       (units    (unkno

wn)



                    date)                                   unknown)  

 

           (unknown)  (no       (unknown)  (unknown)  Heavy menstrual  (units   

 (unknown)



                    date)                         period (-) unknown)  

 

           (unknown)  (no       (unknown)  (unknown)  Height 5 ft 3 in  (units  

  (unknown)



                    date)                                   unknown)  

 

           (unknown)  (no       (unknown)  (unknown)  Intake Note:  (units    (u

nknown)



                    date)                                   unknown)  

 

           (unknown)  (no       (unknown)  (unknown)  Intake performed  (units  

  (unknown)



                    date)                         by: Harshal Gipson unknown)  

 

           (unknown)  (no       (unknown)  (unknown)  Intake    (units    (unkno

wn)



                    date)                                   unknown)  

 

           (unknown)  (no       (unknown)  (unknown)  Intake- Clincial  (units  

  (unknown)



                    date)                         Staff     unknown)  

 

           (unknown)  (no       (unknown)  (unknown)  Irregular  (units    (unkn

own)



                    date)                         menstrual cycle unknown)  



                                                  ()             

 

           (unknown)  (no       (unknown)  (unknown)  Last Menstural  (units    

(unknown)



                    date)                         Cycle + Details unknown)  

 

           (unknown)  (no       (unknown)  (unknown)  Loc: [facility]  (units    (unkno

wn)



                    date)                                   unknown)  

 

           (unknown)  (no       (unknown)  (unknown)  Lymphangioma  (units    (u

nknown)



                    date)                                   unknown)  

 

           (unknown)  (no       (unknown)  (unknown)  Medical History  (units   

 (unknown)



                    date)                         (Reviewed 23 unknown)  



                                                  @ 02:02 by Terrell Arnold MD)           

 

           (unknown)  (no       (unknown)  (unknown)  Medications:  (units    (u

nknown)



                    date)                                   unknown)  

 

           (unknown)  (no       (unknown)  (unknown)  New       (units    (unkno

wn)



                    date)                                   unknown)  

 

           (unknown)  (no       (unknown)  (unknown)  Opioids -  (units    (unkn

own)



                    date)                         Morphine Analogues unknown)  



                                                  Allergy             



                                                  (Intermediate,           



                                                  Verified 22           



                                                  14:51)              

 

           (unknown)  (no       (unknown)  (unknown)  Orders    (units    (unkno

wn)



                    date)                                   unknown)  

 

           (unknown)  (no       (unknown)  (unknown)  Orders:   (units    (unkno

wn)



                    date)                                   unknown)  

 

           (unknown)  (no       (unknown)  (unknown)  Other Menstrual  (units   

 (unknown)



                    date)                         Period: Other unknown)  

 

           (unknown)  (no       (unknown)  (unknown)  Ovarian cyst  (units    (u

nknown)



                    date)                                   unknown)  

 

           (unknown)  (no       (unknown)  (unknown)  PFSH      (units    (unkno

wn)



                    date)                                   unknown)  

 

           (unknown)  (no       (unknown)  (unknown)  POC PREG  (units    (unkno

wn)



                    date)                                   unknown)  

 

           (unknown)  (no       (unknown)  (unknown)  POC Preg Test  (units    (

unknown)



                    date)                         Negative Last Edit unknown)  



                                                  by Harshal Gipson MA on 02/15/23           



                                                  14:25               

 

           (unknown)  (no       (unknown)  (unknown)  POC Urine  (units    (unkn

own)



                    date)                         Pregnancy Test unknown)  



                                                  02/15/23 R10.2 -           



                                                  Pelvic and           



                                                  perineal pain           

 

           (unknown)  (no       (unknown)  (unknown)  Painful menstrual  (units 

   (unknown)



                    date)                         periods () unknown)  

 

           (unknown)  (no       (unknown)  (unknown)  Patient:  (units    (unkno

wn)



                    date)                         Lesvia Aguero E unknown)  



                                                  MR#: [id number]             

 

           (unknown)  (no       (unknown)  (unknown)  Position Sitting  (units  

  (unknown)



                    date)                                   unknown)  

 

           (unknown)  (no       (unknown)  (unknown)  Preg Expiration  (units   

 (unknown)



                    date)                         [id number] Last Edit unknown)  



                                                  by Harshal Gipson MA on 02/15/23           



                                                  14:25               

 

           (unknown)  (no       (unknown)  (unknown)  Preg Lot #  (units    (unk

nown)



                    date)                         [id number] Last unknown)  



                                                  Edit by Harshal Gipson MA on           



                                                  02/15/23 14:25           

 

           (unknown)  (no       (unknown)  (unknown)  Preg QC   (units    (unkno

wn)



                    date)                         Acceptable? Yes unknown)  



                                                  Last Edit by           



                                                  Harshal Gipson MA           



                                                  on 02/15/23 14:25           

 

           (unknown)  (no       (unknown)  (unknown)  Pt here for ED  (units    

(unknown)



                    date)                         follow-up and unknown)  



                                                  positive pregnancy           



                                                  test                

 

           (unknown)  (no       (unknown)  (unknown)  Reason For Visit  (units  

  (unknown)



                    date)                                   unknown)  

 

           (unknown)  (no       (unknown)  (unknown)  Repeat second  (units    (

unknown)



                    date)                         dose 48 hrs after unknown)  



                                                  first dose. 150 mg           



                                                  PO Q48H 2 tabs 4RF           



                                                  2 doses             

 

           (unknown)  (no       (unknown)  (unknown)  Results   (units    (unkno

wn)



                    date)                                   unknown)  

 

           (unknown)  (no       (unknown)  (unknown)  S/P ACL   (units    (unkno

wn)



                    date)                         reconstruction unknown)  



                                                  (-21)           

 

           (unknown)  (no       (unknown)  (unknown)  Signed By:  (units    (unk

nown)



                    date)                                   unknown)  

 

           (unknown)  (no       (unknown)  (unknown)  Smoking Status:  (units   

 (unknown)



                    date)                         Current some day unknown)  



                                                  smoker              

 

           (unknown)  (no       (unknown)  (unknown)  Social History  (units    

(unknown)



                    date)                                   unknown)  

 

           (unknown)  (no       (unknown)  (unknown)  Surgical History  (units  

  (unknown)



                    date)                         (Reviewed 23 unknown)  



                                                  @ 02:02 by Terrell Arnold MD)           

 

           (unknown)  (no       (unknown)  (unknown)  TOA (tubo-ovarian  (units 

   (unknown)



                    date)                         abscess)  unknown)  



                                                  (-21)           

 

           (unknown)  (no       (unknown)  (unknown)  This note may  (units    (

unknown)



                    date)                         have been all or unknown)  



                                                  partially           



                                                  generated using           



                                                  voice recognition           

 

           (unknown)  (no       (unknown)  (unknown)  Tobacco +  (units    (unkn

own)



                    date)                         Substance Use unknown)  

 

           (unknown)  (no       (unknown)  (unknown)  Tobacco Status  (units    

(unknown)



                    date)                                   unknown)  

 

           (unknown)  (no       (unknown)  (unknown)  Visit Reasons: IH  (units 

   (unknown)



                    date)                         ER F/U, bleeding unknown)  



                                                  and postive           



                                                  pregnacy test           

 

           (unknown)  (no       (unknown)  (unknown)  Vitals    (units    (unkno

wn)



                    date)                                   unknown)  

 

           (unknown)  (no       (unknown)  (unknown)  Weight 162 lb  (units    (

unknown)



                    date)                                   unknown)  

 

           (unknown)  (no       (unknown)  (unknown)  alcohol intake:  (units   

 (unknown)



                    date)                         current   unknown)  

 

           (unknown)  (no       (unknown)  (unknown)  disorders of  (units    (u

nknown)



                    date)                         vagina, R10.2 - unknown)  



                                                  Pelvic and           



                                                  perineal pain,           



                                                  Z11.3 - Encounter           



                                                  for                 

 

           (unknown)  (no       (unknown)  (unknown)  doxycycline  (units    (un

known)



                    date)                         Adverse Reaction unknown)  



                                                  (Intermediate,           



                                                  Verified 22           



                                                  14:51)              

 

           (unknown)  (no       (unknown)  (unknown)  fluconazole  (units    (un

known)



                    date)                         (Diflucan) unknown)  

 

           (unknown)  (no       (unknown)  (unknown)  have a bowel  (units    (u

nknown)



                    date)                         movement or unknown)  



                                                  empties her           



                                                  bladder. Patient           



                                                  has been seen at           



                                                  [facility]             

 

           (unknown)  (no       (unknown)  (unknown)  have occurred. If  (units 

   (unknown)



                    date)                         there are any unknown)  



                                                  questions, please           



                                                  contact the           



                                                  Medical Records           

 

           (unknown)  (no       (unknown)  (unknown)  household  (units    (unkn

own)



                    date)                         members: friend(s) unknown)  

 

           (unknown)  (no       (unknown)  (unknown)  may occur.  (units    (unk

nown)



                    date)                         Occasional unknown)  



                                                  wrong-word or           



                                                  'sound-alike'           



                                                  substitutions may           



                                                  have                

 

           (unknown)  (no       (unknown)  (unknown)  month history of  (units  

  (unknown)



                    date)                         persistent left unknown)  



                                                  lower quadrant           



                                                  pain which is           



                                                  sharp and           

 

           (unknown)  (no       (unknown)  (unknown)  negative but this  (units 

   (unknown)



                    date)                         morning she had a unknown)  



                                                  positive home           



                                                  pregnancy test.           

 

           (unknown)  (no       (unknown)  (unknown)  occurred due to  (units   

 (unknown)



                    date)                         the inherent unknown)  



                                                  limitations of           



                                                  voice recognition           



                                                  software. Please           

 

           (unknown)  (no       (unknown)  (unknown)  rash      (units    (unkno

wn)



                    date)                                   unknown)  

 

           (unknown)  (no       (unknown)  (unknown)  read the note  (units    (

unknown)



                    date)                         carefully and unknown)  



                                                  recognize, using           



                                                  context, where           



                                                  these               



                                                  substitutions           

 

           (unknown)  (no       (unknown)  (unknown)  screening for  (units    (

unknown)



                    date)                         infections with a unknown)  



                                                  predominantly           



                                                  sexual mode of           



                                                  transmission           

 

           (unknown)  (no       (unknown)  (unknown)  she is been  (units    (un

known)



                    date)                         bleeding for the unknown)  



                                                  last 2 weeks and           



                                                  her pain has been           



                                                  worse during           

 

           (unknown)  (no       (unknown)  (unknown)  software.  (units    (unkn

own)



                    date)                         Although every unknown)  



                                                  effort is made to           



                                                  edit content,           



                                                  transcription           



                                                  errors              

 

           (unknown)  (no       (unknown)  (unknown)  stabbing, and  (units    (

unknown)



                    date)                         intermittent. It unknown)  



                                                  does not seem to           



                                                  be related to her           



                                                  cycles but           

 

           (unknown)  (no       (unknown)  (unknown)  that period of  (units    

(unknown)



                    date)                         time. In addition unknown)  



                                                  she has             



                                                  significant pain           



                                                  when she strains           



                                                  to                  

 

           (unknown)  (no       (unknown)  (unknown)  vomitt    (units    (unkno

wn)



                    date)                                   unknown)  

 

           (unknown)  (no       (unknown)  (unknown)  which was  (units    (unkn

own)



                    date)                         negative but no unknown)  



                                                  records are           



                                                  available for           



                                                  review. Urine hCG           



                                                  today is            









                                         Result panel [id number]









           (unknown)  (no       (unknown)  (unknown)  (no value)  (units    (unk

nown)



                    date)                                   unknown)  

 

           (unknown)  (no       (unknown)  (unknown)  (1) Chronic pelvic  (units

    (unknown)



                    date)                         pain in female: unknown)  

 

           (unknown)  (no       (unknown)  (unknown)  (2) Candidal  (units    (u

nknown)



                    date)                         vaginitis: unknown)  

 

           (unknown)  (no       (unknown)  (unknown)  (3) Abnormal  (units    (u

nknown)



                    date)                         uterine bleeding unknown)  



                                                  (AUB):              

 

           (unknown)  (no       (unknown)  (unknown)  (Diffuse, mild)  (units   

 (unknown)



                    date)                                   unknown)  

 

           (unknown)  (no       (unknown)  (unknown)  [id number]  (units    (unkno

wn)



                    date)                                   unknown)  

 

           (unknown)  (no       (unknown)  (unknown)  02/15/23  (units    (unkno

wn)



                    date)                                   unknown)  

 

           (unknown)  (no       (unknown)  (unknown)  23 0801  (units    (

unknown)



                    date)                                   unknown)  

 

           (unknown)  (no       (unknown)  (unknown)  14:13     (units    (unkno

wn)



                    date)                                   unknown)  

 

           (unknown)  (no       (unknown)  (unknown)  ?         (units    (unkno

wn)



                    date)                                   unknown)  

 

           (unknown)  (no       (unknown)  (unknown)  ??        (units    (unkno

wn)



                    date)                                   unknown)  

 

           (unknown)  (no       (unknown)  (unknown)  Acne (-2016)  (units    (u

nknown)



                    date)                                   unknown)  

 

           (unknown)  (no       (unknown)  (unknown)  Affect: normal  (units    

(unknown)



                    date)                         affect    unknown)  

 

           (unknown)  (no       (unknown)  (unknown)  Age/Sex: 17 / F  (units   

 (unknown)



                    date)                         Date of Service: unknown)  

 

           (unknown)  (no       (unknown)  (unknown)  Allergies  (units    (unkn

own)



                    date)                                   unknown)  

 

           (unknown)  (no       (unknown)  (unknown)  [place] [address]  (unit

s    (unknown)



                    date)                                   unknown)  

 

           (unknown)  (no       (unknown)  (unknown)  Anesthesia  (units    (unk

nown)



                    date)                                   unknown)  

 

           (unknown)  (no       (unknown)  (unknown)  Appearance: grossly  (unit

s    (unknown)



                    date)                         normal    unknown)  

 

           (unknown)  (no       (unknown)  (unknown)  Assessment + Plan  (units 

   (unknown)



                    date)                                   unknown)  

 

           (unknown)  (no       (unknown)  (unknown)  Attending Dr:  (units    (

unknown)



                    date)                         Jose Almodovar MD unknown)  

 

           (unknown)  (no       (unknown)  (unknown)  Attitude:  (units    (unkn

own)



                    date)                         cooperative unknown)  

 

           (unknown)  (no       (unknown)  (unknown)  BMI 28.7  (units    (unkno

wn)



                    date)                                   unknown)  

 

           (unknown)  (no       (unknown)  (unknown)  /68  (units    (unkn

own)



                    date)                                   unknown)  

 

           (unknown)  (no       (unknown)  (unknown)  Bilateral ovarian  (units 

   (unknown)



                    date)                         hypoechoic masses unknown)  



                                                  with peripheral           



                                                  vascularity.?           



                                                  Ectopic             

 

           (unknown)  (no       (unknown)  (unknown)  Bimanual Exam-  (units    

(unknown)



                    date)                         Adnexa, other: unknown)  



                                                  adnexae mobile, no           



                                                  masses, normal,           



                                                  tender on the           

 

           (unknown)  (no       (unknown)  (unknown)  Bimanual Exam-  (units    

(unknown)



                    date)                         Vagina + Uterus: unknown)  



                                                  uterine size normal,          

 



                                                  uterine mobility           



                                                  normal,             

 

           (unknown)  (no       (unknown)  (unknown)  Blood Pressure  (units    

(unknown)



                    date)                         Location Rt brachial unknown) 

 

 

           (unknown)  (no       (unknown)  (unknown)  Chief Complaint  (units   

 (unknown)



                    date)                                   unknown)  

 

           (unknown)  (no       (unknown)  (unknown)  Chief Complaint:  (units  

  (unknown)



                    date)                         LLQ/pelvic pain, unknown)  



                                                  abnormal uterine           



                                                  bleeding, + hCG at           



                                                  home                

 

           (unknown)  (no       (unknown)  (unknown)  Chlamydia infection  (unit

s    (unknown)



                    date)                         ()   unknown)  

 

           (unknown)  (no       (unknown)  (unknown)  Chlamydia/Gonoc/Myc  (unit

s    (unknown)



                    date)                         o Genital 02/15/23 unknown)  



                                                  N89.8 - Other           



                                                  specified           



                                                  noninflammatory           

 

           (unknown)  (no       (unknown)  (unknown)  Conjunctivae:  (units    (

unknown)



                    date)                         conjunctivae normal unknown)  

 

           (unknown)  (no       (unknown)  (unknown)  Const     (units    (unkno

wn)



                    date)                                   unknown)  

 

           (unknown)  (no       (unknown)  (unknown)  Continued  (units    (unkn

own)



                    date)                         surveillance and unknown)  



                                                  correlation with           



                                                  serial serum           



                                                  beta-hCG            



                                                  measurements           

 

           (unknown)  (no       (unknown)  (unknown)  Counseling and  (units    

(unknown)



                    date)                         educating the unknown)  



                                                  patient/family/careg          

 



                                                  iver: 5             

 

           (unknown)  (no       (unknown)  (unknown)  : 2005  (units   

 (unknown)



                    date)                         Acct:[id number] unknown)  

 

           (unknown)  (no       (unknown)  (unknown)  Dept at   (units    (unkno

wn)



                    date)                         [phone]. unknown)  

 

           (unknown)  (no       (unknown)  (unknown)  Details:  (units    (o

wn)



                    date)                                   unknown)  

 

           (unknown)  (no       (unknown)  (unknown)  Documented By:  (units    

(unknown)



                    date)                         Jose Almodovar MD unknown)  



                                                  02/15/23 1413           

 

           (unknown)  (no       (unknown)  (unknown)  Documenting  (units    (un

known)



                    date)                         clinical information unknown) 

 



                                                  in EHR/Medical           



                                                  record: 5           

 

           (unknown)  (no       (unknown)  (unknown)  EOM: EOM intact  (units   

 (unknown)



                    date)                         bilaterally unknown)  

 

           (unknown)  (no       (unknown)  (unknown)  Ears: hearing  (units    (

unknown)



                    date)                         grossly normal unknown)  



                                                  bilaterally           

 

           (unknown)  (no       (unknown)  (unknown)  Effort +  (units    (o

wn)



                    date)                         Inspection: normal unknown)  



                                                  respiratory effort           



                                                  and able to speak in          

 



                                                  complete            

 

           (unknown)  (no       (unknown)  (unknown)  Endometriosis  (units    (

unknown)



                    date)                         ()   unknown)  

 

           (unknown)  (no       (unknown)  (unknown)  Exam      (units    (o

wn)



                    date)                                   unknown)  

 

           (unknown)  (no       (unknown)  (unknown)  External Female  (units   

 (unknown)



                    date)                         Exam: normal unknown)  



                                                  external appearance,          

 



                                                  normal appearance of          

 



                                                  the                 

 

           (unknown)  (no       (unknown)  (unknown)  Eyes      (units    (unkno

wn)



                    date)                                   unknown)  

 

           (unknown)  (no       (unknown)  (unknown)  FINDINGS:?  (units    (unk

nown)



                    date)                                   unknown)  

 

           (unknown)  (no       (unknown)  (unknown)  Face and sinus:  (units   

 (unknown)



                    date)                         face symmetric unknown)  

 

           (unknown)  (no       (unknown)  (unknown)  [facility]  (units   

 (unknown)



                    date)                         [facility] unknown)  

 

           (unknown)  (no       (unknown)  (unknown)  GC/CT obtained and  (units

    (unknown)



                    date)                         submitted. unknown)  

 

           (unknown)  (no       (unknown)  (unknown)  GI        (units    (o

wn)



                    date)                                   unknown)  

 

           (unknown)  (no       (unknown)  (unknown)          (units    (o

wn)



                    date)                                   unknown)  

 

           (unknown)  (no       (unknown)  (unknown)  [facility]  [facility]

s    (unknown)



                    date)                         and according to the unknown) 

 



                                                  patient an hCG was           



                                                  done in January           

 

           (unknown)  (no       (unknown)  (unknown)  General: appearance  (unit

s    (unknown)



                    date)                         normal, both eyes unknown)  



                                                  and all related           



                                                  structures           

 

           (unknown)  (no       (unknown)  (unknown)  General:  (units    (unkno

wn)



                    date)                         cooperative, unknown)  



                                                  comfortable and no           



                                                  acute distress           

 

           (unknown)  (no       (unknown)  (unknown)  Lesvia is a  (units    (unk

nown)



                    date)                         17-year-old unknown)  



                                                  nulligravida who           



                                                  returns today for           



                                                  evaluation with a 3           

 

           (unknown)  (no       (unknown)  (unknown)  Gynecology Visit  (units  

  (unknown)



                    date)                                   unknown)  

 

           (unknown)  (no       (unknown)  (unknown)  HENMT     (units    (unkno

wn)



                    date)                                   unknown)  

 

           (unknown)  (no       (unknown)  (unknown)  HPI       (units    (unkno

wn)



                    date)                                   unknown)  

 

           (unknown)  (no       (unknown)  (unknown)  Head: normal to  (units   

 (unknown)



                    date)                         inspection, unknown)  



                                                  normocephalic and           



                                                  atraumatic           

 

           (unknown)  (no       (unknown)  (unknown)  Heavy menstrual  (units   

 (unknown)



                    date)                         period () unknown)  

 

           (unknown)  (no       (unknown)  (unknown)  Height 5 ft 3 in  (units  

  (unknown)



                    date)                                   unknown)  

 

           (unknown)  (no       (unknown)  (unknown)  IMPRESSION:?  (units    (u

nknown)



                    date)                                   unknown)  

 

           (unknown)  (no       (unknown)  (unknown) Insertion of a  (units    (

unknown)



                    date)                         Mirena IUD in the unknown)  



                                                  office or at the           



                                                  time of laparoscopy           



                                                  would also           

 

           (unknown)  (no       (unknown)  (unknown)  Inspection: normal  (units

    (unknown)



                    date)                         to inspection unknown)  

 

           (unknown)  (no       (unknown)  (unknown)  Intake Note:  (units    (u

nknown)



                    date)                                   unknown)  

 

           (unknown)  (no       (unknown)  (unknown)  Intake performed  (units  

  (unknown)



                    date)                         by: Harshal Gipson unknown)  

 

           (unknown)  (no       (unknown)  (unknown)  Intake    (units    (unkno

wn)



                    date)                                   unknown)  

 

           (unknown)  (no       (unknown)  (unknown)  Intake- Clincial  (units  

  (unknown)



                    date)                         Staff     unknown)  

 

           (unknown)  (no       (unknown)  (unknown)  Irregular menstrual  (unit

s    (unknown)



                    date)                         cycle () unknown)  

 

           (unknown)  (no       (unknown)  (unknown)  Judgment: judgment  (units

    (unknown)



                    date)                         good      unknown)  

 

           (unknown)  (no       (unknown)  (unknown)  Last Menstural  (units    

(unknown)



                    date)                         Cycle + Details unknown)  

 

           (unknown)  (no       (unknown)  (unknown)  Loc: [facility]  (units    (unkno

wn)



                    date)                                   unknown)  

 

           (unknown)  (no       (unknown)  (unknown)  Lymphangioma  (units    (u

nknown)



                    date)                                   unknown)  

 

           (unknown)  (no       (unknown)  (unknown)  Medical History  (units   

 (unknown)



                    date)                         (Reviewed 23 @ unknown) 

 



                                                  02:02 by Terrell Arnold MD)           

 

           (unknown)  (no       (unknown)  (unknown)  Medications:  (units    (u

nknown)



                    date)                                   unknown)  

 

           (unknown)  (no       (unknown)  (unknown)  Mental Status:  (units    

(unknown)



                    date)                         mental status unknown)  



                                                  grossly normal           

 

           (unknown)  (no       (unknown)  (unknown)  Mood: congruent  (units   

 (unknown)



                    date)                         mood      unknown)  

 

           (unknown)  (no       (unknown)  (unknown)  Neck      (units    (unkno

wn)



                    date)                                   unknown)  

 

           (unknown)  (no       (unknown)  (unknown)  Neck: normal visual  (unit

s    (unknown)



                    date)                         inspection unknown)  

 

           (unknown)  (no       (unknown)  (unknown)  New       (units    (unkno

wn)



                    date)                                   unknown)  

 

           (unknown)  (no       (unknown)  (unknown)  No convincing  (units    (

unknown)



                    date)                         evidence of unknown)  



                                                  intrauterine           



                                                  pregnancy.           

 

           (unknown)  (no       (unknown)  (unknown)  Nutritional  (units    (un

known)



                    date)                         Appearance: average unknown)  



                                                  body habitus           

 

           (unknown)  (no       (unknown)  (unknown)  Obtaining and/or  (units  

  (unknown)



                    date)                         reviewing separately unknown) 

 



                                                  obtained history: 5           

 

           (unknown)  (no       (unknown)  (unknown)  Opioids - Morphine  (units

    (unknown)



                    date)                         Analogues Allergy unknown)  



                                                  (Intermediate,           



                                                  Verified 22           



                                                  14:51)              

 

           (unknown)  (no       (unknown)  (unknown)  Ordering  (units    (unkno

wn)



                    date)                         medications, tests, unknown)  



                                                  or procedures: 5           

 

           (unknown)  (no       (unknown)  (unknown)  Orders    (units    (unkno

wn)



                    date)                                   unknown)  

 

           (unknown)  (no       (unknown)  (unknown)  Orders:   (units    (unkno

wn)



                    date)                                   unknown)  

 

           (unknown)  (no       (unknown)  (unknown)  Orientation: alert  (units

    (unknown)



                    date)                         and oriented x3 unknown)  

 

           (unknown)  (no       (unknown)  (unknown)  Other Menstrual  (units   

 (unknown)



                    date)                         Period: Other unknown)  

 

           (unknown)  (no       (unknown)  (unknown)  Ovarian cyst  (units    (u

nknown)



                    date)                                   unknown)  

 

           (unknown)  (no       (unknown)  (unknown)  P.O. Diflucan for  (units 

   (unknown)



                    date)                         candidal  unknown)  



                                                  vulvovaginitis.           

 

           (unknown)  (no       (unknown)  (unknown)  PFSH      (units    (unkno

wn)



                    date)                                   unknown)  

 

           (unknown)  (no       (unknown)  (unknown)  POC PREG  (units    (unkno

wn)



                    date)                                   unknown)  

 

           (unknown)  (no       (unknown)  (unknown)  POC Preg Test  (units    (

unknown)



                    date)                         Negative Last Edit unknown)  



                                                  by Harshal Gipson MA          

 



                                                  on 02/15/23 14:25           

 

           (unknown)  (no       (unknown)  (unknown)  POC Urine Pregnancy  (unit

s    (unknown)



                    date)                         Test 02/15/23 R10.2 unknown)  



                                                  - Pelvic and           



                                                  perineal pain           

 

           (unknown)  (no       (unknown)  (unknown)  Painful menstrual  (units 

   (unknown)



                    date)                         periods () unknown)  

 

           (unknown)  (no       (unknown)  (unknown)  Palpation: soft, no  (unit

s    (unknown)



                    date)                         hepatosplenomegaly unknown)  



                                                  and tender (Direct           



                                                  tenderness LLQ)           

 

           (unknown)  (no       (unknown)  (unknown)  Patient to be  (units    (

unknown)



                    date)                         contacted with unknown)  



                                                  results of hCG and           



                                                  ultrasound with           



                                                  discussion           

 

           (unknown)  (no       (unknown)  (unknown)  Patient:  (units    (unkno

wn)



                    date)                         Lesvia Aguero unknown)  



                                                  MR#: [id number]             

 

           (unknown)  (no       (unknown)  (unknown)  Pelvic Support:  (units   

 (unknown)



                    date)                         normal    unknown)  

 

           (unknown)  (no       (unknown)  (unknown)  Performing a  (units    (u

nknown)



                    date)                         medically unknown)  



                                                  appropriate exam           



                                                  and/or evaluation: 5          

 

 

           (unknown)  (no       (unknown)  (unknown)  Plan      (units    (unkno

wn)



                    date)                                   unknown)  

 

           (unknown)  (no       (unknown)  (unknown)  Position Sitting  (units  

  (unknown)



                    date)                                   unknown)  

 

           (unknown)  (no       (unknown)  (unknown)  Preg Expiration  (units   

 (unknown)



                    date)                         [id number] Last Edit by unknown)  



                                                  Harshal Gipson MA on          

 



                                                  02/15/23 14:25           

 

           (unknown)  (no       (unknown)  (unknown)  Preg Lot #  (units    (unk

nown)



                    date)                         [id number] Last Edit unknown) 

 



                                                  by Harshal Gipson MA          

 



                                                  on 02/15/23 14:25           

 

           (unknown)  (no       (unknown)  (unknown)  Preg QC Acceptable?  (unit

s    (unknown)



                    date)                         Yes Last Edit by unknown)  



                                                  Harshal Gipson MA on          

 



                                                  02/15/23 14:25           

 

           (unknown)  (no       (unknown)  (unknown)  Preparing to see  (units  

  (unknown)



                    date)                         the patient, i.e., unknown)  



                                                  chart review, review          

 



                                                  of tests: 5           

 

           (unknown)  (no       (unknown)  (unknown)  Problem-specific  (units  

  (unknown)



                    date)                         ROS positives unknown)  



                                                  included with the           



                                                  HPI                 

 

           (unknown)  (no       (unknown)  (unknown)  Psych     (units    (unkno

wn)



                    date)                                   unknown)  

 

           (unknown)  (no       (unknown)  (unknown)  Pt here for ED  (units    

(unknown)



                    date)                         follow-up and unknown)  



                                                  positive pregnancy           



                                                  test                

 

           (unknown)  (no       (unknown)  (unknown)  ROS Narrative  (units    (

unknown)



                    date)                                   unknown)  

 

           (unknown)  (no       (unknown)  (unknown)  ROS Narrative:  (units    

(unknown)



                    date)                                   unknown)  

 

           (unknown)  (no       (unknown)  (unknown)  ROS       (units    (unkno

wn)



                    date)                                   unknown)  

 

           (unknown)  (no       (unknown)  (unknown)  Reason For Visit  (units  

  (unknown)



                    date)                                   unknown)  

 

           (unknown)  (no       (unknown)  (unknown)  Recto-Vaginal: no  (units 

   (unknown)



                    date)                         cul-de-sac fullness, unknown) 

 



                                                  cul-de-sac           



                                                  tenderness (Mild)           



                                                  and no              

 

           (unknown)  (no       (unknown)  (unknown)  Repeat second dose  (units

    (unknown)



                    date)                         48 hrs after first unknown)  



                                                  dose. 150 mg PO Q48H          

 



                                                  2 tabs 4RF           

 

           (unknown)  (no       (unknown)  (unknown)  Resp      (units    (unkno

wn)



                    date)                                   unknown)  

 

           (unknown)  (no       (unknown)  (unknown)  Results   (units    (unkno

wn)



                    date)                                   unknown)  

 

           (unknown)  (no       (unknown)  (unknown)  S/P ACL   (units    (unkno

wn)



                    date)                         reconstruction unknown)  



                                                  (-21)           

 

           (unknown)  (no       (unknown)  (unknown)  Sclera: sclerae  (units   

 (unknown)



                    date)                         normal    unknown)  

 

           (unknown)  (no       (unknown)  (unknown)  Signed By:  (units    (unk

nown)



                    date)                         <Electronically unknown)  



                                                  signed by Jose Almodovar MD>           

 

           (unknown)  (no       (unknown)  (unknown)  Signed    (units    (unkno

wn)



                    date)                                   unknown)  

 

           (unknown)  (no       (unknown)  (unknown)  Smoking Status:  (units   

 (unknown)



                    date)                         Current some day unknown)  



                                                  smoker              

 

           (unknown)  (no       (unknown)  (unknown)  Social History  (units    

(unknown)



                    date)                                   unknown)  

 

           (unknown)  (no       (unknown)  (unknown)  Speculum Exam -  (units   

 (unknown)



                    date)                         Cervix: normal unknown)  



                                                  appearance of the           



                                                  cervix, no cervical           



                                                  discharge,           

 

           (unknown)  (no       (unknown)  (unknown)  Speculum Exam -  (units   

 (unknown)



                    date)                         Vagina: normal unknown)  



                                                  appearance of the           



                                                  vagina, abnormal           



                                                  vaginal             

 

           (unknown)  (no       (unknown)  (unknown)  Speech and  (units    (unk

nown)



                    date)                         Movement: speech and unknown) 

 



                                                  movement normal           

 

           (unknown)  (no       (unknown)  (unknown)  Status: Acute  (units    (

unknown)



                    date)                                   unknown)  

 

           (unknown)  (no       (unknown)  (unknown)  Surgical History  (units  

  (unknown)



                    date)                         (Reviewed 23 @ unknown) 

 



                                                  02:02 by Terrell Arnold MD)           

 

           (unknown)  (no       (unknown)  (unknown)  TOA (tubo-ovarian  (units 

   (unknown)



                    date)                         abscess) (-21) unknown) 

 

 

           (unknown)  (no       (unknown)  (unknown)  This note may have  (units

    (unknown)



                    date)                         been all or unknown)  



                                                  partially generated           



                                                  using voice           



                                                  recognition           

 

           (unknown)  (no       (unknown)  (unknown)  Thought Content:  (units  

  (unknown)



                    date)                         normal    unknown)  

 

           (unknown)  (no       (unknown)  (unknown)  Thought Process:  (units  

  (unknown)



                    date)                         normal    unknown)  

 

           (unknown)  (no       (unknown)  (unknown)  Time Coding Minutes  (unit

s    (unknown)



                    date)                         Spent: (must be on unknown)  



                                                  same date of           



                                                  service/appointment)          

 

 

           (unknown)  (no       (unknown)  (unknown)  Time Spent  (units    (unk

nown)



                    date)                                   unknown)  

 

           (unknown)  (no       (unknown)  (unknown)  Tobacco + Substance  (unit

s    (unknown)



                    date)                         Use       unknown)  

 

           (unknown)  (no       (unknown)  (unknown)  Tobacco Status  (units    

(unknown)



                    date)                                   unknown)  

 

           (unknown)  (no       (unknown)  (unknown)  Total Time: 30  (units    

(unknown)



                    date)                                   unknown)  

 

           (unknown)  (no       (unknown)  (unknown)  Urethra: normal  (units   

 (unknown)



                    date)                         appearance of the unknown)  



                                                  urethra             

 

           (unknown)  (no       (unknown)  (unknown)  Uterine body is  (units   

 (unknown)



                    date)                         normal in size.? unknown)  



                                                  Echogenic fluid in           



                                                  the uterine cavity           



                                                  may                 

 

           (unknown)  (no       (unknown)  (unknown)  Visit Reasons: IH  (units 

   (unknown)



                    date)                         ER F/U, bleeding and unknown) 

 



                                                  postive pregnacy           



                                                  test                

 

           (unknown)  (no       (unknown)  (unknown)  Vitals    (units    (unkno

wn)



                    date)                                   unknown)  

 

           (unknown)  (no       (unknown)  (unknown)  Weight 162 lb  (units    (

unknown)



                    date)                                   unknown)  

 

           (unknown)  (no       (unknown)  (unknown)  alcohol intake:  (units   

 (unknown)



                    date)                         current   unknown)  

 

           (unknown)  (no       (unknown)  (unknown)  although a  (units    (unk

nown)



                    date)                                   unknown)  

 

           (unknown)  (no       (unknown)  (unknown)  and       (units    (unkno

wn)



                    date)                                   unknown)  

 

           (unknown)  (no       (unknown)  (unknown)  associated with  (units   

 (unknown)



                    date)                         scarring of the left unknown) 

 



                                                  fallopian tube which          

 



                                                  was noted at the           



                                                  time                

 

           (unknown)  (no       (unknown)  (unknown)  ay occur.  (units    (unkn

own)



                    date)                         Occasional unknown)  



                                                  wrong-word or           



                                                  'sound-alike'           



                                                  substitutions may           



                                                  have                

 

           (unknown)  (no       (unknown)  (unknown)  be a potential  (units    

(unknown)



                    date)                         option. Will discuss unknown) 

 



                                                  further with the           



                                                  patient and proceed           



                                                  as she              

 

           (unknown)  (no       (unknown)  (unknown)  blood products.?  (units  

  (unknown)



                    date)                         There is no definite unknown) 

 



                                                  evidence of           



                                                  gestational sac.?           



                                                  There is a           

 

           (unknown)  (no       (unknown)  (unknown)  but smaller 1.5 cm  (units

    (unknown)



                    date)                         hypoechoic area in unknown)  



                                                  the left ovary.?           

 

           (unknown)  (no       (unknown)  (unknown)  cannot be strictly  (units

    (unknown)



                    date)                         excluded for either unknown)  



                                                  these locations.           

 

           (unknown)  (no       (unknown)  (unknown)  clinical symptoms  (units 

   (unknown)



                    date)                         recommended. unknown)  

 

           (unknown)  (no       (unknown)  (unknown)  corpus luteum or  (units  

  (unknown)



                    date)                         hemorrhagic cyst unknown)  



                                                  could have a similar          

 



                                                  appearance.? There           



                                                  is a                

 

           (unknown)  (no       (unknown)  (unknown)  cul-de-sac  (units    (unk

nown)



                    date)                         nodularlity unknown)  

 

           (unknown)  (no       (unknown)  (unknown)  deems appropriate.  (units

    (unknown)



                    date)                                   unknown)  

 

           (unknown)  (no       (unknown)  (unknown)  discharge ([place];  (unit

s    (unknown)



                    date)                         confirmed by wet unknown)  



                                                  prep) and no lesions          

 

 

           (unknown)  (no       (unknown)  (unknown)  disorders of  (units    (u

nknown)



                    date)                         vagina, R10.2 - unknown)  



                                                  Pelvic and perineal           



                                                  pain, Z11.3 -           



                                                  Encounter for           

 

           (unknown)  (no       (unknown)  (unknown)  doxycycline Adverse  (unit

s    (unknown)



                    date)                         Reaction  unknown)  



                                                  (Intermediate,           



                                                  Verified 22           



                                                  14:51)              

 

           (unknown)  (no       (unknown)  (unknown)  fluconazole  (units    (un

known)



                    date)                         (Diflucan) unknown)  

 

           (unknown)  (no       (unknown)  (unknown)  have a bowel  (units    (u

nknown)



                    date)                         movement or empties unknown)  



                                                  her bladder. Patient          

 



                                                  has been seen at           



                                                  [facility]             

 

           (unknown)  (no       (unknown)  (unknown)  have occurred. If  (units 

   (unknown)



                    date)                         there are any unknown)  



                                                  questions, please           



                                                  contact the Medical           



                                                  Records             

 

           (unknown)  (no       (unknown)  (unknown)  household members:  (units

    (unknown)



                    date)                         friend(s) unknown)  

 

           (unknown)  (no       (unknown)  (unknown)  left, No cul-de-sac  (unit

s    (unknown)



                    date)                         fullness, cul-de-sac unknown) 

 



                                                  tenderness (Mild)           



                                                  and No cul-de-sac           

 

           (unknown)  (no       (unknown)  (unknown)  month history of  (units  

  (unknown)



                    date)                         persistent left unknown)  



                                                  lower quadrant pain           



                                                  which is sharp and           

 

           (unknown)  (no       (unknown)  (unknown)  ne bleeding is  (units    

(unknown)



                    date)                         uncertain but most unknown)  



                                                  likely her pain in           



                                                  the left lower           



                                                  quadrant is           

 

           (unknown)  (no       (unknown)  (unknown)  negative but this  (units 

   (unknown)



                    date)                         morning she had a unknown)  



                                                  positive home           



                                                  pregnancy test.           



                                                  Serum hCG           

 

           (unknown)  (no       (unknown)  (unknown)  no lesions and  (units    

(unknown)



                    date)                         nontender unknown)  

 

           (unknown)  (no       (unknown)  (unknown)  nodularity  (units    (unk

nown)



                    date)                                   unknown)  

 

           (unknown)  (no       (unknown)  (unknown)  occurred due to the  (unit

s    (unknown)



                    date)                         inherent limitations unknown) 

 



                                                  of voice recognition          

 



                                                  software. Please           

 

           (unknown)  (no       (unknown)  (unknown)  of prior  (units    (unkno

wn)



                    date)                         laparoscopy. A 2nd unknown)  



                                                  laparoscopy may be           



                                                  warranted for pain           



                                                  relief but           

 

           (unknown)  (no       (unknown)  (unknown)  peripheral  (units    (unk

nown)



                    date)                         vascularity.? This unknown)  



                                                  could potentially           



                                                  represent an ectopic          

 



                                                  pregnancy           

 

           (unknown)  (no       (unknown)  (unknown)  pregnancy  (units    (unkn

own)



                    date)                                   unknown)  

 

           (unknown)  (no       (unknown)  (unknown)  pregnant in the  (units   

 (unknown)



                    date)                         future without unknown)  



                                                  benefit of assisted           



                                                  reproductive           



                                                  technology.           

 

           (unknown)  (no       (unknown)  (unknown)  rash      (units    (unkno

wn)



                    date)                                   unknown)  

 

           (unknown)  (no       (unknown)  (unknown)  read the note  (units    (

unknown)



                    date)                         carefully and unknown)  



                                                  recognize, using           



                                                  context, where these          

 



                                                  substitutions           

 

           (unknown)  (no       (unknown)  (unknown)  regarding potential  (unit

s    (unknown)



                    date)                         next steps. The unknown)  



                                                  cause of the           



                                                  patient's           



                                                  dysfunctional uteri           

 

           (unknown)  (no       (unknown)  (unknown)  removal of the  (units    

(unknown)



                    date)                         fallopian tube would unknown) 

 



                                                  make it impossible           



                                                  for her to become           

 

           (unknown)  (no       (unknown)  (unknown)  represent  (units    (unkn

own)



                    date)                                   unknown)  

 

           (unknown)  (no       (unknown)  (unknown)  screening for  (units    (

unknown)



                    date)                         infections with a unknown)  



                                                  predominantly sexual          

 



                                                  mode of transmission          

 

 

           (unknown)  (no       (unknown)  (unknown)  sentences  (units    (unkn

own)



                    date)                                   unknown)  

 

           (unknown)  (no       (unknown)  (unknown)  she is been  (units    (un

known)



                    date)                         bleeding for the unknown)  



                                                  last 2 weeks and her          

 



                                                  pain has been worse           



                                                  during              

 

           (unknown)  (no       (unknown)  (unknown)  similar   (units    (unkno

wn)



                    date)                                   unknown)  

 

           (unknown)  (no       (unknown)  (unknown)  software. Although  (units

    (unknown)



                    date)                         every effort is made unknown) 

 



                                                  to edit content,           



                                                  transcription errors          

 



                                                  m                   

 

           (unknown)  (no       (unknown)  (unknown)  stabbing, and  (units    (

unknown)



                    date)                         intermittent. It unknown)  



                                                  does not seem to be           



                                                  related to her           



                                                  cycles but           

 

           (unknown)  (no       (unknown)  (unknown)  that period of  (units    

(unknown)



                    date)                         time. In addition unknown)  



                                                  she has significant           



                                                  pain when she           



                                                  strains to           

 

           (unknown)  (no       (unknown)  (unknown) thick-walled 2.7 cm  (units

    (unknown)



                    date)                         heterogeneous unknown)  



                                                  complex hypoechoic           



                                                  mass in the left           



                                                  ovary with           

 

           (unknown)  (no       (unknown)  (unknown)  today is also  (units    (

unknown)



                    date)                         negative and stat unknown)  



                                                  pelvic US shows:           

 

           (unknown)  (no       (unknown)  (unknown)  urethra and no  (units    

(unknown)



                    date)                         lesions   unknown)  

 

           (unknown)  (no       (unknown)  (unknown)  uterine shape  (units    (

unknown)



                    date)                         normal, No tender, unknown)  



                                                  no cervical motion           



                                                  tenderness and           



                                                  tender              

 

           (unknown)  (no       (unknown)  (unknown)  vomitt    (units    (unkno

wn)



                    date)                                   unknown)  

 

           (unknown)  (no       (unknown)  (unknown)  which was negative  (units

    (unknown)



                    date)                         but no records are unknown)  



                                                  available for           



                                                  review. Urine hCG           



                                                  today is            







Social History







                     date                description         facility

 

                     2022 00:00    Current some day smoker  [facility]

l

 

                     2022 00:00    Current some day smoker  [facility]

l

 

                     2023 00:00    Current some day smoker  [facility]

l

 

                     2023-02-15 00:00    Current some day smoker  [facility]







Vital Signs







                 date            measurement     value           units

 

                 2022 00:00  BMI             26.9            kg/m2

 

                 2022 00:00  BMI             87.0            %

 

                 2022 00:00  BP_diastolic    62              mmHg

 

                 2022 00:00  BP_systolic     108             mmHg

 

                 2022 00:00  heart_rate      66              /min

 

                 2022 00:00  height_metric   160.02          cm

 

                 2022 00:00  height_standard  63              in

 

                 2022 00:00  o2_saturation   99              %

 

                 2022 00:00  respiration_rate  14              /min

 

                 2022 00:00  temperature_metric  36.44           C

 

                 2022 00:00  temperature_standard  97.6            F

 

                 2022 00:00  weight_metric   68.99           kg

 

                 2022 00:00  weight_standard  152.1           lb

 

                 2022 00:00  BMI             27.1            kg/m2

 

                 2022 00:00  BMI             87.6            %

 

                 2022 00:00  BP_diastolic    62              mmHg

 

                 2022 00:00  BP_systolic     110             mmHg

 

                 2022 00:00  height_metric   160.02          cm

 

                 2022 00:00  height_standard  63              in

 

                 2022 00:00  weight_metric   69.39           kg

 

                 2022 00:00  weight_standard  152.98          lb

 

                 2023 00:00  BMI             26.5            kg/m2

 

                 2023 00:00  BMI             85.5            %

 

                 2023 00:00  BP_diastolic    57              mmHg

 

                 2023 00:00  BP_systolic     103             mmHg

 

                 2023 00:00  heart_rate      65              /min

 

                 2023 00:00  height_metric   160.02          cm

 

                 2023 00:00  height_standard  63              in

 

                 2023 00:00  o2_saturation   98              %

 

                 2023 00:00  respiration_rate  17              /min

 

                 2023 00:00  weight_metric   68.03           kg

 

                 2023 00:00  weight_standard  149.98          lb

 

                 2023-02-15 00:00  BMI             28.7            kg/m2

 

                 2023-02-15 00:00  BMI             91.2            %

 

                 2023-02-15 00:00  BP_diastolic    68              mmHg

 

                 2023-02-15 00:00  BP_systolic     118             mmHg

 

                 2023-02-15 00:00  height_metric   160.02          cm

 

                 2023-02-15 00:00  height_standard  63              in

 

                 2023-02-15 00:00  weight_metric   73.48           kg

 

                 2023-02-15 00:00  weight_standard  162             lb

## 2023-02-17 NOTE — ED PHYSICIAN DOCUMENTATION
PD HPI SKIN





- Stated complaint


Stated Complaint: ALERGIC REACTION





- Chief complaint


Chief Complaint: Allergic Rx





- History obtained from


History obtained from: Patient





- History of Present Illness


Timing - onset: How many hours ago (2)


Quality / character: Itchy, Painful, Burning





- Additional information


Additional information: 





HPI from patient. 


Patient had false eyelashes paced bilaterally at a salon using adhesive 

material. She says this was done approximately 3 hours PTA, and that within one 

hour of placement she developed bilateral upper eyelid swelling, itching, and 

erythema. She denies change in vision, fever. She says she has had similar, 

though noticeably milder, such symptoms with previous applications. 





Review of Systems


Constitutional: denies: Fever, Chills, Sweats


Eyes: reports: Irritation.  denies: Loss of vision, Decreased vision, 

Photophobia, Discharge





PD PAST MEDICAL HISTORY





- Past Medical History


Past Medical History: Yes


Cardiovascular: None


Respiratory: None


Endocrine/Autoimmune: None


GYN: Ovarian cysts, Other


Psych: Depression


Other Past Medical History: Acne





- Past Surgical History


Past Surgical History: Yes


Ortho: ACL reconstruction


/GYN: Oophrectomy, Other





- Present Medications


Home Medications: 


                                Ambulatory Orders











 Medication  Instructions  Recorded  Confirmed


 


Amoxicillin 500 mg PO BID 02/17/23 02/17/23


 


Bacitracin/Polymyxin Ophth Oin 1 applic EACHEYE BID #7 gm 02/17/23 





[Polysporin Ophth Oint]   


 


Sertraline [Zoloft] 25 mg PO DAILY 02/17/23 02/17/23


 


predniSONE [Deltasone] 40 mg PO DAILY 3 Days #6 tablet 02/17/23 














- Allergies


Allergies/Adverse Reactions: 


                                    Allergies











Allergy/AdvReac Type Severity Reaction Status Date / Time


 


doxycycline Allergy  Unknown Verified 02/17/23 22:04


 


lactose Allergy  Unknown Verified 02/17/23 22:04


 


morphine Allergy  Unknown Verified 02/17/23 22:04


 


Morpholine Analogues Allergy  Hives Verified 02/17/23 22:04


 


Dairy Allergy  Unknown Uncoded 02/17/23 22:04














- Social History


Does the pt smoke?: No


Smoking Status: Never smoker


Does the pt drink ETOH?: No


Does the pt have substance abuse?: No





- Immunizations


Immunizations are current?: Yes





- POLST


Patient has POLST: No





PD ED PE NORMAL





- Vitals


Vital signs reviewed: Yes





- General


General: Alert and oriented X 3, No acute distress, Well developed/nourished





- HEENT


HEENT: PERRL, EOMI





PD ED PE EXPANDED





- Eyes


Eyes: PERRL, Normal accommodation, EOMI, Both eyes, Eyelid swelling (bilateral 

upper eyelid mild/moderate swelling with mild erythema), Eyelid erythema, Nl 

conjunctiva/sclera.  No: Exudate, Subconj hemorrhage





Results





- Vitals


Vitals: 


                               Vital Signs - 24 hr











  02/17/23





  21:59


 


Temperature 37.0 C


 


Heart Rate 90


 


Respiratory 17





Rate 


 


Blood Pressure 127/77


 


O2 Saturation 98








                                     Oxygen











O2 Source                      Room air

















PD Medical Decision Making





- ED course


Complexity details: considered differential, d/w patient


ED course: 





patient is able to find the adhesive produce that was used on the upper eyelids 

and it is a cyanoacrylate-based medication. The erythem , swelling, and intense 

pruritis are consistent with focal/contact dermatitis. She is given 40mg PO 

prednisone and rx for same x 3 more days. erythromycin ointment is applied to 

both upper lids along eyelash lines; she is provided rx for bacitracin ointment 

to be used twice per day to these same upper eyelash lines; this should help 

weaken the bond of the glue faster and hopefully she can gently remove the 

lashes them selves once enough of the glue his dissipated. 





Departure





- Departure


Disposition: 01 Home, Self Care


Clinical Impression: 


Contact dermatitis


Qualifiers:


 Contact dermatitis type: allergic Contact dermatitis trigger: adhesive Quali

fied Code(s): L23.1 - Allergic contact dermatitis due to adhesives





Condition: Good


Instructions:  ED Dermatitis Contact


Prescriptions: 


predniSONE [Deltasone] 40 mg PO DAILY 3 Days #6 tablet


Bacitracin/Polymyxin Ophth Oin [Polysporin Ophth Oint] 1 applic EACHEYE BID #7 

gm


Comments: 


You appear to be having a local reaction to the adhesive that was used when the 

eyelashes were placed earlier tonight.  As we discussed, the ingredients of the 

product that you showed to me that was used is a particular strong adhesive; the

antibiotic ointment that was applied tonight (I have also electronically 

submitted a prescription for this to the [facility] in [place]) should

help break up the adhesive.  You are also given a dose of a steroid 

(prednisone), which is often used for allergic reaction.  The onset effect of 

the steroid is typically quite gradual over the course of several hours, but it 

should eventually help reduce the swelling/inflammation of the eyelids until the

adhesive weakens enough to allow for removal of the eyelashes. I have also 

electronically submitted a prescription for 3 more days of the steroid to the 

[facility] in [place].  As we discussed, I recommend that you take 

Benadryl as soon as you get home (25 to 50 mg orally every 6 hours as needed for

symptoms).  This might help in a more cordoba fashion than the other medications 

in regards to the symptoms including the swelling and itching.  Do not drive for

minimum of 6 hours after taking a dose of Benadryl.


Discharge Date/Time: 02/17/23 23:10

## 2023-04-20 ENCOUNTER — HOSPITAL ENCOUNTER (EMERGENCY)
Dept: HOSPITAL 76 - ED | Age: 18
Discharge: HOME | End: 2023-04-20
Payer: COMMERCIAL

## 2023-04-20 VITALS — DIASTOLIC BLOOD PRESSURE: 62 MMHG | SYSTOLIC BLOOD PRESSURE: 113 MMHG

## 2023-04-20 DIAGNOSIS — R33.9: Primary | ICD-10-CM

## 2023-04-20 LAB
CLARITY UR REFRACT.AUTO: CLEAR
GLUCOSE UR QL STRIP.AUTO: NEGATIVE MG/DL
KETONES UR QL STRIP.AUTO: NEGATIVE MG/DL
NITRITE UR QL STRIP.AUTO: NEGATIVE
PH UR STRIP.AUTO: 6 PH (ref 5–7.5)
PROT UR STRIP.AUTO-MCNC: NEGATIVE MG/DL
RBC # UR STRIP.AUTO: NEGATIVE /UL
SP GR UR STRIP.AUTO: <=1.005 (ref 1–1.03)
UROBILINOGEN UR QL STRIP.AUTO: (no result) E.U./DL
UROBILINOGEN UR STRIP.AUTO-MCNC: NEGATIVE MG/DL

## 2023-04-20 PROCEDURE — 81001 URINALYSIS AUTO W/SCOPE: CPT

## 2023-04-20 PROCEDURE — 87086 URINE CULTURE/COLONY COUNT: CPT

## 2023-04-20 PROCEDURE — 99283 EMERGENCY DEPT VISIT LOW MDM: CPT

## 2023-04-20 PROCEDURE — 51702 INSERT TEMP BLADDER CATH: CPT

## 2023-04-20 PROCEDURE — 81003 URINALYSIS AUTO W/O SCOPE: CPT

## 2023-04-20 NOTE — ED PHYSICIAN DOCUMENTATION
PD HPI ABD PAIN





- Stated complaint


Stated Complaint: FEMALE 





- Chief complaint


Chief Complaint: Abd Pain





- History obtained from


History obtained from: Patient, Family





- Additional information


Additional information: 


The patient comes to the emergency department chief complaint of urinary 

retention after an exploratory laparoscopy today.  The patient has a history of 

chlamydia and a tubo-ovarian abscess, and in the months since has had chronic 

pelvic pain.  She has had this once before and has had laparoscopy at which time

nothing was found.  She had urinary retention after that laparoscopy and had a 

Wolfe catheter for 5 days.  The patient states that after this Laparoscopy, she 

was told that it looks like she has endometriosis but they could not find 

anything else.  She asked her surgeon to place a Wolfe, remembering her 

experience previously, and her surgeon declined at that time.  The patient 

states she got home this evening and never was able to urinate.  She states she 

ate and drink something and Feeling more and more like she had to urinate but 

could not.  That is what finally prompted her to come in.  No fevers or chills. 

No nausea or vomiting.  No other complaints at this time.








PD PAST MEDICAL HISTORY





- Past Medical History


Past Medical History: Yes


Cardiovascular: None


Respiratory: None


Neuro: None


Endocrine/Autoimmune: None


GI: None


GYN: Ovarian cysts, Other


: None


HEENT: None


Psych: Depression


Musculoskeletal: None


Derm: None





- Past Surgical History


Past Surgical History: Yes


General: Other


Ortho: ACL reconstruction


/GYN: Oophrectomy, Other





- Present Medications


Home Medications: 


                                Ambulatory Orders











 Medication  Instructions  Recorded  Confirmed


 


Sertraline [Zoloft] 25 mg PO DAILY 02/17/23 02/17/23


 


predniSONE [Deltasone] 40 mg PO DAILY 3 Days #6 tablet 02/17/23 














- Allergies


Allergies/Adverse Reactions: 


                                    Allergies











Allergy/AdvReac Type Severity Reaction Status Date / Time


 


doxycycline Allergy  Unknown Verified 04/20/23 22:32


 


lactose Allergy  Unknown Verified 04/20/23 22:32


 


morphine Allergy  Unknown Verified 04/20/23 22:32


 


Morpholine Analogues Allergy  Hives Verified 04/20/23 22:32


 


Dairy Allergy  Unknown Uncoded 04/20/23 22:32














- Social History


Does the pt smoke?: No


Smoking Status: Never smoker


Does the pt drink ETOH?: No


Does the pt have substance abuse?: No





- Immunizations


Immunizations are current?: Yes





- POLST


Patient has POLST: No





PD ED PE NORMAL





- Vitals


Vital signs reviewed: Yes





- General


General: Alert and oriented X 3, No acute distress, Well developed/nourished





- HEENT


HEENT: Atraumatic, PERRL, EOMI, Moist mucous membranes





- Neck


Neck: Supple, no meningeal sign





- Cardiac


Cardiac: RRR, No murmur, Strong equal pulses





- Respiratory


Respiratory: No respiratory distress, Clear bilaterally





- Abdomen


Abdomen: Soft, Other (Incision sites are covered with bandages which are clean 

dry and intact; mild to moderate diffuse tenderness throughout abdomen, not an 

appropriate for surgical procedure patient just had; distended bladder.)





- Derm


Derm: Warm and dry





- Extremities


Extremities: No deformity





- Neuro


Neuro: Alert and oriented X 3





- Psych


Psych: Normal mood, Normal affect





Results





- Vitals


Vitals: 


                               Vital Signs - 24 hr











  04/20/23 04/20/23 04/20/23





  21:34 21:57 23:26


 


Temperature 36.5 C  36.6 C


 


Heart Rate 58 L  61


 


Respiratory 17 20 15





Rate   


 


Blood Pressure 124/65  113/62


 


O2 Saturation 98  100








                                     Oxygen











O2 Source                      Room air

















- Labs


Labs: 


                                Laboratory Tests











  04/20/23





  21:45


 


Urine Color  YELLOW


 


Urine Clarity  CLEAR


 


Urine pH  6.0


 


Ur Specific Gravity  <=1.005


 


Urine Protein  NEGATIVE


 


Urine Glucose (UA)  NEGATIVE


 


Urine Ketones  NEGATIVE


 


Urine Occult Blood  NEGATIVE


 


Urine Nitrite  NEGATIVE


 


Urine Bilirubin  NEGATIVE


 


Urine Urobilinogen  0.2 (NORMAL)


 


Ur Leukocyte Esterase  NEGATIVE


 


Ur Microscopic Review  NOT INDICATED


 


Urine Culture Comments  NOT INDICATED














PD Medical Decision Making





- ED course


Complexity details: considered differential, d/w patient, d/w family


ED course: 


A Wolfe catheter was placed and the patient put out about 700 cc of clearish 

yellow urine.  She reported immediate relief of her symptoms.  We have discussed

 that she did not should not use the catheter for more than 7 days without a 

voiding trial and should either she her gynecologic surgeon, her primary doctor,

 or our department to have this removed.  We have discussed signs and symptoms 

of infection which should prompt reevaluation as well.  Patient is in agreement.








Departure





- Departure


Disposition: 01 Home, Self Care


Clinical Impression: 


 Postoperative urinary retention





Condition: Stable


Instructions:  ED Catheter Care Wolfe, ED Retention Urinary Female


Comments: 


A Wolfe catheter has been placed today and is draining well.  This should not be

 in place for more than a week without rechecking to see if your bladder is 

working properly.  You may follow-up with your surgeon, your primary doctor, or 

our department at any time during that week To have the catheter removed, though

 it is advisable that you leave it in for at least a couple of days.  Please 

drink plenty of fluids and continue to follow all of the other postoperative 

recommendations and instructions that you were given today.  Please follow-up 

with your surgeon regarding any concerns about the surgery itself.


Discharge Date/Time: 04/20/23 23:54

## 2023-04-20 NOTE — EXTERNAL MEDICAL SUMMARY RPT
Continuity of Care Document

                            Created on:2023



Patient:Lesvia Aguero

Sex:Female

:2005

External Reference #:[id number]





Demographics







                          Address                   [address] [place] [address]

 

                          Phone                     Unavailable

 

                          Preferred Language        English

 

                          Marital Status            Never 

 

                          Sikhism Affiliation     Unknown

 

                          Race                      Unknown

 

                          Ethnic Group              Unknown









Author







                          Organization              [facility]

 

                          Address                    [place] [address]

 

                          Phone                     [phone]









Care Team Providers







                    Name                Role                Phone

 

                    Unavailable         Unavailable         Unavailable

 

                    Denia Faustin      Unavailable         Unavailable









Allergies and Intolerances







                 date            description     facility        type

 

                 (no date)       Opioids - Morphine Analogues  [facility]  

(unknown)

 

                 (no date)       amoxicillin     [facility]  (unknown)

 

                 (no date)       doxycycline     [facility]  (unknown)

 

                 (no date)       lactose         [facility]  (unknown)







Encounters

No information.



Functional Status

No information.



Immunizations

No information.



Medications







                     date                description         facility

 

                     2023 00:00    Ondansetron         [facility]

 

                     2023 00:00    Oxycodone           [facility]

 

                     2023-02-15 00:00    Fluconazole         [facility]

 

                     2023 00:00    Ketorolac           [facility]

 

                     2023 00:00    Hydrocodone-Acetaminophen  [facility]

joyce

 

                     2023 00:00    Hydrocodone-Acetaminophen  [facility]

joyce







Problems







                     date                description         facility

 

                     2023-02-15 14:44    Other specified noninflammatory disorde

rs of  [facility]



                                        vagina              

 

                     2023-02-15 14:44    Pelvic and perineal pain  [facility]

 

                     2023-02-15 14:44    Encounter for screening for infections 

with a  [facility]



                                        predominantly       

 

                     2023-02-15 15:06    Hemorrhage in early pregnancy, unspecif

[facility]

 

                     2023-02-15 15:15    Other chronic pain  [facility]

 

                     2023-02-15 15:15    Hemorrhage in early pregnancy, unspecif

[facility]

 

                     2023-02-15 15:15    Left upper quadrant pain  [facility]

al

 

                     2023-02-15 15:15    [facility]

al

 

                     2023-02-15 15:33    Other chronic pain  [facility]

 

                     2023-02-15 15:33    Hemorrhage in early pregnancy, unspecif

[facility]

 

                     2023-02-15 15:33    [facility] quadrant [facility]

al

 

                     2023-02-15 15:33    Pelvic and perineal pain  [facility]

al

 

                     2023 00:00    Candidiasis of vagina  [facility]

 

                     2023 00:00    Abnormal uterine bleeding  [facility]

[facility]

 

                     2023 02:58    Other specified noninflammatory disorde

rs of  [facility]



                                        vagina              

 

                     2023 02:58    Pelvic and perineal pain  [facility]

al

 

                     2023 02:58    Encounter for screening for infections 

with a  [facility]



                                        predominantly       

 

                     2023 00:00    Menorrhagia with irregular cycle  [facility]

 

                     2023 00:00    Abdominal pain      [facility]

 

                     2023 00:00    Right lower quadrant abdominal pain  [facility]

 

                     2023 13:47    Unspecified ovarian cyst, unspecified [facility]

 

                     2023 13:47    Other specified abnormal uterine and [facility]

[facility]



                                        bleeding            

 

                     2023 13:47    Pelvic and perineal pain  [facility]

al

 

                     2023 13:47    Encounter for insertion of [facility]



                                        contraceptive device 

 

                     2023 16:42    Unspecified ovarian cyst, unspecified [facility]

 

                     2023 16:42    Other specified abnormal uterine and va

[facility]



                                        bleeding            

 

                     2023 16:42    Pelvic and perineal pain  [facility]

 

                     2023 16:42    Encounter for insertion of intrauterine

  [facility]



                                        contraceptive device 

 

                     2023 16:46    Unspecified ovarian cyst, unspecified [facility]

 

                     2023 16:46    Other specified abnormal uterine and va

[facility]



                                        bleeding            

 

                     2023 16:46    Pelvic and perineal pain  [facility]

al

 

                     2023 16:46    Encounter for insertion of intrauterine

  [facility]



                                        contraceptive device 

 

                     2023 17:25    Unspecified ovarian cyst, unspecified [facility]

 

                     2023 17:25    Other specified abnormal uterine and va

[facility]



                                        bleeding            

 

                     2023 17:25    Pel[facility] perineal [facility]

al

 

                     2023 17:25    Encounter for insertion of [facility]



                                        contraceptive device 

 

                     2023 19:14    Unspecified ovarian cyst, unspecified [facility]

 

                     2023 19:14    Other specified abnormal uterine and va

[facility]



                                        bleeding            

 

                     2023 19:14    Pelvic and perineal [facility]

al

 

                     2023 19:14    Encounter for insertion of intrauterine

  [facility]



                                        contraceptive device 







Procedures







                     date                description         facility

 

                     2023-02-15 00:00    Complete ultrasound of pelvis  [facility]

ospital

 

                     2023 00:00    Complete ultrasound of pelvis  [facility]

ospital

 

                     2023-02-15 00:00    Gram Stain          [facility]

 

                     2023 00:00    US Abdomen limited  [facility]







Results/Labs







           test      date      author    facility  value     unit      interpret

ation









                                         Result panel 1









           (unknown)  (no date)  (unknown)  [facility]  (units    (unk

nown)



                                        [facility]            unknown)  









                                         Result panel 2









           (unknown)  (no date)  (unknown)  [facility]    (no value)  (units    (unk

nown)



                                        Hospital            unknown)  









                                         Result panel 3









           (unknown)  (no date)  (unknown)  [facility]    (no value)  (units    (unk

nown)



                                        Hospital            unknown)  









                                         Result panel 4









           (unknown)  (no date)  (unknown)  [facility]    (no value)  (units    (unk

nown)



                                        Hospital            unknown)  









                                         Result panel 5









           (unknown)  (no date)  (unknown)  [facility]    (no value)  (units    (unk

nown)



                                        Hospital            unknown)  









                                         Result panel 6









           (unknown)  (no date)  (unknown)  [facility]    (no value)  (units    (unk

nown)



                                        Hospital            unknown)  









                                         Result panel 7









           (unknown)  (no date)  (unknown)  [facility]    (no value)  (units    (unk

nown)



                                        Hospital            unknown)  









                                         Result panel 8









           (unknown)  (no date)  (unknown)  [facility]    (no value)  (units    (unk

nown)



                                        Hospital            unknown)  









                                         Result panel 9









           (unknown)  (no date)  (unknown)  [facility]    (no value)  (units    (unk

nown)



                                        Hospital            unknown)  









                                         Result panel 10









           (unknown)  (no date)  (unknown)  [facility]    (no value)  (units    (unk

nown)



                                        Hospital            unknown)  









                                         Result panel 11









           (unknown)  (no date)  (unknown)  [facility] value[facility]  (units    (unk

nown)



                                        Hospital            unknown)  









                                         Result panel 12









           (unknown)  (no date)  (unknown)  [facility]    (no value)  (units    (unk

nown)



                                        Hospital            unknown)  









                                         Result panel 13









           (unknown)  (no date)  (unknown)  [facility]    (no value)  (units    (unk

nown)



                                        Hospital            unknown)  









                                         Result panel 14









           (unknown)  (no date)  (unknown)  [facility]    (no value)  (units    (unk

nown)



                                        Hospital            unknown)  









                                         Result panel 15









           (unknown)  (no date)  (unknown)  [facility]    (no value)  (units    (unk

nown)



                                        Hospital            unknown)  









                                         Result panel 16









           (unknown)  (no date)  (unknown)  [facility] value)  (units    (unk

nown)



                                        Hospital            unknown)  









                                         Result panel 17









           (unknown)  (no date)  (unknown)  [facility] value[facility]  (units    (unk

nown)



                                        Hospital            unknown)  









                                         Result panel 18









           (unknown)  (no date)  (unknown)  [facility] value[facility]  (units    (unk

nown)



                                        Hospital            unknown)  









                                         Result panel 19









           (unknown)  (no date)  (unknown)  [facility]    (no value)  (units    (unk

nown)



                                        Hospital            unknown)  









                                         Result panel 20









           (unknown)  (no date)  (unknown)  [facility] value[facility]  (units    (unk

nown)



                                        Hospital            unknown)  









                                         Result panel 21









           (unknown)  (no date)  (unknown)  [facility] value[facility]  (units    (unk

nown)



                                        Hospital            unknown)  









                                         Result panel 22









           (unknown)  (no date)  (unknown)  [facility] value[facility]  (units    (unk

nown)



                                        Hospital            unknown)  









                                         Result panel 23









           (unknown)  (no date)  (unknown)  [facility] value[facility]  (units    (unk

nown)



                                        Hospital            unknown)  









                                         Result panel 24









           (unknown)  (no date)  (unknown)  [facility]  (units    (unk

nown)



                                        Hospital            unknown)  









                                         Result panel 25









           (unknown)  (no date)  (unknown)  [facility] value[facility]  (units    (unk

nown)



                                        Hospital            unknown)  









                                         Result panel 26









           (unknown)  (no date)  (unknown)  [facility] value[facility]  (units    (unk

nown)



                                        Hospital            unknown)  









                                         Result panel 27









           (unknown)  (no date)  (unknown)  [facility] value[facility]  (units    (unk

nown)



                                        Hospital            unknown)  









                                         Result panel 28









           (unknown)  (no date)  (unknown)  [facility] value[facility]  (units    (unk

nown)



                                        Hospital            unknown)  









                                         Result panel 29









           (unknown)  (no date)  (unknown)  [facility] value[facility]  (units    (unk

nown)



                                        Hospital            unknown)  









                                         Result panel 30









           (unknown)  (no date)  (unknown)  [facility] value[facility]  (units    (unk

nown)



                                        Hospital            unknown)  









                                         Result panel 31









           (unknown)  (no date)  (unknown)  [facility] value[facility]  (units    (unk

nown)



                                        Hospital            unknown)  









                                         Result panel 32









           (unknown)  (no date)  (unknown)  [facility]  [facility]units    (unk

nown)



                                        Hospital            unknown)  









                                         Result panel 33









           (unknown)  (no date)  (unknown)  [facility] value[facility]  (units    (unk

nown)



                                        Hospital            unknown)  









                                         Result panel 34









           (unknown)  (no date)  (unknown)  [facility]  (units    (unk

nown)



                                        Hospital            unknown)  









                                         Result panel 35









           (unknown)  (no date)  (unknown)  [facility] value[facility]  (units    (unk

nown)



                                        Hospital            unknown)  









                                         Result panel 36









           (unknown)  (no date)  (unknown)  [facility]    (no value[facility]  (units    (unk

nown)



                                        Hospital            unknown)  









                                         Result panel 37









           (unknown)  (no date)  (unknown)  [facility]    (no value[facility]  (units    (unk

nown)



                                        Hospital            unknown)  









                                         Result panel 38









           (unknown)  (no date)  (unknown)  [facility]    (no value)  (units    (unk

nown)



                                        Hospital            unknown)  









                                         Result panel 39









           (unknown)  (no date)  (unknown)  [facility]    (no value)  (units    (unk

nown)



                                        Hospital            unknown)  









                                         Result panel 40









           (unknown)  (no date)  (unknown)  [facility]    (no value[facility]  (units    (unk

nown)



                                        Hospital            unknown)  









                                         Result panel 41









           (unknown)  (no date)  (unknown)  [facility]    (no value)  (units    (unk

nown)



                                        Hospital            unknown)  









                                         Result panel 42









           (unknown)  (no date)  (unknown)  [facility]    (no value[facility]  (units    (unk

nown)



                                        Hospital            unknown)  









                                         Result panel 43









           (unknown)  (no date)  (unknown)  [facility]    (no value[facility]  (units    (unk

nown)



                                        Hospital            unknown)  









                                         Result panel 44









           (unknown)  (no date)  (unknown)  [facility]    (no value)  (units    (unk

nown)



                                        Hospital            unknown)  









                                         Result panel 45









           (unknown)  (no date)  (unknown)  [facility]    (no value[facility]  (units    (unk

nown)



                                        Hospital            unknown)  









                                         Result panel 46









           (unknown)  (no date)  (unknown)  [facility]    (no value[facility]  (units    (unk

nown)



                                        Hospital            unknown)  









                                         Result panel 47









           (unknown)  (no date)  (unknown)  [facility] value[facility]  (units    (unk

nown)



                                        Hospital            unknown)  









                                         Result panel 48









           (unknown)  (no date)  (unknown)  [facility]    (no value[facility]  (units    (unk

nown)



                                        Hospital            unknown)  









                                         Result panel 49









           (unknown)  (no date)  (unknown)  [facility]    (no value[facility]  (units    (unk

nown)



                                        Hospital            unknown)  









                                         Result panel 50









           (unknown)  (no date)  (unknown)  [facility]    (no value[facility]  (units    (unk

nown)



                                        Hospital            unknown)  









                                         Result panel 51









           (unknown)  (no date)  (unknown)  [facility]    (no value[facility]  (units    (unk

nown)



                                        Hospital            unknown)  









                                         Result panel 52









           (unknown)  (no date)  (unknown)  [facility]    (no value)  (units    (unk

nown)



                                        Hospital            unknown)  









                                         Result panel 53









           (unknown)  (no date)  (unknown)  [facility]    (no value[facility]  (units    (unk

nown)



                                        Hospital            unknown)  









                                         Result panel 54









           (unknown)  (no date)  (unknown)  [facility]    (no value)  (units    (unk

nown)



                                        Hospital            unknown)  









                                         Result panel 55









           (unknown)  (no date)  (unknown)  [facility]    (no value)  (units    (unk

nown)



                                        Hospital            unknown)  









                                         Result panel 56









           (unknown)  (no date)  (unknown)  [facility]    (no value)  (units    (unk

nown)



                                        Hospital            unknown)  









                                         Result panel 57









           (unknown)  (no date)  (unknown)  [facility]    (no value)  (units    (unk

nown)



                                        Hospital            unknown)  









                                         Result panel 58









           (unknown)  (no date)  (unknown)  [facility]    (no value)  (units    (unk

nown)



                                        Hospital            unknown)  









                                         Result panel 59









           (unknown)  (no date)  (unknown)  [facility]    (no value)  (units    (unk

nown)



                                        Hospital            unknown)  









                                         Result panel 60









           (unknown)  (no date)  (unknown)  [facility]    (no value)  (units    (unk

nown)



                                        Hospital            unknown)  









                                         Result panel 61









           (unknown)  (no date)  (unknown)  [facility]    (no value)  (units    (unk

nown)



                                        Hospital            unknown)  









                                         Result panel 62









           (unknown)  (no date)  (unknown)  [facility]    (no value)  (units    (unk

nown)



                                        Hospital            unknown)  









                                         Result panel 63









           (unknown)  (no date)  (unknown)  [facility]    (no value)  (units    (unk

nown)



                                        Hospital            unknown)  









                                         Result panel 64









           (unknown)  (no date)  (unknown)  [facility]    (no value)  (units    (unk

nown)



                                        Hospital            unknown)  









                                         Result panel 65









           (unknown)  (no date)  (unknown)  [facility]    (no value)  (units    (unk

nown)



                                        Hospital            unknown)  









                                         Result panel 66









           (unknown)  (no date)  (unknown)  [facility]    (no value)  (units    (unk

nown)



                                        Hospital            unknown)  









                                         Result panel 67









           (unknown)  (no date)  (unknown)  [facility]    (no value)  (units    (unk

nown)



                                        Hospital            unknown)  









                                         Result panel 68









           (unknown)  (no date)  (unknown)  [facility]    (no value)  (units    (unk

nown)



                                        Hospital            unknown)  









                                         Result panel 69









           (unknown)  (no date)  (unknown)  [facility]    (no value)  (units    (unk

nown)



                                        Hospital            unknown)  









                                         Result panel 70









           (unknown)  (no date)  (unknown)  [facility]    (no value)  (units    (unk

nown)



                                        Hospital            unknown)  









                                         Result panel 71









           (unknown)  (no date)  (unknown)  [facility]    (no value)  (units    (unk

nown)



                                        Hospital            unknown)  









                                         Result panel 72









           (unknown)  (no date)  (unknown)  [facility]    (no value)  (units    (unk

nown)



                                        Hospital            unknown)  









                                         Result panel 73









           (unknown)  (no date)  (unknown)  [facility]    (no value)  (units    (unk

nown)



                                        Hospital            unknown)  









                                         Result panel 74









           (unknown)  (no date)  (unknown)  [facility] value[facility]  (units    (unk

nown)



                                        [facility]            unknown)  









                                         Result panel 75









           (unknown)  (no date)  (unknown)  [facility]    (no value)  (units    (unk

nown)



                                        [facility]            unknown)  









                                         Result panel 76









           (unknown)  (no date)  (unknown)  [facility]    (no value)  (units    (unk

nown)



                                        [facility]            unknown)  









                                         Result panel 77









           (unknown)  (no date)  (unknown)  [facility]    (no value)  (units    (unk

nown)



                                        [facility]            unknown)  









                                         Result panel 78









           (unknown)  (no date)  (unknown)  [facility]    (no value)  (units    (unk

nown)



                                        [facility]            unknown)  









                                         Result panel 79









           (unknown)  (no date)  (unknown)  [facility]    (no value)  (units    (unk

nown)



                                        [facility]            unknown)  









                                         Result panel 80









           (unknown)  (no date)  (unknown)  [facility]    (no value)  (units    (unk

nown)



                                        [facility]            unknown)  









                                         Result panel 81









           (unknown)  (no date)  (unknown)  [facility]    (no value)  (units    (unk

nown)



                                        [facility]            unknown)  









                                         Result panel 82









           (unknown)  (no date)  (unknown)  [facility]    (no value)  (units    (unk

nown)



                                        [facility]            unknown)  









                                         Result panel 83









           (unknown)  (no date)  (unknown)  [facility]    (no value)  (units    (unk

nown)



                                        [facility]            unknown)  









                                         Result panel 84









           (unknown)  (no date)  (unknown)  [facility]    (no value[facility]  (units    (unk

nown)



                                        [facility]            unknown)  









                                         Result panel 85









           (unknown)  (no date)  (unknown)  [facility]    (no value)  (units    (unk

nown)



                                        [facility]            unknown)  









                                         Result panel 86









           (unknown)  (no date)  (unknown)  [facility]    (no value)  (units    (unk

nown)



                                        [facility]            unknown)  









                                         Result panel 87









           (unknown)  (no date)  (unknown)  [facility]    (no value)  (units    (unk

nown)



                                        [facility]            unknown)  









                                         Result panel 88









           (unknown)  (no date)  (unknown)  [facility]    (no value)  (units    (unk

nown)



                                        [facility]            unknown)  









                                         Result panel 89









           (unknown)  (no date)  (unknown)  [facility]    (no value)  (units    (unk

nown)



                                        [facility]            unknown)  









                                         Result panel 90









           (unknown)  (no date)  (unknown)  [facility]    (no value)  (units    (unk

nown)



                                        [facility]            unknown)  









                                         Result panel 91









           (unknown)  (no date)  (unknown)  [facility]    (no value)  (units    (unk

nown)



                                        [facility]            unknown)  









                                         Result panel 92









           (unknown)  (no date)  (unknown)  [facility]    (no value)  (units    (unk

nown)



                                        [facility]            unknown)  









                                         Result panel 93









           (unknown)  (no date)  (unknown)  [facility]    (no value)  (units    (unk

nown)



                                        [facility]            unknown)  









                                         Result panel 94









           (unknown)  (no date)  (unknown)  [facility]    (no value[facility]  (units    (unk

nown)



                                        [facility]            unknown)  









                                         Result panel 95









           (unknown)  (no date)  (unknown)  [facility]    (no value)  (units    (unk

nown)



                                        [facility]            unknown)  









                                         Result panel 96









           (unknown)  (no date)  (unknown)  [facility]    (no value)  (units    (unk

nown)



                                        [facility]            unknown)  









                                         Result panel 97









           (unknown)  (no date)  (unknown)  [facility]    (no value[facility]  (units    (unk

nown)



                                        [facility]            unknown)  









                                         Result panel 98









           (unknown)  (no date)  (unknown)  [facility]    (no value)  (units    (unk

nown)



                                        [facility]            unknown)  









                                         Result panel 99









           (unknown)  (no date)  (unknown)  [facility]    (no value[facility]  (units    (unk

nown)



                                        [facility]            unknown)  









                                         Result panel 100









           (unknown)  (no date)  (unknown)  [facility]    (no value)  (units    (unk

nown)



                                        [facility]            unknown)  









                                         Result panel 101









           (unknown)  (no date)  (unknown)  [facility]    (no value)  (units    (unk

nown)



                                        [facility]            unknown)  









                                         Result panel 102









           (unknown)  (no date)  (unknown)  [facility]    (no value[facility]  (units    (unk

nown)



                                        [facility]            unknown)  









                                         Result panel 103









           (unknown)  (no date)  (unknown)  [facility]    (no value)  (units    (unk

nown)



                                        [facility]            unknown)  









                                         Result panel 104









           (unknown)  (no date)  (unknown)  [facility]    (no value)  (units    (unk

nown)



                                        [facility]            unknown)  









                                         Result panel 105









           (unknown)  (no date)  (unknown)  [facility]    (no value[facility]  (units    (unk

nown)



                                        [facility]            unknown)  









                                         Result panel 106









           (unknown)  (no date)  (unknown)  [facility]    (no value[facility]  (units    (unk

nown)



                                        [facility]            unknown)  









                                         Result panel 107









           (unknown)  (no date)  (unknown)  [facility]    (no value[facility]  (units    (unk

nown)



                                        [facility]            unknown)  









                                         Result panel 108









           (unknown)  (no date)  (unknown)  [facility]    (no value[facility]  (units    (unk

nown)



                                        [facility]            unknown)  









                                         Result panel 109









           (unknown)  (no date)  (unknown)  [facility]    (no value[facility]  (units    (unk

nown)



                                        [facility]            unknown)  









                                         Result panel 110









           (unknown)  (no date)  (unknown)  [facility]    (no value[facility]  (units    (unk

nown)



                                        [facility]            unknown)  









                                         Result panel 111









           (unknown)  (no date)  (unknown)  [facility]    (no value)  (units    (unk

nown)



                                        [facility]            unknown)  









                                         Result panel 112









           (unknown)  (no date)  (unknown)  [facility]    (no value)  (units    (unk

nown)



                                        [facility]            unknown)  









                                         Result panel 113









           (unknown)  (no date)  (unknown)  [facility]    (no value[facility]  (units    (unk

nown)



                                        [facility]            unknown)  









                                         Result panel 114









           (unknown)  (no date)  (unknown)  [facility]    (no value[facility]  (units    (unk

nown)



                                        [facility]            unknown)  









                                         Result panel 115









           (unknown)  (no date)  (unknown)  [facility]    (no value[facility]  (units    (unk

nown)



                                        [facility]            unknown)  









                                         Result panel 116









           (unknown)  (no date)  (unknown)  [facility]    (no value[facility]  (units    (unk

nown)



                                        [facility]            unknown)  









                                         Result panel 117









           (unknown)  (no date)  (unknown)  [facility]    (no value[facility]  (units    (unk

nown)



                                        [facility]            unknown)  









                                         Result panel 118









           (unknown)  (no date)  (unknown)  [facility]    (no value[facility]  (units    (unk

nown)



                                        [facility]            unknown)  









                                         Result panel 119









           (unknown)  (no date)  (unknown)  [facility]    (no value[facility]  (units    (unk

nown)



                                        [facility]            unknown)  









                                         Result panel 120









           (unknown)  (no date)  (unknown)  [facility]    (no value[facility]  (units    (unk

nown)



                                        [facility]            unknown)  









                                         Result panel 121









           (unknown)  (no date)  (unknown)  [facility]    (no value[facility]  (units    (unk

nown)



                                        [facility]            unknown)  









                                         Result panel 122









           (unknown)  (no date)  (unknown)  [facility]    (no value[facility]  (units    (unk

nown)



                                        [facility]            unknown)  









                                         Result panel 123









           (unknown)  (no date)  (unknown)  [facility]    (no value[facility]  (units    (unk

nown)



                                        [facility]            unknown)  









                                         Result panel 124









           (unknown)  (no date)  (unknown)  [facility]    (no value[facility]  (units    (unk

nown)



                                        [facility]            unknown)  









                                         Result panel 125









           (unknown)  (no date)  (unknown)  [facility]    (no value[facility]  (units    (unk

nown)



                                        [facility]            unknown)  









                                         Result panel 126









           (unknown)  (no date)  (unknown)  [facility]    (no value[facility]  (units    (unk

nown)



                                        [facility]            unknown)  









                                         Result panel 127









           (unknown)  (no date)  (unknown)  [facility]    (no value)  (units    (unk

nown)



                                        [facility]            unknown)  









                                         Result panel 128









           (unknown)  (no date)  (unknown)  [facility]    (no value[facility]  (units    (unk

nown)



                                        [facility]            unknown)  









                                         Result panel 129









           (unknown)  (no date)  (unknown)  [facility]    (no value[facility]  (units    (unk

nown)



                                        [facility]            unknown)  









                                         Result panel 130









           (unknown)  (no date)  (unknown)  [facility]    (no value[facility]  (units    (unk

nown)



                                        [facility]            unknown)  









                                         Result panel 131









           (unknown)  (no date)  (unknown)  [facility]    (no value[facility]  (units    (unk

nown)



                                        [facility]            unknown)  









                                         Result panel 132









           (unknown)  (no date)  (unknown)  [facility]    (no value[facility]  (units    (unk

nown)



                                        [facility]            unknown)  









                                         Result panel 133









           (unknown)  (no date)  (unknown)  [facility]    (no value[facility]  (units    (unk

nown)



                                        [facility]            unknown)  









                                         Result panel 134









           (unknown)  (no date)  (unknown)  [facility]    (no value[facility]  (units    (unk

nown)



                                        [facility]            unknown)  









                                         Result panel 135









           (unknown)  (no date)  (unknown)  [facility]    (no value)  (units    (unk

nown)



                                        [facility]            unknown)  









                                         Result panel 136









           (unknown)  (no date)  (unknown)  [facility]    (no value[facility]  (units    (unk

nown)



                                        [facility]            unknown)  









                                         Result panel 137









           (unknown)  (no date)  (unknown)  [facility]    (no value)  (units    (unk

nown)



                                        [facility]            unknown)  









                                         Result panel 138









           (unknown)  (no date)  (unknown)  [facility]    (no value[facility]  (units    (unk

nown)



                                        [facility]            unknown)  









                                         Result panel 139









           (unknown)  (no date)  (unknown)  [facility]    (no value[facility]  (units    (unk

nown)



                                        [facility]            unknown)  









                                         Result panel 140









           (unknown)  (no date)  (unknown)  [facility]    (no value[facility]  (units    (unk

nown)



                                        [facility]            unknown)  









                                         Result panel 141









           (unknown)  (no date)  (unknown)  [facility]    (no value[facility]  (units    (unk

nown)



                                        [facility]            unknown)  









                                         Result panel 142









           (unknown)  (no date)  (unknown)  [facility]    (no value[facility]  (units    (unk

nown)



                                        [facility]            unknown)  









                                         Result panel 143









           (unknown)  (no date)  (unknown)  [facility]    (no value[facility]  (units    (unk

nown)



                                        [facility]            unknown)  









                                         Result panel 144









           (unknown)  (no date)  (unknown)  [facility]    (no value[facility]  (units    (unk

nown)



                                        [facility]            unknown)  









                                         Result panel 145









           (unknown)  (no date)  (unknown)  [facility]    (no value[facility]  (units    (unk

nown)



                                        [facility]            unknown)  









                                         Result panel 146









           (unknown)  (no date)  (unknown)  [facility]    (no value[facility]  (units    (unk

nown)



                                        [facility]            unknown)  









                                         Result panel 147









           (unknown)  (no date)  (unknown)  [facility]    (no value[facility]  (units    (unk

nown)



                                        [facility]            unknown)  









                                         Result panel 148









           (unknown)  (no date)  (unknown)  [facility]    (no value[facility]  (units    (unk

nown)



                                        [facility]            unknown)  









                                         Result panel 149









           (unknown)  (no date)  (unknown)  [facility]    (no value[facility]  (units    (unk

nown)



                                        [facility]            unknown)  









                                         Result panel 150









           (unknown)  (no date)  (unknown)  [facility]    (no value[facility]  (units    (unk

nown)



                                        [facility]            unknown)  









                                         Result panel 151









           (unknown)  (no date)  (unknown)  [facility]    (no value[facility]  (units    (unk

nown)



                                        [facility]            unknown)  









                                         Result panel 152









           (unknown)  (no date)  (unknown)  [facility]    (no value[facility]  (units    (unk

nown)



                                        [facility]            unknown)  









                                         Result panel 153









           (unknown)  (no date)  (unknown)  [facility]    (no value[facility]  (units    (unk

nown)



                                        [facility]            unknown)  









                                         Result panel 154









           (unknown)  (no date)  (unknown)  [facility]    (no value[facility]  (units    (unk

nown)



                                        [facility]            unknown)  









                                         Result panel 155









           (unknown)  (no date)  (unknown)  [facility]    (no value[facility]  (units    (unk

nown)



                                        [facility]            unknown)  









                                         Result panel 156









           (unknown)  (no date)  (unknown)  [facility]    (no value[facility]  (units    (unk

nown)



                                        [facility]            unknown)  









                                         Result panel 157









           (unknown)  (no date)  (unknown)  [facility]    (no value[facility]  (units    (unk

nown)



                                        [facility]            unknown)  









                                         Result panel 158









           (unknown)  (no date)  (unknown)  [facility]    (no value[facility]  (units    (unk

nown)



                                        [facility]            unknown)  









                                         Result panel 159









           (unknown)  (no date)  (unknown)  [facility]    (no value[facility]  (units    (unk

nown)



                                        [facility]            unknown)  









                                         Result panel 160









           (unknown)  (no date)  (unknown)  [facility]    (no value[facility]  (units    (unk

nown)



                                        [facility]            unknown)  









                                         Result panel 161









           (unknown)  (no date)  (unknown)  [facility]    (no value[facility]  (units    (unk

nown)



                                        [facility]            unknown)  









                                         Result panel 162









           (unknown)  (no date)  (unknown)  [facility]    (no value[facility]  (units    (unk

nown)



                                        [facility]            unknown)  









                                         Result panel 163









           (unknown)  (no date)  (unknown)  [facility] value[facility]  (units    (unk

nown)



                                        [facility]            unknown)  









                                         Result panel 164









           (unknown)  (no date)  (unknown)  [facility] value[facility]  (units    (unk

nown)



                                        [facility]            unknown)  









                                         Result panel 165









           (unknown)  (no date)  (unknown)  [facility] value[facility]  (units    (unk

nown)



                                        [facility]            unknown)  









                                         Result panel 166









           (unknown)  (no date)  (unknown)  [facility] value[facility]  (units    (unk

nown)



                                        [facility]            unknown)  









                                         Result panel 167









           (unknown)  (no date)  (unknown)  [facility] value[facility]  (units    (unk

nown)



                                        [facility]            unknown)  









                                         Result panel 168









           (unknown)  (no date)  (unknown)  [facility] value[facility]  (units    (unk

nown)



                                        [facility]            unknown)  









                                         Result panel 169









           (unknown)  (no date)  (unknown)  [facility]    (no value[facility]  (units    (unk

nown)



                                        [facility]            unknown)  









                                         Result panel 170









           (unknown)  (no date)  (unknown)  [facility] value[facility]  (units    (unk

nown)



                                        [facility]            unknown)  









                                         Result panel 171









           (unknown)  (no date)  (unknown)  [facility] value[facility]  (units    (unk

nown)



                                        [facility]            unknown)  









                                         Result panel 172









           (unknown)  (no date)  (unknown)  [facility] value[facility]  (units    (unk

nown)



                                        [facility]            unknown)  









                                         Result panel 173









           (unknown)  (no date)  (unknown)  [facility] value[facility]  (units    (unk

nown)



                                        [facility]            unknown)  









                                         Result panel 174









           (unknown)  (no date)  (unknown)  [facility] value[facility]  (units    (unk

nown)



                                        [facility]            unknown)  









                                         Result panel 175









           (unknown)  (no date)  (unknown)  [facility] value[facility]  (units    (unk

nown)



                                        [facility]            unknown)  









                                         Result panel 176









           (unknown)  (no date)  (unknown)  [facility] value[facility]  (units    (unk

nown)



                                        [facility]            unknown)  









                                         Result panel 177









           (unknown)  (no date)  (unknown)  [facility] value[facility]  (units    (unk

nown)



                                        [facility]            unknown)  









                                         Result panel 178









           (unknown)  (no date)  (unknown)  [facility] value[facility]  (units    (unk

nown)



                                        [facility]            unknown)  









                                         Result panel 179









           (unknown)  (no date)  (unknown)  [facility] value[facility]  (units    (unk

nown)



                                        [facility]            unknown)  









                                         Result panel 180









           (unknown)  (no date)  (unknown)  [facility] value[facility]  (units    (unk

nown)



                                        [facility]            unknown)  









                                         Result panel 181









           (unknown)  (no date)  (unknown)  [facility] value[facility]  (units    (unk

nown)



                                        [facility]            unknown)  









                                         Result panel 182









           (unknown)  (no date)  (unknown)  [facility]    (no value[facility]  (units    (unk

nown)



                                        [facility]            unknown)  









                                         Result panel 183









           (unknown)  (no date)  (unknown)  [facility]    (no value)  (units    (unk

nown)



                                        [facility]            unknown)  









                                         Result panel 184









           (unknown)  (no date)  (unknown)  [facility]    (no value)  (units    (unk

nown)



                                        [facility]ospital            unknown)  









                                         Result panel 185









           (unknown)  (no date)  (unknown)  [facility]    (no value)  (units    (unk

nown)



                                        [facility]            unknown)  









                                         Result panel 186









           (unknown)  (no date)  (unknown)  [facility]    (no value)  (units    (unk

nown)



                                        [facility]            unknown)  









                                         Result panel 187









           (unknown)  (no date)  (unknown)  [facility]    (no value)  (units    (unk

nown)



                                        [facility]ospital            unknown)  









                                         Result panel 188









           (unknown)  (no date)  (unknown)  [facility]    (no value)  (units    (unk

nown)



                                        [facility]            unknown)  









                                         Result panel 189









           (unknown)  (no date)  (unknown)  [facility]    (no value)  (units    (unk

nown)



                                        [facility]            unknown)  









                                         Result panel 190









           (unknown)  (no date)  (unknown)  [facility]    (no value)  (units    (unk

nown)



                                        [facility]            unknown)  









                                         Result panel 191









           (unknown)  (no date)  (unknown)  [facility]    (no value)  (units    (unk

nown)



                                        [facility]            unknown)  









                                         Result panel 192









           (unknown)  (no date)  (unknown)  [facility]    (no value)  (units    (unk

nown)



                                        [facility]            unknown)  









                                         Result panel 193









           (unknown)  (no date)  (unknown)  [facility]    (no value)  (units    (unk

nown)



                                        [facility]            unknown)  









                                         Result panel 194









           (unknown)  (no date)  (unknown)  [facility]    (no value)  (units    (unk

nown)



                                        [facility]            unknown)  









                                         Result panel 195









           (unknown)  (no date)  (unknown)  [facility]    (no value)  (units    (unk

nown)



                                        [facility]            unknown)  









                                         Result panel 196









           (unknown)  (no date)  (unknown)  [facility]    (no value)  (units    (unk

nown)



                                        [facility]            unknown)  









                                         Result panel 197









           (unknown)  (no date)  (unknown)  [facility]    (no value)  (units    (unk

nown)



                                        [facility]            unknown)  









                                         Result panel 198









           (unknown)  (no date)  (unknown)  [facility]    (no value)  (units    (unk

nown)



                                        [facility]            unknown)  









                                         Result panel 199









           (unknown)  (no date)  (unknown)  [facility]    (no value)  (units    (unk

nown)



                                        [facility]            unknown)  









                                         Result panel 200









           (unknown)  (no date)  (unknown)  [facility]    (no value)  (units    (unk

nown)



                                        [facility]            unknown)  









                                         Result panel 201









           (unknown)  (no date)  (unknown)  [facility]    (no value)  (units    (unk

nown)



                                        [facility]            unknown)  









                                         Result panel 202









           (unknown)  (no date)  (unknown)  [facility]    (no value)  (units    (unk

nown)



                                        [facility]            unknown)  









                                         Result panel 203









           (unknown)  (no date)  (unknown)  [facility]    (no value)  (units    (unk

nown)



                                        [facility]            unknown)  









                                         Result panel 204









           (unknown)  (no date)  (unknown)  [facility]    (no value)  (units    (unk

nown)



                                        [facility]            unknown)  









                                         Result panel 205









           (unknown)  (no date)  (unknown)  [facility]    (no value)  (units    (unk

nown)



                                        [facility]            unknown)  









                                         Result panel 206









           (unknown)  (no date)  (unknown)  [facility]    (no value)  (units    (unk

nown)



                                        [facility]            unknown)  









                                         Result panel 207









           (unknown)  (no date)  (unknown)  [facility]    (no value)  (units    (unk

nown)



                                        [facility]            unknown)  









                                         Result panel 208









           (unknown)  (no date)  (unknown)  [facility]    (no value)  (units    (unk

nown)



                                        [facility]            unknown)  









                                         Result panel 209









           (unknown)  (no date)  (unknown)  [facility]    (no value)  (units    (unk

nown)



                                        [facility]            unknown)  









                                         Result panel 210









           (unknown)  (no date)  (unknown)  [facility]    (no value)  (units    (unk

nown)



                                        [facility]            unknown)  









                                         Result panel 211









           (unknown)  (no date)  (unknown)  [facility]    (no value)  (units    (unk

nown)



                                        [facility]            unknown)  









                                         Result panel 212









           (unknown)  (no date)  (unknown)  [facility]    (no value)  (units    (unk

nown)



                                        [facility]            unknown)  









                                         Result panel 213









           (unknown)  (no date)  (unknown)  [facility]    (no value)  (units    (unk

nown)



                                        [facility]            unknown)  









                                         Result panel 214









           (unknown)  (no date)  (unknown)  [facility]    (no value)  (units    (unk

nown)



                                        [facility]            unknown)  









                                         Result panel 215









           (unknown)  (no date)  (unknown)  [facility]    (no value)  (units    (unk

nown)



                                        [facility]            unknown)  









                                         Result panel 216









           (unknown)  (no date)  (unknown)  [facility]    (no value)  (units    (unk

nown)



                                        [facility]            unknown)  









                                         Result panel 217









           (unknown)  (no date)  (unknown)  [facility]    (no value)  (units    (unk

nown)



                                        [facility]            unknown)  









                                         Result panel 218









           (unknown)  (no date)  (unknown)  [facility]    (no value)  (units    (unk

nown)



                                        [facility]            unknown)  









                                         Result panel 219









           (unknown)  (no date)  (unknown)  [facility]    (no value)  (units    (unk

nown)



                                        [facility]            unknown)  









                                         Result panel 220









           (unknown)  (no date)  (unknown)  [facility]    (no value)  (units    (unk

nown)



                                        [facility]            unknown)  









                                         Result panel 221









           (unknown)  (no date)  (unknown)  [facility]    (no value)  (units    (unk

nown)



                                        [facility]            unknown)  









                                         Result panel 222









           (unknown)  (no date)  (unknown)  [facility]    (no value)  (units    (unk

nown)



                                        [facility]            unknown)  









                                         Result panel 223









           (unknown)  (no date)  (unknown)  [facility]    (no value)  (units    (unk

nown)



                                        [facility]            unknown)  









                                         Result panel 224









           (unknown)  (no date)  (unknown)  [facility]    (no value)  (units    (unk

nown)



                                        [facility]            unknown)  









                                         Result panel 225









           (unknown)  (no date)  (unknown)  [facility]    (no value)  (units    (unk

nown)



                                        [facility]            unknown)  









                                         Result panel 226









           (unknown)  (no date)  (unknown)  [facility]    (no value)  (units    (unk

nown)



                                        [facility]            unknown)  









                                         Result panel 227









           (unknown)  (no date)  (unknown)  [facility]    (no value)  (units    (unk

nown)



                                        [facility]            unknown)  









                                         Result panel 228









           (unknown)  (no date)  (unknown)  [facility]    (no value)  (units    (unk

nown)



                                        [facility]            unknown)  









                                         Result panel 229









           (unknown)  (no date)  (unknown)  [facility]    (no value)  (units    (unk

nown)



                                        [facility]            unknown)  









                                         Result panel 230









           (unknown)  (no date)  (unknown)  [facility]    (no value)  (units    (unk

nown)



                                        [facility]            unknown)  









                                         Result panel 231









           (unknown)  (no date)  (unknown)  [facility]    (no value)  (units    (unk

nown)



                                        [facility]            unknown)  









                                         Result panel 232









           (unknown)  (no date)  (unknown)  [facility]    (no value)  (units    (unk

nown)



                                        [facility]            unknown)  









                                         Result panel 233









           (unknown)  (no date)  (unknown)  [facility]    (no value)  (units    (unk

nown)



                                        [facility]            unknown)  









                                         Result panel 234









           (unknown)  (no date)  (unknown)  [facility]    (no value)  (units    (unk

nown)



                                        [facility]            unknown)  









                                         Result panel 235









           (unknown)  (no date)  (unknown)  [facility]    (no value)  (units    (unk

nown)



                                        [facility]            unknown)  









                                         Result panel 236









           (unknown)  (no date)  (unknown)  [facility]    (no value)  (units    (unk

nown)



                                        [facility]            unknown)  









                                         Result panel 237









           (unknown)  (no date)  (unknown)  [facility]    (no value)  (units    (unk

nown)



                                        [facility]            unknown)  









                                         Result panel 238









           (unknown)  (no date)  (unknown)  [facility]    (no value)  (units    (unk

nown)



                                        [facility]            unknown)  









                                         Result panel 239









           (unknown)  (no date)  (unknown)  [facility]    (no value)  (units    (unk

nown)



                                        [facility]            unknown)  









                                         Result panel 240









           (unknown)  (no date)  (unknown)  [facility]    (no value)  (units    (unk

nown)



                                        [facility]            unknown)  









                                         Result panel 241









           (unknown)  (no date)  (unknown)  [facility]    (no value)  (units    (unk

nown)



                                        [facility]            unknown)  









                                         Result panel 242









           (unknown)  (no date)  (unknown)  [facility]    (no value)  (units    (unk

nown)



                                        [facility]            unknown)  









                                         Result panel 243









           (unknown)  (no date)  (unknown)  [facility]    (no value)  (units    (unk

nown)



                                        [facility]            unknown)  









                                         Result panel 244









           (unknown)  (no date)  (unknown)  [facility]    (no value)  (units    (unk

nown)



                                        [facility]            unknown)  









                                         Result panel 245









           (unknown)  (no date)  (unknown)  [facility]    (no value)  (units    (unk

nown)



                                        [facility]            unknown)  









                                         Result panel 246









           (unknown)  (no date)  (unknown)  [facility]    (no value)  (units    (unk

nown)



                                        [facility]            unknown)  









                                         Result panel 247









           (unknown)  (no date)  (unknown)  [facility]    (no value)  (units    (unk

nown)



                                        [facility]            unknown)  









                                         Result panel 248









           (unknown)  (no date)  (unknown)  [facility]    (no value)  (units    (unk

nown)



                                        [facility]            unknown)  









                                         Result panel 249









           (unknown)  (no date)  (unknown)  [facility]    (no value)  (units    (unk

nown)



                                        [facility]            unknown)  









                                         Result panel 250









           (unknown)  (no date)  (unknown)  [facility]    (no value)  (units    (unk

nown)



                                        [facility]            unknown)  









                                         Result panel 251









           (unknown)  (no date)  (unknown)  [facility]    (no value)  (units    (unk

nown)



                                        [facility]            unknown)  









                                         Result panel 252









           (unknown)  (no date)  (unknown)  [facility]    (no value)  (units    (unk

nown)



                                        [facility]            unknown)  









                                         Result panel 253









           (unknown)  (no date)  (unknown)  [facility]    (no value)  (units    (unk

nown)



                                        [facility]            unknown)  









                                         Result panel 254









           (unknown)  (no date)  (unknown)  [facility]    (no value)  (units    (unk

nown)



                                        [facility]            unknown)  









                                         Result panel 255









           (unknown)  (no date)  (unknown)  [facility]    (no value)  (units    (unk

nown)



                                        [facility]            unknown)  









                                         Result panel 256









           (unknown)  (no date)  (unknown)  [facility]    (no value)  (units    (unk

nown)



                                        [facility]            unknown)  









                                         Result panel 257









           (unknown)  (no date)  (unknown)  [facility]    (no value)  (units    (unk

nown)



                                        [facility]            unknown)  









                                         Result panel 258









           (unknown)  (no date)  (unknown)  [facility]    (no value)  (units    (unk

nown)



                                        [facility]            unknown)  









                                         Result panel 259









           (unknown)  (no date)  (unknown)  [facility]    (no value)  (units    (unk

nown)



                                        [facility]            unknown)  









                                         Result panel 260









           (unknown)  (no date)  (unknown)  [facility]    (no value)  (units    (unk

nown)



                                        [facility]            unknown)  









                                         Result panel 261









           (unknown)  (no date)  (unknown)  [facility]    (no value)  (units    (unk

nown)



                                        [facility]            unknown)  









                                         Result panel 262









           (unknown)  (no date)  (unknown)  [facility]    (no value)  (units    (unk

nown)



                                        [facility]            unknown)  









                                         Result panel 263









           (unknown)  (no date)  (unknown)  [facility]    (no value)  (units    (unk

nown)



                                        [facility]            unknown)  









                                         Result panel 264









           (unknown)  (no date)  (unknown)  [facility]    (no value)  (units    (unk

nown)



                                        [facility]            unknown)  









                                         Result panel 265









           (unknown)  (no date)  (unknown)  [facility]    (no value)  (units    (unk

nown)



                                        [facility]            unknown)  









                                         Result panel 266









           (unknown)  (no date)  (unknown)  [facility]    (no value)  (units    (unk

nown)



                                        [facility]            unknown)  









                                         Result panel 267









           (unknown)  (no date)  (unknown)  [facility]    (no value)  (units    (unk

nown)



                                        [facility]            unknown)  









                                         Result panel 268









           (unknown)  (no date)  (unknown)  [facility]    (no value)  (units    (unk

nown)



                                        [facility]            unknown)  









                                         Result panel 269









           (unknown)  (no date)  (unknown)  [facility]    (no value)  (units    (unk

nown)



                                        [facility]            unknown)  









                                         Result panel 270









           (unknown)  (no date)  (unknown)  [facility]    (no value)  (units    (unk

nown)



                                        [facility]            unknown)  









                                         Result panel 271









           (unknown)  (no date)  (unknown)  [facility]    (no value)  (units    (unk

nown)



                                        [facility]            unknown)  









                                         Result panel 272









           (unknown)  (no date)  (unknown)  [facility]    (no value[facility]  (units    (unk

nown)



                                        [facility]            unknown)  









                                         Result panel 273









           (unknown)  (no date)  (unknown)  [facility]    (no value[facility]  (units    (unk

nown)



                                        [facility]            unknown)  









                                         Result panel 274









           (unknown)  (no date)  (unknown)  [facility]    (no value[facility]  (units    (unk

nown)



                                        [facility]            unknown)  









                                         Result panel 275









           (unknown)  (no date)  (unknown)  [facility]    (no value[facility]  (units    (unk

nown)



                                        [facility]            unknown)  









                                         Result panel 276









           (unknown)  (no date)  (unknown)  [facility]    (no value[facility]  (units    (unk

nown)



                                        [facility]            unknown)  









                                         Result panel 277









           (unknown)  (no date)  (unknown)  [facility]    (no value[facility]  (units    (unk

nown)



                                        [facility]            unknown)  









                                         Result panel 278









           (unknown)  (no date)  (unknown)  [facility]    (no value[facility]  (units    (unk

nown)



                                        [facility]            unknown)  









                                         Result panel 279









           (unknown)  (no date)  (unknown)  [facility]    (no value[facility]  (units    (unk

nown)



                                        [facility]            unknown)  









                                         Result panel 280









           (unknown)  (no date)  (unknown)  [facility]    (no value[facility]  (units    (unk

nown)



                                        [facility]            unknown)  









                                         Result panel 281









           (unknown)  (no date)  (unknown)  [facility]    (no value[facility]  (units    (unk

nown)



                                        [facility]            unknown)  









                                         Result panel 282









           (unknown)  (no date)  (unknown)  [facility]    (no value[facility]  (units    (unk

nown)



                                        [facility]            unknown)  









                                         Result panel 283









           (unknown)  (no date)  (unknown)  [facility]    (no value[facility]  (units    (unk

nown)



                                        [facility]            unknown)  









                                         Result panel 284









           (unknown)  (no date)  (unknown)  [facility]    (no value[facility]  (units    (unk

nown)



                                        [facility]            unknown)  









                                         Result panel 285









           (unknown)  (no date)  (unknown)  [facility]    (no value[facility]  (units    (unk

nown)



                                        [facility]            unknown)  









                                         Result panel 286









           (unknown)  (no date)  (unknown)  [facility]    (no value[facility]  (units    (unk

nown)



                                        [facility]            unknown)  









                                         Result panel 287









           (unknown)  (no date)  (unknown)  [facility]    (no value[facility]  (units    (unk

nown)



                                        [facility]            unknown)  









                                         Result panel 288









           (unknown)  (no date)  (unknown)  [facility]    (no value[facility]  (units    (unk

nown)



                                        [facility]            unknown)  









                                         Result panel 289









           (unknown)  (no date)  (unknown)  [facility]    (no value[facility]  (units    (unk

nown)



                                        [facility]            unknown)  









                                         Result panel 290









           (unknown)  (no date)  (unknown)  [facility]    (no value[facility]  (units    (unk

nown)



                                        [facility]            unknown)  









                                         Result panel 291









           (unknown)  (no date)  (unknown)  [facility]    (no value[facility]  (units    (unk

nown)



                                        [facility]            unknown)  









                                         Result panel 292









           (unknown)  (no date)  (unknown)  [facility]    (no value)  (units    (unk

nown)



                                        [facility]            unknown)  









                                         Result panel 293









           (unknown)  (no date)  (unknown)  [facility]    (no value)  (units    (unk

nown)



                                        Hospital            unknown)  









                                         Result panel 294









           (unknown)  (no date)  (unknown)  [facility]    (no value)  (units    (unk

nown)



                                        Hospital            unknown)  









                                         Result panel 295









           (unknown)  (no date)  (unknown)  [facility]    (no value)  (units    (unk

nown)



                                        Hospital            unknown)  









                                         Result panel 296









           (unknown)  (no date)  (unknown)  [facility]    (no value)  (units    (unk

nown)



                                        Hospital            unknown)  









                                         Result panel 297









           (unknown)  (no date)  (unknown)  [facility]    (no value)  (units    (unk

nown)



                                        Hospital            unknown)  









                                         Result panel 298









           (unknown)  (no date)  (unknown)  [facility]    (no value)  (units    (unk

nown)



                                        [facility]ospital            unknown)  









                                         Result panel 299









           (unknown)  (no date)  (unknown)  [facility]    (no value)  (units    (unk

nown)



                                        [facility]ospital            unknown)  









                                         Result panel 300









           (unknown)  (no date)  (unknown)  [facility]    (no value)  (units    (unk

nown)



                                        [facility]ospital            unknown)  









                                         Result panel 301









           (unknown)  (no date)  (unknown)  [facility]    (no value)  (units    (unk

nown)



                                        [facility]ospital            unknown)  









                                         Result panel 302









           (unknown)  (no date)  (unknown)  [facility]    (no value)  (units    (unk

nown)



                                        [facility]            unknown)  









                                         Result panel 303









           (unknown)  (no date)  (unknown)  [facility] value)  (units    (unk

nown)



                                        [facility]ospital            unknown)  









                                         Result panel 304









           (unknown)  (no date)  (unknown)  [facility]    (no value)  (units    (unk

nown)



                                        [facility]ospital            unknown)  









                                         Result panel 305









           (unknown)  (no date)  (unknown)  [facility] value)  (units    (unk

nown)



                                        [facility]            unknown)  









                                         Result panel 306









           (unknown)  (no date)  (unknown)  [facility]    (no value)  (units    (unk

nown)



                                        [facility]            unknown)  









                                         Result panel 307









           (unknown)  (no date)  (unknown)  [facility]    (no value)  (units    (unk

nown)



                                        [facility]ospital            unknown)  









                                         Result panel 308









           (unknown)  (no date)  (unknown)  [facility]    (no value)  (units    (unk

nown)



                                        [facility]ospital            unknown)  









                                         Result panel 309









           (unknown)  (no date)  (unknown)  [facility]    (no value)  (units    (unk

nown)



                                        [facility]ospital            unknown)  









                                         Result panel 310









           (unknown)  (no date)  (unknown)  [facility]    (no value)  (units    (unk

nown)



                                        [facility]ospital            unknown)  









                                         Result panel 311









           (unknown)  (no date)  (unknown)  [facility]    (no value)  (units    (unk

nown)



                                        [facility]ospital            unknown)  









                                         Result panel 312









           (unknown)  (no date)  (unknown)  [facility] value)  (units    (unk

nown)



                                        [facility]ospital            unknown)  









                                         Result panel 313









           (unknown)  (no date)  (unknown)  [facility]    (no value)  (units    (unk

nown)



                                        [facility]            unknown)  









                                         Result panel 314









           (unknown)  (no date)  (unknown)  [facility]    (no value)  (units    (unk

nown)



                                        [facility]            unknown)  









                                         Result panel 315









           (unknown)  (no date)  (unknown)  [facility]    (no value)  (units    (unk

nown)



                                        [facility]            unknown)  









                                         Result panel 316









           (unknown)  (no date)  (unknown)  [facility]    (no value)  (units    (unk

nown)



                                        [facility]            unknown)  









                                         Result panel 317









           (unknown)  (no date)  (unknown)  [facility]    (no value)  (units    (unk

nown)



                                        [facility]            unknown)  









                                         Result panel 318









           (unknown)  (no date)  (unknown)  [facility]    (no value)  (units    (unk

nown)



                                        [facility]            unknown)  









                                         Result panel 319









           (unknown)  (no date)  (unknown)  [facility]    (no value)  (units    (unk

nown)



                                        [facility]            unknown)  









                                         Result panel 320









           (unknown)  (no date)  (unknown)  [facility]    (no value)  (units    (unk

nown)



                                        [facility]ospital            unknown)  









                                         Result panel 321









           (unknown)  (no date)  (unknown)  [facility]    (no value)  (units    (unk

nown)



                                        [facility]ospital            unknown)  









                                         Result panel 322









           (unknown)  (no date)  (unknown)  [facility]    (no value)  (units    (unk

nown)



                                        [facility]ospital            unknown)  









                                         Result panel 323









           (unknown)  (no date)  (unknown)  [facility] value[facility]  (units    (unk

nown)



                                        [facility]ospital            unknown)  









                                         Result panel 324









           (unknown)  (no date)  (unknown)  [facility]    (no value)  (units    (unk

nown)



                                        [facility]ospital            unknown)  









                                         Result panel 325









           (unknown)  (no date)  (unknown)  [facility] value[facility]  (units    (unk

nown)



                                        [facility]            unknown)  









                                         Result panel 326









           (unknown)  (no date)  (unknown)  [facility]    (no value)  (units    (unk

nown)



                                        [facility]            unknown)  









                                         Result panel 327









           (unknown)  (no date)  (unknown)  [facility]    (no value)  (units    (unk

nown)



                                        [facility]ospital            unknown)  









                                         Result panel 328









           (unknown)  (no date)  (unknown)  [facility] value[facility]  (units    (unk

nown)



                                        [facility]ospital            unknown)  









                                         Result panel 329









           (unknown)  (no date)  (unknown)  [facility]    (no value)  (units    (unk

nown)



                                        [facility]            unknown)  









                                         Result panel 330









           (unknown)  (no date)  (unknown)  [facility]    (no value)  (units    (unk

nown)



                                        [facility]            unknown)  









                                         Result panel 331









           (unknown)  (no date)  (unknown)  [facility] value)  (units    (unk

nown)



                                        [facility]            unknown)  









                                         Result panel 332









           (unknown)  (no date)  (unknown)  [facility]    (no value)  (units    (unk

nown)



                                        [facility]ospital            unknown)  









                                         Result panel 333









           (unknown)  (no date)  (unknown)  [facility] value)  (units    (unk

nown)



                                        [facility]            unknown)  









                                         Result panel 334









           (unknown)  (no date)  (unknown)  [facility] value[facility]  (units    (unk

nown)



                                        [facility]            unknown)  









                                         Result panel 335









           (unknown)  (no date)  (unknown)  [facility]    (no value)  (units    (unk

nown)



                                        [facility]ospital            unknown)  









                                         Result panel 336









           (unknown)  (no date)  (unknown)  [facility] value[facility]  (units    (unk

nown)



                                        [facility]            unknown)  









                                         Result panel 337









           (unknown)  (no date)  (unknown)  [facility]    (no value[facility]  (units    (unk

nown)



                                        [facility]            unknown)  









                                         Result panel 338









           (unknown)  (no date)  (unknown)  [facility]    (no value)  (units    (unk

nown)



                                        [facility]            unknown)  









                                         Result panel 339









           (unknown)  (no date)  (unknown)  [facility] value[facility]  (units    (unk

nown)



                                        [facility]            unknown)  









                                         Result panel 340









           (unknown)  (no date)  (unknown)  [facility]    (no value)  (units    (unk

nown)



                                        [facility]ospital            unknown)  









                                         Result panel 341









           (unknown)  (no date)  (unknown)  [facility]    (no value)  (units    (unk

nown)



                                        [facility]            unknown)  









                                         Result panel 342









           (unknown)  (no date)  (unknown)  [facility]    (no value)  (units    (unk

nown)



                                        [facility]            unknown)  









                                         Result panel 343









           (unknown)  (no date)  (unknown)  [facility]    (no value)  (units    (unk

nown)



                                        [facility]ospital            unknown)  









                                         Result panel 344









           (unknown)  (no date)  (unknown)  [facility] value[facility]  (units    (unk

nown)



                                        [facility]ospital            unknown)  









                                         Result panel 345









           (unknown)  (no date)  (unknown)  [facility]    (no value)  (units    (unk

nown)



                                        [facility]ospital            unknown)  









                                         Result panel 346









           (unknown)  (no date)  (unknown)  [facility]    (no value)  (units    (unk

nown)



                                        [facility]ospital            unknown)  









                                         Result panel 347









           (unknown)  (no date)  (unknown)  [facility]    (no value)  (units    (unk

nown)



                                        [facility]            unknown)  









                                         Result panel 348









           (unknown)  (no date)  (unknown)  [facility] value)  (units    (unk

nown)



                                        [facility]ospital            unknown)  









                                         Result panel 349









           (unknown)  (no date)  (unknown)  [facility] value)  (units    (unk

nown)



                                        [facility]            unknown)  









                                         Result panel 350









           (unknown)  (no       (unknown)  (unknown)  (no value)  (units    (unk

nown)



                    date)                                   unknown)  

 

           (unknown)  (no       (unknown)  (unknown)  [id number]  (units    (unkno

wn)



                    date)                                   unknown)  

 

           (unknown)  (no       (unknown)  (unknown)  02/15/23  (units    (unkno

wn)



                    date)                                   unknown)  

 

           (unknown)  (no       (unknown)  (unknown)  14:13     (units    (unkno

wn)



                    date)                                   unknown)  

 

           (unknown)  (no       (unknown)  (unknown)  Acne (-)  (units    (u

nknown)



                    date)                                   unknown)  

 

           (unknown)  (no       (unknown)  (unknown)  Age/Sex: 17 / F  (units   

 (unknown)



                    date)                         Date of Service: unknown)  

 

           (unknown)  (no       (unknown)  (unknown)  Allergies  (units    (unkn

own)



                    date)                                   unknown)  

 

           (unknown)  (no       (unknown)  (unknown)  RENZO Roland  (units    (

unknown)



                    date)                         [id number]     unknown)  

 

           (unknown)  (no       (unknown)  (unknown)  Anesthesia  (units    (unk

nown)



                    date)                                   unknown)  

 

           (unknown)  (no       (unknown)  (unknown)  Attending Dr:  (units    (

unknown)



                    date)                         Jose Almodovar unknown)  



                                                  MD                  

 

           (unknown)  (no       (unknown)  (unknown)  BMI 28.7  (units    (unkno

wn)



                    date)                                   unknown)  

 

           (unknown)  (no       (unknown)  (unknown)  /68  (units    (unkn

own)



                    date)                                   unknown)  

 

           (unknown)  (no       (unknown)  (unknown)  Blood Pressure  (units    

(unknown)



                    date)                         Location Rt unknown)  



                                                  brachial            

 

           (unknown)  (no       (unknown)  (unknown)  Chlamydia  (units    (unkn

own)



                    date)                         infection () unknown)  

 

           (unknown)  (no       (unknown)  (unknown)  : 2005  (units   

 (unknown)



                    date)                         Acct:[id number] unknown)  

 

           (unknown)  (no       (unknown)  (unknown)  Dept at   (units    (unkno

wn)



                    date)                         [phone]. unknown)  

 

           (unknown)  (no       (unknown)  (unknown)  Documented By:  (units    

(unknown)



                    date)                         Jose Almodovar unknown)  



                                                  MD 02/15/23 1413           

 

           (unknown)  (no       (unknown)  (unknown)  [employer]     (units    (unkno

wn)



                    date)                                   unknown)  

 

           (unknown)  (no       (unknown)  (unknown)  Endometriosis  (units    (

unknown)



                    date)                         ()   unknown)  

 

           (unknown)  (no       (unknown)  (unknown)  [facility]  (units   

 (unknown)



                    date)                         [facility] unknown)  

 

           (unknown)  (no       (unknown)  (unknown)  Gynecology Visit  (units  

  (unknown)



                    date)                                   unknown)  

 

           (unknown)  (no       (unknown)  (unknown)  Heavy menstrual  (units   

 (unknown)



                    date)                         period () unknown)  

 

           (unknown)  (no       (unknown)  (unknown)  Height 5 ft 3 in  (units  

  (unknown)



                    date)                                   unknown)  

 

           (unknown)  (no       (unknown)  (unknown)  Intake Note:  (units    (u

nknown)



                    date)                                   unknown)  

 

           (unknown)  (no       (unknown)  (unknown)  Intake performed  (units  

  (unknown)



                    date)                         by: Harshal Gipson unknown)  

 

           (unknown)  (no       (unknown)  (unknown)  Intake    (units    (unkno

wn)



                    date)                                   unknown)  

 

           (unknown)  (no       (unknown)  (unknown)  Intake- Clincial  (units  

  (unknown)



                    date)                         Staff     unknown)  

 

           (unknown)  (no       (unknown)  (unknown)  Irregular  (units    (unkn

own)



                    date)                         menstrual cycle unknown)  



                                                  ()             

 

           (unknown)  (no       (unknown)  (unknown)  Last Menstural  (units    

(unknown)



                    date)                         Cycle + Details unknown)  

 

           (unknown)  (no       (unknown)  (unknown)  Loc: [facility]  (units    (unkno

wn)



                    date)                                   unknown)  

 

           (unknown)  (no       (unknown)  (unknown)  Lymphangioma  (units    (u

nknown)



                    date)                                   unknown)  

 

           (unknown)  (no       (unknown)  (unknown)  Medical History  (units   

 (unknown)



                    date)                         (Reviewed unknown)  



                                                  23 @ 02:02           



                                                  by Terrell Arnold MD)           

 

           (unknown)  (no       (unknown)  (unknown)  Opioids -  (units    (unkn

own)



                    date)                         Morphine  unknown)  



                                                  Analogues Allergy           



                                                  (Intermediate,           



                                                  Verified 22           



                                                  14:51)              

 

           (unknown)  (no       (unknown)  (unknown)  Other Menstrual  (units   

 (unknown)



                    date)                         Period: Other unknown)  

 

           (unknown)  (no       (unknown)  (unknown)  Ovarian cyst  (units    (u

nknown)



                    date)                                   unknown)  

 

           (unknown)  (no       (unknown)  (unknown)  PFSH      (units    (unkno

wn)



                    date)                                   unknown)  

 

           (unknown)  (no       (unknown)  (unknown)  Painful   (units    (unkno

wn)



                    date)                         menstrual periods unknown)  



                                                  ()             

 

           (unknown)  (no       (unknown)  (unknown)  Patient:  (units    (unkno

wn)



                    date)                         Lesvia Aguero unknown)  



                                                  MR#: [id number]             

 

           (unknown)  (no       (unknown)  (unknown)  Position Sitting  (units  

  (unknown)



                    date)                                   unknown)  

 

           (unknown)  (no       (unknown)  (unknown)  Pt here for ED  (units    

(unknown)



                    date)                         follow-up and unknown)  



                                                  positive            



                                                  pregnancy test           

 

           (unknown)  (no       (unknown)  (unknown)  Reason For Visit  (units  

  (unknown)



                    date)                                   unknown)  

 

           (unknown)  (no       (unknown)  (unknown)  S/P ACL   (units    (unkno

wn)



                    date)                         reconstruction unknown)  



                                                  (-21)           

 

           (unknown)  (no       (unknown)  (unknown)  Signed By:  (units    (unk

nown)



                    date)                                   unknown)  

 

           (unknown)  (no       (unknown)  (unknown)  Smoking Status:  (units   

 (unknown)



                    date)                         Current some day unknown)  



                                                  smoker              

 

           (unknown)  (no       (unknown)  (unknown)  Social History  (units    

(unknown)



                    date)                                   unknown)  

 

           (unknown)  (no       (unknown)  (unknown)  Surgical History  (units  

  (unknown)



                    date)                         (Reviewed unknown)  



                                                  23 @ 02:02           



                                                  by Terrell Arnold MD)           

 

           (unknown)  (no       (unknown)  (unknown)  TOA       (units    (unkno

wn)



                    date)                         (tubo-ovarian unknown)  



                                                  abscess)            



                                                  (-21)           

 

           (unknown)  (no       (unknown)  (unknown)  This note may  (units    (

unknown)



                    date)                         have been all or unknown)  



                                                  partially           



                                                  generated using           



                                                  voice recognition           

 

           (unknown)  (no       (unknown)  (unknown)  Tobacco +  (units    (unkn

own)



                    date)                         Substance Use unknown)  

 

           (unknown)  (no       (unknown)  (unknown)  Tobacco Status  (units    

(unknown)



                    date)                                   unknown)  

 

           (unknown)  (no       (unknown)  (unknown)  Visit Reasons:  (units    

(unknown)



                    date)                         [facility] ER F/U, unknown)  



                                                  bleeding and           



                                                  postive pregnacy           



                                                  test                

 

           (unknown)  (no       (unknown)  (unknown)  Vitals    (units    (unkno

wn)



                    date)                                   unknown)  

 

           (unknown)  (no       (unknown)  (unknown)  Weight 162 lb  (units    (

unknown)



                    date)                                   unknown)  

 

           (unknown)  (no       (unknown)  (unknown)  alcohol intake:  (units   

 (unknown)



                    date)                         current   unknown)  

 

           (unknown)  (no       (unknown)  (unknown)  doxycycline  (units    (un

known)



                    date)                         Adverse Reaction unknown)  



                                                  (Intermediate,           



                                                  Verified 22           



                                                  14:51)              

 

           (unknown)  (no       (unknown)  (unknown)  have occurred.  (units    

(unknown)



                    date)                         If there are any unknown)  



                                                  questions, please           



                                                  contact the           



                                                  Medical Records           

 

           (unknown)  (no       (unknown)  (unknown)  household  (units    (unkn

own)



                    date)                         members:  unknown)  



                                                  friend(s)           

 

           (unknown)  (no       (unknown)  (unknown)  may occur.  (units    (unk

nown)



                    date)                         Occasional unknown)  



                                                  wrong-word or           



                                                  'sound-alike'           



                                                  substitutions may           



                                                  have                

 

           (unknown)  (no       (unknown)  (unknown)  occurred due to  (units   

 (unknown)



                    date)                         the inherent unknown)  



                                                  limitations of           



                                                  voice recognition           



                                                  software. Please           

 

           (unknown)  (no       (unknown)  (unknown)  rash      (units    (unkno

wn)



                    date)                                   unknown)  

 

           (unknown)  (no       (unknown)  (unknown)  read the note  (units    (

unknown)



                    date)                         carefully and unknown)  



                                                  recognize, using           



                                                  context, where           



                                                  these               



                                                  substitutions           

 

           (unknown)  (no       (unknown)  (unknown)  software.  (units    (unkn

own)



                    date)                         Although every unknown)  



                                                  effort is made to           



                                                  edit content,           



                                                  transcription           



                                                  errors              

 

           (unknown)  (no       (unknown)  (unknown)  vomitt    (units    (unkno

wn)



                    date)                                   unknown)  









                                         Result panel 351









           (unknown)  (no       (unknown)  (unknown)  (no value)  (units    (unk

nown)



                    date)                                   unknown)  

 

           (unknown)  (no       (unknown)  (unknown)  [id number]  (units    (unkno

wn)



                    date)                                   unknown)  

 

           (unknown)  (no       (unknown)  (unknown)  02/15/23  (units    (unkno

wn)



                    date)                                   unknown)  

 

           (unknown)  (no       (unknown)  (unknown)  14:13     (units    (unkno

wn)



                    date)                                   unknown)  

 

           (unknown)  (no       (unknown)  (unknown)  Acne (-2016)  (units    (u

nknown)



                    date)                                   unknown)  

 

           (unknown)  (no       (unknown)  (unknown)  Age/Sex: 17 / F  (units   

 (unknown)



                    date)                         Date of Service: unknown)  

 

           (unknown)  (no       (unknown)  (unknown)  Allergies  (units    (unkn

own)



                    date)                                   unknown)  

 

           (unknown)  (no       (unknown)  (unknown)  [place]  (units    (

unknown)



                    date)                         [id number]     unknown)  

 

           (unknown)  (no       (unknown)  (unknown)  Anesthesia  (units    (unk

nown)



                    date)                                   unknown)  

 

           (unknown)  (no       (unknown)  (unknown)  Assessment +  (units    (u

nknown)



                    date)                         Plan      unknown)  

 

           (unknown)  (no       (unknown)  (unknown)  Attending Dr:  (units    (

unknown)



                    date)                         Jose Almodovar unknown)  



                                                  MD                  

 

           (unknown)  (no       (unknown)  (unknown)  BMI 28.7  (units    (unkno

wn)



                    date)                                   unknown)  

 

           (unknown)  (no       (unknown)  (unknown)  /68  (units    (unkn

own)



                    date)                                   unknown)  

 

           (unknown)  (no       (unknown)  (unknown)  Blood Pressure  (units    

(unknown)



                    date)                         Location Rt unknown)  



                                                  brachial            

 

           (unknown)  (no       (unknown)  (unknown)  Chlamydia  (units    (unkn

own)



                    date)                         infection () unknown)  

 

           (unknown)  (no       (unknown)  (unknown)  : 2005  (units   

 (unknown)



                    date)                         Acct:[id number] unknown)  

 

           (unknown)  (no       (unknown)  (unknown)  Dept at   (units    (unkno

wn)



                    date)                         [phone]. unknown)  

 

           (unknown)  (no       (unknown)  (unknown)  Documented By:  (units    

(unknown)



                    date)                         Jose Almodovar unknown)  



                                                  MD 02/15/23 1413           

 

           (unknown)  (no       (unknown)  (unknown)  Draft     (units    (unkno

wn)



                    date)                                   unknown)  

 

           (unknown)  (no       (unknown)  (unknown)  Endometriosis  (units    (

unknown)



                    date)                         ()   unknown)  

 

           (unknown)  (no       (unknown)  (unknown)  [facility]  (units   

 (unknown)



                    date)                         [facility] unknown)  

 

           (unknown)  (no       (unknown)  (unknown)  Gynecology Visit  (units  

  (unknown)



                    date)                                   unknown)  

 

           (unknown)  (no       (unknown)  (unknown)  Heavy menstrual  (units   

 (unknown)



                    date)                         period () unknown)  

 

           (unknown)  (no       (unknown)  (unknown)  Height 5 ft 3 in  (units  

  (unknown)



                    date)                                   unknown)  

 

           (unknown)  (no       (unknown)  (unknown)  Intake Note:  (units    (u

nknown)



                    date)                                   unknown)  

 

           (unknown)  (no       (unknown)  (unknown)  Intake performed  (units  

  (unknown)



                    date)                         by: Harshal Gipson unknown)  

 

           (unknown)  (no       (unknown)  (unknown)  Intake    (units    (unkno

wn)



                    date)                                   unknown)  

 

           (unknown)  (no       (unknown)  (unknown)  Intake- Clincial  (units  

  (unknown)



                    date)                         Staff     unknown)  

 

           (unknown)  (no       (unknown)  (unknown)  Irregular  (units    (unkn

own)



                    date)                         menstrual cycle unknown)  



                                                  ()             

 

           (unknown)  (no       (unknown)  (unknown)  Last Menstural  (units    

(unknown)



                    date)                         Cycle + Details unknown)  

 

           (unknown)  (no       (unknown)  (unknown)  Loc: [facility]  (units    (unkno

wn)



                    date)                                   unknown)  

 

           (unknown)  (no       (unknown)  (unknown)  Lymphangioma  (units    (u

nknown)



                    date)                                   unknown)  

 

           (unknown)  (no       (unknown)  (unknown)  Medical History  (units   

 (unknown)



                    date)                         (Reviewed unknown)  



                                                  23 @ 02:02           



                                                  by Terrell Arnold MD)           

 

           (unknown)  (no       (unknown)  (unknown)  Opioids -  (units    (unkn

own)



                    date)                         Morphine  unknown)  



                                                  Analogues Allergy           



                                                  (Intermediate,           



                                                  Verified 22           



                                                  14:51)              

 

           (unknown)  (no       (unknown)  (unknown)  Orders    (units    (unkno

wn)



                    date)                                   unknown)  

 

           (unknown)  (no       (unknown)  (unknown)  Orders:   (units    (unkno

wn)



                    date)                                   unknown)  

 

           (unknown)  (no       (unknown)  (unknown)  Other Menstrual  (units   

 (unknown)



                    date)                         Period: Other unknown)  

 

           (unknown)  (no       (unknown)  (unknown)  Ovarian cyst  (units    (u

nknown)



                    date)                                   unknown)  

 

           (unknown)  (no       (unknown)  (unknown)  PFSH      (units    (unkno

wn)



                    date)                                   unknown)  

 

           (unknown)  (no       (unknown)  (unknown)  POC PREG  (units    (unkno

wn)



                    date)                                   unknown)  

 

           (unknown)  (no       (unknown)  (unknown)  POC Preg Test  (units    (

unknown)



                    date)                         Negative Last unknown)  



                                                  Edit by Harshal Gipson MA on           



                                                  02/15/23 14:25           

 

           (unknown)  (no       (unknown)  (unknown)  POC Urine  (units    (unkn

own)



                    date)                         Pregnancy Test unknown)  



                                                  Today R10.2 -           



                                                  Pelvic and           



                                                  perineal pain           

 

           (unknown)  (no       (unknown)  (unknown)  Painful   (units    (unkno

wn)



                    date)                         menstrual periods unknown)  



                                                  ()             

 

           (unknown)  (no       (unknown)  (unknown)  Patient:  (units    (unkno

wn)



                    date)                         Lesvia Aguero E unknown)  



                                                  MR#: [id number]             

 

           (unknown)  (no       (unknown)  (unknown)  Position Sitting  (units  

  (unknown)



                    date)                                   unknown)  

 

           (unknown)  (no       (unknown)  (unknown)  Preg Expiration  (units   

 (unknown)



                    date)                         [id number] Last Edit unknown)  



                                                  by Harshal Gipson MA on 02/15/23           



                                                  14:25               

 

           (unknown)  (no       (unknown)  (unknown)  Preg Lot #  (units    (unk

nown)



                    date)                         [id number] Last unknown)  



                                                  Edit by Harshal Gipson MA on           



                                                  02/15/23 14:25           

 

           (unknown)  (no       (unknown)  (unknown)  Preg QC   (units    (unkno

wn)



                    date)                         Acceptable? Yes unknown)  



                                                  Last Edit by           



                                                  Harshal Gipson MA           



                                                  on 02/15/23 14:25           

 

           (unknown)  (no       (unknown)  (unknown)  Pt here for ED  (units    

(unknown)



                    date)                         follow-up and unknown)  



                                                  positive            



                                                  pregnancy test           

 

           (unknown)  (no       (unknown)  (unknown)  Reason For Visit  (units  

  (unknown)



                    date)                                   unknown)  

 

           (unknown)  (no       (unknown)  (unknown)  Results   (units    (unkno

wn)



                    date)                                   unknown)  

 

           (unknown)  (no       (unknown)  (unknown)  S/P ACL   (units    (unkno

wn)



                    date)                         reconstruction unknown)  



                                                  (-21)           

 

           (unknown)  (no       (unknown)  (unknown)  Signed By:  (units    (unk

nown)



                    date)                                   unknown)  

 

           (unknown)  (no       (unknown)  (unknown)  Smoking Status:  (units   

 (unknown)



                    date)                         Current some day unknown)  



                                                  smoker              

 

           (unknown)  (no       (unknown)  (unknown)  Social History  (units    

(unknown)



                    date)                                   unknown)  

 

           (unknown)  (no       (unknown)  (unknown)  Surgical History  (units  

  (unknown)



                    date)                         (Reviewed unknown)  



                                                  23 @ 02:02           



                                                  by Terrell Arnold MD)           

 

           (unknown)  (no       (unknown)  (unknown)  TOA       (units    (unkno

wn)



                    date)                         (tubo-ovarian unknown)  



                                                  abscess)            



                                                  ()           

 

           (unknown)  (no       (unknown)  (unknown)  This note may  (units    (

unknown)



                    date)                         have been all or unknown)  



                                                  partially           



                                                  generated using           



                                                  voice recognition           

 

           (unknown)  (no       (unknown)  (unknown)  Tobacco +  (units    (unkn

own)



                    date)                         Substance Use unknown)  

 

           (unknown)  (no       (unknown)  (unknown)  Tobacco Status  (units    

(unknown)



                    date)                                   unknown)  

 

           (unknown)  (no       (unknown)  (unknown)  Visit Reasons:  (units    

(unknown)



                    date)                         IH ER F/U, unknown)  



                                                  bleeding and           



                                                  postive pregnacy           



                                                  test                

 

           (unknown)  (no       (unknown)  (unknown)  Vitals    (units    (unkno

wn)



                    date)                                   unknown)  

 

           (unknown)  (no       (unknown)  (unknown)  Weight 162 lb  (units    (

unknown)



                    date)                                   unknown)  

 

           (unknown)  (no       (unknown)  (unknown)  alcohol intake:  (units   

 (unknown)



                    date)                         current   unknown)  

 

           (unknown)  (no       (unknown)  (unknown)  doxycycline  (units    (un

known)



                    date)                         Adverse Reaction unknown)  



                                                  (Intermediate,           



                                                  Verified 22           



                                                  14:51)              

 

           (unknown)  (no       (unknown)  (unknown)  have occurred.  (units    

(unknown)



                    date)                         If there are any unknown)  



                                                  questions, please           



                                                  contact the           



                                                  Medical Records           

 

           (unknown)  (no       (unknown)  (unknown)  household  (units    (unkn

own)



                    date)                         members:  unknown)  



                                                  friend(s)           

 

           (unknown)  (no       (unknown)  (unknown)  may occur.  (units    (unk

nown)



                    date)                         Occasional unknown)  



                                                  wrong-word or           



                                                  'sound-alike'           



                                                  substitutions may           



                                                  have                

 

           (unknown)  (no       (unknown)  (unknown)  occurred due to  (units   

 (unknown)



                    date)                         the inherent unknown)  



                                                  limitations of           



                                                  voice recognition           



                                                  software. Please           

 

           (unknown)  (no       (unknown)  (unknown)  rash      (units    (unkno

wn)



                    date)                                   unknown)  

 

           (unknown)  (no       (unknown)  (unknown)  read the note  (units    (

unknown)



                    date)                         carefully and unknown)  



                                                  recognize, using           



                                                  context, where           



                                                  these               



                                                  substitutions           

 

           (unknown)  (no       (unknown)  (unknown)  software.  (units    (unkn

own)



                    date)                         Although every unknown)  



                                                  effort is made to           



                                                  edit content,           



                                                  transcription           



                                                  errors              

 

           (unknown)  (no       (unknown)  (unknown)  vomitt    (units    (unkno

wn)



                    date)                                   unknown)  









                                         Result panel 352









           (unknown)  (no       (unknown)  (unknown)  (no value)  (units    (unk

nown)



                    date)                                   unknown)  

 

           (unknown)  (no       (unknown)  (unknown)  [id number]  (units    (unkno

wn)



                    date)                                   unknown)  

 

           (unknown)  (no       (unknown)  (unknown)  02/15/23  (units    (unkno

wn)



                    date)                                   unknown)  

 

           (unknown)  (no       (unknown)  (unknown)  14:13     (units    (unkno

wn)



                    date)                                   unknown)  

 

           (unknown)  (no       (unknown)  (unknown)  Acne (-)  (units    (u

nknown)



                    date)                                   unknown)  

 

           (unknown)  (no       (unknown)  (unknown)  Age/Sex: 17 / F  (units   

 (unknown)



                    date)                         Date of Service: unknown)  

 

           (unknown)  (no       (unknown)  (unknown)  Allergies  (units    (unkn

own)



                    date)                                   unknown)  

 

           (unknown)  (no       (unknown)  (unknown)  [place]  (units    (

unknown)



                    date)                         [id number]     unknown)  

 

           (unknown)  (no       (unknown)  (unknown)  Anesthesia  (units    (unk

nown)



                    date)                                   unknown)  

 

           (unknown)  (no       (unknown)  (unknown)  Assessment +  (units    (u

nknown)



                    date)                         Plan      unknown)  

 

           (unknown)  (no       (unknown)  (unknown)  Attending Dr:  (units    (

unknown)



                    date)                         Jose Almodovar unknown)  



                                                  MD                  

 

           (unknown)  (no       (unknown)  (unknown)  BMI 28.7  (units    (unkno

wn)



                    date)                                   unknown)  

 

           (unknown)  (no       (unknown)  (unknown)  /68  (units    (unkn

own)



                    date)                                   unknown)  

 

           (unknown)  (no       (unknown)  (unknown)  Blood Pressure  (units    

(unknown)



                    date)                         Location Rt unknown)  



                                                  brachial            

 

           (unknown)  (no       (unknown)  (unknown)  Chief Complaint  (units   

 (unknown)



                    date)                                   unknown)  

 

           (unknown)  (no       (unknown)  (unknown)  Chief Complaint:  (units  

  (unknown)



                    date)                         LLQ/pelvic pain, unknown)  



                                                  abnormal uterine           



                                                  bleeding, + hCG           



                                                  at home             

 

           (unknown)  (no       (unknown)  (unknown)  Chlamydia  (units    (unkn

own)



                    date)                         infection () unknown)  

 

           (unknown)  (no       (unknown)  (unknown)  : 2005  (units   

 (unknown)



                    date)                         Acct:[id number] unknown)  

 

           (unknown)  (no       (unknown)  (unknown)  Dept at   (units    (unkno

wn)



                    date)                         [phone]. unknown)  

 

           (unknown)  (no       (unknown)  (unknown)  Details:  (units    (unkno

wn)



                    date)                                   unknown)  

 

           (unknown)  (no       (unknown)  (unknown)  Documented By:  (units    

(unknown)



                    date)                         Jose Almodovar unknown)  



                                                  MD 02/15/23 1413           

 

           (unknown)  (no       (unknown)  (unknown)  Draft     (units    (unkno

wn)



                    date)                                   unknown)  

 

           (unknown)  (no       (unknown)  (unknown)  Endometriosis  (units    (

unknown)



                    date)                         ()   unknown)  

 

           (unknown)  (no       (unknown)  (unknown)  [facility]  (units   

 (unknown)



                    date)                         [facility] unknown)  

 

           (unknown)  (no       (unknown)  (unknown)  Lesvia is a  (units    (unk

nown)



                    date)                         17-year-old unknown)  



                                                  nulligravida who           



                                                  returns today for           



                                                  evaluation with a           



                                                  3                   

 

           (unknown)  (no       (unknown)  (unknown)  Gynecology Visit  (units  

  (unknown)



                    date)                                   unknown)  

 

           (unknown)  (no       (unknown)  (unknown)  HPI       (units    (unkno

wn)



                    date)                                   unknown)  

 

           (unknown)  (no       (unknown)  (unknown)  Heavy menstrual  (units   

 (unknown)



                    date)                         period () unknown)  

 

           (unknown)  (no       (unknown)  (unknown)  Height 5 ft 3 in  (units  

  (unknown)



                    date)                                   unknown)  

 

           (unknown)  (no       (unknown)  (unknown)  Intake Note:  (units    (u

nknown)



                    date)                                   unknown)  

 

           (unknown)  (no       (unknown)  (unknown)  Intake performed  (units  

  (unknown)



                    date)                         by: Harshal Gipson unknown)  

 

           (unknown)  (no       (unknown)  (unknown)  Intake    (units    (unkno

wn)



                    date)                                   unknown)  

 

           (unknown)  (no       (unknown)  (unknown)  Intake- Clincial  (units  

  (unknown)



                    date)                         Staff     unknown)  

 

           (unknown)  (no       (unknown)  (unknown)  Irregular  (units    (unkn

own)



                    date)                         menstrual cycle unknown)  



                                                  ()             

 

           (unknown)  (no       (unknown)  (unknown)  Last Menstural  (units    

(unknown)



                    date)                         Cycle + Details unknown)  

 

           (unknown)  (no       (unknown)  (unknown)  Loc: [facility]  (units    (unkno

wn)



                    date)                                   unknown)  

 

           (unknown)  (no       (unknown)  (unknown)  Lymphangioma  (units    (u

nknown)



                    date)                                   unknown)  

 

           (unknown)  (no       (unknown)  (unknown)  Medical History  (units   

 (unknown)



                    date)                         (Reviewed unknown)  



                                                  23 @ 02:02           



                                                  by Terrell Arnold MD)           

 

           (unknown)  (no       (unknown)  (unknown)  Opioids -  (units    (unkn

own)



                    date)                         Morphine  unknown)  



                                                  Analogues Allergy           



                                                  (Intermediate,           



                                                  Verified 22           



                                                  14:51)              

 

           (unknown)  (no       (unknown)  (unknown)  Orders    (units    (unkno

wn)



                    date)                                   unknown)  

 

           (unknown)  (no       (unknown)  (unknown)  Orders:   (units    (unkno

wn)



                    date)                                   unknown)  

 

           (unknown)  (no       (unknown)  (unknown)  Other Menstrual  (units   

 (unknown)



                    date)                         Period: Other unknown)  

 

           (unknown)  (no       (unknown)  (unknown)  Ovarian cyst  (units    (u

nknown)



                    date)                                   unknown)  

 

           (unknown)  (no       (unknown)  (unknown)  PFSH      (units    (unkno

wn)



                    date)                                   unknown)  

 

           (unknown)  (no       (unknown)  (unknown)  POC PREG  (units    (unkno

wn)



                    date)                                   unknown)  

 

           (unknown)  (no       (unknown)  (unknown)  POC Preg Test  (units    (

unknown)



                    date)                         Negative Last unknown)  



                                                  Edit by Harshal Gipson MA on           



                                                  02/15/23 14:25           

 

           (unknown)  (no       (unknown)  (unknown)  POC Urine  (units    (unkn

own)



                    date)                         Pregnancy Test unknown)  



                                                  Today R10.2 -           



                                                  Pelvic and           



                                                  perineal pain           

 

           (unknown)  (no       (unknown)  (unknown)  Painful   (units    (unkno

wn)



                    date)                         menstrual periods unknown)  



                                                  ()             

 

           (unknown)  (no       (unknown)  (unknown)  Patient:  (units    (unkno

wn)



                    date)                         Lesvia Aguero E unknown)  



                                                  MR#: [id number]             

 

           (unknown)  (no       (unknown)  (unknown)  Position Sitting  (units  

  (unknown)



                    date)                                   unknown)  

 

           (unknown)  (no       (unknown)  (unknown)  Preg Expiration  (units   

 (unknown)



                    date)                         [id number] Last Edit unknown)  



                                                  by Harshal Gipson MA on 02/15/23           



                                                  14:25               

 

           (unknown)  (no       (unknown)  (unknown)  Preg Lot #  (units    (unk

nown)



                    date)                         [id number] Last unknown)  



                                                  Edit by Harshal Gipson MA on           



                                                  02/15/23 14:25           

 

           (unknown)  (no       (unknown)  (unknown)  Preg QC   (units    (unkno

wn)



                    date)                         Acceptable? Yes unknown)  



                                                  Last Edit by           



                                                  Harshal Gipson MA           



                                                  on 02/15/23 14:25           

 

           (unknown)  (no       (unknown)  (unknown)  Pt here for ED  (units    

(unknown)



                    date)                         follow-up and unknown)  



                                                  positive            



                                                  pregnancy test           

 

           (unknown)  (no       (unknown)  (unknown)  Reason For Visit  (units  

  (unknown)



                    date)                                   unknown)  

 

           (unknown)  (no       (unknown)  (unknown)  Results   (units    (unkno

wn)



                    date)                                   unknown)  

 

           (unknown)  (no       (unknown)  (unknown)  S/P ACL   (units    (unkno

wn)



                    date)                         reconstruction unknown)  



                                                  ()           

 

           (unknown)  (no       (unknown)  (unknown)  Signed By:  (units    (unk

nown)



                    date)                                   unknown)  

 

           (unknown)  (no       (unknown)  (unknown)  Smoking Status:  (units   

 (unknown)



                    date)                         Current some day unknown)  



                                                  smoker              

 

           (unknown)  (no       (unknown)  (unknown)  Social History  (units    

(unknown)



                    date)                                   unknown)  

 

           (unknown)  (no       (unknown)  (unknown)  Surgical History  (units  

  (unknown)



                    date)                         (Reviewed unknown)  



                                                  23 @ 02:02           



                                                  by Terrell Arnold MD)           

 

           (unknown)  (no       (unknown)  (unknown)  TOA       (units    (unkno

wn)



                    date)                         (tubo-ovarian unknown)  



                                                  abscess)            



                                                  ()           

 

           (unknown)  (no       (unknown)  (unknown)  This note may  (units    (

unknown)



                    date)                         have been all or unknown)  



                                                  partially           



                                                  generated using           



                                                  voice recognition           

 

           (unknown)  (no       (unknown)  (unknown)  Tobacco +  (units    (unkn

own)



                    date)                         Substance Use unknown)  

 

           (unknown)  (no       (unknown)  (unknown)  Tobacco Status  (units    

(unknown)



                    date)                                   unknown)  

 

           (unknown)  (no       (unknown)  (unknown)  Visit Reasons:  (units    

(unknown)



                    date)                         IH ER F/U, unknown)  



                                                  bleeding and           



                                                  postive pregnacy           



                                                  test                

 

           (unknown)  (no       (unknown)  (unknown)  Vitals    (units    (unkno

wn)



                    date)                                   unknown)  

 

           (unknown)  (no       (unknown)  (unknown)  Weight 162 lb  (units    (

unknown)



                    date)                                   unknown)  

 

           (unknown)  (no       (unknown)  (unknown)  alcohol intake:  (units   

 (unknown)



                    date)                         current   unknown)  

 

           (unknown)  (no       (unknown)  (unknown) and half month  (units    (

unknown)



                    date)                         history of unknown)  



                                                  persistent left           



                                                  lower quadrant           



                                                  pain which is           



                                                  sharp and           

 

           (unknown)  (no       (unknown)  (unknown)  doxycycline  (units    (un

known)



                    date)                         Adverse Reaction unknown)  



                                                  (Intermediate,           



                                                  Verified 22           



                                                  14:51)              

 

           (unknown)  (no       (unknown)  (unknown)  have occurred.  (units    

(unknown)



                    date)                         If there are any unknown)  



                                                  questions, please           



                                                  contact the           



                                                  Medical Records           

 

           (unknown)  (no       (unknown)  (unknown)  household  (units    (unkn

own)



                    date)                         members:  unknown)  



                                                  friend(s)           

 

           (unknown)  (no       (unknown)  (unknown)  may occur.  (units    (unk

nown)



                    date)                         Occasional unknown)  



                                                  wrong-word or           



                                                  'sound-alike'           



                                                  substitutions may           



                                                  have                

 

           (unknown)  (no       (unknown)  (unknown)  now       (units    (unkno

wn)



                    date)                                   unknown)  

 

           (unknown)  (no       (unknown)  (unknown)  occurred due to  (units   

 (unknown)



                    date)                         the inherent unknown)  



                                                  limitations of           



                                                  voice recognition           



                                                  software. Please           

 

           (unknown)  (no       (unknown)  (unknown)  rash      (units    (unkno

wn)



                    date)                                   unknown)  

 

           (unknown)  (no       (unknown)  (unknown)  read the note  (units    (

unknown)



                    date)                         carefully and unknown)  



                                                  recognize, using           



                                                  context, where           



                                                  these               



                                                  substitutions           

 

           (unknown)  (no       (unknown)  (unknown)  she is been  (units    (un

known)



                    date)                         bleeding for the unknown)  



                                                  last 2 weeks and           



                                                  her pain has been           



                                                  worse during           

 

           (unknown)  (no       (unknown)  (unknown)  software.  (units    (unkn

own)



                    date)                         Although every unknown)  



                                                  effort is made to           



                                                  edit content,           



                                                  transcription           



                                                  errors              

 

           (unknown)  (no       (unknown)  (unknown)  stabbing, and  (units    (

unknown)



                    date)                         intermittent. It unknown)  



                                                  does not seem to           



                                                  be related to her           



                                                  cycles but           

 

           (unknown)  (no       (unknown)  (unknown)  that period of  (units    

(unknown)



                    date)                         time. Her changed unknown)  



                                                  so entered FIGO           



                                                  whenever you           



                                                  dosage of earlier           

 

           (unknown)  (no       (unknown)  (unknown)  vomitt    (units    (unkno

wn)



                    date)                                   unknown)  









                                         Result panel 353









           (unknown)  (no date)  (unknown)  (unknown)  Negative  (units    (unkn

own)



                                                            unknown)  

 

           (unknown)  (no date)  (unknown)  (unknown)  Negative  (units    93205

-6



                                                            unknown)  

 

           (unknown)  (no date)  (unknown)  (unknown)  Negative  (units    34586

-1



                                                            unknown)  

 

           (unknown)  (no date)  (unknown)  (unknown)  Negative  (units    86554

-5



                                                            unknown)  









                                         Result panel 354









           (unknown)  (no       (unknown)  (unknown)  (no value)  (units    (unk

nown)



                    date)                                   unknown)  

 

           (unknown)  (no       (unknown)  (unknown)  [id number]  (units    (unkno

wn)



                    date)                                   unknown)  

 

           (unknown)  (no       (unknown)  (unknown)  02/15/23  (units    (unkno

wn)



                    date)                                   unknown)  

 

           (unknown)  (no       (unknown)  (unknown)  14:13     (units    (unkno

wn)



                    date)                                   unknown)  

 

           (unknown)  (no       (unknown)  (unknown)  Acne (-)  (units    (u

nknown)



                    date)                                   unknown)  

 

           (unknown)  (no       (unknown)  (unknown)  Age/Sex: 17 / F  (units   

 (unknown)



                    date)                         Date of Service: unknown)  

 

           (unknown)  (no       (unknown)  (unknown)  Allergies  (units    (unkn

own)



                    date)                                   unknown)  

 

           (unknown)  (no       (unknown)  (unknown)  [place]  (units    (

unknown)



                    date)                         [address]     unknown)  

 

           (unknown)  (no       (unknown)  (unknown)  Anesthesia  (units    (unk

nown)



                    date)                                   unknown)  

 

           (unknown)  (no       (unknown)  (unknown)  Assessment + Plan  (units 

   (unknown)



                    date)                                   unknown)  

 

           (unknown)  (no       (unknown)  (unknown)  Attending Dr:  (units    (

unknown)



                    date)                         Jose Almodovar MD unknown)  

 

           (unknown)  (no       (unknown)  (unknown)  BMI 28.7  (units    (unkno

wn)



                    date)                                   unknown)  

 

           (unknown)  (no       (unknown)  (unknown)  /68  (units    (unkn

own)



                    date)                                   unknown)  

 

           (unknown)  (no       (unknown)  (unknown)  Blood Pressure  (units    

(unknown)



                    date)                         Location Rt unknown)  



                                                  brachial            

 

           (unknown)  (no       (unknown)  (unknown)  Chief Complaint  (units   

 (unknown)



                    date)                                   unknown)  

 

           (unknown)  (no       (unknown)  (unknown)  Chief Complaint:  (units  

  (unknown)



                    date)                         LLQ/pelvic pain, unknown)  



                                                  abnormal uterine           



                                                  bleeding, + hCG at           



                                                  home                

 

           (unknown)  (no       (unknown)  (unknown)  Chlamydia  (units    (unkn

own)



                    date)                         infection () unknown)  

 

           (unknown)  (no       (unknown)  (unknown)  Chlamydia/Gonoc/M  (units 

   (unknown)



                    date)                         yco Genital Today unknown)  



                                                  N89.8 - Other           



                                                  specified           



                                                  noninflammatory           

 

           (unknown)  (no       (unknown)  (unknown)  : 2005  (units   

 (unknown)



                    date)                         Acct:[id number] unknown)  

 

           (unknown)  (no       (unknown)  (unknown)  Dept at   (units    (unkno

wn)



                    date)                         [phone]. unknown)  

 

           (unknown)  (no       (unknown)  (unknown)  Details:  (units    (unkno

wn)



                    date)                                   unknown)  

 

           (unknown)  (no       (unknown)  (unknown)  Documented By:  (units    

(unknown)



                    date)                         Jose Almodovar MD unknown)  



                                                  02/15/23 1413           

 

           (unknown)  (no       (unknown)  (unknown)  [employer]     (units    (unkno

wn)



                    date)                                   unknown)  

 

           (unknown)  (no       (unknown)  (unknown)  Endometriosis  (units    (

unknown)



                    date)                         ()   unknown)  

 

           (unknown)  (no       (unknown)  (unknown)  [facility]  (units   

 (unknown)



                    date)                         [facility] unknown)  

 

           (unknown)  (no       (unknown)  (unknown)  [facility]  (units  

  (unknown)



                    date)                         ED and according unknown)  



                                                  to the patient an           



                                                  hCG was done in           



                                                  January             

 

           (unknown)  (no       (unknown)  (unknown)  Lesvia is a  (units    (unk

nown)



                    date)                         17-year-old unknown)  



                                                  nulligravida who           



                                                  returns today for           



                                                  evaluation with a           



                                                  3                   

 

           (unknown)  (no       (unknown)  (unknown)  Gynecology Visit  (units  

  (unknown)



                    date)                                   unknown)  

 

           (unknown)  (no       (unknown)  (unknown)  HPI       (units    (unkno

wn)



                    date)                                   unknown)  

 

           (unknown)  (no       (unknown)  (unknown)  Heavy menstrual  (units   

 (unknown)



                    date)                         period (-2019) unknown)  

 

           (unknown)  (no       (unknown)  (unknown)  Height 5 ft 3 in  (units  

  (unknown)



                    date)                                   unknown)  

 

           (unknown)  (no       (unknown)  (unknown)  Intake Note:  (units    (u

nknown)



                    date)                                   unknown)  

 

           (unknown)  (no       (unknown)  (unknown)  Intake performed  (units  

  (unknown)



                    date)                         by: Harshal Gipson unknown)  

 

           (unknown)  (no       (unknown)  (unknown)  Intake    (units    (unkno

wn)



                    date)                                   unknown)  

 

           (unknown)  (no       (unknown)  (unknown)  Intake- Clincial  (units  

  (unknown)



                    date)                         Staff     unknown)  

 

           (unknown)  (no       (unknown)  (unknown)  Irregular  (units    (unkn

own)



                    date)                         menstrual cycle unknown)  



                                                  ()             

 

           (unknown)  (no       (unknown)  (unknown)  Last Menstural  (units    

(unknown)



                    date)                         Cycle + Details unknown)  

 

           (unknown)  (no       (unknown)  (unknown)  Loc: [facility]  (units    (unkno

wn)



                    date)                                   unknown)  

 

           (unknown)  (no       (unknown)  (unknown)  Lymphangioma  (units    (u

nknown)



                    date)                                   unknown)  

 

           (unknown)  (no       (unknown)  (unknown)  Medical History  (units   

 (unknown)



                    date)                         (Reviewed 23 unknown)  



                                                  @ 02:02 by Terrell Arnold MD)           

 

           (unknown)  (no       (unknown)  (unknown)  Opioids -  (units    (unkn

own)



                    date)                         Morphine Analogues unknown)  



                                                  Allergy             



                                                  (Intermediate,           



                                                  Verified 22           



                                                  14:51)              

 

           (unknown)  (no       (unknown)  (unknown)  Orders    (units    (unkno

wn)



                    date)                                   unknown)  

 

           (unknown)  (no       (unknown)  (unknown)  Orders:   (units    (unkno

wn)



                    date)                                   unknown)  

 

           (unknown)  (no       (unknown)  (unknown)  Other Menstrual  (units   

 (unknown)



                    date)                         Period: Other unknown)  

 

           (unknown)  (no       (unknown)  (unknown)  Ovarian cyst  (units    (u

nknown)



                    date)                                   unknown)  

 

           (unknown)  (no       (unknown)  (unknown)  PFSH      (units    (unkno

wn)



                    date)                                   unknown)  

 

           (unknown)  (no       (unknown)  (unknown)  POC PREG  (units    (unkno

wn)



                    date)                                   unknown)  

 

           (unknown)  (no       (unknown)  (unknown)  POC Preg Test  (units    (

unknown)



                    date)                         Negative Last Edit unknown)  



                                                  by Harshal Gipson MA on 02/15/23           



                                                  14:25               

 

           (unknown)  (no       (unknown)  (unknown)  POC Urine  (units    (unkn

own)



                    date)                         Pregnancy Test unknown)  



                                                  Today R10.2 -           



                                                  Pelvic and           



                                                  perineal pain           

 

           (unknown)  (no       (unknown)  (unknown)  Painful menstrual  (units 

   (unknown)



                    date)                         periods () unknown)  

 

           (unknown)  (no       (unknown)  (unknown)  Patient:  (units    (unkno

wn)



                    date)                         Lesvia Aguero unknown)  



                                                  MR#: [id number]             

 

           (unknown)  (no       (unknown)  (unknown)  Position Sitting  (units  

  (unknown)



                    date)                                   unknown)  

 

           (unknown)  (no       (unknown)  (unknown)  Preg Expiration  (units   

 (unknown)



                    date)                         [id number] Last Edit unknown)  



                                                  by Harshal Gipson MA on 02/15/23           



                                                  14:25               

 

           (unknown)  (no       (unknown)  (unknown)  Preg Lot #  (units    (unk

nown)



                    date)                         [id number] Last unknown)  



                                                  Edit by Harshal Gipson MA on           



                                                  02/15/23 14:25           

 

           (unknown)  (no       (unknown)  (unknown)  Preg QC   (units    (unkno

wn)



                    date)                         Acceptable? Yes unknown)  



                                                  Last Edit by           



                                                  Harshal Gipson MA           



                                                  on 02/15/23 14:25           

 

           (unknown)  (no       (unknown)  (unknown)  Pt here for ED  (units    

(unknown)



                    date)                         follow-up and unknown)  



                                                  positive pregnancy           



                                                  test                

 

           (unknown)  (no       (unknown)  (unknown)  Reason For Visit  (units  

  (unknown)



                    date)                                   unknown)  

 

           (unknown)  (no       (unknown)  (unknown)  Results   (units    (unkno

wn)



                    date)                                   unknown)  

 

           (unknown)  (no       (unknown)  (unknown)  S/P ACL   (units    (unkno

wn)



                    date)                         reconstruction unknown)  



                                                  (-21)           

 

           (unknown)  (no       (unknown)  (unknown)  Signed By:  (units    (unk

nown)



                    date)                                   unknown)  

 

           (unknown)  (no       (unknown)  (unknown)  Smoking Status:  (units   

 (unknown)



                    date)                         Current some day unknown)  



                                                  smoker              

 

           (unknown)  (no       (unknown)  (unknown)  Social History  (units    

(unknown)



                    date)                                   unknown)  

 

           (unknown)  (no       (unknown)  (unknown)  Surgical History  (units  

  (unknown)



                    date)                         (Reviewed 23 unknown)  



                                                  @ 02:02 by Terrell Arnold MD)           

 

           (unknown)  (no       (unknown)  (unknown)  TOA (tubo-ovarian  (units 

   (unknown)



                    date)                         abscess)  unknown)  



                                                  (-21)           

 

           (unknown)  (no       (unknown)  (unknown)  This note may  (units    (

unknown)



                    date)                         have been all or unknown)  



                                                  partially           



                                                  generated using           



                                                  voice recognition           

 

           (unknown)  (no       (unknown)  (unknown)  Tobacco +  (units    (unkn

own)



                    date)                         Substance Use unknown)  

 

           (unknown)  (no       (unknown)  (unknown)  Tobacco Status  (units    

(unknown)



                    date)                                   unknown)  

 

           (unknown)  (no       (unknown)  (unknown)  Visit Reasons: IH  (units 

   (unknown)



                    date)                         ER F/U, bleeding unknown)  



                                                  and postive           



                                                  pregnacy test           

 

           (unknown)  (no       (unknown)  (unknown)  Vitals    (units    (unkno

wn)



                    date)                                   unknown)  

 

           (unknown)  (no       (unknown)  (unknown)  Weight 162 lb  (units    (

unknown)



                    date)                                   unknown)  

 

           (unknown)  (no       (unknown)  (unknown)  alcohol intake:  (units   

 (unknown)



                    date)                         current   unknown)  

 

           (unknown)  (no       (unknown)  (unknown)  disorders of  (units    (u

nknown)



                    date)                         vagina, R10.2 - unknown)  



                                                  Pelvic and           



                                                  perineal pain,           



                                                  Z11.3 - Encounter           



                                                  for                 

 

           (unknown)  (no       (unknown)  (unknown)  doxycycline  (units    (un

known)



                    date)                         Adverse Reaction unknown)  



                                                  (Intermediate,           



                                                  Verified 22           



                                                  14:51)              

 

           (unknown)  (no       (unknown)  (unknown)  have a bowel  (units    (u

nknown)



                    date)                         movement or unknown)  



                                                  empties her           



                                                  bladder. Patient           



                                                  has been seen at           



                                                  [facility]             

 

           (unknown)  (no       (unknown)  (unknown)  have occurred. If  (units 

   (unknown)



                    date)                         there are any unknown)  



                                                  questions, please           



                                                  contact the           



                                                  Medical Records           

 

           (unknown)  (no       (unknown)  (unknown)  household  (units    (unkn

own)



                    date)                         members: friend(s) unknown)  

 

           (unknown)  (no       (unknown)  (unknown)  may occur.  (units    (unk

nown)



                    date)                         Occasional unknown)  



                                                  wrong-word or           



                                                  'sound-alike'           



                                                  substitutions may           



                                                  have                

 

           (unknown)  (no       (unknown)  (unknown)  month history of  (units  

  (unknown)



                    date)                         persistent left unknown)  



                                                  lower quadrant           



                                                  pain which is           



                                                  sharp and           

 

           (unknown)  (no       (unknown)  (unknown)  negative but this  (units 

   (unknown)



                    date)                         morning she had a unknown)  



                                                  positive home           



                                                  pregnancy test.           

 

           (unknown)  (no       (unknown)  (unknown)  occurred due to  (units   

 (unknown)



                    date)                         the inherent unknown)  



                                                  limitations of           



                                                  voice recognition           



                                                  software. Please           

 

           (unknown)  (no       (unknown)  (unknown)  rash      (units    (unkno

wn)



                    date)                                   unknown)  

 

           (unknown)  (no       (unknown)  (unknown)  read the note  (units    (

unknown)



                    date)                         carefully and unknown)  



                                                  recognize, using           



                                                  context, where           



                                                  these               



                                                  substitutions           

 

           (unknown)  (no       (unknown)  (unknown)  screening for  (units    (

unknown)



                    date)                         infections with a unknown)  



                                                  predominantly           



                                                  sexual mode of           



                                                  transmission           

 

           (unknown)  (no       (unknown)  (unknown)  she is been  (units    (un

known)



                    date)                         bleeding for the unknown)  



                                                  last 2 weeks and           



                                                  her pain has been           



                                                  worse during           

 

           (unknown)  (no       (unknown)  (unknown)  software.  (units    (unkn

own)



                    date)                         Although every unknown)  



                                                  effort is made to           



                                                  edit content,           



                                                  transcription           



                                                  errors              

 

           (unknown)  (no       (unknown)  (unknown)  stabbing, and  (units    (

unknown)



                    date)                         intermittent. It unknown)  



                                                  does not seem to           



                                                  be related to her           



                                                  cycles but           

 

           (unknown)  (no       (unknown)  (unknown)  that period of  (units    

(unknown)



                    date)                         time. In addition unknown)  



                                                  she has             



                                                  significant pain           



                                                  when she strains           



                                                  to                  

 

           (unknown)  (no       (unknown)  (unknown)  vomitt    (units    (unkno

wn)



                    date)                                   unknown)  

 

           (unknown)  (no       (unknown)  (unknown)  which was  (units    (unkn

own)



                    date)                         negative but no unknown)  



                                                  records are           



                                                  available for           



                                                  review. Urine hCG           



                                                  today is            









                                         Result panel [id number]









           (unknown)  (no       (unknown)  (unknown)  (no value)  (units    (unk

nown)



                    date)                                   unknown)  

 

           (unknown)  (no       (unknown)  (unknown)  [id number]  (units    (unkn

own)



                    date)                                   unknown)  

 

           (unknown)  (no       (unknown)  (unknown)  02/15/23  (units    (unkno

wn)



                    date)                                   unknown)  

 

           (unknown)  (no       (unknown)  (unknown)  [address] [place] [address]  (units  

  (unknown)



                    date)                                   unknown)  

 

           (unknown)  (no       (unknown)  (unknown)  Accession  (units    (unkn

own)



                    date)                         Number:   unknown)  



                                                  [id number]           

 

           (unknown)  (no       (unknown)  (unknown)  Additional  (units    (unk

nown)



                    date)                         endovaginal unknown)  



                                                  scanning was           



                                                  necessary due to           



                                                  incomplete           



                                                  visualization           

 

           (unknown)  (no       (unknown)  (unknown)  Age/Sex: 17 / F  (units   

 (unknown)



                    date)                         Date of Service: unknown)  

 

           (unknown)  (no       (unknown)  (unknown)  [place], [place]  (units    (

unknown)



                    date)                         [id number]     unknown)  

 

           (unknown)  (no       (unknown)  (unknown)  Approved by:  (units    (u

nknown)



                    date)                         Luis M Escobedo, unknown)  



                                                  M.D. on 2/15/2023           



                                                  at 17:05            

 

           (unknown)  (no       (unknown)  (unknown)  Bilateral  (units    (unkn

own)



                    date)                         ovarian   unknown)  



                                                  hypoechoic masses           



                                                  with peripheral           



                                                  vascularity.           



                                                  Ectopic             

 

           (unknown)  (no       (unknown)  (unknown)  COMPARISON:  (units    (un

known)



                    date)                         [facility], unknown)  



                                                  US, US PELVIC           



                                                  COMPLETE,           



                                                  2023, 2:14.           

 

           (unknown)  (no       (unknown)  (unknown)  Continued  (units    (unkn

own)



                    date)                         surveillance and unknown)  



                                                  correlation with           



                                                  serial serum           



                                                  beta-hCG            



                                                  measurements           

 

           (unknown)  (no       (unknown)  (unknown)  : 2005  (units   

 (unknown)



                    date)                         Acct:[id number] unknown)  

 

           (unknown)  (no       (unknown)  (unknown)  Dictated by:  (units    (u

nknown)



                    date)                         Luis M Escobedo, afsaneh)  



                                                  M.D. on 2/15/2023           



                                                  at 17:02            

 

           (unknown)  (no       (unknown)  (unknown)  FINDINGS:  (units    (unkn

own)



                    date)                                   unknown)  

 

           (unknown)  (no       (unknown)  (unknown)  IMPRESSION:  (units    (un

known)



                    date)                                   unknown)  

 

           (unknown)  (no       (unknown)  (unknown)  INDICATIONS:  (units    (u

nknown)



                    date)                         BLEEDING/PELVIC unknown)  



                                                  PAIN. HOME           



                                                  POSITIVE            



                                                  PREGNANCY TEST           



                                                  TODAY.              

 

           (unknown)  (no       (unknown)  (unknown)  [facility]  (units   

 (unknown)



                    date)                                   unknown)  

 

           (unknown)  (no       (unknown)  (unknown)  Loc: US   (units    (unkno

wn)



                    date)                                   unknown)  

 

           (unknown)  (no       (unknown)  (unknown)  No convincing  (units    (

unknown)



                    date)                         evidence of unknown)  



                                                  intrauterine           



                                                  pregnancy.           

 

           (unknown)  (no       (unknown)  (unknown)  Ordering  (units    (unkno

wn)



                    date)                         Provider: unknown)  



                                                  Jose Almodovar MD                  

 

           (unknown)  (no       (unknown)  (unknown)  PROCEDURE: US  (units    (

unknown)



                    date)                         PELVIC COMPLETE unknown)  

 

           (unknown)  (no       (unknown)  (unknown)  Patient:  (units    (unkno

wn)



                    date)                         Lesvia Aguero unknown)  



                                                  MR#: [id number]              

 

           (unknown)  (no       (unknown)  (unknown)  Procedure: US  (units    (

unknown)



                    date)                         pelvic complete unknown)  

 

           (unknown)  (no       (unknown)  (unknown)  Real-time  (units    (unkn

own)



                    date)                         scanning was unknown)  



                                                  performed of the           



                                                  pelvic organs,           



                                                  with image           

 

           (unknown)  (no       (unknown)  (unknown)  Signed    (units    (unkno

wn)



                    date)                                   unknown)  

 

           (unknown)  (no       (unknown)  (unknown)  TECHNIQUE:  (units    (unk

nown)



                    date)                                   unknown)  

 

           (unknown)  (no       (unknown)  (unknown)  To assist  (units    (unkn

own)



                    date)                                   unknown)  

 

           (unknown)  (no       (unknown)  (unknown)  Ultrasound  (units    (unk

nown)



                    date)                         Report    unknown)  

 

           (unknown)  (no       (unknown)  (unknown)  Uterine body is  (units   

 (unknown)



                    date)                         normal in size. unknown)  



                                                  Echogenic fluid           



                                                  in the uterine           



                                                  cavity may           

 

           (unknown)  (no       (unknown)  (unknown)  We strive to  (units    (u

nknown)



                    date)                         produce accurate, unknown)  



                                                  complete, and           



                                                  clear reports of           



                                                  imaging services.           

 

           (unknown)  (no       (unknown)  (unknown)  adnexal and  (units    (un

known)



                    date)                         endometrial unknown)  



                                                  structures by           



                                                  transabdominal           



                                                  scanning.           

 

           (unknown)  (no       (unknown)  (unknown)  although a  (units    (unk

nown)



                    date)                                   unknown)  

 

           (unknown)  (no       (unknown)  (unknown)  and voice  (units    (unkn

own)



                    date)                         recognition unknown)  



                                                  software.           



                                                  Therefore, it may           



                                                  contain abnormal           



                                                  punctuation,           

 

           (unknown)  (no       (unknown)  (unknown)  and       (units    (unkno

wn)



                    date)                                   unknown)  

 

           (unknown)  (no       (unknown)  (unknown)  blood products.  (units   

 (unknown)



                    date)                         There is no unknown)  



                                                  definite evidence           



                                                  of gestational           



                                                  sac. There is a           

 

           (unknown)  (no       (unknown)  (unknown)  but smaller 1.5  (units   

 (unknown)



                    date)                         cm hypoechoic unknown)  



                                                  area in the left           



                                                  ovary.              

 

           (unknown)  (no       (unknown)  (unknown)  cannot be  (units    (unkn

own)



                    date)                         strictly excluded unknown)  



                                                  for either these           



                                                  locations.           

 

           (unknown)  (no       (unknown)  (unknown)  clinical  (units    (unkno

wn)



                    date)                         symptoms  unknown)  



                                                  recommended.           

 

           (unknown)  (no       (unknown)  (unknown)  corpus luteum or  (units  

  (unknown)



                    date)                         hemorrhagic cyst unknown)  



                                                  could have a           



                                                  similar             



                                                  appearance. There           



                                                  is a                

 

           (unknown)  (no       (unknown)  (unknown)  documentation.  (units    

(unknown)



                    date)                                   unknown)  

 

           (unknown)  (no       (unknown)  (unknown)  inaccuracies.  (units    (

unknown)



                    date)                                   unknown)  

 

           (unknown)  (no       (unknown)  (unknown)  insertions  (units    (unk

nown)



                    date)                         and/or omissions. unknown)  



                                                  Occasional           



                                                  wrong-word or           



                                                  sound-alike           



                                                  substitutions           

 

           (unknown)  (no       (unknown)  (unknown)  may       (units    (unkno

wn)



                    date)                                   unknown)  

 

           (unknown)  (no       (unknown)  (unknown)  occur. Though we  (units  

  (unknown)



                    date)                         review the report unknown)  



                                                  and make efforts           



                                                  to correct it, we           



                                                  do                  

 

           (unknown)  (no       (unknown)  (unknown)  of the    (units    (unkno

wn)



                    date)                                   unknown)  

 

           (unknown)  (no       (unknown)  (unknown)  peripheral  (units    (unk

nown)



                    date)                         vascularity. This unknown)  



                                                  could potentially           



                                                  represent an           



                                                  ectopic pregnancy           

 

           (unknown)  (no       (unknown)  (unknown)  pregnancy  (units    (unkn

own)



                    date)                                   unknown)  

 

           (unknown)  (no       (unknown)  (unknown)  recommend that  (units    

(unknown)



                    date)                                   unknown)  

 

           (unknown)  (no       (unknown)  (unknown)  represent  (units    (unkn

own)



                    date)                                   unknown)  

 

           (unknown)  (no       (unknown)  (unknown)  similar   (units    (unkno

wn)



                    date)                                   unknown)  

 

           (unknown)  (no       (unknown)  (unknown)  templates  (units    (unkn

own)



                    date)                                   unknown)  

 

           (unknown)  (no       (unknown)  (unknown)  the report be  (units    (

unknown)



                    date)                         read carefully in unknown)  



                                                  proper context to           



                                                  recognize any           



                                                  text                

 

           (unknown)  (no       (unknown)  (unknown)  thick-walled 2.7  (units  

  (unknown)



                    date)                         cm heterogeneous unknown)  



                                                  complex             



                                                  hypoechoic mass           



                                                  in the left ovary           

 

           (unknown)  (no       (unknown)  (unknown)  us in improving  (units   

 (unknown)



                    date)                         patient care, unknown)  



                                                  this report was           



                                                  composed using           



                                                  standard report           

 

           (unknown)  (no       (unknown)  (unknown)  with      (units    (unkno

wn)



                    date)                                   unknown)  









                                         Result panel 356









           (unknown)  (no date)  (unknown)  (unknown)  < 2.4     miu/ml    (unkn

own)

 

           (unknown)  (no date)  (unknown)  (unknown)  < 2.4     miu/ml    (unkn

own)









                                         Result panel 357









           (unknown)  (no       (unknown)  (unknown)  (no value)  (units    (unk

nown)



                    date)                                   unknown)  

 

           (unknown)  (no       (unknown)  (unknown)  [id number]  (units    (unkno

wn)



                    date)                                   unknown)  

 

           (unknown)  (no       (unknown)  (unknown)  02/15/23  (units    (unkno

wn)



                    date)                                   unknown)  

 

           (unknown)  (no       (unknown)  (unknown)  14:13     (units    (unkno

wn)



                    date)                                   unknown)  

 

           (unknown)  (no       (unknown)  (unknown)  Acne (-)  (units    (u

nknown)



                    date)                                   unknown)  

 

           (unknown)  (no       (unknown)  (unknown)  Age/Sex: 17 / F  (units   

 (unknown)



                    date)                         Date of Service: unknown)  

 

           (unknown)  (no       (unknown)  (unknown)  Allergies  (units    (unkn

own)



                    date)                                   unknown)  

 

           (unknown)  (no       (unknown)  (unknown)  [place]  (units    (

unknown)



                    date)                         [id number]     unknown)  

 

           (unknown)  (no       (unknown)  (unknown)  Anesthesia  (units    (unk

nown)



                    date)                                   unknown)  

 

           (unknown)  (no       (unknown)  (unknown)  Assessment + Plan  (units 

   (unknown)



                    date)                                   unknown)  

 

           (unknown)  (no       (unknown)  (unknown)  Attending Dr:  (units    (

unknown)



                    date)                         Jose Almodovar MD unknown)  

 

           (unknown)  (no       (unknown)  (unknown)  BMI 28.7  (units    (unkno

wn)



                    date)                                   unknown)  

 

           (unknown)  (no       (unknown)  (unknown)  /68  (units    (unkn

own)



                    date)                                   unknown)  

 

           (unknown)  (no       (unknown)  (unknown)  Blood Pressure  (units    

(unknown)



                    date)                         Location Rt unknown)  



                                                  brachial            

 

           (unknown)  (no       (unknown)  (unknown)  Chief Complaint  (units   

 (unknown)



                    date)                                   unknown)  

 

           (unknown)  (no       (unknown)  (unknown)  Chief Complaint:  (units  

  (unknown)



                    date)                         LLQ/pelvic pain, unknown)  



                                                  abnormal uterine           



                                                  bleeding, + hCG at           



                                                  home                

 

           (unknown)  (no       (unknown)  (unknown)  Chlamydia  (units    (unkn

own)



                    date)                         infection () unknown)  

 

           (unknown)  (no       (unknown)  (unknown)  Chlamydia/Gonoc/M  (units 

   (unknown)



                    date)                         yco Genital unknown)  



                                                  02/15/23 N89.8 -           



                                                  Other specified           



                                                  noninflammatory           

 

           (unknown)  (no       (unknown)  (unknown)  : 2005  (units   

 (unknown)



                    date)                         Acct:[id number] unknown)  

 

           (unknown)  (no       (unknown)  (unknown)  Dept at   (units    (unkno

wn)



                    date)                         [phone]. unknown)  

 

           (unknown)  (no       (unknown)  (unknown)  Details:  (units    (unkno

wn)



                    date)                                   unknown)  

 

           (unknown)  (no       (unknown)  (unknown)  Documented By:  (units    

(unknown)



                    date)                         Jose Almodovar MD unknown)  



                                                  02/15/23 1413           

 

           (unknown)  (no       (unknown)  (unknown)  [employer]     (units    (unkno

wn)



                    date)                                   unknown)  

 

           (unknown)  (no       (unknown)  (unknown)  Endometriosis  (units    (

unknown)



                    date)                         ()   unknown)  

 

           (unknown)  (no       (unknown)  (unknown)  [facility]  (units   

 (unknown)



                    date)                         [facility] unknown)  

 

           (unknown)  (no       (unknown)  (unknown)  [facility]  (units  

  (unknown)



                    date)                         ED and according unknown)  



                                                  to the patient an           



                                                  hCG was done in           



                                                  January             

 

           (unknown)  (no       (unknown)  (unknown)  Lesvia is a  (units    (unk

nown)



                    date)                         17-year-old unknown)  



                                                  nulligravida who           



                                                  returns today for           



                                                  evaluation with a           



                                                  3                   

 

           (unknown)  (no       (unknown)  (unknown)  Gynecology Visit  (units  

  (unknown)



                    date)                                   unknown)  

 

           (unknown)  (no       (unknown)  (unknown)  HPI       (units    (unkno

wn)



                    date)                                   unknown)  

 

           (unknown)  (no       (unknown)  (unknown)  Heavy menstrual  (units   

 (unknown)



                    date)                         period (-) unknown)  

 

           (unknown)  (no       (unknown)  (unknown)  Height 5 ft 3 in  (units  

  (unknown)



                    date)                                   unknown)  

 

           (unknown)  (no       (unknown)  (unknown)  Intake Note:  (units    (u

nknown)



                    date)                                   unknown)  

 

           (unknown)  (no       (unknown)  (unknown)  Intake performed  (units  

  (unknown)



                    date)                         by: Harshal Gipson unknown)  

 

           (unknown)  (no       (unknown)  (unknown)  Intake    (units    (unkno

wn)



                    date)                                   unknown)  

 

           (unknown)  (no       (unknown)  (unknown)  Intake- Clincial  (units  

  (unknown)



                    date)                         Staff     unknown)  

 

           (unknown)  (no       (unknown)  (unknown)  Irregular  (units    (unkn

own)



                    date)                         menstrual cycle unknown)  



                                                  ()             

 

           (unknown)  (no       (unknown)  (unknown)  Last Menstural  (units    

(unknown)



                    date)                         Cycle + Details unknown)  

 

           (unknown)  (no       (unknown)  (unknown)  Loc: [facility]  (units    (unkno

wn)



                    date)                                   unknown)  

 

           (unknown)  (no       (unknown)  (unknown)  Lymphangioma  (units    (u

nknown)



                    date)                                   unknown)  

 

           (unknown)  (no       (unknown)  (unknown)  Medical History  (units   

 (unknown)



                    date)                         (Reviewed 23 unknown)  



                                                  @ 02:02 by Terrell Arnold MD)           

 

           (unknown)  (no       (unknown)  (unknown)  Medications:  (units    (u

nknown)



                    date)                                   unknown)  

 

           (unknown)  (no       (unknown)  (unknown)  New       (units    (unkno

wn)



                    date)                                   unknown)  

 

           (unknown)  (no       (unknown)  (unknown)  Opioids -  (units    (unkn

own)



                    date)                         Morphine Analogues unknown)  



                                                  Allergy             



                                                  (Intermediate,           



                                                  Verified 22           



                                                  14:51)              

 

           (unknown)  (no       (unknown)  (unknown)  Orders    (units    (unkno

wn)



                    date)                                   unknown)  

 

           (unknown)  (no       (unknown)  (unknown)  Orders:   (units    (unkno

wn)



                    date)                                   unknown)  

 

           (unknown)  (no       (unknown)  (unknown)  Other Menstrual  (units   

 (unknown)



                    date)                         Period: Other unknown)  

 

           (unknown)  (no       (unknown)  (unknown)  Ovarian cyst  (units    (u

nknown)



                    date)                                   unknown)  

 

           (unknown)  (no       (unknown)  (unknown)  PFSH      (units    (unkno

wn)



                    date)                                   unknown)  

 

           (unknown)  (no       (unknown)  (unknown)  POC PREG  (units    (unkno

wn)



                    date)                                   unknown)  

 

           (unknown)  (no       (unknown)  (unknown)  POC Preg Test  (units    (

unknown)



                    date)                         Negative Last Edit unknown)  



                                                  by Harshal Gipson MA on 02/15/23           



                                                  14:25               

 

           (unknown)  (no       (unknown)  (unknown)  POC Urine  (units    (unkn

own)



                    date)                         Pregnancy Test unknown)  



                                                  02/15/23 R10.2 -           



                                                  Pelvic and           



                                                  perineal pain           

 

           (unknown)  (no       (unknown)  (unknown)  Painful menstrual  (units 

   (unknown)



                    date)                         periods () unknown)  

 

           (unknown)  (no       (unknown)  (unknown)  Patient:  (units    (unkno

wn)



                    date)                         Lesvia Aguero E unknown)  



                                                  MR#: [id number]             

 

           (unknown)  (no       (unknown)  (unknown)  Position Sitting  (units  

  (unknown)



                    date)                                   unknown)  

 

           (unknown)  (no       (unknown)  (unknown)  Preg Expiration  (units   

 (unknown)



                    date)                         [id number] Last Edit unknown)  



                                                  by Harshal Gipson MA on 02/15/23           



                                                  14:25               

 

           (unknown)  (no       (unknown)  (unknown)  Preg Lot #  (units    (unk

nown)



                    date)                         [id number] Last unknown)  



                                                  Edit by Harshal Gipson MA on           



                                                  02/15/23 14:25           

 

           (unknown)  (no       (unknown)  (unknown)  Preg QC   (units    (unkno

wn)



                    date)                         Acceptable? Yes unknown)  



                                                  Last Edit by           



                                                  Harshal Gipson MA           



                                                  on 02/15/23 14:25           

 

           (unknown)  (no       (unknown)  (unknown)  Pt here for ED  (units    

(unknown)



                    date)                         follow-up and unknown)  



                                                  positive pregnancy           



                                                  test                

 

           (unknown)  (no       (unknown)  (unknown)  Reason For Visit  (units  

  (unknown)



                    date)                                   unknown)  

 

           (unknown)  (no       (unknown)  (unknown)  Repeat second  (units    (

unknown)



                    date)                         dose 48 hrs after unknown)  



                                                  first dose. 150 mg           



                                                  PO Q48H 2 tabs 4RF           



                                                  2 doses             

 

           (unknown)  (no       (unknown)  (unknown)  Results   (units    (unkno

wn)



                    date)                                   unknown)  

 

           (unknown)  (no       (unknown)  (unknown)  S/P ACL   (units    (unkno

wn)



                    date)                         reconstruction unknown)  



                                                  (-21)           

 

           (unknown)  (no       (unknown)  (unknown)  Signed By:  (units    (unk

nown)



                    date)                                   unknown)  

 

           (unknown)  (no       (unknown)  (unknown)  Smoking Status:  (units   

 (unknown)



                    date)                         Current some day unknown)  



                                                  smoker              

 

           (unknown)  (no       (unknown)  (unknown)  Social History  (units    

(unknown)



                    date)                                   unknown)  

 

           (unknown)  (no       (unknown)  (unknown)  Surgical History  (units  

  (unknown)



                    date)                         (Reviewed 23 unknown)  



                                                  @ 02:02 by Terrell Arnold MD)           

 

           (unknown)  (no       (unknown)  (unknown)  TOA (tubo-ovarian  (units 

   (unknown)



                    date)                         abscess)  unknown)  



                                                  (-21)           

 

           (unknown)  (no       (unknown)  (unknown)  This note may  (units    (

unknown)



                    date)                         have been all or unknown)  



                                                  partially           



                                                  generated using           



                                                  voice recognition           

 

           (unknown)  (no       (unknown)  (unknown)  Tobacco +  (units    (unkn

own)



                    date)                         Substance Use unknown)  

 

           (unknown)  (no       (unknown)  (unknown)  Tobacco Status  (units    

(unknown)



                    date)                                   unknown)  

 

           (unknown)  (no       (unknown)  (unknown)  Visit Reasons: IH  (units 

   (unknown)



                    date)                         ER F/U, bleeding unknown)  



                                                  and postive           



                                                  pregnacy test           

 

           (unknown)  (no       (unknown)  (unknown)  Vitals    (units    (unkno

wn)



                    date)                                   unknown)  

 

           (unknown)  (no       (unknown)  (unknown)  Weight 162 lb  (units    (

unknown)



                    date)                                   unknown)  

 

           (unknown)  (no       (unknown)  (unknown)  alcohol intake:  (units   

 (unknown)



                    date)                         current   unknown)  

 

           (unknown)  (no       (unknown)  (unknown)  disorders of  (units    (u

nknown)



                    date)                         vagina, R10.2 - unknown)  



                                                  Pelvic and           



                                                  perineal pain,           



                                                  Z11.3 - Encounter           



                                                  for                 

 

           (unknown)  (no       (unknown)  (unknown)  doxycycline  (units    (un

known)



                    date)                         Adverse Reaction unknown)  



                                                  (Intermediate,           



                                                  Verified 22           



                                                  14:51)              

 

           (unknown)  (no       (unknown)  (unknown)  fluconazole  (units    (un

known)



                    date)                         (Diflucan) unknown)  

 

           (unknown)  (no       (unknown)  (unknown)  have a bowel  (units    (u

nknown)



                    date)                         movement or unknown)  



                                                  empties her           



                                                  bladder. Patient           



                                                  has been seen at           



                                                  [facility]             

 

           (unknown)  (no       (unknown)  (unknown)  have occurred. If  (units 

   (unknown)



                    date)                         there are any unknown)  



                                                  questions, please           



                                                  contact the           



                                                  Medical Records           

 

           (unknown)  (no       (unknown)  (unknown)  household  (units    (unkn

own)



                    date)                         members: friend(s) unknown)  

 

           (unknown)  (no       (unknown)  (unknown)  may occur.  (units    (unk

nown)



                    date)                         Occasional unknown)  



                                                  wrong-word or           



                                                  'sound-alike'           



                                                  substitutions may           



                                                  have                

 

           (unknown)  (no       (unknown)  (unknown)  month history of  (units  

  (unknown)



                    date)                         persistent left unknown)  



                                                  lower quadrant           



                                                  pain which is           



                                                  sharp and           

 

           (unknown)  (no       (unknown)  (unknown)  negative but this  (units 

   (unknown)



                    date)                         morning she had a unknown)  



                                                  positive home           



                                                  pregnancy test.           

 

           (unknown)  (no       (unknown)  (unknown)  occurred due to  (units   

 (unknown)



                    date)                         the inherent unknown)  



                                                  limitations of           



                                                  voice recognition           



                                                  software. Please           

 

           (unknown)  (no       (unknown)  (unknown)  rash      (units    (unkno

wn)



                    date)                                   unknown)  

 

           (unknown)  (no       (unknown)  (unknown)  read the note  (units    (

unknown)



                    date)                         carefully and unknown)  



                                                  recognize, using           



                                                  context, where           



                                                  these               



                                                  substitutions           

 

           (unknown)  (no       (unknown)  (unknown)  screening for  (units    (

unknown)



                    date)                         infections with a unknown)  



                                                  predominantly           



                                                  sexual mode of           



                                                  transmission           

 

           (unknown)  (no       (unknown)  (unknown)  she is been  (units    (un

known)



                    date)                         bleeding for the unknown)  



                                                  last 2 weeks and           



                                                  her pain has been           



                                                  worse during           

 

           (unknown)  (no       (unknown)  (unknown)  software.  (units    (unkn

own)



                    date)                         Although every unknown)  



                                                  effort is made to           



                                                  edit content,           



                                                  transcription           



                                                  errors              

 

           (unknown)  (no       (unknown)  (unknown)  stabbing, and  (units    (

unknown)



                    date)                         intermittent. It unknown)  



                                                  does not seem to           



                                                  be related to her           



                                                  cycles but           

 

           (unknown)  (no       (unknown)  (unknown)  that period of  (units    

(unknown)



                    date)                         time. In addition unknown)  



                                                  she has             



                                                  significant pain           



                                                  when she strains           



                                                  to                  

 

           (unknown)  (no       (unknown)  (unknown)  vomitt    (units    (unkno

wn)



                    date)                                   unknown)  

 

           (unknown)  (no       (unknown)  (unknown)  which was  (units    (unkn

own)



                    date)                         negative but no unknown)  



                                                  records are           



                                                  available for           



                                                  review. Urine hCG           



                                                  today is            









                                         Result panel 358









           (unknown)  (no       (unknown)  (unknown)  (no value)  (units    (unk

nown)



                    date)                                   unknown)  

 

           (unknown)  (no       (unknown)  (unknown)  (1) Chronic pelvic  (units

    (unknown)



                    date)                         pain in female: unknown)  

 

           (unknown)  (no       (unknown)  (unknown)  (2) Candidal  (units    (u

nknown)



                    date)                         vaginitis: unknown)  

 

           (unknown)  (no       (unknown)  (unknown)  (3) Abnormal  (units    (u

nknown)



                    date)                         uterine bleeding unknown)  



                                                  (AUB):              

 

           (unknown)  (no       (unknown)  (unknown)  (Diffuse, mild)  (units   

 (unknown)



                    date)                                   unknown)  

 

           (unknown)  (no       (unknown)  (unknown)  [id number]  (units    (unkno

wn)



                    date)                                   unknown)  

 

           (unknown)  (no       (unknown)  (unknown)  02/15/23  (units    (unkno

wn)



                    date)                                   unknown)  

 

           (unknown)  (no       (unknown)  (unknown)  23 0801  (units    (

unknown)



                    date)                                   unknown)  

 

           (unknown)  (no       (unknown)  (unknown)  14:13     (units    (unkno

wn)



                    date)                                   unknown)  

 

           (unknown)  (no       (unknown)  (unknown)  ?         (units    (unkno

wn)



                    date)                                   unknown)  

 

           (unknown)  (no       (unknown)  (unknown)  ??        (units    (unkno

wn)



                    date)                                   unknown)  

 

           (unknown)  (no       (unknown)  (unknown)  Acne (-2016)  (units    (u

nknown)



                    date)                                   unknown)  

 

           (unknown)  (no       (unknown)  (unknown)  Affect: normal  (units    

(unknown)



                    date)                         affect    unknown)  

 

           (unknown)  (no       (unknown)  (unknown)  Age/Sex: 17 / F  (units   

 (unknown)



                    date)                         Date of Service: unknown)  

 

           (unknown)  (no       (unknown)  (unknown)  Allergies  (units    (unkn

own)



                    date)                                   unknown)  

 

           (unknown)  (no       (unknown)  (unknown)  [place] [address]  (unit

s    (unknown)



                    date)                                   unknown)  

 

           (unknown)  (no       (unknown)  (unknown)  Anesthesia  (units    (unk

nown)



                    date)                                   unknown)  

 

           (unknown)  (no       (unknown)  (unknown)  Appearance: grossly  (unit

s    (unknown)



                    date)                         normal    unknown)  

 

           (unknown)  (no       (unknown)  (unknown)  Assessment + Plan  (units 

   (unknown)



                    date)                                   unknown)  

 

           (unknown)  (no       (unknown)  (unknown)  Attending Dr:  (units    (

unknown)



                    date)                         Jose Almodovar MD unknown)  

 

           (unknown)  (no       (unknown)  (unknown)  Attitude:  (units    (unkn

own)



                    date)                         cooperative unknown)  

 

           (unknown)  (no       (unknown)  (unknown)  BMI 28.7  (units    (unkno

wn)



                    date)                                   unknown)  

 

           (unknown)  (no       (unknown)  (unknown)  /68  (units    (unkn

own)



                    date)                                   unknown)  

 

           (unknown)  (no       (unknown)  (unknown)  Bilateral ovarian  (units 

   (unknown)



                    date)                         hypoechoic masses unknown)  



                                                  with peripheral           



                                                  vascularity.?           



                                                  Ectopic             

 

           (unknown)  (no       (unknown)  (unknown)  Bimanual Exam-  (units    

(unknown)



                    date)                         Adnexa, other: unknown)  



                                                  adnexae mobile, no           



                                                  masses, normal,           



                                                  tender on the           

 

           (unknown)  (no       (unknown)  (unknown)  Bimanual Exam-  (units    

(unknown)



                    date)                         Vagina + Uterus: unknown)  



                                                  uterine size normal,          

 



                                                  uterine mobility           



                                                  normal,             

 

           (unknown)  (no       (unknown)  (unknown)  Blood Pressure  (units    

(unknown)



                    date)                         Location Rt brachial unknown) 

 

 

           (unknown)  (no       (unknown)  (unknown)  Chief Complaint  (units   

 (unknown)



                    date)                                   unknown)  

 

           (unknown)  (no       (unknown)  (unknown)  Chief Complaint:  (units  

  (unknown)



                    date)                         LLQ/pelvic pain, unknown)  



                                                  abnormal uterine           



                                                  bleeding, + hCG at           



                                                  home                

 

           (unknown)  (no       (unknown)  (unknown)  Chlamydia infection  (unit

s    (unknown)



                    date)                         ()   unknown)  

 

           (unknown)  (no       (unknown)  (unknown)  Chlamydia/Gonoc/Myc  (unit

s    (unknown)



                    date)                         o Genital 02/15/23 unknown)  



                                                  N89.8 - Other           



                                                  specified           



                                                  noninflammatory           

 

           (unknown)  (no       (unknown)  (unknown)  Conjunctivae:  (units    (

unknown)



                    date)                         conjunctivae normal unknown)  

 

           (unknown)  (no       (unknown)  (unknown)  Const     (units    (unkno

wn)



                    date)                                   unknown)  

 

           (unknown)  (no       (unknown)  (unknown)  Continued  (units    (unkn

own)



                    date)                         surveillance and unknown)  



                                                  correlation with           



                                                  serial serum           



                                                  beta-hCG            



                                                  measurements           

 

           (unknown)  (no       (unknown)  (unknown)  Counseling and  (units    

(unknown)



                    date)                         educating the unknown)  



                                                  patient/family/careg          

 



                                                  iver: 5             

 

           (unknown)  (no       (unknown)  (unknown)  : 2005  (units   

 (unknown)



                    date)                         Acct:[id number] unknown)  

 

           (unknown)  (no       (unknown)  (unknown)  Dept at   (units    (unkno

wn)



                    date)                         [phone]. unknown)  

 

           (unknown)  (no       (unknown)  (unknown)  Details:  (units    (unkno

wn)



                    date)                                   unknown)  

 

           (unknown)  (no       (unknown)  (unknown)  Documented By:  (units    

(unknown)



                    date)                         Jose Almodovar MD unknown)  



                                                  02/15/23 1413           

 

           (unknown)  (no       (unknown)  (unknown)  Documenting  (units    (un

known)



                    date)                         clinical information unknown) 

 



                                                  in EHR/Medical           



                                                  record: 5           

 

           (unknown)  (no       (unknown)  (unknown)  EOM: EOM intact  (units   

 (unknown)



                    date)                         bilaterally unknown)  

 

           (unknown)  (no       (unknown)  (unknown)  Ears: hearing  (units    (

unknown)



                    date)                         grossly normal unknown)  



                                                  bilaterally           

 

           (unknown)  (no       (unknown)  (unknown)  Effort +  (units    (unkno

wn)



                    date)                         Inspection: normal unknown)  



                                                  respiratory effort           



                                                  and able to speak in          

 



                                                  complete            

 

           (unknown)  (no       (unknown)  (unknown)  Endometriosis  (units    (

unknown)



                    date)                         (-)   unknown)  

 

           (unknown)  (no       (unknown)  (unknown)  Exam      (units    (unkno

wn)



                    date)                                   unknown)  

 

           (unknown)  (no       (unknown)  (unknown)  External Female  (units   

 (unknown)



                    date)                         Exam: normal unknown)  



                                                  external appearance,          

 



                                                  normal appearance of          

 



                                                  the                 

 

           (unknown)  (no       (unknown)  (unknown)  Eyes      (units    (unkno

wn)



                    date)                                   unknown)  

 

           (unknown)  (no       (unknown)  (unknown)  FINDINGS:?  (units    (unk

nown)



                    date)                                   unknown)  

 

           (unknown)  (no       (unknown)  (unknown)  Face and sinus:  (units   

 (unknown)



                    date)                         face symmetric unknown)  

 

           (unknown)  (no       (unknown)  (unknown)  [facility]  (units   

 (unknown)



                    date)                         [facility] unknown)  

 

           (unknown)  (no       (unknown)  (unknown)  GC/CT obtained and  (units

    (unknown)



                    date)                         submitted. unknown)  

 

           (unknown)  (no       (unknown)  (unknown)  GI        (units    (unkno

wn)



                    date)                                   unknown)  

 

           (unknown)  (no       (unknown)  (unknown)          (units    (unkno

wn)



                    date)                                   unknown)  

 

           (unknown)  (no       (unknown)  ([facility]  [facility]  [facility]

s    (unknown)



                    date)                         and according to the unknown) 

 



                                                  patient an hCG was           



                                                  done in January           

 

           (unknown)  (no       (unknown)  (unknown)  General: appearance  (unit

s    (unknown)



                    date)                         normal, both eyes unknown)  



                                                  and all related           



                                                  structures           

 

           (unknown)  (no       (unknown)  (unknown)  General:  (units    (unkno

wn)



                    date)                         cooperative, unknown)  



                                                  comfortable and no           



                                                  acute distress           

 

           (unknown)  (no       (unknown)  (unknown)  Lesvia is a  (units    (unk

nown)



                    date)                         17-year-old unknown)  



                                                  nulligravida who           



                                                  returns today for           



                                                  evaluation with a 3           

 

           (unknown)  (no       (unknown)  (unknown)  Gynecology Visit  (units  

  (unknown)



                    date)                                   unknown)  

 

           (unknown)  (no       (unknown)  (unknown)  HENMT     (units    (unkno

wn)



                    date)                                   unknown)  

 

           (unknown)  (no       (unknown)  (unknown)  HPI       (units    (unkno

wn)



                    date)                                   unknown)  

 

           (unknown)  (no       (unknown)  (unknown)  Head: normal to  (units   

 (unknown)



                    date)                         inspection, unknown)  



                                                  normocephalic and           



                                                  atraumatic           

 

           (unknown)  (no       (unknown)  (unknown)  Heavy menstrual  (units   

 (unknown)



                    date)                         period () unknown)  

 

           (unknown)  (no       (unknown)  (unknown)  Height 5 ft 3 in  (units  

  (unknown)



                    date)                                   unknown)  

 

           (unknown)  (no       (unknown)  (unknown)  IMPRESSION:?  (units    (u

nknown)



                    date)                                   unknown)  

 

           (unknown)  (no       (unknown)  (unknown) Insertion of a  (units    (

unknown)



                    date)                         Mirena IUD in the unknown)  



                                                  office or at the           



                                                  time of laparoscopy           



                                                  would also           

 

           (unknown)  (no       (unknown)  (unknown)  Inspection: normal  (units

    (unknown)



                    date)                         to inspection unknown)  

 

           (unknown)  (no       (unknown)  (unknown)  Intake Note:  (units    (u

nknown)



                    date)                                   unknown)  

 

           (unknown)  (no       (unknown)  (unknown)  Intake performed  (units  

  (unknown)



                    date)                         by: Harshal Gipson unknown)  

 

           (unknown)  (no       (unknown)  (unknown)  Intake    (units    (unkno

wn)



                    date)                                   unknown)  

 

           (unknown)  (no       (unknown)  (unknown)  Intake- Clincial  (units  

  (unknown)



                    date)                         Staff     unknown)  

 

           (unknown)  (no       (unknown)  (unknown)  Irregular menstrual  (unit

s    (unknown)



                    date)                         cycle () unknown)  

 

           (unknown)  (no       (unknown)  (unknown)  Judgment: judgment  (units

    (unknown)



                    date)                         good      unknown)  

 

           (unknown)  (no       (unknown)  (unknown)  Last Menstural  (units    

(unknown)



                    date)                         Cycle + Details unknown)  

 

           (unknown)  (no       (unknown)  (unknown)  Loc: [facility]  (units    (unkno

wn)



                    date)                                   unknown)  

 

           (unknown)  (no       (unknown)  (unknown)  Lymphangioma  (units    (u

nknown)



                    date)                                   unknown)  

 

           (unknown)  (no       (unknown)  (unknown)  Medical History  (units   

 (unknown)



                    date)                         (Reviewed 23 @ unknown) 

 



                                                  02:02 by Terrell Arnold MD)           

 

           (unknown)  (no       (unknown)  (unknown)  Medications:  (units    (u

nknown)



                    date)                                   unknown)  

 

           (unknown)  (no       (unknown)  (unknown)  Mental Status:  (units    

(unknown)



                    date)                         mental status unknown)  



                                                  grossly normal           

 

           (unknown)  (no       (unknown)  (unknown)  Mood: congruent  (units   

 (unknown)



                    date)                         mood      unknown)  

 

           (unknown)  (no       (unknown)  (unknown)  Neck      (units    (unkno

wn)



                    date)                                   unknown)  

 

           (unknown)  (no       (unknown)  (unknown)  Neck: normal visual  (unit

s    (unknown)



                    date)                         inspection unknown)  

 

           (unknown)  (no       (unknown)  (unknown)  New       (units    (unkno

wn)



                    date)                                   unknown)  

 

           (unknown)  (no       (unknown)  (unknown)  No convincing  (units    (

unknown)



                    date)                         evidence of unknown)  



                                                  intrauterine           



                                                  pregnancy.           

 

           (unknown)  (no       (unknown)  (unknown)  Nutritional  (units    (un

known)



                    date)                         Appearance: average unknown)  



                                                  body habitus           

 

           (unknown)  (no       (unknown)  (unknown)  Obtaining and/or  (units  

  (unknown)



                    date)                         reviewing separately unknown) 

 



                                                  obtained history: 5           

 

           (unknown)  (no       (unknown)  (unknown)  Opioids - Morphine  (units

    (unknown)



                    date)                         Analogues Allergy unknown)  



                                                  (Intermediate,           



                                                  Verified 22           



                                                  14:51)              

 

           (unknown)  (no       (unknown)  (unknown)  Ordering  (units    (unkno

wn)



                    date)                         medications, tests, unknown)  



                                                  or procedures: 5           

 

           (unknown)  (no       (unknown)  (unknown)  Orders    (units    (unkno

wn)



                    date)                                   unknown)  

 

           (unknown)  (no       (unknown)  (unknown)  Orders:   (units    (unkno

wn)



                    date)                                   unknown)  

 

           (unknown)  (no       (unknown)  (unknown)  Orientation: alert  (units

    (unknown)



                    date)                         and oriented x3 unknown)  

 

           (unknown)  (no       (unknown)  (unknown)  Other Menstrual  (units   

 (unknown)



                    date)                         Period: Other unknown)  

 

           (unknown)  (no       (unknown)  (unknown)  Ovarian cyst  (units    (u

nknown)



                    date)                                   unknown)  

 

           (unknown)  (no       (unknown)  (unknown)  P.O. Diflucan for  (units 

   (unknown)



                    date)                         candidal  unknown)  



                                                  vulvovaginitis.           

 

           (unknown)  (no       (unknown)  (unknown)  PFSH      (units    (unkno

wn)



                    date)                                   unknown)  

 

           (unknown)  (no       (unknown)  (unknown)  POC PREG  (units    (unkno

wn)



                    date)                                   unknown)  

 

           (unknown)  (no       (unknown)  (unknown)  POC Preg Test  (units    (

unknown)



                    date)                         Negative Last Edit unknown)  



                                                  by Harshal Gipson MA          

 



                                                  on 02/15/23 14:25           

 

           (unknown)  (no       (unknown)  (unknown)  POC Urine Pregnancy  (unit

s    (unknown)



                    date)                         Test 02/15/23 R10.2 unknown)  



                                                  - Pelvic and           



                                                  perineal pain           

 

           (unknown)  (no       (unknown)  (unknown)  Painful menstrual  (units 

   (unknown)



                    date)                         periods () unknown)  

 

           (unknown)  (no       (unknown)  (unknown)  Palpation: soft, no  (unit

s    (unknown)



                    date)                         hepatosplenomegaly unknown)  



                                                  and tender (Direct           



                                                  tenderness LLQ)           

 

           (unknown)  (no       (unknown)  (unknown)  Patient to be  (units    (

unknown)



                    date)                         contacted with unknown)  



                                                  results of hCG and           



                                                  ultrasound with           



                                                  discussion           

 

           (unknown)  (no       (unknown)  (unknown)  Patient:  (units    (unkno

wn)



                    date)                         Lesvia Aguero E unknown)  



                                                  MR#: [id number]             

 

           (unknown)  (no       (unknown)  (unknown)  Pelvic Support:  (units   

 (unknown)



                    date)                         normal    unknown)  

 

           (unknown)  (no       (unknown)  (unknown)  Performing a  (units    (u

nknown)



                    date)                         medically unknown)  



                                                  appropriate exam           



                                                  and/or evaluation: 5          

 

 

           (unknown)  (no       (unknown)  (unknown)  Plan      (units    (unkno

wn)



                    date)                                   unknown)  

 

           (unknown)  (no       (unknown)  (unknown)  Position Sitting  (units  

  (unknown)



                    date)                                   unknown)  

 

           (unknown)  (no       (unknown)  (unknown)  Preg Expiration  (units   

 (unknown)



                    date)                         [id number] Last Edit by unknown)  



                                                  Harshal Gipson MA on          

 



                                                  02/15/23 14:25           

 

           (unknown)  (no       (unknown)  (unknown)  Preg Lot #  (units    (unk

nown)



                    date)                         [id number] Last Edit unknown) 

 



                                                  by Harshal Gipson MA          

 



                                                  on 02/15/23 14:25           

 

           (unknown)  (no       (unknown)  (unknown)  Preg QC Acceptable?  (unit

s    (unknown)



                    date)                         Yes Last Edit by unknown)  



                                                  Harshal Gipson, MA on          

 



                                                  02/15/23 14:25           

 

           (unknown)  (no       (unknown)  (unknown)  Preparing to see  (units  

  (unknown)



                    date)                         the patient, i.e., unknown)  



                                                  chart review, review          

 



                                                  of tests: 5           

 

           (unknown)  (no       (unknown)  (unknown)  Problem-specific  (units  

  (unknown)



                    date)                         ROS positives unknown)  



                                                  included with the           



                                                  HPI                 

 

           (unknown)  (no       (unknown)  (unknown)  Psych     (units    (unkno

wn)



                    date)                                   unknown)  

 

           (unknown)  (no       (unknown)  (unknown)  Pt here for ED  (units    

(unknown)



                    date)                         follow-up and unknown)  



                                                  positive pregnancy           



                                                  test                

 

           (unknown)  (no       (unknown)  (unknown)  ROS Narrative  (units    (

unknown)



                    date)                                   unknown)  

 

           (unknown)  (no       (unknown)  (unknown)  ROS Narrative:  (units    

(unknown)



                    date)                                   unknown)  

 

           (unknown)  (no       (unknown)  (unknown)  ROS       (units    (unkno

wn)



                    date)                                   unknown)  

 

           (unknown)  (no       (unknown)  (unknown)  Reason For Visit  (units  

  (unknown)



                    date)                                   unknown)  

 

           (unknown)  (no       (unknown)  (unknown)  Recto-Vaginal: no  (units 

   (unknown)



                    date)                         cul-de-sac fullness, unknown) 

 



                                                  cul-de-sac           



                                                  tenderness (Mild)           



                                                  and no              

 

           (unknown)  (no       (unknown)  (unknown)  Repeat second dose  (units

    (unknown)



                    date)                         48 hrs after first unknown)  



                                                  dose. 150 mg PO Q48H          

 



                                                  2 tabs 4RF           

 

           (unknown)  (no       (unknown)  (unknown)  Resp      (units    (unkno

wn)



                    date)                                   unknown)  

 

           (unknown)  (no       (unknown)  (unknown)  Results   (units    (unkno

wn)



                    date)                                   unknown)  

 

           (unknown)  (no       (unknown)  (unknown)  S/P ACL   (units    (unkno

wn)



                    date)                         reconstruction unknown)  



                                                  (-21)           

 

           (unknown)  (no       (unknown)  (unknown)  Sclera: sclerae  (units   

 (unknown)



                    date)                         normal    unknown)  

 

           (unknown)  (no       (unknown)  (unknown)  Signed By:  (units    (unk

nown)



                    date)                         <Electronically unknown)  



                                                  signed by Jose Almodovar MD>           

 

           (unknown)  (no       (unknown)  (unknown)  Signed    (units    (unkno

wn)



                    date)                                   unknown)  

 

           (unknown)  (no       (unknown)  (unknown)  Smoking Status:  (units   

 (unknown)



                    date)                         Current some day unknown)  



                                                  smoker              

 

           (unknown)  (no       (unknown)  (unknown)  Social History  (units    

(unknown)



                    date)                                   unknown)  

 

           (unknown)  (no       (unknown)  (unknown)  Speculum Exam -  (units   

 (unknown)



                    date)                         Cervix: normal unknown)  



                                                  appearance of the           



                                                  cervix, no cervical           



                                                  discharge,           

 

           (unknown)  (no       (unknown)  (unknown)  Speculum Exam -  (units   

 (unknown)



                    date)                         Vagina: normal unknown)  



                                                  appearance of the           



                                                  vagina, abnormal           



                                                  vaginal             

 

           (unknown)  (no       (unknown)  (unknown)  Speech and  (units    (unk

nown)



                    date)                         Movement: speech and unknown) 

 



                                                  movement normal           

 

           (unknown)  (no       (unknown)  (unknown)  Status: Acute  (units    (

unknown)



                    date)                                   unknown)  

 

           (unknown)  (no       (unknown)  (unknown)  Surgical History  (units  

  (unknown)



                    date)                         (Reviewed 23 @ unknown) 

 



                                                  02:02 by Terrell Arnold MD)           

 

           (unknown)  (no       (unknown)  (unknown)  TOA (tubo-ovarian  (units 

   (unknown)



                    date)                         abscess) (-21) unknown) 

 

 

           (unknown)  (no       (unknown)  (unknown)  This note may have  (units

    (unknown)



                    date)                         been all or unknown)  



                                                  partially generated           



                                                  using voice           



                                                  recognition           

 

           (unknown)  (no       (unknown)  (unknown)  Thought Content:  (units  

  (unknown)



                    date)                         normal    unknown)  

 

           (unknown)  (no       (unknown)  (unknown)  Thought Process:  (units  

  (unknown)



                    date)                         normal    unknown)  

 

           (unknown)  (no       (unknown)  (unknown)  Time Coding Minutes  (unit

s    (unknown)



                    date)                         Spent: (must be on unknown)  



                                                  same date of           



                                                  service/appointment)          

 

 

           (unknown)  (no       (unknown)  (unknown)  Time Spent  (units    (unk

nown)



                    date)                                   unknown)  

 

           (unknown)  (no       (unknown)  (unknown)  Tobacco + Substance  (unit

s    (unknown)



                    date)                         Use       unknown)  

 

           (unknown)  (no       (unknown)  (unknown)  Tobacco Status  (units    

(unknown)



                    date)                                   unknown)  

 

           (unknown)  (no       (unknown)  (unknown)  Total Time: 30  (units    

(unknown)



                    date)                                   unknown)  

 

           (unknown)  (no       (unknown)  (unknown)  Urethra: normal  (units   

 (unknown)



                    date)                         appearance of the unknown)  



                                                  urethra             

 

           (unknown)  (no       (unknown)  (unknown)  Uterine body is  (units   

 (unknown)



                    date)                         normal in size.? unknown)  



                                                  Echogenic fluid in           



                                                  the uterine cavity           



                                                  may                 

 

           (unknown)  (no       (unknown)  (unknown)  Visit Reasons: IH  (units 

   (unknown)



                    date)                         ER F/U, bleeding and unknown) 

 



                                                  postive pregnacy           



                                                  test                

 

           (unknown)  (no       (unknown)  (unknown)  Vitals    (units    (unkno

wn)



                    date)                                   unknown)  

 

           (unknown)  (no       (unknown)  (unknown)  Weight 162 lb  (units    (

unknown)



                    date)                                   unknown)  

 

           (unknown)  (no       (unknown)  (unknown)  alcohol intake:  (units   

 (unknown)



                    date)                         current   unknown)  

 

           (unknown)  (no       (unknown)  (unknown)  although a  (units    (unk

nown)



                    date)                                   unknown)  

 

           (unknown)  (no       (unknown)  (unknown)  and       (units    (unkno

wn)



                    date)                                   unknown)  

 

           (unknown)  (no       (unknown)  (unknown)  associated with  (units   

 (unknown)



                    date)                         scarring of the left unknown) 

 



                                                  fallopian tube which          

 



                                                  was noted at the           



                                                  time                

 

           (unknown)  (no       (unknown)  (unknown)  ay occur.  (units    (unkn

own)



                    date)                         Occasional unknown)  



                                                  wrong-word or           



                                                  'sound-alike'           



                                                  substitutions may           



                                                  have                

 

           (unknown)  (no       (unknown)  (unknown)  be a potential  (units    

(unknown)



                    date)                         option. Will discuss unknown) 

 



                                                  further with the           



                                                  patient and proceed           



                                                  as she              

 

           (unknown)  (no       (unknown)  (unknown)  blood products.?  (units  

  (unknown)



                    date)                         There is no definite unknown) 

 



                                                  evidence of           



                                                  gestational sac.?           



                                                  There is a           

 

           (unknown)  (no       (unknown)  (unknown)  but smaller 1.5 cm  (units

    (unknown)



                    date)                         hypoechoic area in unknown)  



                                                  the left ovary.?           

 

           (unknown)  (no       (unknown)  (unknown)  cannot be strictly  (units

    (unknown)



                    date)                         excluded for either unknown)  



                                                  these locations.           

 

           (unknown)  (no       (unknown)  (unknown)  clinical symptoms  (units 

   (unknown)



                    date)                         recommended. unknown)  

 

           (unknown)  (no       (unknown)  (unknown)  corpus luteum or  (units  

  (unknown)



                    date)                         hemorrhagic cyst unknown)  



                                                  could have a similar          

 



                                                  appearance.? There           



                                                  is a                

 

           (unknown)  (no       (unknown)  (unknown)  cul-de-sac  (units    (unk

nown)



                    date)                         nodularlity unknown)  

 

           (unknown)  (no       (unknown)  (unknown)  deems appropriate.  (units

    (unknown)



                    date)                                   unknown)  

 

           (unknown)  (no       (unknown)  (unknown)  discharge ([place];  (unit

s    (unknown)



                    date)                         confirmed by wet unknown)  



                                                  prep) and no lesions          

 

 

           (unknown)  (no       (unknown)  (unknown)  disorders of  (units    (u

nknown)



                    date)                         vagina, R10.2 - unknown)  



                                                  Pelvic and perineal           



                                                  pain, Z11.3 -           



                                                  Encounter for           

 

           (unknown)  (no       (unknown)  (unknown)  doxycycline Adverse  (unit

s    (unknown)



                    date)                         Reaction  unknown)  



                                                  (Intermediate,           



                                                  Verified 22           



                                                  14:51)              

 

           (unknown)  (no       (unknown)  (unknown)  fluconazole  (units    (un

known)



                    date)                         (Diflucan) unknown)  

 

           (unknown)  (no       (unknown)  (unknown)  have a bowel  (units    (u

nknown)



                    date)                         movement or empties unknown)  



                                                  her bladder. Patient          

 



                                                  has been seen at           



                                                  [facility]             

 

           (unknown)  (no       (unknown)  (unknown)  have occurred. If  (units 

   (unknown)



                    date)                         there are any unknown)  



                                                  questions, please           



                                                  contact the Medical           



                                                  Records             

 

           (unknown)  (no       (unknown)  (unknown)  household members:  (units

    (unknown)



                    date)                         friend(s) unknown)  

 

           (unknown)  (no       (unknown)  (unknown)  left, No cul-de-sac  (unit

s    (unknown)



                    date)                         fullness, cul-de-sac unknown) 

 



                                                  tenderness (Mild)           



                                                  and No cul-de-sac           

 

           (unknown)  (no       (unknown)  (unknown)  month history of  (units  

  (unknown)



                    date)                         persistent left unknown)  



                                                  lower quadrant pain           



                                                  which is sharp and           

 

           (unknown)  (no       (unknown)  (unknown)  ne bleeding is  (units    

(unknown)



                    date)                         uncertain but most unknown)  



                                                  likely her pain in           



                                                  the left lower           



                                                  quadrant is           

 

           (unknown)  (no       (unknown)  (unknown)  negative but this  (units 

   (unknown)



                    date)                         morning she had a unknown)  



                                                  positive home           



                                                  pregnancy test.           



                                                  Serum hCG           

 

           (unknown)  (no       (unknown)  (unknown)  no lesions and  (units    

(unknown)



                    date)                         nontender unknown)  

 

           (unknown)  (no       (unknown)  (unknown)  nodularity  (units    (unk

nown)



                    date)                                   unknown)  

 

           (unknown)  (no       (unknown)  (unknown)  occurred due to the  (unit

s    (unknown)



                    date)                         inherent limitations unknown) 

 



                                                  of voice recognition          

 



                                                  software. Please           

 

           (unknown)  (no       (unknown)  (unknown)  of prior  (units    (unkno

wn)



                    date)                         laparoscopy. A 2nd unknown)  



                                                  laparoscopy may be           



                                                  warranted for pain           



                                                  relief but           

 

           (unknown)  (no       (unknown)  (unknown)  peripheral  (units    (unk

nown)



                    date)                         vascularity.? This unknown)  



                                                  could potentially           



                                                  represent an ectopic          

 



                                                  pregnancy           

 

           (unknown)  (no       (unknown)  (unknown)  pregnancy  (units    (unkn

own)



                    date)                                   unknown)  

 

           (unknown)  (no       (unknown)  (unknown)  pregnant in the  (units   

 (unknown)



                    date)                         future without unknown)  



                                                  benefit of assisted           



                                                  reproductive           



                                                  technology.           

 

           (unknown)  (no       (unknown)  (unknown)  rash      (units    (unkno

wn)



                    date)                                   unknown)  

 

           (unknown)  (no       (unknown)  (unknown)  read the note  (units    (

unknown)



                    date)                         carefully and unknown)  



                                                  recognize, using           



                                                  context, where these          

 



                                                  substitutions           

 

           (unknown)  (no       (unknown)  (unknown)  regarding potential  (unit

s    (unknown)



                    date)                         next steps. The unknown)  



                                                  cause of the           



                                                  patient's           



                                                  dysfunctional uteri           

 

           (unknown)  (no       (unknown)  (unknown)  removal of the  (units    

(unknown)



                    date)                         fallopian tube would unknown) 

 



                                                  make it impossible           



                                                  for her to become           

 

           (unknown)  (no       (unknown)  (unknown)  represent  (units    (unkn

own)



                    date)                                   unknown)  

 

           (unknown)  (no       (unknown)  (unknown)  screening for  (units    (

unknown)



                    date)                         infections with a unknown)  



                                                  predominantly sexual          

 



                                                  mode of transmission          

 

 

           (unknown)  (no       (unknown)  (unknown)  sentences  (units    (unkn

own)



                    date)                                   unknown)  

 

           (unknown)  (no       (unknown)  (unknown)  she is been  (units    (un

known)



                    date)                         bleeding for the unknown)  



                                                  last 2 weeks and her          

 



                                                  pain has been worse           



                                                  during              

 

           (unknown)  (no       (unknown)  (unknown)  similar   (units    (unkno

wn)



                    date)                                   unknown)  

 

           (unknown)  (no       (unknown)  (unknown)  software. Although  (units

    (unknown)



                    date)                         every effort is made unknown) 

 



                                                  to edit content,           



                                                  transcription errors          

 



                                                  m                   

 

           (unknown)  (no       (unknown)  (unknown)  stabbing, and  (units    (

unknown)



                    date)                         intermittent. It unknown)  



                                                  does not seem to be           



                                                  related to her           



                                                  cycles but           

 

           (unknown)  (no       (unknown)  (unknown)  that period of  (units    

(unknown)



                    date)                         time. In addition unknown)  



                                                  she has significant           



                                                  pain when she           



                                                  strains to           

 

           (unknown)  (no       (unknown)  (unknown) thick-walled 2.7 cm  (units

    (unknown)



                    date)                         heterogeneous unknown)  



                                                  complex hypoechoic           



                                                  mass in the left           



                                                  ovary with           

 

           (unknown)  (no       (unknown)  (unknown)  today is also  (units    (

unknown)



                    date)                         negative and stat unknown)  



                                                  pelvic US shows:           

 

           (unknown)  (no       (unknown)  (unknown)  urethra and no  (units    

(unknown)



                    date)                         lesions   unknown)  

 

           (unknown)  (no       (unknown)  (unknown)  uterine shape  (units    (

unknown)



                    date)                         normal, No tender, unknown)  



                                                  no cervical motion           



                                                  tenderness and           



                                                  tender              

 

           (unknown)  (no       (unknown)  (unknown)  vomitt    (units    (unkno

wn)



                    date)                                   unknown)  

 

           (unknown)  (no       (unknown)  (unknown)  which was negative  (units

    (unknown)



                    date)                         but no records are unknown)  



                                                  available for           



                                                  review. Urine hCG           



                                                  today is            









                                         Result panel 359









           (unknown)  (no date)  (unknown)  (unknown)  Negative  (units    (unkn

own)



                                                            unknown)  

 

           (unknown)  (no date)  (unknown)  (unknown)  Negative  (units    (unkn

own)



                                                            unknown)  

 

           (unknown)  (no date)  (unknown)  (unknown)  Negative  (units    (unkn

own)



                                                            unknown)  









                                         Result panel 360









           (unknown)  (no       (unknown)  (unknown)  (no value)  (units    (unk

nown)



                    date)                                   unknown)  

 

           (unknown)  (no       (unknown)  (unknown)  [id number]  (units    (unkno

wn)



                    date)                                   unknown)  

 

           (unknown)  (no       (unknown)  (unknown)  23  (units    (unkno

wn)



                    date)                                   unknown)  

 

           (unknown)  (no       (unknown)  (unknown)  Acne (-2016)  (units    (u

nknown)



                    date)                                   unknown)  

 

           (unknown)  (no       (unknown)  (unknown)  Age/Sex: 17 / F  (units   

 (unknown)



                    date)                         Date of Service: unknown)  

 

           (unknown)  (no       (unknown)  (unknown)  Allergies  (units    (unkn

own)



                    date)                                   unknown)  

 

           (unknown)  (no       (unknown)  (unknown)  [place]  (units    (

unknown)



                    date)                         [id number]     unknown)  

 

           (unknown)  (no       (unknown)  (unknown)  Anesthesia  (units    (unk

nown)



                    date)                                   unknown)  

 

           (unknown)  (no       (unknown)  (unknown)  Attending Dr:  (units    (

unknown)



                    date)                         Jose Almodovar unknown)  



                                                  MD                  

 

           (unknown)  (no       (unknown)  (unknown)  Chlamydia  (units    (unkn

own)



                    date)                         infection () unknown)  

 

           (unknown)  (no       (unknown)  (unknown)  : 2005  (units   

 (unknown)



                    date)                         Acct:[id number] unknown)  

 

           (unknown)  (no       (unknown)  (unknown)  Dept at   (units    (unkno

wn)



                    date)                         [phone] unknown)  

 

           (unknown)  (no       (unknown)  (unknown)  Documented By:  (units    

(unknown)



                    date)                         Jose Almodovar MD 23 1457           

 

           (unknown)  (no       (unknown)  (unknown)  [employer]raft     (units    (unkno

wn)



                    date)                                   unknown)  

 

           (unknown)  (no       (unknown)  (unknown)  Endometriosis  (units    (

unknown)



                    date)                         ()   unknown)  

 

           (unknown)  (no       (unknown)  (unknown)  [facility]  (units   

 (unknown)



                    date)                         [facility] unknown)  

 

           (unknown)  (no       (unknown)  (unknown)  Gynecology Visit  (units  

  (unknown)



                    date)                                   unknown)  

 

           (unknown)  (no       (unknown)  (unknown)  Heavy menstrual  (units   

 (unknown)



                    date)                         period () unknown)  

 

           (unknown)  (no       (unknown)  (unknown)  Hx of     (units    (unkno

wn)



                    date)                         laparoscopy unknown)  



                                                  (22)           

 

           (unknown)  (no       (unknown)  (unknown)  Intake Note:  (units    (u

nknown)



                    date)                                   unknown)  

 

           (unknown)  (no       (unknown)  (unknown)  Intake    (units    (unkno

wn)



                    date)                                   unknown)  

 

           (unknown)  (no       (unknown)  (unknown)  Irregular  (units    (unkn

own)



                    date)                         menstrual cycle unknown)  



                                                  ()             

 

           (unknown)  (no       (unknown)  (unknown)  Last Menstural  (units    

(unknown)



                    date)                         Cycle + Details unknown)  

 

           (unknown)  (no       (unknown)  (unknown)  Loc: [facility]  (units    (unkno

wn)



                    date)                                   unknown)  

 

           (unknown)  (no       (unknown)  (unknown)  Lymphangioma  (units    (u

nknown)



                    date)                                   unknown)  

 

           (unknown)  (no       (unknown)  (unknown)  Medical History  (units   

 (unknown)



                    date)                         (Updated 23 unknown)  



                                                  @ 07:57 by Jose Almodovar MD)           

 

           (unknown)  (no       (unknown)  (unknown)  Opioids -  (units    (unkn

own)



                    date)                         Morphine  unknown)  



                                                  Analogues Allergy           



                                                  (Intermediate,           



                                                  Verified 22           



                                                  14:51)              

 

           (unknown)  (no       (unknown)  (unknown)  Other Menstrual  (units   

 (unknown)



                    date)                         Period: Other unknown)  

 

           (unknown)  (no       (unknown)  (unknown)  Ovarian cyst  (units    (u

nknown)



                    date)                                   unknown)  

 

           (unknown)  (no       (unknown)  (unknown)  PFSH      (units    (unkno

wn)



                    date)                                   unknown)  

 

           (unknown)  (no       (unknown)  (unknown)  Painful   (units    (unkno

wn)



                    date)                         menstrual periods unknown)  



                                                  ()             

 

           (unknown)  (no       (unknown)  (unknown)  Patient:  (units    (unkno

wn)



                    date)                         Lesvia Aguero E unknown)  



                                                  MR#: [id number]             

 

           (unknown)  (no       (unknown)  (unknown)  Pt is here for a  (units  

  (unknown)



                    date)                         pre-op    unknown)  

 

           (unknown)  (no       (unknown)  (unknown)  Reason For Visit  (units  

  (unknown)



                    date)                                   unknown)  

 

           (unknown)  (no       (unknown)  (unknown)  S/P ACL   (units    (unkno

wn)



                    date)                         reconstruction unknown)  



                                                  ()           

 

           (unknown)  (no       (unknown)  (unknown)  Signed By:  (units    (unk

nown)



                    date)                                   unknown)  

 

           (unknown)  (no       (unknown)  (unknown)  Smoking Status:  (units   

 (unknown)



                    date)                         Current some day unknown)  



                                                  smoker              

 

           (unknown)  (no       (unknown)  (unknown)  Social History  (units    

(unknown)



                    date)                                   unknown)  

 

           (unknown)  (no       (unknown)  (unknown)  Surgical History  (units  

  (unknown)



                    date)                         (Updated 23 unknown)  



                                                  @ 08:13 by Shayna Morales RN)           

 

           (unknown)  (no       (unknown)  (unknown)  TOA       (units    (unkno

wn)



                    date)                         (tubo-ovarian unknown)  



                                                  abscess)            



                                                  ()           

 

           (unknown)  (no       (unknown)  (unknown)  This note may  (units    (

unknown)



                    date)                         have been all or unknown)  



                                                  partially           



                                                  generated using           



                                                  voice recognition           

 

           (unknown)  (no       (unknown)  (unknown)  Tobacco +  (units    (unkn

own)



                    date)                         Substance Use unknown)  

 

           (unknown)  (no       (unknown)  (unknown)  Tobacco Status  (units    

(unknown)



                    date)                                   unknown)  

 

           (unknown)  (no       (unknown)  (unknown)  Visit Reasons:  (units    

(unknown)



                    date)                         Pre-op    unknown)  

 

           (unknown)  (no       (unknown)  (unknown)  alcohol intake:  (units   

 (unknown)



                    date)                         current   unknown)  

 

           (unknown)  (no       (unknown)  (unknown)  doxycycline  (units    (un

known)



                    date)                         Adverse Reaction unknown)  



                                                  (Intermediate,           



                                                  Verified 22           



                                                  14:51)              

 

           (unknown)  (no       (unknown)  (unknown)  have occurred.  (units    

(unknown)



                    date)                         If there are any unknown)  



                                                  questions, please           



                                                  contact the           



                                                  Medical Records           

 

           (unknown)  (no       (unknown)  (unknown)  household  (units    (unkn

own)



                    date)                         members:  unknown)  



                                                  friend(s)           

 

           (unknown)  (no       (unknown)  (unknown)  may occur.  (units    (unk

nown)



                    date)                         Occasional unknown)  



                                                  wrong-word or           



                                                  'sound-alike'           



                                                  substitutions may           



                                                  have                

 

           (unknown)  (no       (unknown)  (unknown)  occurred due to  (units   

 (unknown)



                    date)                         the inherent unknown)  



                                                  limitations of           



                                                  voice recognition           



                                                  software. Please           

 

           (unknown)  (no       (unknown)  (unknown)  rash      (units    (unkno

wn)



                    date)                                   unknown)  

 

           (unknown)  (no       (unknown)  (unknown)  read the note  (units    (

unknown)



                    date)                         carefully and unknown)  



                                                  recognize, using           



                                                  context, where           



                                                  these               



                                                  substitutions           

 

           (unknown)  (no       (unknown)  (unknown)  software.  (units    (unkn

own)



                    date)                         Although every unknown)  



                                                  effort is made to           



                                                  edit content,           



                                                  transcription           



                                                  errors              

 

           (unknown)  (no       (unknown)  (unknown)  vomitt    (units    (unkno

wn)



                    date)                                   unknown)  









                                         Result panel 361









           (unknown)  (no       (unknown)  (unknown)  (no value)  (units    (unk

nown)



                    date)                                   unknown)  

 

           (unknown)  (no       (unknown)  (unknown)  #10 tabs  (units    (unkno

wn)



                    date)                         22 [Rx unknown)  



                                                  Confirmed           



                                                  23]           

 

           (unknown)  (no       (unknown)  (unknown)  [id number]  (units    (unkno

wn)



                    date)                                   unknown)  

 

           (unknown)  (no       (unknown)  (unknown)  23  (units    (unkno

wn)



                    date)                                   unknown)  

 

           (unknown)  (no       (unknown)  (unknown)  23]  (units    (unkn

own)



                    date)                                   unknown)  

 

           (unknown)  (no       (unknown)  (unknown)  22 [Rx  (units    (u

nknown)



                    date)                         Confirmed unknown)  



                                                  23]           

 

           (unknown)  (no       (unknown)  (unknown)  15:00     (units    (unkno

wn)



                    date)                                   unknown)  

 

           (unknown)  (no       (unknown)  (unknown)  Acne (-2016)  (units    (u

nknown)



                    date)                                   unknown)  

 

           (unknown)  (no       (unknown)  (unknown)  Age/Sex: 17 / F  (units   

 (unknown)



                    date)                         Date of Service: unknown)  

 

           (unknown)  (no       (unknown)  (unknown)  Allergies  (units    (unkn

own)



                    date)                                   unknown)  

 

           (unknown)  (no       (unknown)  (unknown)  [place]  (units    (

unknown)



                    date)                         [id number]     unknown)  

 

           (unknown)  (no       (unknown)  (unknown)  Anesthesia  (units    (unk

nown)



                    date)                                   unknown)  

 

           (unknown)  (no       (unknown)  (unknown)  Attending Dr:  (units    (

unknown)



                    date)                         Jose Almodovar unknown)  



                                                  MD                  

 

           (unknown)  (no       (unknown)  (unknown)  BMI 28.7  (units    (unkno

wn)



                    date)                                   unknown)  

 

           (unknown)  (no       (unknown)  (unknown)  /66  (units    (unkn

own)



                    date)                                   unknown)  

 

           (unknown)  (no       (unknown)  (unknown)  Blood Pressure  (units    

(unknown)



                    date)                         Location Rt unknown)  



                                                  brachial            

 

           (unknown)  (no       (unknown)  (unknown)  Chlamydia  (units    (unkn

own)



                    date)                         infection () unknown)  

 

           (unknown)  (no       (unknown)  (unknown)  Confirmed  (units    (unkn

own)



                    date)                         23] unknown)  

 

           (unknown)  (no       (unknown)  (unknown)  : 2005  (units   

 (unknown)



                    date)                         Acct:[id number] unknown)  

 

           (unknown)  (no       (unknown)  (unknown)  Dept at   (units    (unkno

wn)



                    date)                         [phone]. unknown)  

 

           (unknown)  (no       (unknown)  (unknown)  Documented By:  (units    

(unknown)



                    date)                         Jose Almodovar unknown)  



                                                  MD 23 1457           

 

           (unknown)  (no       (unknown)  (unknown)  [employer]     (units    (unkno

wn)



                    date)                                   unknown)  

 

           (unknown)  (no       (unknown)  (unknown)  Endometriosis  (units    (

unknown)



                    date)                         ()   unknown)  

 

           (unknown)  (no       (unknown)  (unknown)  [facility]  (units   

 (unknown)



                    date)                         [facility] unknown)  

 

           (unknown)  (no       (unknown)  (unknown)  Gynecology Visit  (units  

  (unknown)



                    date)                                   unknown)  

 

           (unknown)  (no       (unknown)  (unknown)  Heavy menstrual  (units   

 (unknown)



                    date)                         period (-2019) unknown)  

 

           (unknown)  (no       (unknown)  (unknown)  Height 5 ft 3 in  (units  

  (unknown)



                    date)                                   unknown)  

 

           (unknown)  (no       (unknown)  (unknown)  Hx of     (units    (unkno

wn)



                    date)                         laparoscopy unknown)  



                                                  (22)           

 

           (unknown)  (no       (unknown)  (unknown)  Intake Note:  (units    (u

nknown)



                    date)                                   unknown)  

 

           (unknown)  (no       (unknown)  (unknown)  Intake    (units    (unkno

wn)



                    date)                                   unknown)  

 

           (unknown)  (no       (unknown)  (unknown)  Irregular  (units    (unkn

own)



                    date)                         menstrual cycle unknown)  



                                                  ()             

 

           (unknown)  (no       (unknown)  (unknown)  Last Menstural  (units    

(unknown)



                    date)                         Cycle + Details unknown)  

 

           (unknown)  (no       (unknown)  (unknown)  Loc: [facility]  (units    (unkno

wn)



                    date)                                   unknown)  

 

           (unknown)  (no       (unknown)  (unknown)  Lymphangioma  (units    (u

nknown)



                    date)                                   unknown)  

 

           (unknown)  (no       (unknown)  (unknown)  Medical History  (units   

 (unknown)



                    date)                         (Updated 23 unknown)  



                                                  @ 07:57 by Jose Almodovar MD)           

 

           (unknown)  (no       (unknown)  (unknown)  Medications  (units    (un

known)



                    date)                                   unknown)  

 

           (unknown)  (no       (unknown)  (unknown)  Opioids -  (units    (unkn

own)



                    date)                         Morphine  unknown)  



                                                  Analogues Allergy           



                                                  (Intermediate,           



                                                  Verified 23           



                                                  14:59)              

 

           (unknown)  (no       (unknown)  (unknown)  Other Menstrual  (units   

 (unknown)



                    date)                         Period: Other unknown)  

 

           (unknown)  (no       (unknown)  (unknown)  Ovarian cyst  (units    (u

nknown)



                    date)                                   unknown)  

 

           (unknown)  (no       (unknown)  (unknown)  Oxygen Delivery  (units   

 (unknown)



                    date)                         Method room air unknown)  

 

           (unknown)  (no       (unknown)  (unknown)  PFSH      (units    (unkno

wn)



                    date)                                   unknown)  

 

           (unknown)  (no       (unknown)  (unknown)  Painful   (units    (unkno

wn)



                    date)                         menstrual periods unknown)  



                                                  ()             

 

           (unknown)  (no       (unknown)  (unknown)  Patient:  (units    (unkno

wn)



                    date)                         Lesvia Aguero E unknown)  



                                                  MR#: [id number]             

 

           (unknown)  (no       (unknown)  (unknown)  Position Sitting  (units  

  (unknown)



                    date)                                   unknown)  

 

           (unknown)  (no       (unknown)  (unknown)  Pt is here for a  (units  

  (unknown)



                    date)                         pre-op    unknown)  

 

           (unknown)  (no       (unknown)  (unknown)  Pulse 60  (units    (unkno

wn)



                    date)                                   unknown)  

 

           (unknown)  (no       (unknown)  (unknown)  Pulse Oximetry  (units    

(unknown)



                    date)                         (%) 99    unknown)  

 

           (unknown)  (no       (unknown)  (unknown)  Pulse Source  (units    (u

nknown)



                    date)                         Monitor   unknown)  

 

           (unknown)  (no       (unknown)  (unknown)  Reason For Visit  (units  

  (unknown)



                    date)                                   unknown)  

 

           (unknown)  (no       (unknown)  (unknown)  S/P ACL   (units    (unkno

wn)



                    date)                         reconstruction unknown)  



                                                  ()           

 

           (unknown)  (no       (unknown)  (unknown)  Signed By:  (units    (unk

nown)



                    date)                                   unknown)  

 

           (unknown)  (no       (unknown)  (unknown)  Smoking Status:  (units   

 (unknown)



                    date)                         Current some day unknown)  



                                                  smoker              

 

           (unknown)  (no       (unknown)  (unknown)  Social History  (units    

(unknown)



                    date)                                   unknown)  

 

           (unknown)  (no       (unknown)  (unknown)  Surgical History  (units  

  (unknown)



                    date)                         (Updated 23 unknown)  



                                                  @ 08:13 by Shayna Morales RN)           

 

           (unknown)  (no       (unknown)  (unknown)  TOA       (units    (unkno

wn)



                    date)                         (tubo-ovarian unknown)  



                                                  abscess)            



                                                  ()           

 

           (unknown)  (no       (unknown)  (unknown)  Temp 98.5 F  (units    (un

known)



                    date)                                   unknown)  

 

           (unknown)  (no       (unknown)  (unknown)  Temp Source  (units    (un

known)



                    date)                         Temporal Artery unknown)  



                                                  Scan                

 

           (unknown)  (no       (unknown)  (unknown)  This note may  (units    (

unknown)



                    date)                         have been all or unknown)  



                                                  partially           



                                                  generated using           



                                                  voice recognition           

 

           (unknown)  (no       (unknown)  (unknown)  Tobacco +  (units    (unkn

own)



                    date)                         Substance Use unknown)  

 

           (unknown)  (no       (unknown)  (unknown)  Tobacco Status  (units    

(unknown)



                    date)                                   unknown)  

 

           (unknown)  (no       (unknown)  (unknown)  Visit Reasons:  (units    

(unknown)



                    date)                         Pre-op    unknown)  

 

           (unknown)  (no       (unknown)  (unknown)  Vitals    (units    (unkno

wn)



                    date)                                   unknown)  

 

           (unknown)  (no       (unknown)  (unknown)  Vomiting  (units    (unkno

wn)



                    date)                                   unknown)  

 

           (unknown)  (no       (unknown)  (unknown)  Weight 162 lb  (units    (

unknown)



                    date)                                   unknown)  

 

           (unknown)  (no       (unknown)  (unknown)  [Rx Confirmed  (units    (

unknown)



                    date)                         23] unknown)  

 

           (unknown)  (no       (unknown)  (unknown)  alcohol intake:  (units   

 (unknown)



                    date)                         current   unknown)  

 

           (unknown)  (no       (unknown)  (unknown)  amoxicillin  (units    (un

known)



                    date)                         Adverse Reaction unknown)  



                                                  (Verified           



                                                  23 15:00)           

 

           (unknown)  (no       (unknown)  (unknown)  clindamycin HCl  (units   

 (unknown)



                    date)                         150 mg capsule unknown)  



                                                  150 mg PO DAILY           



                                                  #30 caps 22           



                                                  [Rx Confirmed           

 

           (unknown)  (no       (unknown)  (unknown)  doxycycline  (units    (un

known)



                    date)                         Adverse Reaction unknown)  



                                                  (Intermediate,           



                                                  Verified 23           



                                                  14:59)              

 

           (unknown)  (no       (unknown)  (unknown) fluconazole 150  (units    

(unknown)



                    date)                         mg tablet unknown)  



                                                  (Diflucan) 150 mg           



                                                  PO Q48H 2 doses           



                                                  #2 tabs 02/15/23           



                                                  [Rx                 

 

           (unknown)  (no       (unknown)  (unknown)  have occurred.  (units    

(unknown)



                    date)                         If there are any unknown)  



                                                  questions, please           



                                                  contact the           



                                                  Medical Records           

 

           (unknown)  (no       (unknown)  (unknown)  household  (units    (unkn

own)



                    date)                         members:  unknown)  



                                                  friend(s)           

 

           (unknown)  (no       (unknown)  (unknown)  hydrocodone 5  (units    (

unknown)



                    date)                         mg-acetaminophen unknown)  



                                                  325 mg tablet 1           



                                                  tab PO Q4-6H PRN           



                                                  pain #10 tabs           

 

           (unknown)  (no       (unknown)  (unknown)  hydromorphone 4  (units   

 (unknown)



                    date)                         mg tablet unknown)  



                                                  (Dilaudid) 4 mg           



                                                  PO Q4-6H PRN pain           



                                                  #20 tabs 22           

 

           (unknown)  (no       (unknown)  (unknown)  ketorolac 10 mg  (units   

 (unknown)



                    date)                         tablet 10 mg PO unknown)  



                                                  Q6H PRN pain #14           



                                                  tabs 22 [Rx           



                                                  Confirmed           

 

           (unknown)  (no       (unknown)  (unknown)  may occur.  (units    (unk

nown)



                    date)                         Occasional unknown)  



                                                  wrong-word or           



                                                  'sound-alike'           



                                                  substitutions may           



                                                  have                

 

           (unknown)  (no       (unknown)  (unknown)  occurred due to  (units   

 (unknown)



                    date)                         the inherent unknown)  



                                                  limitations of           



                                                  voice recognition           



                                                  software. Please           

 

           (unknown)  (no       (unknown)  (unknown)  ondansetron 4 mg  (units  

  (unknown)



                    date)                         disintegrating unknown)  



                                                  tablet 4 mg PO           



                                                  TID-QID PRN           



                                                  nausea and           



                                                  vomiting            

 

           (unknown)  (no       (unknown)  (unknown)  oxycodone 5 mg  (units    

(unknown)



                    date)                         tablet 5 mg PO unknown)  



                                                  Q4H PRN pain #20           



                                                  tabs 22 [Rx           



                                                  Confirmed           

 

           (unknown)  (no       (unknown)  (unknown)  rash      (units    (unkno

wn)



                    date)                                   unknown)  

 

           (unknown)  (no       (unknown)  (unknown)  read the note  (units    (

unknown)



                    date)                         carefully and unknown)  



                                                  recognize, using           



                                                  context, where           



                                                  these               



                                                  substitutions           

 

           (unknown)  (no       (unknown)  (unknown)  software.  (units    (unkn

own)



                    date)                         Although every unknown)  



                                                  effort is made to           



                                                  edit content,           



                                                  transcription           



                                                  errors              

 

           (unknown)  (no       (unknown)  (unknown)  vomitt    (units    (unkno

wn)



                    date)                                   unknown)  









                                         Result panel 362









           (unknown)  (no       (unknown)  (unknown)  (no value)  (units    (unk

nown)



                    date)                                   unknown)  

 

           (unknown)  (no       (unknown)  (unknown)  #10 tabs  (units    (unkno

wn)



                    date)                         22 [Rx unknown)  



                                                  Confirmed           



                                                  23]           

 

           (unknown)  (no       (unknown)  (unknown)  [id number]  (units    (unkno

wn)



                    date)                                   unknown)  

 

           (unknown)  (no       (unknown)  (unknown)  23  (units    (unkno

wn)



                    date)                                   unknown)  

 

           (unknown)  (no       (unknown)  (unknown)  23]  (units    (unkn

own)



                    date)                                   unknown)  

 

           (unknown)  (no       (unknown)  (unknown)  22 [Rx  (units    (u

nknown)



                    date)                         Confirmed unknown)  



                                                  23]           

 

           (unknown)  (no       (unknown)  (unknown)  15:00     (units    (unkno

wn)



                    date)                                   unknown)  

 

           (unknown)  (no       (unknown)  (unknown)  ?         (units    (unkno

wn)



                    date)                                   unknown)  

 

           (unknown)  (no       (unknown)  (unknown)  ??        (units    (unkno

wn)



                    date)                                   unknown)  

 

           (unknown)  (no       (unknown)  (unknown)  Acne (-2016)  (units    (u

nknown)



                    date)                                   unknown)  

 

           (unknown)  (no       (unknown)  (unknown)  Age/Sex: 17 / F  (units   

 (unknown)



                    date)                         Date of Service: unknown)  

 

           (unknown)  (no       (unknown)  (unknown)  Allergies  (units    (unkn

own)



                    date)                                   unknown)  

 

           (unknown)  (no       (unknown)  (unknown)  [place]  (units    (

unknown)



                    date)                         [id number]     unknown)  

 

           (unknown)  (no       (unknown)  (unknown)  Anesthesia  (units    (unk

nown)



                    date)                                   unknown)  

 

           (unknown)  (no       (unknown)  (unknown)  Attending Dr:  (units    (

unknown)



                    date)                         Jose Almodovar unknown)  



                                                  MD                  

 

           (unknown)  (no       (unknown)  (unknown)  BMI 28.7  (units    (unkno

wn)



                    date)                                   unknown)  

 

           (unknown)  (no       (unknown)  (unknown)  /66  (units    (unkn

own)



                    date)                                   unknown)  

 

           (unknown)  (no       (unknown)  (unknown)  Bilateral  (units    (unkn

own)



                    date)                         ovarian   unknown)  



                                                  hypoechoic masses           



                                                  with peripheral           



                                                  vascularity.?           



                                                  Ectopic             

 

           (unknown)  (no       (unknown)  (unknown)  Blood Pressure  (units    

(unknown)



                    date)                         Location Rt unknown)  



                                                  brachial            

 

           (unknown)  (no       (unknown)  (unknown)  Chief Complaint  (units   

 (unknown)



                    date)                                   unknown)  

 

           (unknown)  (no       (unknown)  (unknown)  Chief Complaint:  (units  

  (unknown)



                    date)                         Preop visit unknown)  

 

           (unknown)  (no       (unknown)  (unknown)  Chlamydia  (units    (unkn

own)



                    date)                         infection () unknown)  

 

           (unknown)  (no       (unknown)  (unknown)  Confirmed  (units    (unkn

own)



                    date)                         23] unknown)  

 

           (unknown)  (no       (unknown)  (unknown)  Continued  (units    (unkn

own)



                    date)                         surveillance and unknown)  



                                                  correlation with           



                                                  serial serum           



                                                  beta-hCG            



                                                  measurements           

 

           (unknown)  (no       (unknown)  (unknown)  : 2005  (units   

 (unknown)



                    date)                         Acct:[id number] unknown)  

 

           (unknown)  (no       (unknown)  (unknown)  Dept at   (units    (unkno

wn)



                    date)                         [phone]. unknown)  

 

           (unknown)  (no       (unknown)  (unknown)  Details:  (units    (unkno

wn)



                    date)                                   unknown)  

 

           (unknown)  (no       (unknown)  (unknown)  Documented By:  (units    

(unknown)



                    date)                         Jose Almodovar unknown)  



                                                  MD 23 1457           

 

           (unknown)  (no       (unknown)  (unknown)  [employer]     (units    (unkno

wn)



                    date)                                   unknown)  

 

           (unknown)  (no       (unknown)  (unknown)  Endometriosis  (units    (

unknown)



                    date)                         ()   unknown)  

 

           (unknown)  (no       (unknown)  (unknown)  FINDINGS:?  (units    (unk

nown)



                    date)                                   unknown)  

 

           (unknown)  (no       (unknown)  (unknown)  [facility]  (units   

 (unknown)



                    date)                         [facility] unknown)  

 

           (unknown)  (no       (unknown)  (unknown)  Lesvia is a  (units    (unk

nown)



                    date)                         17-year-old unknown)  



                                                  nulligravida who           



                                                  returns today for           



                                                  evaluation with a           



                                                  3?                  

 

           (unknown)  (no       (unknown)  (unknown)  Gynecology Visit  (units  

  (unknown)



                    date)                                   unknown)  

 

           (unknown)  (no       (unknown)  (unknown)  HPI       (units    (unkno

wn)



                    date)                                   unknown)  

 

           (unknown)  (no       (unknown)  (unknown)  Heavy menstrual  (units   

 (unknown)



                    date)                         period () unknown)  

 

           (unknown)  (no       (unknown)  (unknown)  Height 5 ft 3 in  (units  

  (unknown)



                    date)                                   unknown)  

 

           (unknown)  (no       (unknown)  (unknown)  Hx of     (units    (unkno

wn)



                    date)                         laparoscopy unknown)  



                                                  (22)           

 

           (unknown)  (no       (unknown)  (unknown)  IMPRESSION:?  (units    (u

nknown)



                    date)                                   unknown)  

 

           (unknown)  (no       (unknown)  (unknown)  Intake Note:  (units    (u

nknown)



                    date)                                   unknown)  

 

           (unknown)  (no       (unknown)  (unknown)  Intake    (units    (unkno

wn)



                    date)                                   unknown)  

 

           (unknown)  (no       (unknown)  (unknown)  Irregular  (units    (unkn

own)



                    date)                         menstrual cycle unknown)  



                                                  ()             

 

           (unknown)  (no       (unknown)  (unknown)  Last Menstural  (units    

(unknown)



                    date)                         Cycle + Details unknown)  

 

           (unknown)  (no       (unknown)  (unknown)  Loc: [facility]  (units    (unkno

wn)



                    date)                                   unknown)  

 

           (unknown)  (no       (unknown)  (unknown)  Lymphangioma  (units    (u

nknown)



                    date)                                   unknown)  

 

           (unknown)  (no       (unknown)  (unknown)  Medical History  (units   

 (unknown)



                    date)                         (Updated 23 unknown)  



                                                  @ 07:57 by Jose Almodovar MD)           

 

           (unknown)  (no       (unknown)  (unknown)  Medications  (units    (un

known)



                    date)                                   unknown)  

 

           (unknown)  (no       (unknown)  (unknown)  No convincing  (units    (

unknown)



                    date)                         evidence of unknown)  



                                                  intrauterine           



                                                  pregnancy.           

 

           (unknown)  (no       (unknown)  (unknown)  Opioids -  (units    (unkn

own)



                    date)                         Morphine  unknown)  



                                                  Analogues Allergy           



                                                  (Intermediate,           



                                                  Verified 23           



                                                  14:59)              

 

           (unknown)  (no       (unknown)  (unknown)  Other Menstrual  (units   

 (unknown)



                    date)                         Period: Other unknown)  

 

           (unknown)  (no       (unknown)  (unknown)  Ovarian cyst  (units    (u

nknown)



                    date)                                   unknown)  

 

           (unknown)  (no       (unknown)  (unknown)  Oxygen Delivery  (units   

 (unknown)



                    date)                         Method room air unknown)  

 

           (unknown)  (no       (unknown)  (unknown)  PFSH      (units    (unkno

wn)



                    date)                                   unknown)  

 

           (unknown)  (no       (unknown)  (unknown)  Painful   (units    (unkno

wn)



                    date)                         menstrual periods unknown)  



                                                  ()             

 

           (unknown)  (no       (unknown)  (unknown)  Patient:  (units    (unkno

wn)



                    date)                         Lesvia Aguero unknown)  



                                                  MR#: [id number]             

 

           (unknown)  (no       (unknown)  (unknown)  Position Sitting  (units  

  (unknown)



                    date)                                   unknown)  

 

           (unknown)  (no       (unknown)  (unknown)  Pt is here for a  (units  

  (unknown)



                    date)                         pre-op    unknown)  

 

           (unknown)  (no       (unknown)  (unknown)  Pulse 60  (units    (unkno

wn)



                    date)                                   unknown)  

 

           (unknown)  (no       (unknown)  (unknown)  Pulse Oximetry  (units    

(unknown)



                    date)                         (%) 99    unknown)  

 

           (unknown)  (no       (unknown)  (unknown)  Pulse Source  (units    (u

nknown)



                    date)                         Monitor   unknown)  

 

           (unknown)  (no       (unknown)  (unknown)  Reason For Visit  (units  

  (unknown)



                    date)                                   unknown)  

 

           (unknown)  (no       (unknown)  (unknown)  S/P ACL   (units    (unkno

wn)



                    date)                         reconstruction unknown)  



                                                  (-21)           

 

           (unknown)  (no       (unknown)  (unknown)  Signed By:  (units    (unk

nown)



                    date)                                   unknown)  

 

           (unknown)  (no       (unknown)  (unknown)  Smoking Status:  (units   

 (unknown)



                    date)                         Current some day unknown)  



                                                  smoker              

 

           (unknown)  (no       (unknown)  (unknown)  Social History  (units    

(unknown)



                    date)                                   unknown)  

 

           (unknown)  (no       (unknown)  (unknown)  Surgical History  (units  

  (unknown)



                    date)                         (Updated 23 unknown)  



                                                  @ 08:13 by Shayna Morales RN)           

 

           (unknown)  (no       (unknown)  (unknown)  TOA       (units    (unkno

wn)



                    date)                         (tubo-ovarian unknown)  



                                                  abscess)            



                                                  (-21)           

 

           (unknown)  (no       (unknown)  (unknown)  Temp 98.5 F  (units    (un

known)



                    date)                                   unknown)  

 

           (unknown)  (no       (unknown)  (unknown)  Temp Source  (units    (un

known)



                    date)                         Temporal Artery unknown)  



                                                  Scan                

 

           (unknown)  (no       (unknown)  (unknown)  This note may  (units    (

unknown)



                    date)                         have been all or unknown)  



                                                  partially           



                                                  generated using           



                                                  voice recognition           

 

           (unknown)  (no       (unknown)  (unknown)  Tobacco +  (units    (unkn

own)



                    date)                         Substance Use unknown)  

 

           (unknown)  (no       (unknown)  (unknown)  Tobacco Status  (units    

(unknown)



                    date)                                   unknown)  

 

           (unknown)  (no       (unknown)  (unknown)  Uterine body is  (units   

 (unknown)



                    date)                         normal in size.? unknown)  



                                                  Echogenic fluid           



                                                  in the uterine           



                                                  cavity may           

 

           (unknown)  (no       (unknown)  (unknown)  Visit Reasons:  (units    

(unknown)



                    date)                         Pre-op    unknown)  

 

           (unknown)  (no       (unknown)  (unknown)  Vitals    (units    (unkno

wn)



                    date)                                   unknown)  

 

           (unknown)  (no       (unknown)  (unknown)  Vomiting  (units    (unkno

wn)



                    date)                                   unknown)  

 

           (unknown)  (no       (unknown)  (unknown)  Weight 162 lb  (units    (

unknown)



                    date)                                   unknown)  

 

           (unknown)  (no       (unknown)  (unknown)  [Rx Confirmed  (units    (

unknown)



                    date)                         23] unknown)  

 

           (unknown)  (no       (unknown)  (unknown)  alcohol intake:  (units   

 (unknown)



                    date)                         current   unknown)  

 

           (unknown)  (no       (unknown)  (unknown)  although a  (units    (unk

nown)



                    date)                                   unknown)  

 

           (unknown)  (no       (unknown)  (unknown)  amoxicillin  (units    (un

known)



                    date)                         Adverse Reaction unknown)  



                                                  (Verified           



                                                  23 15:00)           

 

           (unknown)  (no       (unknown)  (unknown)  and       (units    (unkno

wn)



                    date)                                   unknown)  

 

           (unknown)  (no       (unknown)  (unknown)  blood products.?  (units  

  (unknown)



                    date)                         There is no unknown)  



                                                  definite evidence           



                                                  of gestational           



                                                  sac.? There is a           

 

           (unknown)  (no       (unknown)  (unknown)  but smaller 1.5  (units   

 (unknown)



                    date)                         cm hypoechoic unknown)  



                                                  area in the left           



                                                  ovary.?             

 

           (unknown)  (no       (unknown)  (unknown)  cannot be  (units    (unkn

own)



                    date)                         strictly excluded unknown)  



                                                  for either these           



                                                  locations.           

 

           (unknown)  (no       (unknown)  (unknown)  clindamycin HCl  (units   

 (unknown)



                    date)                         150 mg capsule unknown)  



                                                  150 mg PO DAILY           



                                                  #30 caps 22           



                                                  [Rx Confirmed           

 

           (unknown)  (no       (unknown)  (unknown)  clinical  (units    (unkno

wn)



                    date)                         symptoms  unknown)  



                                                  recommended.           

 

           (unknown)  (no       (unknown)  (unknown)  corpus luteum or  (units  

  (unknown)



                    date)                         hemorrhagic cyst unknown)  



                                                  could have a           



                                                  similar             



                                                  appearance.?           



                                                  There is a           

 

           (unknown)  (no       (unknown)  (unknown)  doxycycline  (units    (un

known)



                    date)                         Adverse Reaction unknown)  



                                                  (Intermediate,           



                                                  Verified 23           



                                                  14:59)              

 

           (unknown)  (no       (unknown)  (unknown) fluconazole 150  (units    

(unknown)



                    date)                         mg tablet unknown)  



                                                  (Diflucan) 150 mg           



                                                  PO Q48H 2 doses           



                                                  #2 tabs 02/15/23           



                                                  [Rx                 

 

           (unknown)  (no       (unknown)  (unknown)  have a bowel  (units    (u

nknown)



                    date)                         movement or unknown)  



                                                  empties her           



                                                  bladder.? Pelvic           



                                                  US performed           



                                                  2/15/2023           

 

           (unknown)  (no       (unknown)  (unknown)  have occurred.  (units    

(unknown)



                    date)                         If there are any unknown)  



                                                  questions, please           



                                                  contact the           



                                                  Medical Records           

 

           (unknown)  (no       (unknown)  (unknown)  household  (units    (unkn

own)



                    date)                         members:  unknown)  



                                                  friend(s)           

 

           (unknown)  (no       (unknown)  (unknown)  hydrocodone 5  (units    (

unknown)



                    date)                         mg-acetaminophen unknown)  



                                                  325 mg tablet 1           



                                                  tab PO Q4-6H PRN           



                                                  pain #10 tabs           

 

           (unknown)  (no       (unknown)  (unknown)  hydromorphone 4  (units   

 (unknown)



                    date)                         mg tablet unknown)  



                                                  (Dilaudid) 4 mg           



                                                  PO Q4-6H PRN pain           



                                                  #20 tabs 22           

 

           (unknown)  (no       (unknown)  (unknown)  ketorolac 10 mg  (units   

 (unknown)



                    date)                         tablet 10 mg PO unknown)  



                                                  Q6H PRN pain #14           



                                                  tabs 22 [Rx           



                                                  Confirmed           

 

           (unknown)  (no       (unknown)  (unknown)  may occur.  (units    (unk

nown)



                    date)                         Occasional unknown)  



                                                  wrong-word or           



                                                  'sound-alike'           



                                                  substitutions may           



                                                  have                

 

           (unknown)  (no       (unknown)  (unknown)  month history of  (units  

  (unknown)



                    date)                         persistent left unknown)  



                                                  lower quadrant           



                                                  pain which is           



                                                  sharp and           

 

           (unknown)  (no       (unknown)  (unknown)  occurred due to  (units   

 (unknown)



                    date)                         the inherent unknown)  



                                                  limitations of           



                                                  voice recognition           



                                                  software. Please           

 

           (unknown)  (no       (unknown)  (unknown)  ondansetron 4 mg  (units  

  (unknown)



                    date)                         disintegrating unknown)  



                                                  tablet 4 mg PO           



                                                  TID-QID PRN           



                                                  nausea and           



                                                  vomiting            

 

           (unknown)  (no       (unknown)  (unknown)  oxycodone 5 mg  (units    

(unknown)



                    date)                         tablet 5 mg PO unknown)  



                                                  Q4H PRN pain #20           



                                                  tabs 22 [Rx           



                                                  Confirmed           

 

           (unknown)  (no       (unknown)  (unknown)  peripheral  (units    (unk

nown)



                    date)                         vascularity.? unknown)  



                                                  This could           



                                                  potentially           



                                                  represent an           



                                                  ectopic pregnancy           

 

           (unknown)  (no       (unknown)  (unknown)  pregnancy  (units    (unkn

own)



                    date)                                   unknown)  

 

           (unknown)  (no       (unknown)  (unknown)  rash      (units    (unkno

wn)



                    date)                                   unknown)  

 

           (unknown)  (no       (unknown)  (unknown)  read the note  (units    (

unknown)



                    date)                         carefully and unknown)  



                                                  recognize, using           



                                                  context, where           



                                                  these               



                                                  substitutions           

 

           (unknown)  (no       (unknown)  (unknown)  represent  (units    (unkn

own)



                    date)                                   unknown)  

 

           (unknown)  (no       (unknown)  (unknown)  she is been  (units    (un

known)



                    date)                         bleeding for the unknown)  



                                                  last 2 weeks and           



                                                  her pain has been           



                                                  worse during           

 

           (unknown)  (no       (unknown)  (unknown)  shows:    (units    (unkno

wn)



                    date)                                   unknown)  

 

           (unknown)  (no       (unknown)  (unknown)  similar   (units    (unkno

wn)



                    date)                                   unknown)  

 

           (unknown)  (no       (unknown)  (unknown)  software.  (units    (unkn

own)



                    date)                         Although every unknown)  



                                                  effort is made to           



                                                  edit content,           



                                                  transcription           



                                                  errors              

 

           (unknown)  (no       (unknown)  (unknown)  stabbing, and  (units    (

unknown)



                    date)                         intermittent.? It unknown)  



                                                  does not seem to           



                                                  be related to her           



                                                  cycles but           

 

           (unknown)  (no       (unknown)  (unknown)  that period of  (units    

(unknown)



                    date)                         time.? In unknown)  



                                                  addition she has           



                                                  significant pain           



                                                  when she strains           



                                                  to                  

 

           (unknown)  (no       (unknown)  (unknown) thick-walled 2.7  (units   

 (unknown)



                    date)                         cm heterogeneous unknown)  



                                                  complex             



                                                  hypoechoic mass           



                                                  in the left ovary           



                                                  with                

 

           (unknown)  (no       (unknown)  (unknown)  vomitt    (units    (unkno

wn)



                    date)                                   unknown)  









                                         Result panel 363









           (unknown)  (no       (unknown)  (unknown)  (no value)  (units    (unk

nown)



                    date)                                   unknown)  

 

           (unknown)  (no       (unknown)  (unknown)  #10 tabs  (units    (unkno

wn)



                    date)                         22 [Rx unknown)  



                                                  Confirmed           



                                                  23]           

 

           (unknown)  (no       (unknown)  (unknown)  (Diffuse, mild)  (units   

 (unknown)



                    date)                                   unknown)  

 

           (unknown)  (no       (unknown)  (unknown)  [id number]  (units    (unkno

wn)



                    date)                                   unknown)  

 

           (unknown)  (no       (unknown)  (unknown)  23  (units    (unkno

wn)



                    date)                                   unknown)  

 

           (unknown)  (no       (unknown)  (unknown)  23]  (units    (unkn

own)



                    date)                                   unknown)  

 

           (unknown)  (no       (unknown)  (unknown)  22 [Rx  (units    (u

nknown)



                    date)                         Confirmed unknown)  



                                                  23]           

 

           (unknown)  (no       (unknown)  (unknown)  15:00     (units    (unkno

wn)



                    date)                                   unknown)  

 

           (unknown)  (no       (unknown)  (unknown)  ?         (units    (unkno

wn)



                    date)                                   unknown)  

 

           (unknown)  (no       (unknown)  (unknown)  ??        (units    (unkno

wn)



                    date)                                   unknown)  

 

           (unknown)  (no       (unknown)  (unknown)  Acne (-2016)  (units    (u

nknown)



                    date)                                   unknown)  

 

           (unknown)  (no       (unknown)  (unknown)  Affect: normal  (units    

(unknown)



                    date)                         affect    unknown)  

 

           (unknown)  (no       (unknown)  (unknown)  Age/Sex: 17 / F  (units   

 (unknown)



                    date)                         Date of Service: unknown)  

 

           (unknown)  (no       (unknown)  (unknown)  Allergies  (units    (unkn

own)



                    date)                                   unknown)  

 

           (unknown)  (no       (unknown)  (unknown)  [place]  (units    (

unknown)



                    date)                         [id number]     unknown)  

 

           (unknown)  (no       (unknown)  (unknown)  Anesthesia  (units    (unk

nown)



                    date)                                   unknown)  

 

           (unknown)  (no       (unknown)  (unknown)  Appearance:  (units    (un

known)



                    date)                         grossly normal unknown)  

 

           (unknown)  (no       (unknown)  (unknown)  Attending Dr:  (units    (

unknown)



                    date)                         Jose Almodovar unknown)  



                                                  MD                  

 

           (unknown)  (no       (unknown)  (unknown)  Attitude:  (units    (unkn

own)



                    date)                         cooperative unknown)  

 

           (unknown)  (no       (unknown)  (unknown)  BMI 28.7  (units    (unkno

wn)



                    date)                                   unknown)  

 

           (unknown)  (no       (unknown)  (unknown)  /66  (units    (unkn

own)



                    date)                                   unknown)  

 

           (unknown)  (no       (unknown)  (unknown)  Because of the  (units    

(unknown)



                    date)                         patient's unknown)  



                                                  persistent pain           



                                                  and the impact it           



                                                  is having on her           

 

           (unknown)  (no       (unknown)  (unknown)  Bilateral  (units    (unkn

own)



                    date)                         ovarian   unknown)  



                                                  hypoechoic masses           



                                                  with peripheral           



                                                  vascularity.?           



                                                  Ectopic             

 

           (unknown)  (no       (unknown)  (unknown)  Bimanual Exam-  (units    

(unknown)



                    date)                         Adnexa, other: unknown)  



                                                  adnexae mobile,           



                                                  no masses,           



                                                  normal, tender on           



                                                  the                 

 

           (unknown)  (no       (unknown)  (unknown)  Bimanual Exam-  (units    

(unknown)



                    date)                         Vagina + Uterus: unknown)  



                                                  uterine size           



                                                  normal, uterine           



                                                  mobility normal,           

 

           (unknown)  (no       (unknown)  (unknown)  Blood Pressure  (units    

(unknown)



                    date)                         Location Rt unknown)  



                                                  brachial            

 

           (unknown)  (no       (unknown)  (unknown)  Chief Complaint  (units   

 (unknown)



                    date)                                   unknown)  

 

           (unknown)  (no       (unknown)  (unknown)  Chief Complaint:  (units  

  (unknown)



                    date)                         Preop visit unknown)  

 

           (unknown)  (no       (unknown)  (unknown)  Chlamydia  (units    (unkn

own)



                    date)                         infection () unknown)  

 

           (unknown)  (no       (unknown)  (unknown)  Confirmed  (units    (unkn

own)



                    date)                         23] unknown)  

 

           (unknown)  (no       (unknown)  (unknown)  Conjunctivae:  (units    (

unknown)



                    date)                         conjunctivae unknown)  



                                                  normal              

 

           (unknown)  (no       (unknown)  (unknown)  Const     (units    (unkno

wn)



                    date)                                   unknown)  

 

           (unknown)  (no       (unknown)  (unknown)  Continued  (units    (unkn

own)



                    date)                         surveillance and unknown)  



                                                  correlation with           



                                                  serial serum           



                                                  beta-hCG            



                                                  measurements           

 

           (unknown)  (no       (unknown)  (unknown)  : 2005  (units   

 (unknown)



                    date)                         Acct:[id number] unknown)  

 

           (unknown)  (no       (unknown)  (unknown)  Dept at   (units    (unkno

wn)



                    date)                         [phone] unknown)  

 

           (unknown)  (no       (unknown)  (unknown)  Details:  (units    (unkno

wn)



                    date)                                   unknown)  

 

           (unknown)  (no       (unknown)  (unknown)  Documented By:  (units    

(unknown)



                    date)                         Jose Almodovar unknown)  



                                                  MD 23 1457           

 

           (unknown)  (no       (unknown)  (unknown)  Draft     (units    (unkno

wn)



                    date)                                   unknown)  

 

           (unknown)  (no       (unknown)  (unknown)  EOM: EOM intact  (units   

 (unknown)



                    date)                         bilaterally unknown)  

 

           (unknown)  (no       (unknown)  (unknown)  Ears: hearing  (units    (

unknown)



                    date)                         grossly normal unknown)  



                                                  bilaterally           

 

           (unknown)  (no       (unknown)  (unknown)  Effort +  (units    (unkno

wn)



                    date)                         Inspection: unknown)  



                                                  normal              



                                                  respiratory           



                                                  effort and able           



                                                  to speak in           



                                                  complete            

 

           (unknown)  (no       (unknown)  (unknown)  Endometriosis  (units    (

unknown)



                    date)                         ()   unknown)  

 

           (unknown)  (no       (unknown)  (unknown)  Exam      (units    (unkno

wn)



                    date)                                   unknown)  

 

           (unknown)  (no       (unknown)  (unknown)  External Female  (units   

 (unknown)



                    date)                         Exam: normal unknown)  



                                                  external            



                                                  appearance,           



                                                  normal appearance           



                                                  of the              

 

           (unknown)  (no       (unknown)  (unknown)  Eyes      (units    (unkno

wn)



                    date)                                   unknown)  

 

           (unknown)  (no       (unknown)  (unknown)  FINDINGS:?  (units    (unk

nown)



                    date)                                   unknown)  

 

           (unknown)  (no       (unknown)  (unknown)  Face and sinus:  (units   

 (unknown)



                    date)                         face symmetric unknown)  

 

           (unknown)  (no       (unknown)  (unknown)  [facility]  (units   

 (unknown)



                    date)                         [facility] unknown)  

 

           (unknown)  (no       (unknown)  (unknown)          (units    (unkno

wn)



                    date)                                   unknown)  

 

           (unknown)  (no       (unknown)  (unknown)  General:  (units    (unkno

wn)



                    date)                         appearance unknown)  



                                                  normal, both eyes           



                                                  and all related           



                                                  structures           

 

           (unknown)  (no       (unknown)  (unknown)  General:  (units    (unkno

wn)



                    date)                         cooperative, unknown)  



                                                  comfortable and           



                                                  no acute distress           

 

           (unknown)  (no       (unknown)  (unknown)  General:  (units    (unkno

wn)



                    date)                         deferred  unknown)  

 

           (unknown)  (no       (unknown)  (unknown)  Lesvia is a  (units    (unk

nown)



                    date)                         17-year-old unknown)  



                                                  nulligravida who           



                                                  returns today           



                                                  with a 4?month           



                                                  history of           

 

           (unknown)  (no       (unknown)  (unknown)  Gynecology Visit  (units  

  (unknown)



                    date)                                   unknown)  

 

           (unknown)  (no       (unknown)  (unknown)  HENMT     (units    (unkno

wn)



                    date)                                   unknown)  

 

           (unknown)  (no       (unknown)  (unknown)  HPI       (units    (unkno

wn)



                    date)                                   unknown)  

 

           (unknown)  (no       (unknown)  (unknown)  Head: normal to  (units   

 (unknown)



                    date)                         inspection, unknown)  



                                                  normocephalic and           



                                                  atraumatic           

 

           (unknown)  (no       (unknown)  (unknown)  Heavy menstrual  (units   

 (unknown)



                    date)                         period (-2019) unknown)  

 

           (unknown)  (no       (unknown)  (unknown)  Height 5 ft 3 in  (units  

  (unknown)



                    date)                                   unknown)  

 

           (unknown)  (no       (unknown)  (unknown)  Hx of     (units    (unkno

wn)



                    date)                         laparoscopy unknown)  



                                                  (22)           

 

           (unknown)  (no       (unknown)  (unknown)  IMPRESSION:?  (units    (u

nknown)



                    date)                                   unknown)  

 

           (unknown)  (no       (unknown)  (unknown)  Intake Note:  (units    (u

nknown)



                    date)                                   unknown)  

 

           (unknown)  (no       (unknown)  (unknown)  Intake    (units    (unkno

wn)



                    date)                                   unknown)  

 

           (unknown)  (no       (unknown)  (unknown)  Irregular  (units    (unkn

own)



                    date)                         menstrual cycle unknown)  



                                                  ()             

 

           (unknown)  (no       (unknown)  (unknown)  Judgment:  (units    (unkn

own)



                    date)                         judgment good unknown)  

 

           (unknown)  (no       (unknown)  (unknown)  Last Menstural  (units    

(unknown)



                    date)                         Cycle + Details unknown)  

 

           (unknown)  (no       (unknown)  (unknown)  Loc: [facility]  (units    (unkno

wn)



                    date)                                   unknown)  

 

           (unknown)  (no       (unknown)  (unknown)  Lymphangioma  (units    (u

nknown)



                    date)                                   unknown)  

 

           (unknown)  (no       (unknown)  (unknown)  Medical History  (units   

 (unknown)



                    date)                         (Updated 23 unknown)  



                                                  @ 07:57 by Jose Almodovar MD)           

 

           (unknown)  (no       (unknown)  (unknown)  Medications  (units    (un

known)



                    date)                                   unknown)  

 

           (unknown)  (no       (unknown)  (unknown)  Mental Status:  (units    

(unknown)



                    date)                         mental status unknown)  



                                                  grossly normal           

 

           (unknown)  (no       (unknown)  (unknown)  Mood: congruent  (units   

 (unknown)



                    date)                         mood      unknown)  

 

           (unknown)  (no       (unknown)  (unknown)  Neck      (units    (unkno

wn)



                    date)                                   unknown)  

 

           (unknown)  (no       (unknown)  (unknown)  Neck: normal  (units    (u

nknown)



                    date)                         visual inspection unknown)  

 

           (unknown)  (no       (unknown)  (unknown)  No convincing  (units    (

unknown)



                    date)                         evidence of unknown)  



                                                  intrauterine           



                                                  pregnancy.           

 

           (unknown)  (no       (unknown)  (unknown)  Nutritional  (units    (un

known)



                    date)                         Appearance: unknown)  



                                                  average body           



                                                  habitus             

 

           (unknown)  (no       (unknown)  (unknown)  Opioids -  (units    (unkn

own)



                    date)                         Morphine  unknown)  



                                                  Analogues Allergy           



                                                  (Intermediate,           



                                                  Verified 23           



                                                  14:59)              

 

           (unknown)  (no       (unknown)  (unknown)  Orientation:  (units    (u

nknown)



                    date)                         alert and unknown)  



                                                  oriented x3           

 

           (unknown)  (no       (unknown)  (unknown)  Other Menstrual  (units   

 (unknown)



                    date)                         Period: Other unknown)  

 

           (unknown)  (no       (unknown)  (unknown)  Other:    (units    (unkno

wn)



                    date)                                   unknown)  

 

           (unknown)  (no       (unknown)  (unknown)  Ovarian cyst  (units    (u

nknown)



                    date)                                   unknown)  

 

           (unknown)  (no       (unknown)  (unknown)  Oxygen Delivery  (units   

 (unknown)



                    date)                         Method room air unknown)  

 

           (unknown)  (no       (unknown)  (unknown)  PFSH      (units    (unkno

wn)



                    date)                                   unknown)  

 

           (unknown)  (no       (unknown)  (unknown)  Painful   (units    (unkno

wn)



                    date)                         menstrual periods unknown)  



                                                  ()             

 

           (unknown)  (no       (unknown)  (unknown)  Patient:  (units    (unkno

wn)



                    date)                         Lesvia Aguero unknown)  



                                                  MR#: [id number]             

 

           (unknown)  (no       (unknown)  (unknown)  Pelvic Support:  (units   

 (unknown)



                    date)                         normal    unknown)  

 

           (unknown)  (no       (unknown)  (unknown)  Position Sitting  (units  

  (unknown)



                    date)                                   unknown)  

 

           (unknown)  (no       (unknown)  (unknown)  Problem-specific  (units  

  (unknown)



                    date)                         ROS positives unknown)  



                                                  included with the           



                                                  HPI                 

 

           (unknown)  (no       (unknown)  (unknown)  Psych     (units    (unkno

wn)



                    date)                                   unknown)  

 

           (unknown)  (no       (unknown)  (unknown)  Pt is here for a  (units  

  (unknown)



                    date)                         pre-op    unknown)  

 

           (unknown)  (no       (unknown)  (unknown)  Pulse 60  (units    (unkno

wn)



                    date)                                   unknown)  

 

           (unknown)  (no       (unknown)  (unknown)  Pulse Oximetry  (units    

(unknown)



                    date)                         (%) 99    unknown)  

 

           (unknown)  (no       (unknown)  (unknown)  Pulse Source  (units    (u

nknown)



                    date)                         Monitor   unknown)  

 

           (unknown)  (no       (unknown)  (unknown)  ROS Narrative  (units    (

unknown)



                    date)                                   unknown)  

 

           (unknown)  (no       (unknown)  (unknown)  ROS Narrative:  (units    

(unknown)



                    date)                                   unknown)  

 

           (unknown)  (no       (unknown)  (unknown)  ROS       (units    (unkno

wn)



                    date)                                   unknown)  

 

           (unknown)  (no       (unknown)  (unknown)  Reason For Visit  (units  

  (unknown)



                    date)                                   unknown)  

 

           (unknown)  (no       (unknown)  (unknown)  Recto-Vaginal:  (units    

(unknown)



                    date)                         no cul-de-sac unknown)  



                                                  fullness,           



                                                  cul-de-sac           



                                                  tenderness (Mild)           



                                                  and no              

 

           (unknown)  (no       (unknown)  (unknown)  Resp      (units    (unkno

wn)



                    date)                                   unknown)  

 

           (unknown)  (no       (unknown)  (unknown)  S/P ACL   (units    (unkno

wn)



                    date)                         reconstruction unknown)  



                                                  (-21)           

 

           (unknown)  (no       (unknown)  (unknown)  Sclera: sclerae  (units   

 (unknown)



                    date)                         normal    unknown)  

 

           (unknown)  (no       (unknown)  (unknown)  Signed By:  (units    (unk

nown)



                    date)                                   unknown)  

 

           (unknown)  (no       (unknown)  (unknown)  Smoking Status:  (units   

 (unknown)



                    date)                         Current some day unknown)  



                                                  smoker              

 

           (unknown)  (no       (unknown)  (unknown)  Social History  (units    

(unknown)



                    date)                                   unknown)  

 

           (unknown)  (no       (unknown)  (unknown)  Speculum Exam -  (units   

 (unknown)



                    date)                         Cervix: normal unknown)  



                                                  appearance of the           



                                                  cervix, no           



                                                  cervical            



                                                  discharge,           

 

           (unknown)  (no       (unknown)  (unknown)  Speculum Exam -  (units   

 (unknown)



                    date)                         Vagina: normal unknown)  



                                                  appearance of the           



                                                  vagina, abnormal           



                                                  vaginal             

 

           (unknown)  (no       (unknown)  (unknown)  Speech and  (units    (unk

nown)



                    date)                         Movement: speech unknown)  



                                                  and movement           



                                                  normal              

 

           (unknown)  (no       (unknown)  (unknown)  Surgical History  (units  

  (unknown)



                    date)                         (Updated 23 unknown)  



                                                  @ 08:13 by Shayna Morales RN)           

 

           (unknown)  (no       (unknown)  (unknown)  TOA       (units    (unkno

wn)



                    date)                         (tubo-ovarian unknown)  



                                                  abscess)            



                                                  (-21)           

 

           (unknown)  (no       (unknown)  (unknown)  Temp 98.5 F  (units    (un

known)



                    date)                                   unknown)  

 

           (unknown)  (no       (unknown)  (unknown)  Temp Source  (units    (un

known)



                    date)                         Temporal Artery unknown)  



                                                  Scan                

 

           (unknown)  (no       (unknown)  (unknown)  This note may  (units    (

unknown)



                    date)                         have been all or unknown)  



                                                  partially           



                                                  generated using           



                                                  voice recognition           

 

           (unknown)  (no       (unknown)  (unknown)  Thought Content:  (units  

  (unknown)



                    date)                         normal    unknown)  

 

           (unknown)  (no       (unknown)  (unknown)  Thought Process:  (units  

  (unknown)



                    date)                         normal    unknown)  

 

           (unknown)  (no       (unknown)  (unknown)  Tobacco +  (units    (unkn

own)



                    date)                         Substance Use unknown)  

 

           (unknown)  (no       (unknown)  (unknown)  Tobacco Status  (units    

(unknown)



                    date)                                   unknown)  

 

           (unknown)  (no       (unknown)  (unknown)  Urethra: normal  (units   

 (unknown)



                    date)                         appearance of the unknown)  



                                                  urethra             

 

           (unknown)  (no       (unknown)  (unknown)  Uterine body is  (units   

 (unknown)



                    date)                         normal in size.? unknown)  



                                                  Echogenic fluid           



                                                  in the uterine           



                                                  cavity may           

 

           (unknown)  (no       (unknown)  (unknown)  Visit Reasons:  (units    

(unknown)



                    date)                         Pre-op    unknown)  

 

           (unknown)  (no       (unknown)  (unknown)  Vitals    (units    (unkno

wn)



                    date)                                   unknown)  

 

           (unknown)  (no       (unknown)  (unknown)  Vomiting  (units    (unkno

wn)



                    date)                                   unknown)  

 

           (unknown)  (no       (unknown)  (unknown)  Weight 162 lb  (units    (

unknown)



                    date)                                   unknown)  

 

           (unknown)  (no       (unknown)  (unknown)  [Rx Confirmed  (units    (

unknown)



                    date)                         23] unknown)  

 

           (unknown)  (no       (unknown)  (unknown) ability to work  (units    

(unknown)



                    date)                         and normal daily unknown)  



                                                  activities, she           



                                                  has decided that           



                                                  she would like           

 

           (unknown)  (no       (unknown)  (unknown)  alcohol intake:  (units   

 (unknown)



                    date)                         current   unknown)  

 

           (unknown)  (no       (unknown)  (unknown)  although a  (units    (unk

nown)



                    date)                                   unknown)  

 

           (unknown)  (no       (unknown)  (unknown)  amoxicillin  (units    (un

known)



                    date)                         Adverse Reaction unknown)  



                                                  (Verified           



                                                  23 15:00)           

 

           (unknown)  (no       (unknown)  (unknown)  and       (units    (unkno

wn)



                    date)                                   unknown)  

 

           (unknown)  (no       (unknown)  (unknown)  bleeding for the  (units  

  (unknown)



                    date)                         last 2 weeks and unknown)  



                                                  her pain has been           



                                                  worse during that           



                                                  period of           

 

           (unknown)  (no       (unknown)  (unknown)  blood products.?  (units  

  (unknown)



                    date)                         There is no unknown)  



                                                  definite evidence           



                                                  of gestational           



                                                  sac.? There is a           

 

           (unknown)  (no       (unknown)  (unknown)  but smaller 1.5  (units   

 (unknown)



                    date)                         cm hypoechoic unknown)  



                                                  area in the left           



                                                  ovary.?             

 

           (unknown)  (no       (unknown)  (unknown)  cannot be  (units    (unkn

own)



                    date)                         strictly excluded unknown)  



                                                  for either these           



                                                  locations.           

 

           (unknown)  (no       (unknown)  (unknown)  clindamycin HCl  (units   

 (unknown)



                    date)                         150 mg capsule unknown)  



                                                  150 mg PO DAILY           



                                                  #30 caps 22           



                                                  [Rx Confirmed           

 

           (unknown)  (no       (unknown)  (unknown)  clinical  (units    (unkno

wn)



                    date)                         symptoms  unknown)  



                                                  recommended.           

 

           (unknown)  (no       (unknown)  (unknown)  corpus luteum or  (units  

  (unknown)



                    date)                         hemorrhagic cyst unknown)  



                                                  could have a           



                                                  similar             



                                                  appearance.?           



                                                  There is a           

 

           (unknown)  (no       (unknown)  (unknown)  cul-de-sac  (units    (unk

nown)



                    date)                         nodularlity unknown)  

 

           (unknown)  (no       (unknown)  (unknown)  cystectomy or  (units    (

unknown)



                    date)                         possible left unknown)  



                                                  oophorectomy. At           



                                                  the same time she           



                                                  would like to           

 

           (unknown)  (no       (unknown)  (unknown)  discharge  (units    (unkn

own)



                    date)                         ([place]; unknown)  



                                                  confirmed by wet           



                                                  prep) and no           



                                                  lesions             

 

           (unknown)  (no       (unknown)  (unknown)  doxycycline  (units    (un

known)



                    date)                         Adverse Reaction unknown)  



                                                  (Intermediate,           



                                                  Verified 23           



                                                  14:59)              

 

           (unknown)  (no       (unknown)  (unknown) fluconazole 150  (units    

(unknown)



                    date)                         mg tablet unknown)  



                                                  (Diflucan) 150 mg           



                                                  PO Q48H 2 doses           



                                                  #2 tabs 02/15/23           



                                                  [Rx                 

 

           (unknown)  (no       (unknown)  (unknown)  have a Mirena  (units    (

unknown)



                    date)                         IUD inserted to unknown)  



                                                  address her           



                                                  persistent           



                                                  menometrorrhagia.           



                                                  She                 

 

           (unknown)  (no       (unknown)  (unknown)  have occurred.  (units    

(unknown)



                    date)                         If there are any unknown)  



                                                  questions, please           



                                                  contact the           



                                                  Medical Records           

 

           (unknown)  (no       (unknown)  (unknown)  household  (units    (unkn

own)



                    date)                         members:  unknown)  



                                                  friend(s)           

 

           (unknown)  (no       (unknown)  (unknown)  hydrocodone 5  (units    (

unknown)



                    date)                         mg-acetaminophen unknown)  



                                                  325 mg tablet 1           



                                                  tab PO Q4-6H PRN           



                                                  pain #10 tabs           

 

           (unknown)  (no       (unknown)  (unknown)  hydromorphone 4  (units   

 (unknown)



                    date)                         mg tablet unknown)  



                                                  (Dilaudid) 4 mg           



                                                  PO Q4-6H PRN pain           



                                                  #20 tabs 22           

 

           (unknown)  (no       (unknown)  (unknown)  if the tube is  (units    

(unknown)



                    date)                         severely affected unknown)  



                                                  by adhesive           



                                                  disease and           



                                                  possible left           



                                                  ovarian             

 

           (unknown)  (no       (unknown)  (unknown)  intermittent.?  (units    

(unknown)



                    date)                         It does not seem unknown)  



                                                  to be related to           



                                                  her cycles but           



                                                  she is been           

 

           (unknown)  (no       (unknown)  (unknown)  ketorolac 10 mg  (units   

 (unknown)



                    date)                         tablet 10 mg PO unknown)  



                                                  Q6H PRN pain #14           



                                                  tabs 22 [Rx           



                                                  Confirmed           

 

           (unknown)  (no       (unknown)  (unknown)  left, No  (units    (unkno

wn)



                    date)                         cul-de-sac unknown)  



                                                  fullness,           



                                                  cul-de-sac           



                                                  tenderness (Mild)           



                                                  and No cul-de-sac           

 

           (unknown)  (no       (unknown)  (unknown)  may occur.  (units    (unk

nown)



                    date)                         Occasional unknown)  



                                                  wrong-word or           



                                                  'sound-alike'           



                                                  substitutions may           



                                                  have                

 

           (unknown)  (no       (unknown)  (unknown)  movement or  (units    (un

known)



                    date)                         empties her unknown)  



                                                  bladder.? Pelvic           



                                                  US performed           



                                                  2/15/2023 shows:           

 

           (unknown)  (no       (unknown)  (unknown)  no lesions and  (units    

(unknown)



                    date)                         nontender unknown)  

 

           (unknown)  (no       (unknown)  (unknown)  nodularity  (units    (unk

nown)



                    date)                                   unknown)  

 

           (unknown)  (no       (unknown)  (unknown)  occurred due to  (units   

 (unknown)



                    date)                         the inherent unknown)  



                                                  limitations of           



                                                  voice recognition           



                                                  software. Please           

 

           (unknown)  (no       (unknown)  (unknown)  ondansetron 4 mg  (units  

  (unknown)



                    date)                         disintegrating unknown)  



                                                  tablet 4 mg PO           



                                                  TID-QID PRN           



                                                  nausea and           



                                                  vomiting            

 

           (unknown)  (no       (unknown)  (unknown)  oxycodone 5 mg  (units    

(unknown)



                    date)                         tablet 5 mg PO unknown)  



                                                  Q4H PRN pain #20           



                                                  tabs 22 [Rx           



                                                  Confirmed           

 

           (unknown)  (no       (unknown)  (unknown)  peripheral  (units    (unk

nown)



                    date)                         vascularity.? unknown)  



                                                  This could           



                                                  potentially           



                                                  represent an           



                                                  ectopic pregnancy           

 

           (unknown)  (no       (unknown)  (unknown)  persistent left  (units   

 (unknown)



                    date)                         lower quadrant unknown)  



                                                  pain which is           



                                                  sharp and           



                                                  stabbing, and           

 

           (unknown)  (no       (unknown)  (unknown)  pregnancy  (units    (unkn

own)



                    date)                                   unknown)  

 

           (unknown)  (no       (unknown)  (unknown)  presents today  (units    

(unknown)



                    date)                         for preoperative unknown)  



                                                  evaluation,           



                                                  counseling, and           



                                                  consent.            

 

           (unknown)  (no       (unknown)  (unknown)  rash      (units    (unkno

wn)



                    date)                                   unknown)  

 

           (unknown)  (no       (unknown)  (unknown)  read the note  (units    (

unknown)



                    date)                         carefully and unknown)  



                                                  recognize, using           



                                                  context, where           



                                                  these               



                                                  substitutions           

 

           (unknown)  (no       (unknown)  (unknown)  represent  (units    (unkn

own)



                    date)                                   unknown)  

 

           (unknown)  (no       (unknown)  (unknown)  sentences  (units    (unkn

own)



                    date)                                   unknown)  

 

           (unknown)  (no       (unknown)  (unknown)  similar   (units    (unkno

wn)



                    date)                                   unknown)  

 

           (unknown)  (no       (unknown)  (unknown)  software.  (units    (unkn

own)



                    date)                         Although every unknown)  



                                                  effort is made to           



                                                  edit content,           



                                                  transcription           



                                                  errors              

 

           (unknown)  (no       (unknown)  (unknown) thick-walled 2.7  (units   

 (unknown)



                    date)                         cm heterogeneous unknown)  



                                                  complex             



                                                  hypoechoic mass           



                                                  in the left ovary           



                                                  with                

 

           (unknown)  (no       (unknown)  (unknown)  time.? In  (units    (unkn

own)



                    date)                         addition she has unknown)  



                                                  significant pain           



                                                  when she strains           



                                                  to have a bowel           

 

           (unknown)  (no       (unknown)  (unknown)  to proceed with  (units   

 (unknown)



                    date)                         a 2nd laparoscopy unknown)  



                                                  with plans to           



                                                  remove the left           



                                                  fallopian tube           

 

           (unknown)  (no       (unknown)  (unknown)  urethra and no  (units    

(unknown)



                    date)                         lesions   unknown)  

 

           (unknown)  (no       (unknown)  (unknown)  uterine shape  (units    (

unknown)



                    date)                         normal, No unknown)  



                                                  tender, no           



                                                  cervical motion           



                                                  tenderness and           



                                                  tender              

 

           (unknown)  (no       (unknown)  (unknown)  vomitt    (units    (unkno

wn)



                    date)                                   unknown)  









                                         Result panel 364









           (unknown)  (no       (unknown)  (unknown)  (no value)  (units    (unk

nown)



                    date)                                   unknown)  

 

           (unknown)  (no       (unknown)  (unknown)  #10 tabs 22  (units 

   (unknown)



                    date)                         [Rx Confirmed unknown)  



                                                  23]           

 

           (unknown)  (no       (unknown)  (unknown)  (1)       (units    (unkno

wn)



                    date)                         Menometrorrhagia: unknown)  

 

           (unknown)  (no       (unknown)  (unknown)  (Diffuse, mild)  (units   

 (unknown)



                    date)                                   unknown)  

 

           (unknown)  (no       (unknown)  (unknown)  [id number]  (units    (unkno

wn)



                    date)                                   unknown)  

 

           (unknown)  (no       (unknown)  (unknown)  23 1748  (units    (

unknown)



                    date)                                   unknown)  

 

           (unknown)  (no       (unknown)  (unknown)  23  (units    (unkno

wn)



                    date)                                   unknown)  

 

           (unknown)  (no       (unknown)  (unknown)  23]  (units    (unkn

own)



                    date)                                   unknown)  

 

           (unknown)  (no       (unknown)  (unknown)  11/22/22 [Rx  (units    (u

nknown)



                    date)                         Confirmed 23] unknown)  

 

           (unknown)  (no       (unknown)  (unknown)  15:00     (units    (unkno

wn)



                    date)                                   unknown)  

 

           (unknown)  (no       (unknown)  (unknown)  ?         (units    (unkno

wn)



                    date)                                   unknown)  

 

           (unknown)  (no       (unknown)  (unknown)  ??        (units    (unkno

wn)



                    date)                                   unknown)  

 

           (unknown)  (no       (unknown)  (unknown)  Acne (-2016)  (units    (u

nknown)



                    date)                                   unknown)  

 

           (unknown)  (no       (unknown)  (unknown)  Affect: normal  (units    

(unknown)



                    date)                         affect    unknown)  

 

           (unknown)  (no       (unknown)  (unknown)  Age/Sex: 17 / F  (units   

 (unknown)



                    date)                         Date of Service: unknown)  

 

           (unknown)  (no       (unknown)  (unknown)  Allergies  (units    (unkn

own)



                    date)                                   unknown)  

 

           (unknown)  (no       (unknown)  (unknown)  [place] [address]  (unit

s    (unknown)



                    date)                                   unknown)  

 

           (unknown)  (no       (unknown)  (unknown)  Anesthesia  (units    (unk

nown)



                    date)                                   unknown)  

 

           (unknown)  (no       (unknown)  (unknown)  Appearance: grossly  (unit

s    (unknown)



                    date)                         normal    unknown)  

 

           (unknown)  (no       (unknown)  (unknown)  Assessment + Plan  (units 

   (unknown)



                    date)                                   unknown)  

 

           (unknown)  (no       (unknown)  (unknown)  Attending Dr:  (units    (

unknown)



                    date)                         Jose Almodovar MD unknown)  

 

           (unknown)  (no       (unknown)  (unknown)  Attitude:  (units    (unkn

own)



                    date)                         cooperative unknown)  

 

           (unknown)  (no       (unknown)  (unknown)  Auscultation: clear  (unit

s    (unknown)



                    date)                         to auscultation unknown)  



                                                  bilaterally           

 

           (unknown)  (no       (unknown)  (unknown)  BMI 28.7  (units    (unkno

wn)



                    date)                                   unknown)  

 

           (unknown)  (no       (unknown)  (unknown)  /66  (units    (unkn

own)



                    date)                                   unknown)  

 

           (unknown)  (no       (unknown)  (unknown)  Because of the  (units    

(unknown)



                    date)                         patient's persistent unknown) 

 



                                                  pain and the impact           



                                                  it is having on her           

 

           (unknown)  (no       (unknown)  (unknown)  Bilateral ovarian  (units 

   (unknown)



                    date)                         hypoechoic masses unknown)  



                                                  with peripheral           



                                                  vascularity.?           



                                                  Ectopic             

 

           (unknown)  (no       (unknown)  (unknown)  Bimanual Exam-  (units    

(unknown)



                    date)                         Adnexa, other: unknown)  



                                                  adnexae mobile, no           



                                                  masses, normal,           



                                                  tender on the           

 

           (unknown)  (no       (unknown)  (unknown)  Bimanual Exam-  (units    

(unknown)



                    date)                         Vagina + Uterus: unknown)  



                                                  uterine size normal,          

 



                                                  uterine mobility           



                                                  normal,             

 

           (unknown)  (no       (unknown)  (unknown)  Blood Pressure  (units    

(unknown)



                    date)                         Location Rt brachial unknown) 

 

 

           (unknown)  (no       (unknown)  (unknown)  Cardio    (units    (unkno

wn)



                    date)                                   unknown)  

 

           (unknown)  (no       (unknown)  (unknown)  Chief Complaint  (units   

 (unknown)



                    date)                                   unknown)  

 

           (unknown)  (no       (unknown)  (unknown)  Chief Complaint:  (units  

  (unknown)



                    date)                         Preop visit unknown)  

 

           (unknown)  (no       (unknown)  (unknown)  Chlamydia infection  (unit

s    (unknown)



                    date)                         ()   unknown)  

 

           (unknown)  (no       (unknown)  (unknown)  Confirmed 23]  (unit

s    (unknown)



                    date)                                   unknown)  

 

           (unknown)  (no       (unknown)  (unknown)  Conjunctivae:  (units    (

unknown)



                    date)                         conjunctivae normal unknown)  

 

           (unknown)  (no       (unknown)  (unknown)  Const     (units    (unkno

wn)



                    date)                                   unknown)  

 

           (unknown)  (no       (unknown)  (unknown)  Continued  (units    (unkn

own)



                    date)                         surveillance and unknown)  



                                                  correlation with           



                                                  serial serum           



                                                  beta-hCG            



                                                  measurements           

 

           (unknown)  (no       (unknown)  (unknown)  Counseling and  (units    

(unknown)



                    date)                         educating the unknown)  



                                                  patient/family/careg          

 



                                                  iver: 10            

 

           (unknown)  (no       (unknown)  (unknown)  : 2005  (units   

 (unknown)



                    date)                         Acct:[id number] unknown)  

 

           (unknown)  (no       (unknown)  (unknown)  Dept at   (units    (unkno

wn)



                    date)                         [phone] unknown)  

 

           (unknown)  (no       (unknown)  (unknown)  Details:  (units    (unkno

wn)



                    date)                                   unknown)  

 

           (unknown)  (no       (unknown)  (unknown)  Documented By:  (units    

(unknown)



                    date)                         Jose Almodovar MD unknown)  



                                                  23 1457           

 

           (unknown)  (no       (unknown)  (unknown)  Documenting  (units    (un

known)



                    date)                         clinical information unknown) 

 



                                                  in EHR/Medical           



                                                  record: 10           

 

           (unknown)  (no       (unknown)  (unknown)  EOM: EOM intact  (units   

 (unknown)



                    date)                         bilaterally unknown)  

 

           (unknown)  (no       (unknown)  (unknown)  Ears: hearing  (units    (

unknown)



                    date)                         grossly normal unknown)  



                                                  bilaterally           

 

           (unknown)  (no       (unknown)  (unknown)  Effort +  (units    (unkno

wn)



                    date)                         Inspection: normal unknown)  



                                                  respiratory effort           



                                                  and able to speak in          

 



                                                  complete            

 

           (unknown)  (no       (unknown)  (unknown)  Endometriosis  (units    (

unknown)



                    date)                         (-)   unknown)  

 

           (unknown)  (no       (unknown)  (unknown)  Exam      (units    (unkno

wn)



                    date)                                   unknown)  

 

           (unknown)  (no       (unknown)  (unknown)  External Female  (units   

 (unknown)



                    date)                         Exam: normal unknown)  



                                                  external appearance,          

 



                                                  normal appearance of          

 



                                                  the                 

 

           (unknown)  (no       (unknown)  (unknown)  Eyes      (units    (unkno

wn)



                    date)                                   unknown)  

 

           (unknown)  (no       (unknown)  (unknown)  FINDINGS:?  (units    (unk

nown)



                    date)                                   unknown)  

 

           (unknown)  (no       (unknown)  (unknown)  Face and sinus:  (units   

 (unknown)



                    date)                         face symmetric unknown)  

 

           (unknown)  (no       (unknown)  (unknown)  [facility]  (units   

 (unknown)



                    date)                         [facility] unknown)  

 

           (unknown)  (no       (unknown)  (unknown)  GI        (units    (unkno

wn)



                    date)                                   unknown)  

 

           (unknown)  (no       (unknown)  (unknown)          (units    (unkno

wn)



                    date)                                   unknown)  

 

           (unknown)  (no       (unknown)  (unknown)  General: appearance  (unit

s    (unknown)



                    date)                         normal, both eyes unknown)  



                                                  and all related           



                                                  structures           

 

           (unknown)  (no       (unknown)  (unknown)  General:  (units    (unkno

wn)



                    date)                         cooperative, unknown)  



                                                  comfortable and no           



                                                  acute distress           

 

           (unknown)  (no       (unknown)  (unknown)  General: deferred  (units 

   (unknown)



                    date)                                   unknown)  

 

           (unknown)  (no       (unknown)  (unknown)  Lesvia is a  (units    (unk

nown)



                    date)                         17-year-old unknown)  



                                                  nulligravida who           



                                                  returns today with a          

 



                                                  4?month history of           

 

           (unknown)  (no       (unknown)  (unknown)  Gynecology Visit  (units  

  (unknown)



                    date)                                   unknown)  

 

           (unknown)  (no       (unknown)  (unknown)  HENMT     (units    (unkno

wn)



                    date)                                   unknown)  

 

           (unknown)  (no       (unknown)  (unknown)  HPI       (units    (unkno

wn)



                    date)                                   unknown)  

 

           (unknown)  (no       (unknown)  (unknown)  Head: normal to  (units   

 (unknown)



                    date)                         inspection, unknown)  



                                                  normocephalic and           



                                                  atraumatic           

 

           (unknown)  (no       (unknown)  (unknown)  Heart Sounds: S1  (units  

  (unknown)



                    date)                         normal, S2 normal unknown)  



                                                  and no murmurs           

 

           (unknown)  (no       (unknown)  (unknown)  Heavy menstrual  (units   

 (unknown)



                    date)                         period (-2019) unknown)  

 

           (unknown)  (no       (unknown)  (unknown)  Height 5 ft 3 in  (units  

  (unknown)



                    date)                                   unknown)  

 

           (unknown)  (no       (unknown)  (unknown)  Hx of laparoscopy  (units 

   (unknown)



                    date)                         (22) unknown)  

 

           (unknown)  (no       (unknown)  (unknown)  IMPRESSION:?  (units    (u

nknown)



                    date)                                   unknown)  

 

           (unknown)  (no       (unknown)  (unknown)  Inspection: normal  (units

    (unknown)



                    date)                         to inspection and unknown)  



                                                  scar (Prior           



                                                  laparoscopy)           

 

           (unknown)  (no       (unknown)  (unknown)  Intake Note:  (units    (u

nknown)



                    date)                                   unknown)  

 

           (unknown)  (no       (unknown)  (unknown)  Intake    (units    (unkno

wn)



                    date)                                   unknown)  

 

           (unknown)  (no       (unknown)  (unknown)  Irregular menstrual  (unit

s    (unknown)



                    date)                         cycle () unknown)  

 

           (unknown)  (no       (unknown)  (unknown)  Judgment: judgment  (units

    (unknown)



                    date)                         good      unknown)  

 

           (unknown)  (no       (unknown)  (unknown)  Last Menstural  (units    

(unknown)



                    date)                         Cycle + Details unknown)  

 

           (unknown)  (no       (unknown)  (unknown)  Loc: [facility]  (units    (unkno

wn)



                    date)                                   unknown)  

 

           (unknown)  (no       (unknown)  (unknown)  Lymphangioma  (units    (u

nknown)



                    date)                                   unknown)  

 

           (unknown)  (no       (unknown)  (unknown)  Medical History  (units   

 (unknown)



                    date)                         (Updated 23 @ unknown)  



                                                  17:43 by Jose Almodovar MD)           

 

           (unknown)  (no       (unknown)  (unknown)  Medications  (units    (un

known)



                    date)                                   unknown)  

 

           (unknown)  (no       (unknown)  (unknown)  Mental Status:  (units    

(unknown)



                    date)                         mental status unknown)  



                                                  grossly normal           

 

           (unknown)  (no       (unknown)  (unknown)  Mood: congruent  (units   

 (unknown)



                    date)                         mood      unknown)  

 

           (unknown)  (no       (unknown)  (unknown)  Neck      (units    (unkno

wn)



                    date)                                   unknown)  

 

           (unknown)  (no       (unknown)  (unknown)  Neck: normal visual  (unit

s    (unknown)



                    date)                         inspection unknown)  

 

           (unknown)  (no       (unknown)  (unknown)  No convincing  (units    (

unknown)



                    date)                         evidence of unknown)  



                                                  intrauterine           



                                                  pregnancy.           

 

           (unknown)  (no       (unknown)  (unknown)  Nutritional  (units    (un

known)



                    date)                         Appearance: average unknown)  



                                                  body habitus           

 

           (unknown)  (no       (unknown)  (unknown)  Obtaining and/or  (units  

  (unknown)



                    date)                         reviewing separately unknown) 

 



                                                  obtained history: 5           

 

           (unknown)  (no       (unknown)  (unknown)  Opioids - Morphine  (units

    (unknown)



                    date)                         Analogues Allergy unknown)  



                                                  (Intermediate,           



                                                  Verified 23           



                                                  14:59)              

 

           (unknown)  (no       (unknown)  (unknown)  Orientation: alert  (units

    (unknown)



                    date)                         and oriented x3 unknown)  

 

           (unknown)  (no       (unknown)  (unknown)  Other Menstrual  (units   

 (unknown)



                    date)                         Period: Other unknown)  

 

           (unknown)  (no       (unknown)  (unknown)  Other:    (units    (unkno

wn)



                    date)                                   unknown)  

 

           (unknown)  (no       (unknown)  (unknown)  Ovarian cyst  (units    (u

nknown)



                    date)                                   unknown)  

 

           (unknown)  (no       (unknown)  (unknown)  Oxygen Delivery  (units   

 (unknown)



                    date)                         Method room air unknown)  

 

           (unknown)  (no       (unknown)  (unknown)  PFSH      (units    (unkno

wn)



                    date)                                   unknown)  

 

           (unknown)  (no       (unknown)  (unknown)  Painful menstrual  (units 

   (unknown)



                    date)                         periods (-) unknown)  

 

           (unknown)  (no       (unknown)  (unknown)  Palpation: soft, no  (unit

s    (unknown)



                    date)                         hepatosplenomegaly unknown)  



                                                  and tender (LLQ)           

 

           (unknown)  (no       (unknown)  (unknown)  Patient counseled  (units 

   (unknown)



                    date)                         regarding unknown)  



                                                  alternatives, risks,          

 



                                                  benefits, and           



                                                  potential           

 

           (unknown)  (no       (unknown)  (unknown)  Patient understands  (unit

s    (unknown)



                    date)                         that if her left unknown)  



                                                  fallopian tube is           



                                                  removed, she will be          

 

 

           (unknown)  (no       (unknown)  (unknown)  Patient:  (units    (unkno

wn)



                    date)                         Lesvia Aguero E unknown)  



                                                  MR#: [id number]             

 

           (unknown)  (no       (unknown)  (unknown)  Pelvic Support:  (units   

 (unknown)



                    date)                         normal    unknown)  

 

           (unknown)  (no       (unknown)  (unknown)  Performing a  (units    (u

nknown)



                    date)                         medically unknown)  



                                                  appropriate exam           



                                                  and/or evaluation: 5          

 

 

           (unknown)  (no       (unknown)  (unknown)  Plan      (units    (unkno

wn)



                    date)                                   unknown)  

 

           (unknown)  (no       (unknown)  (unknown)  Position Sitting  (units  

  (unknown)



                    date)                                   unknown)  

 

           (unknown)  (no       (unknown)  (unknown)  Preparing to see  (units  

  (unknown)



                    date)                         the patient, i.e., unknown)  



                                                  chart review, review          

 



                                                  of tests: 5           

 

           (unknown)  (no       (unknown)  (unknown)  Problem-specific  (units  

  (unknown)



                    date)                         ROS positives unknown)  



                                                  included with the           



                                                  HPI                 

 

           (unknown)  (no       (unknown)  (unknown)  Psych     (units    (unkno

wn)



                    date)                                   unknown)  

 

           (unknown)  (no       (unknown)  (unknown)  Pt is here for a  (units  

  (unknown)



                    date)                         pre-op    unknown)  

 

           (unknown)  (no       (unknown)  (unknown)  Pulse 60  (units    (unkno

wn)



                    date)                                   unknown)  

 

           (unknown)  (no       (unknown)  (unknown)  Pulse Oximetry (%)  (units

    (unknown)



                    date)                         99        unknown)  

 

           (unknown)  (no       (unknown)  (unknown)  Pulse Source  (units    (u

nknown)



                    date)                         Monitor   unknown)  

 

           (unknown)  (no       (unknown)  (unknown)  ROS Narrative  (units    (

unknown)



                    date)                                   unknown)  

 

           (unknown)  (no       (unknown)  (unknown)  ROS Narrative:  (units    

(unknown)



                    date)                                   unknown)  

 

           (unknown)  (no       (unknown)  (unknown)  ROS       (units    (unkno

wn)



                    date)                                   unknown)  

 

           (unknown)  (no       (unknown)  (unknown)  Rate: regular rate  (units

    (unknown)



                    date)                                   unknown)  

 

           (unknown)  (no       (unknown)  (unknown)  Reason For Visit  (units  

  (unknown)



                    date)                                   unknown)  

 

           (unknown)  (no       (unknown)  (unknown)  Recto-Vaginal: no  (units 

   (unknown)



                    date)                         cul-de-sac fullness, unknown) 

 



                                                  cul-de-sac           



                                                  tenderness (Mild)           



                                                  and no              

 

           (unknown)  (no       (unknown)  (unknown)  Resp      (units    (unkno

wn)



                    date)                                   unknown)  

 

           (unknown)  (no       (unknown)  (unknown)  Rhythm: regular  (units   

 (unknown)



                    date)                         rhythm    unknown)  

 

           (unknown)  (no       (unknown)  (unknown)  S/P ACL   (units    (unkno

wn)



                    date)                         reconstruction unknown)  



                                                  (-21)           

 

           (unknown)  (no       (unknown)  (unknown)  Sclera: sclerae  (units   

 (unknown)



                    date)                         normal    unknown)  

 

           (unknown)  (no       (unknown)  (unknown)  Signed By:  (units    (unk

nown)



                    date)                         <Electronically unknown)  



                                                  signed by Jose Almodovar MD>           

 

           (unknown)  (no       (unknown)  (unknown)  Signed    (units    (unkno

wn)



                    date)                                   unknown)  

 

           (unknown)  (no       (unknown)  (unknown)  Smoking Status:  (units   

 (unknown)



                    date)                         Current some day unknown)  



                                                  smoker              

 

           (unknown)  (no       (unknown)  (unknown)  Social History  (units    

(unknown)



                    date)                                   unknown)  

 

           (unknown)  (no       (unknown)  (unknown)  Speculum Exam -  (units   

 (unknown)



                    date)                         Cervix: normal unknown)  



                                                  appearance of the           



                                                  cervix, no cervical           



                                                  discharge,           

 

           (unknown)  (no       (unknown)  (unknown)  Speculum Exam -  (units   

 (unknown)



                    date)                         Vagina: normal unknown)  



                                                  appearance of the           



                                                  vagina, abnormal           



                                                  vaginal             

 

           (unknown)  (no       (unknown)  (unknown)  Speech and  (units    (unk

nown)



                    date)                         Movement: speech and unknown) 

 



                                                  movement normal           

 

           (unknown)  (no       (unknown)  (unknown)  Status: Acute  (units    (

unknown)



                    date)                                   unknown)  

 

           (unknown)  (no       (unknown)  (unknown)  Surgical History  (units  

  (unknown)



                    date)                         (Updated 23 @ unknown)  



                                                  08:13 by Shayna Morales RN)           

 

           (unknown)  (no       (unknown)  (unknown)  TOA (tubo-ovarian  (units 

   (unknown)



                    date)                         abscess) (-21) unknown) 

 

 

           (unknown)  (no       (unknown)  (unknown)  Temp 98.5 F  (units    (un

known)



                    date)                                   unknown)  

 

           (unknown)  (no       (unknown)  (unknown)  Temp Source  (units    (un

known)



                    date)                         Temporal Artery Scan unknown) 

 

 

           (unknown)  (no       (unknown)  (unknown)  This note may have  (units

    (unknown)



                    date)                         been all or unknown)  



                                                  partially generated           



                                                  using voice           



                                                  recognition           

 

           (unknown)  (no       (unknown)  (unknown)  Thought Content:  (units  

  (unknown)



                    date)                         normal    unknown)  

 

           (unknown)  (no       (unknown)  (unknown)  Thought Process:  (units  

  (unknown)



                    date)                         normal    unknown)  

 

           (unknown)  (no       (unknown)  (unknown)  Thyroid: thyroid  (units  

  (unknown)



                    date)                         normal    unknown)  

 

           (unknown)  (no       (unknown)  (unknown)  Time Coding Minutes  (unit

s    (unknown)



                    date)                         Spent: (must be on unknown)  



                                                  same date of           



                                                  service/appointment)          

 

 

           (unknown)  (no       (unknown)  (unknown)  Time Spent  (units    (unk

nown)



                    date)                                   unknown)  

 

           (unknown)  (no       (unknown)  (unknown)  Tobacco + Substance  (unit

s    (unknown)



                    date)                         Use       unknown)  

 

           (unknown)  (no       (unknown)  (unknown)  Tobacco Status  (units    

(unknown)



                    date)                                   unknown)  

 

           (unknown)  (no       (unknown)  (unknown)  Total Time: 35  (units    

(unknown)



                    date)                                   unknown)  

 

           (unknown)  (no       (unknown)  (unknown)  Urethra: normal  (units   

 (unknown)



                    date)                         appearance of the unknown)  



                                                  urethra             

 

           (unknown)  (no       (unknown)  (unknown)  Uterine body is  (units   

 (unknown)



                    date)                         normal in size.? unknown)  



                                                  Echogenic fluid in           



                                                  the uterine cavity           



                                                  may                 

 

           (unknown)  (no       (unknown)  (unknown)  Visit Reasons:  (units    

(unknown)



                    date)                         Pre-op    unknown)  

 

           (unknown)  (no       (unknown)  (unknown)  Vitals    (units    (unkno

wn)



                    date)                                   unknown)  

 

           (unknown)  (no       (unknown)  (unknown)  Vomiting  (units    (unkno

wn)



                    date)                                   unknown)  

 

           (unknown)  (no       (unknown)  (unknown)  Weight 162 lb  (units    (

unknown)



                    date)                                   unknown)  

 

           (unknown)  (no       (unknown)  (unknown)  [Rx Confirmed  (units    (

unknown)



                    date)                         23] unknown)  

 

           (unknown)  (no       (unknown)  (unknown) ability to work and  (units

    (unknown)



                    date)                         normal daily unknown)  



                                                  activities, she has           



                                                  decided that she           



                                                  would like           

 

           (unknown)  (no       (unknown)  (unknown)  alcohol intake:  (units   

 (unknown)



                    date)                         current   unknown)  

 

           (unknown)  (no       (unknown)  (unknown)  although a  (units    (unk

nown)



                    date)                                   unknown)  

 

           (unknown)  (no       (unknown)  (unknown)  amoxicillin Adverse  (unit

s    (unknown)



                    date)                         Reaction (Verified unknown)  



                                                  23 15:00)           

 

           (unknown)  (no       (unknown)  (unknown)  and       (units    (unkno

wn)



                    date)                                   unknown)  

 

           (unknown)  (no       (unknown)  (unknown)  ay occur.  (units    (unkn

own)



                    date)                         Occasional unknown)  



                                                  wrong-word or           



                                                  'sound-alike'           



                                                  substitutions may           



                                                  have                

 

           (unknown)  (no       (unknown)  (unknown)  bleeding for the  (units  

  (unknown)



                    date)                         last 2 weeks and her unknown) 

 



                                                  pain has been worse           



                                                  during that period           



                                                  of                  

 

           (unknown)  (no       (unknown)  (unknown)  blood products.?  (units  

  (unknown)



                    date)                         There is no definite unknown) 

 



                                                  evidence of           



                                                  gestational sac.?           



                                                  There is a           

 

           (unknown)  (no       (unknown)  (unknown)  but smaller 1.5 cm  (units

    (unknown)



                    date)                         hypoechoic area in unknown)  



                                                  the left ovary.?           

 

           (unknown)  (no       (unknown)  (unknown)  cannot be strictly  (units

    (unknown)



                    date)                         excluded for either unknown)  



                                                  these locations.           

 

           (unknown)  (no       (unknown)  (unknown)  clindamycin HCl 150  (unit

s    (unknown)



                    date)                         mg capsule 150 mg PO unknown) 

 



                                                  DAILY #30 caps           



                                                  22 [Rx           



                                                  Confirmed           

 

           (unknown)  (no       (unknown)  (unknown)  clinical symptoms  (units 

   (unknown)



                    date)                         recommended. unknown)  

 

           (unknown)  (no       (unknown)  (unknown)  complications  (units    (

unknown)



                    date)                         associated with unknown)  



                                                  laparoscopic left           



                                                  salpingectomy,           



                                                  possible            

 

           (unknown)  (no       (unknown)  (unknown)  corpus luteum or  (units  

  (unknown)



                    date)                         hemorrhagic cyst unknown)  



                                                  could have a similar          

 



                                                  appearance.? There           



                                                  is a                

 

           (unknown)  (no       (unknown)  (unknown)  cul-de-sac  (units    (unk

nown)



                    date)                         nodularlity unknown)  

 

           (unknown)  (no       (unknown)  (unknown)  cystectomy or  (units    (

unknown)



                    date)                         possible left unknown)  



                                                  oophorectomy. At the          

 



                                                  same time she would           



                                                  like to             

 

           (unknown)  (no       (unknown)  (unknown) cystectomy will be  (units 

   (unknown)



                    date)                         performed if unknown)  



                                                  indicated and           



                                                  removal of the left           



                                                  ovary would be           

 

           (unknown)  (no       (unknown)  (unknown)  discharge ([place];  (unit

s    (unknown)



                    date)                         confirmed by wet unknown)  



                                                  prep) and no lesions          

 

 

           (unknown)  (no       (unknown)  (unknown)  doxycycline Adverse  (unit

s    (unknown)



                    date)                         Reaction  unknown)  



                                                  (Intermediate,           



                                                  Verified 23           



                                                  14:59)              

 

           (unknown)  (no       (unknown)  (unknown) fluconazole 150 mg  (units 

   (unknown)



                    date)                         tablet (Diflucan) unknown)  



                                                  150 mg PO Q48H 2           



                                                  doses #2 tabs           



                                                  02/15/23 [Rx           

 

           (unknown)  (no       (unknown)  (unknown)  have a Mirena IUD  (units 

   (unknown)



                    date)                         inserted to address unknown)  



                                                  her persistent           



                                                  menometrorrhagia.           



                                                  She                 

 

           (unknown)  (no       (unknown)  (unknown)  have occurred. If  (units 

   (unknown)



                    date)                         there are any unknown)  



                                                  questions, please           



                                                  contact the Medical           



                                                  Records             

 

           (unknown)  (no       (unknown)  (unknown)  her pain, she would  (unit

s    (unknown)



                    date)                         want to removed. As unknown)  



                                                  far as the left           



                                                  ovary is concerned,           

 

           (unknown)  (no       (unknown)  (unknown)  household members:  (units

    (unknown)



                    date)                         friend(s) unknown)  

 

           (unknown)  (no       (unknown)  (unknown)  hydrocodone 5  (units    (

unknown)



                    date)                         mg-acetaminophen 325 unknown) 

 



                                                  mg tablet 1 tab PO           



                                                  Q4-6H PRN pain #10           



                                                  tabs                

 

           (unknown)  (no       (unknown)  (unknown)  hydromorphone 4 mg  (units

    (unknown)



                    date)                         tablet (Dilaudid) 4 unknown)  



                                                  mg PO Q4-6H PRN pain          

 



                                                  #20 tabs 22           

 

           (unknown)  (no       (unknown)  (unknown)  if the tube is  (units    

(unknown)



                    date)                         severely affected by unknown) 

 



                                                  adhesive disease and          

 



                                                  possible left           



                                                  ovarian             

 

           (unknown)  (no       (unknown)  (unknown)  intermittent.? It  (units 

   (unknown)



                    date)                         does not seem to be unknown)  



                                                  related to her           



                                                  cycles but she is           



                                                  been                

 

           (unknown)  (no       (unknown)  (unknown)  ketorolac 10 mg  (units   

 (unknown)



                    date)                         tablet 10 mg PO Q6H unknown)  



                                                  PRN pain #14 tabs           



                                                  22 [Rx           



                                                  Confirmed           

 

           (unknown)  (no       (unknown)  (unknown)  laparoscopic left  (units 

   (unknown)



                    date)                         oophorectomy, and unknown)  



                                                  placement of a           



                                                  Mirena intrauterine           



                                                  device.             

 

           (unknown)  (no       (unknown)  (unknown)  left, No cul-de-sac  (unit

s    (unknown)



                    date)                         fullness, cul-de-sac unknown) 

 



                                                  tenderness (Mild)           



                                                  and No cul-de-sac           

 

           (unknown)  (no       (unknown)  (unknown)  movement or empties  (unit

s    (unknown)



                    date)                         her bladder.? Pelvic unknown) 

 



                                                  US performed           



                                                  2/15/2023 shows:           

 

           (unknown)  (no       (unknown)  (unknown)  no lesions and  (units    

(unknown)



                    date)                         nontender unknown)  

 

           (unknown)  (no       (unknown)  (unknown)  nodularity  (units    (unk

nown)



                    date)                                   unknown)  

 

           (unknown)  (no       (unknown)  (unknown)  occurred due to the  (unit

s    (unknown)



                    date)                         inherent limitations unknown) 

 



                                                  of voice recognition          

 



                                                  software. Please           

 

           (unknown)  (no       (unknown)  (unknown)  ondansetron 4 mg  (units  

  (unknown)



                    date)                         disintegrating unknown)  



                                                  tablet 4 mg PO           



                                                  TID-QID PRN nausea           



                                                  and vomiting           

 

           (unknown)  (no       (unknown)  (unknown)  oxycodone 5 mg  (units    

(unknown)



                    date)                         tablet 5 mg PO Q4H unknown)  



                                                  PRN pain #20 tabs           



                                                  22 [Rx           



                                                  Confirmed           

 

           (unknown)  (no       (unknown)  (unknown)  performed only as a  (unit

s    (unknown)



                    date)                         last resort. With unknown)  



                                                  full understanding           



                                                  of the above, a           

 

           (unknown)  (no       (unknown)  (unknown)  peripheral  (units    (unk

nown)



                    date)                         vascularity.? This unknown)  



                                                  could potentially           



                                                  represent an ectopic          

 



                                                  pregnancy           

 

           (unknown)  (no       (unknown)  (unknown)  persistent left  (units   

 (unknown)



                    date)                         lower quadrant pain unknown)  



                                                  which is sharp and           



                                                  stabbing, and           

 

           (unknown)  (no       (unknown)  (unknown)  point and if  (units    (u

nknown)



                    date)                         removal of the unknown)  



                                                  fallopian tube will           



                                                  reasonably           



                                                  improved/eliminate           

 

           (unknown)  (no       (unknown)  (unknown)  possible, her pain  (units

    (unknown)



                    date)                         is exceedingly unknown)  



                                                  disruptive to her           



                                                  life and well-being           



                                                  at this             

 

           (unknown)  (no       (unknown)  (unknown)  pregnancy  (units    (unkn

own)



                    date)                                   unknown)  

 

           (unknown)  (no       (unknown)  (unknown)  presents today for  (units

    (unknown)



                    date)                         preoperative unknown)  



                                                  evaluation,           



                                                  counseling, and           



                                                  consent.            

 

           (unknown)  (no       (unknown)  (unknown)  rash      (units    (unkno

wn)



                    date)                                   unknown)  

 

           (unknown)  (no       (unknown)  (unknown)  read the note  (units    (

unknown)



                    date)                         carefully and unknown)  



                                                  recognize, using           



                                                  context, where these          

 



                                                  substitutions           

 

           (unknown)  (no       (unknown)  (unknown)  represent  (units    (unkn

own)



                    date)                                   unknown)  

 

           (unknown)  (no       (unknown)  (unknown)  reproductive  (units    (u

nknown)



                    date)                         technology. While unknown)  



                                                  she would prefer to           



                                                  retain her fertility          

 



                                                  if                  

 

           (unknown)  (no       (unknown)  (unknown)  sentences  (units    (unkn

own)



                    date)                                   unknown)  

 

           (unknown)  (no       (unknown)  (unknown)  similar   (units    (unkno

wn)



                    date)                                   unknown)  

 

           (unknown)  (no       (unknown)  (unknown)  software. Although  (units

    (unknown)



                    date)                         every effort is made unknown) 

 



                                                  to edit content,           



                                                  transcription errors          

 



                                                  m                   

 

           (unknown)  (no       (unknown)  (unknown) thick-walled 2.7 cm  (units

    (unknown)



                    date)                         heterogeneous unknown)  



                                                  complex hypoechoic           



                                                  mass in the left           



                                                  ovary with           

 

           (unknown)  (no       (unknown)  (unknown)  time.? In addition  (units

    (unknown)



                    date)                         she has significant unknown)  



                                                  pain when she           



                                                  strains to have a           



                                                  bowel               

 

           (unknown)  (no       (unknown)  (unknown)  to proceed with a  (units 

   (unknown)



                    date)                         2nd laparoscopy with unknown) 

 



                                                  plans to remove the           



                                                  left fallopian tube           

 

           (unknown)  (no       (unknown)  (unknown)  unable to bear  (units    

(unknown)



                    date)                         children in the unknown)  



                                                  future without the           



                                                  benefit of assisted           

 

           (unknown)  (no       (unknown)  (unknown)  urethra and no  (units    

(unknown)



                    date)                         lesions   unknown)  

 

           (unknown)  (no       (unknown)  (unknown)  uterine shape  (units    (

unknown)



                    date)                         normal, No tender, unknown)  



                                                  no cervical motion           



                                                  tenderness and           



                                                  tender              

 

           (unknown)  (no       (unknown)  (unknown)  vomitt    (units    (unkno

wn)



                    date)                                   unknown)  

 

           (unknown)  (no       (unknown)  (unknown)  written consent was  (unit

s    (unknown)



                    date)                         executed, signed, unknown)  



                                                  and witnessed this           



                                                  date.               









                                         Result panel 365









           (unknown)  (no       (unknown)  (unknown)  (no value)  (units    (unk

nown)



                    date)                                   unknown)  

 

           (unknown)  (no       (unknown)  (unknown)  (Diflucan)  (units    (unk

nown)



                    date)                                   unknown)  

 

           (unknown)  (no       (unknown)  (unknown)  (Dilaudid)  (units    (unk

nown)



                    date)                                   unknown)  

 

           (unknown)  (no       (unknown)  (unknown)  [id number]  (units    (unkno

wn)



                    date)                                   unknown)  

 

           (unknown)  (no       (unknown)  (unknown)  02:29     (units    (unkno

wn)



                    date)                                   unknown)  

 

           (unknown)  (no       (unknown)  (unknown)  23  (units    (unkno

wn)



                    date)                                   unknown)  

 

           (unknown)  (no       (unknown)  (unknown)  1 tab PO Q4-6H  (units    

(unknown)



                    date)                         PRN (Reason: unknown)  



                                                  pain) Qty: 10 0RF           

 

           (unknown)  (no       (unknown)  (unknown)  10 mg PO Q6H PRN  (units  

  (unknown)



                    date)                         (Reason: pain) unknown)  



                                                  Qty: 14 0RF           

 

           (unknown)  (no       (unknown)  (unknown)  150 mg PO DAILY  (units   

 (unknown)



                    date)                         Qty: 30 0RF unknown)  

 

           (unknown)  (no       (unknown)  (unknown)  150 mg PO Q48H  (units    

(unknown)



                    date)                         Qty: 2 4RF unknown)  

 

           (unknown)  (no       (unknown)  (unknown)  4 mg PO Q4-6H  (units    (

unknown)



                    date)                         PRN (Reason: unknown)  



                                                  pain) Qty: 20 0RF           

 

           (unknown)  (no       (unknown)  (unknown)  4 mg PO TID-QID  (units   

 (unknown)



                    date)                         PRN (Reason: unknown)  



                                                  nausea and           



                                                  vomiting) Qty: 10           



                                                  0RF                 

 

           (unknown)  (no       (unknown)  (unknown)  5 mg PO Q4H PRN  (units   

 (unknown)



                    date)                         (Reason: pain) unknown)  



                                                  Qty: 20 0RF           

 

           (unknown)  (no       (unknown)  (unknown)  Acne (-2016)  (units    (u

nknown)



                    date)                                   unknown)  

 

           (unknown)  (no       (unknown)  (unknown)  Age/Sex: 17 / F  (units   

 (unknown)



                    date)                                   unknown)  

 

           (unknown)  (no       (unknown)  (unknown)  Alcohol type:  (units    (

unknown)



                    date)                         wine      unknown)  

 

           (unknown)  (no       (unknown)  (unknown)  Allergies  (units    (unkn

own)



                    date)                                   unknown)  

 

           (unknown)  (no       (unknown)  (unknown)  Allergy/AdvReac  (units   

 (unknown)



                    date)                         Type Severity unknown)  



                                                  Reaction Status           



                                                  Date / Time           

 

           (unknown)  (no       (unknown)  (unknown)  Anesthesia  (units    (unk

nown)



                    date)                                   unknown)  

 

           (unknown)  (no       (unknown)  (unknown)  Bedside Urine  (units    (

unknown)



                    date)                         Bilirubin - unknown)  



                                                  Negative            

 

           (unknown)  (no       (unknown)  (unknown)  Bedside Urine  (units    (

unknown)



                    date)                         Glucose Negative unknown)  

 

           (unknown)  (no       (unknown)  (unknown)  Bedside Urine  (units    (

unknown)



                    date)                         Ketone - Negative unknown)  

 

           (unknown)  (no       (unknown)  (unknown)  Bedside Urine  (units    (

unknown)



                    date)                         Leukocytes - unknown)  



                                                  Negative            

 

           (unknown)  (no       (unknown)  (unknown)  Bedside Urine  (units    (

unknown)



                    date)                         Nitrite - unknown)  



                                                  Negative            

 

           (unknown)  (no       (unknown)  (unknown)  Bedside Urine  (units    (

unknown)



                    date)                         Occult Blood - unknown)  



                                                  Negative            

 

           (unknown)  (no       (unknown)  (unknown)  Bedside Urine  (units    (

unknown)



                    date)                         Protein - unknown)  



                                                  Negative            

 

           (unknown)  (no       (unknown)  (unknown)  Bedside Urine  (units    (

unknown)



                    date)                         Urobilinogen - unknown)  



                                                  Negative            

 

           (unknown)  (no       (unknown)  (unknown)  Bedside Urine pH  (units  

  (unknown)



                    date)                         6.0       unknown)  

 

           (unknown)  (no       (unknown)  (unknown)  Blood Pressure  (units    

(unknown)



                    date)                         120/62 23 unknown)  



                                                  02:29               

 

           (unknown)  (no       (unknown)  (unknown)  Blood Pressure  (units    

(unknown)



                    date)                         120/62    unknown)  

 

           (unknown)  (no       (unknown)  (unknown)  Chief complaint:  (units  

  (unknown)



                    date)                         Abdominal Pain unknown)  

 

           (unknown)  (no       (unknown)  (unknown)  Chlamydia  (units    (unkn

own)



                    date)                         infection () unknown)  

 

           (unknown)  (no       (unknown)  (unknown)  Course    (units    (unkno

wn)



                    date)                                   unknown)  

 

           (unknown)  (no       (unknown)  (unknown)  : 2003  (units   

 (unknown)



                    date)                         Acct:[id number] unknown)  

 

           (unknown)  (no       (unknown)  (unknown)  Date of Service:  (units  

  (unknown)



                    date)                         23  unknown)  

 

           (unknown)  (no       (unknown)  (unknown)  Departure  (units    (unkn

own)



                    date)                                   unknown)  

 

           (unknown)  (no       (unknown)  (unknown)  Discharge Plan  (units    

(unknown)



                    date)                                   unknown)  

 

           (unknown)  (no       (unknown)  (unknown)  ER Physician:  (units    (

unknown)



                    date)                         Jorge Quach unknown)  



                                                  [email or web address]                

 

           (unknown)  (no       (unknown)  (unknown)  Emergency Report  (units  

  (unknown)



                    date)                                   unknown)  

 

           (unknown)  (no       (unknown)  (unknown)  Endometriosis  (units    (

unknown)



                    date)                         ()   unknown)  

 

           (unknown)  (no       (unknown)  (unknown)  Esterase  (units    (unkno

wn)



                    date)                                   unknown)  

 

           (unknown)  (no       (unknown)  (unknown)  Exam      (units    (unkno

wn)



                    date)                                   unknown)  

 

           (unknown)  (no       (unknown)  (unknown)  General   (units    (unkno

wn)



                    date)                                   unknown)  

 

           (unknown)  (no       (unknown)  (unknown)  HPI - General  (units    (

unknown)



                    date)                         Adult     unknown)  

 

           (unknown)  (no       (unknown)  (unknown)  Heavy menstrual  (units   

 (unknown)



                    date)                         period () unknown)  

 

           (unknown)  (no       (unknown)  (unknown)  Hx of     (units    (unkno

wn)



                    date)                         laparoscopy unknown)  



                                                  (22)           

 

           (unknown)  (no       (unknown)  (unknown)  Initial Vital  (units    (

unknown)



                    date)                         Signs     unknown)  

 

           (unknown)  (no       (unknown)  (unknown)  Initial Vital  (units    (

unknown)



                    date)                         Signs:    unknown)  

 

           (unknown)  (no       (unknown)  (unknown)  Irregular  (units    (unkn

own)



                    date)                         menstrual cycle unknown)  



                                                  ()             

 

           (unknown)  (no       (unknown)  (unknown)  [facility]  (units   

 (unknown)



                    date)                         [address] unknown)  



                                                  [place]           



                                                  [id number]               

 

           (unknown)  (no       (unknown)  (unknown)  Arcenio Darnell  (units  

  (unknown)



                    date)                         HOLLY kim [Primary unknown)  



                                                  Care Provider]           

 

           (unknown)  (no       (unknown)  (unknown)  Lab Data  (units    (unkno

wn)



                    date)                                   unknown)  

 

           (unknown)  (no       (unknown)  (unknown)  Labs:     (units    (unkno

wn)



                    date)                                   unknown)  

 

           (unknown)  (no       (unknown)  (unknown)  Lymphangioma  (units    (u

nknown)



                    date)                                   unknown)  

 

           (unknown)  (no       (unknown)  (unknown)  Medical Decision  (units  

  (unknown)



                    date)                         Making    unknown)  

 

           (unknown)  (no       (unknown)  (unknown)  Medical History  (units   

 (unknown)



                    date)                         (Updated 23 unknown)  



                                                  @ 17:43 by Jose Almodovar MD)           

 

           (unknown)  (no       (unknown)  (unknown)  Medication  (units    (unk

nown)



                    date)                         Instructions unknown)  



                                                  Recorded            

 

           (unknown)  (no       (unknown)  (unknown)  Mode of arrival:  (units  

  (unknown)



                    date)                         Ambulatory unknown)  

 

           (unknown)  (no       (unknown)  (unknown)  No Action  (units    (unkn

own)



                    date)                                   unknown)  

 

           (unknown)  (no       (unknown)  (unknown)  Opioids -  (units    (unkn

own)



                    date)                         Morphine  unknown)  



                                                  Analogues Allergy           



                                                  Intermediate rash           



                                                  Verified 23           



                                                  14:59               

 

           (unknown)  (no       (unknown)  (unknown)  Ovarian cyst  (units    (u

nknown)



                    date)                                   unknown)  

 

           (unknown)  (no       (unknown)  (unknown)  Oxygen Delivery  (units   

 (unknown)



                    date)                         Method Room Air unknown)  



                                                  23 02:29           

 

           (unknown)  (no       (unknown)  (unknown)  Oxygen Delivery  (units   

 (unknown)



                    date)                         Method Room Air unknown)  

 

           (unknown)  (no       (unknown)  (unknown)  Painful   (units    (unkno

wn)



                    date)                         menstrual periods unknown)  



                                                  ()             

 

           (unknown)  (no       (unknown)  (unknown)  Patient History  (units   

 (unknown)



                    date)                                   unknown)  

 

           (unknown)  (no       (unknown)  (unknown)  Patient:  (units    (unkno

wn)



                    date)                         Lesvia Aguero unknown)  



                                                  MR#: [id number]             

 

           (unknown)  (no       (unknown)  (unknown)  Point of care  (units    (

unknown)



                    date)                         testing:  unknown)  

 

           (unknown)  (no       (unknown)  (unknown)  Prescriptions:  (units    

(unknown)



                    date)                                   unknown)  

 

           (unknown)  (no       (unknown)  (unknown)  Previous Rx's  (units    (

unknown)



                    date)                                   unknown)  

 

           (unknown)  (no       (unknown)  (unknown)  Pulse Oximetry  (units    

(unknown)



                    date)                         98 23 02:29 unknown)  

 

           (unknown)  (no       (unknown)  (unknown)  Pulse Oximetry  (units    

(unknown)



                    date)                         98        unknown)  

 

           (unknown)  (no       (unknown)  (unknown)  Pulse Rate 60  (units    (

unknown)



                    date)                         23 02:29 unknown)  

 

           (unknown)  (no       (unknown)  (unknown)  Pulse Rate 60  (units    (

unknown)



                    date)                                   unknown)  

 

           (unknown)  (no       (unknown)  (unknown)  Referrals:  (units    (unk

nown)



                    date)                                   unknown)  

 

           (unknown)  (no       (unknown)  (unknown)  Related Data  (units    (u

nknown)



                    date)                                   unknown)  

 

           (unknown)  (no       (unknown)  (unknown)  Repeat second  (units    (

unknown)



                    date)                         dose 48 hrs after unknown)  



                                                  first dose.           

 

           (unknown)  (no       (unknown)  (unknown)  Respiratory Rate  (units  

  (unknown)



                    date)                         16 23 02:29 unknown)  

 

           (unknown)  (no       (unknown)  (unknown)  Respiratory Rate  (units  

  (unknown)



                    date)                         16        unknown)  

 

           (unknown)  (no       (unknown)  (unknown)  Rx Instructions:  (units  

  (unknown)



                    date)                                   unknown)  

 

           (unknown)  (no       (unknown)  (unknown)  S/P ACL   (units    (unkno

wn)



                    date)                         reconstruction unknown)  



                                                  (-21)           

 

           (unknown)  (no       (unknown)  (unknown)  Signed By:  (units    (unk

nown)



                    date)                                   unknown)  

 

           (unknown)  (no       (unknown)  (unknown)  Smoking Status:  (units   

 (unknown)



                    date)                         Current some day unknown)  



                                                  smoker              

 

           (unknown)  (no       (unknown)  (unknown)  Social History  (units    

(unknown)



                    date)                         (Reviewed unknown)  



                                                  23 @ 02:02           



                                                  by Terrell Arnold MD)           

 

           (unknown)  (no       (unknown)  (unknown)  Source: patient  (units   

 (unknown)



                    date)                         and family unknown)  

 

           (unknown)  (no       (unknown)  (unknown)  Stated    (units    (unkno

wn)



                    date)                         complaint: ABD unknown)  



                                                  PAIN                

 

           (unknown)  (no       (unknown)  (unknown)  Substance Use  (units    (

unknown)



                    date)                         Type: does not unknown)  



                                                  use                 

 

           (unknown)  (no       (unknown)  (unknown)  Surgical History  (units  

  (unknown)



                    date)                         (Updated 23 unknown)  



                                                  @ 08:13 by Shayna Morales RN)           

 

           (unknown)  (no       (unknown)  (unknown)  TOA       (units    (unkno

wn)



                    date)                         (tubo-ovarian unknown)  



                                                  abscess)            



                                                  (-21)           

 

           (unknown)  (no       (unknown)  (unknown)  Temperature 97.7  (units  

  (unknown)



                    date)                         F 23 02:29 unknown)  

 

           (unknown)  (no       (unknown)  (unknown)  Temperature 97.7  (units  

  (unknown)



                    date)                         F         unknown)  

 

           (unknown)  (no       (unknown)  (unknown)  Time Seen by  (units    (u

nknown)



                    date)                         Provider: unknown)  



                                                  23 02:27           

 

           (unknown)  (no       (unknown)  (unknown)  Urine Dip  (units    (unkn

own)



                    date)                                   unknown)  

 

           (unknown)  (no       (unknown)  (unknown)  Urine Specific  (units    

(unknown)



                    date)                         Gravity 1.020 unknown)  

 

           (unknown)  (no       (unknown)  (unknown)  Vital Signs - 8  (units   

 (unknown)



                    date)                         hr        unknown)  

 

           (unknown)  (no       (unknown)  (unknown)  Vital Signs  (units    (un

known)



                    date)                                   unknown)  

 

           (unknown)  (no       (unknown)  (unknown)  Vital signs:  (units    (u

nknown)



                    date)                                   unknown)  

 

           (unknown)  (no       (unknown)  (unknown)  alcohol intake  (units    

(unknown)



                    date)                         frequency: unknown)  



                                                  holidays/special           



                                                  occasions only           

 

           (unknown)  (no       (unknown)  (unknown)  alcohol intake:  (units   

 (unknown)



                    date)                         current   unknown)  

 

           (unknown)  (no       (unknown)  (unknown)  amoxicillin  (units    (un

known)



                    date)                         AdvReac Vomiting unknown)  



                                                  Verified 23           



                                                  15:00               

 

           (unknown)  (no       (unknown)  (unknown)  clindamycin HCl  (units   

 (unknown)



                    date)                         150 mg capsule unknown)  



                                                  150 mg PO DAILY           



                                                  #30 caps 22           

 

           (unknown)  (no       (unknown)  (unknown)  clindamycin HCl  (units   

 (unknown)



                    date)                         150 mg capsule unknown)  

 

           (unknown)  (no       (unknown)  (unknown)  doxycycline  (units    (un

known)



                    date)                         AdvReac   unknown)  



                                                  Intermediate           



                                                  vomitt Verified           



                                                  23 14:59           

 

           (unknown)  (no       (unknown)  (unknown)  fluconazole 150  (units   

 (unknown)



                    date)                         mg tablet 150 mg unknown)  



                                                  PO Q48H 2 doses           



                                                  #2 tabs 02/15/23           

 

           (unknown)  (no       (unknown)  (unknown)  fluconazole  (units    (un

known)



                    date)                         [Diflucan] 150 mg unknown)  



                                                  tablet              

 

           (unknown)  (no       (unknown)  (unknown)  household  (units    (unkn

own)



                    date)                         members:  unknown)  



                                                  friend(s)           

 

           (unknown)  (no       (unknown)  (unknown)  hydrocodone 5  (units    (

unknown)



                    date)                         mg-acetaminophen unknown)  



                                                  325 1 tab PO           



                                                  Q4-6H PRN pain           



                                                  #10 tabs 22           

 

           (unknown)  (no       (unknown)  (unknown)  hydrocodone-acet  (units  

  (unknown)



                    date)                         aminophen 5-325 unknown)  



                                                  mg tablet           

 

           (unknown)  (no       (unknown)  (unknown)  hydromorphone 4  (units   

 (unknown)



                    date)                         mg tablet 4 mg PO unknown)  



                                                  Q4-6H PRN pain           



                                                  #20 tabs 22           

 

           (unknown)  (no       (unknown)  (unknown)  hydromorphone  (units    (

unknown)



                    date)                         [Dilaudid] 4 mg unknown)  



                                                  tablet              

 

           (unknown)  (no       (unknown)  (unknown)  ketorolac 10 mg  (units   

 (unknown)



                    date)                         tablet 10 mg PO unknown)  



                                                  Q6H PRN pain #14           



                                                  tabs 22           

 

           (unknown)  (no       (unknown)  (unknown)  ketorolac 10 mg  (units   

 (unknown)



                    date)                         tablet    unknown)  

 

           (unknown)  (no       (unknown)  (unknown)  mg tablet  (units    (unkn

own)



                    date)                                   unknown)  

 

           (unknown)  (no       (unknown)  (unknown)  ondansetron 4 mg  (units  

  (unknown)



                    date)                         disintegrating 4 unknown)  



                                                  mg PO TID-QID PRN           



                                                  nausea and           



                                                  22            

 

           (unknown)  (no       (unknown)  (unknown)  ondansetron 4 mg  (units  

  (unknown)



                    date)                         tablet,disintegra unknown)  



                                                  ting                

 

           (unknown)  (no       (unknown)  (unknown)  oxycodone 5 mg  (units    

(unknown)



                    date)                         tablet 5 mg PO unknown)  



                                                  Q4H PRN pain #20           



                                                  tabs 22           

 

           (unknown)  (no       (unknown)  (unknown)  oxycodone 5 mg  (units    

(unknown)



                    date)                         tablet    unknown)  

 

           (unknown)  (no       (unknown)  (unknown)  tablet vomiting  (units   

 (unknown)



                    date)                         #10 tabs  unknown)  

 

           (unknown)  (no       (unknown)  (unknown)  tobacco type:  (units    (

unknown)



                    date)                         vaping    unknown)  









                                         Result panel 366









           (unknown)  (no       (unknown)  (unknown)  (no value)  (units    (unk

nown)



                    date)                                   unknown)  

 

           (unknown)  (no       (unknown)  (unknown)  <Electronically  (units   

 (unknown)



                    date)                         signed by Jorge Quach D.O.>           

 

           (unknown)  (no       (unknown)  (unknown)  (Diflucan)  (units    (unk

nown)



                    date)                                   unknown)  

 

           (unknown)  (no       (unknown)  (unknown)  (Dilaudid)  (units    (unk

nown)



                    date)                                   unknown)  

 

           (unknown)  (no       (unknown)  (unknown)  [id number]  (units    (unkno

wn)



                    date)                                   unknown)  

 

           (unknown)  (no       (unknown)  (unknown)  02:29     (units    (unkno

wn)



                    date)                                   unknown)  

 

           (unknown)  (no       (unknown)  (unknown)  23 0331  (units    (

unknown)



                    date)                                   unknown)  

 

           (unknown)  (no       (unknown)  (unknown)  23  (units    (unkno

wn)



                    date)                                   unknown)  

 

           (unknown)  (no       (unknown)  (unknown)  1 tab PO Q4-6H PRN  (units

    (unknown)



                    date)                         (Reason: pain) Qty: unknown)  



                                                  10 0RF              

 

           (unknown)  (no       (unknown)  (unknown)  10 mg PO Q6H PRN  (units  

  (unknown)



                    date)                         (Reason: pain) Qty: unknown)  



                                                  14 0RF              

 

           (unknown)  (no       (unknown)  (unknown)  150 mg PO DAILY  (units   

 (unknown)



                    date)                         Qty: 30 0RF unknown)  

 

           (unknown)  (no       (unknown)  (unknown)  150 mg PO Q48H  (units    

(unknown)



                    date)                         Qty: 2 4RF unknown)  

 

           (unknown)  (no       (unknown)  (unknown)  4 mg PO Q4-6H PRN  (units 

   (unknown)



                    date)                         (Reason: pain) Qty: unknown)  



                                                  20 0RF              

 

           (unknown)  (no       (unknown)  (unknown)  4 mg PO TID-QID  (units   

 (unknown)



                    date)                         PRN (Reason: nausea unknown)  



                                                  and vomiting) Qty:           



                                                  10 0RF              

 

           (unknown)  (no       (unknown)  (unknown)  5 mg PO Q4H PRN  (units   

 (unknown)



                    date)                         (Reason: pain) Qty: unknown)  



                                                  20 0RF              

 

           (unknown)  (no       (unknown)  (unknown)  Abdominal pain  (units    

(unknown)



                    date)                                   unknown)  

 

           (unknown)  (no       (unknown)  (unknown)  Acne (-2016)  (units    (u

nknown)



                    date)                                   unknown)  

 

           (unknown)  (no       (unknown)  (unknown)  Activity  (units    (unkno

wn)



                    date)                         Restrictions/Additi unknown)  



                                                  onal Instructions:           

 

           (unknown)  (no       (unknown)  (unknown)  Age/Sex: 17 / F  (units   

 (unknown)



                    date)                                   unknown)  

 

           (unknown)  (no       (unknown)  (unknown)  Alcohol type: wine  (units

    (unknown)



                    date)                                   unknown)  

 

           (unknown)  (no       (unknown)  (unknown)  Allergies  (units    (unkn

own)



                    date)                                   unknown)  

 

           (unknown)  (no       (unknown)  (unknown)  Allergy/AdvReac  (units   

 (unknown)



                    date)                         Type Severity unknown)  



                                                  Reaction Status           



                                                  Date / Time           

 

           (unknown)  (no       (unknown)  (unknown)  Anesthesia  (units    (unk

nown)



                    date)                                   unknown)  

 

           (unknown)  (no       (unknown)  (unknown)  Back/Spine/Pelvis  (units 

   (unknown)



                    date)                                   unknown)  

 

           (unknown)  (no       (unknown)  (unknown)  Back: No CVA  (units    (u

nknown)



                    date)                         tenderness unknown)  

 

           (unknown)  (no       (unknown)  (unknown)  Bedside Urine  (units    (

unknown)



                    date)                         Bilirubin - unknown)  



                                                  Negative            

 

           (unknown)  (no       (unknown)  (unknown)  Bedside Urine  (units    (

unknown)



                    date)                         Glucose Negative unknown)  

 

           (unknown)  (no       (unknown)  (unknown)  Bedside Urine  (units    (

unknown)



                    date)                         Ketone - Negative unknown)  

 

           (unknown)  (no       (unknown)  (unknown)  Bedside Urine  (units    (

unknown)



                    date)                         Leukocytes - unknown)  



                                                  Negative            

 

           (unknown)  (no       (unknown)  (unknown)  Bedside Urine  (units    (

unknown)



                    date)                         Nitrite - Negative unknown)  

 

           (unknown)  (no       (unknown)  (unknown)  Bedside Urine  (units    (

unknown)



                    date)                         Occult Blood - unknown)  



                                                  Negative            

 

           (unknown)  (no       (unknown)  (unknown)  Bedside Urine  (units    (

unknown)



                    date)                         Protein - Negative unknown)  

 

           (unknown)  (no       (unknown)  (unknown)  Bedside Urine  (units    (

unknown)



                    date)                         Urobilinogen - unknown)  



                                                  Negative            

 

           (unknown)  (no       (unknown)  (unknown)  Bedside Urine pH  (units  

  (unknown)



                    date)                         6.0       unknown)  

 

           (unknown)  (no       (unknown)  (unknown)  Blood Pressure  (units    

(unknown)



                    date)                         120/62 23 unknown)  



                                                  02:29               

 

           (unknown)  (no       (unknown)  (unknown)  Blood Pressure  (units    

(unknown)



                    date)                         120/62    unknown)  

 

           (unknown)  (no       (unknown)  (unknown)  Chief complaint:  (units  

  (unknown)



                    date)                         Abdominal Pain unknown)  

 

           (unknown)  (no       (unknown)  (unknown)  Chlamydia  (units    (unkn

own)



                    date)                         infection () unknown)  

 

           (unknown)  (no       (unknown)  (unknown)  Clinical  (units    (unkno

wn)



                    date)                         Impression: unknown)  

 

           (unknown)  (no       (unknown)  (unknown)  Const     (units    (unkno

wn)



                    date)                                   unknown)  

 

           (unknown)  (no       (unknown)  (unknown)  Constitutional  (units    

(unknown)



                    date)                                   unknown)  

 

           (unknown)  (no       (unknown)  (unknown)  Constitutional:  (units   

 (unknown)



                    date)                         Reports system unknown)  



                                                  reviewed and no           



                                                  additional           



                                                  complaints, except           



                                                  as                  

 

           (unknown)  (no       (unknown)  (unknown)  Course    (units    (unkno

wn)



                    date)                                   unknown)  

 

           (unknown)  (no       (unknown)  (unknown)  : 2003  (units   

 (unknown)



                    date)                         Acct:[id number] unknown)  

 

           (unknown)  (no       (unknown)  (unknown)  Date of Service:  (units  

  (unknown)



                    date)                         23  unknown)  

 

           (unknown)  (no       (unknown)  (unknown)  Departure  (units    (unkn

own)



                    date)                                   unknown)  

 

           (unknown)  (no       (unknown)  (unknown)  Discharge Plan  (units    

(unknown)



                    date)                                   unknown)  

 

           (unknown)  (no       (unknown)  (unknown)  Discontinued  (units    (u

nknown)



                    date)                         Medications unknown)  

 

           (unknown)  (no       (unknown)  (unknown)  Documented By: GC  (units 

   (unknown)



                    date)                                   unknown)  

 

           (unknown)  (no       (unknown)  (unknown)  ER Physician:  (units    (

unknown)



                    date)                         Jorge Quach D.O. unknown)  

 

           (unknown)  (no       (unknown)  (unknown)  Emergency Report  (units  

  (unknown)



                    date)                                   unknown)  

 

           (unknown)  (no       (unknown)  (unknown)  Endometriosis  (units    (

unknown)



                    date)                         ()   unknown)  

 

           (unknown)  (no       (unknown)  (unknown)  Esterase  (units    (unkno

wn)



                    date)                                   unknown)  

 

           (unknown)  (no       (unknown)  (unknown)  Exam      (units    (unkno

wn)



                    date)                                   unknown)  

 

           (unknown)  (no       (unknown)  (unknown)  GI        (units    (unkno

wn)



                    date)                                   unknown)  

 

           (unknown)  (no       (unknown)  (unknown)          (units    (unkno

wn)



                    date)                                   unknown)  

 

           (unknown)  (no       (unknown)  (unknown)  Gastrointestinal  (units  

  (unknown)



                    date)                                   unknown)  

 

           (unknown)  (no       (unknown)  (unknown)  Gastrointestinal:  (units 

   (unknown)



                    date)                         Reports system unknown)  



                                                  reviewed and no           



                                                  additional           



                                                  complaints, except           

 

           (unknown)  (no       (unknown)  (unknown)  General   (units    (unkno

wn)



                    date)                                   unknown)  

 

           (unknown)  (no       (unknown)  (unknown)  General:  (units    (unkno

wn)



                    date)                         cooperative and unknown)  



                                                  comfortable           

 

           (unknown)  (no       (unknown)  (unknown)  General: patient  (units  

  (unknown)



                    date)                         alert, patient unknown)  



                                                  awake and moves all           



                                                  extremities           

 

           (unknown)  (no       (unknown)  (unknown)  Genitourinary  (units    (

unknown)



                    date)                                   unknown)  

 

           (unknown)  (no       (unknown)  (unknown)  Genitourinary:  (units    

(unknown)



                    date)                         Reports system unknown)  



                                                  reviewed and no           



                                                  additional           



                                                  complaints, except           



                                                  as                  

 

           (unknown)  (no       (unknown)  (unknown)  Glucose POC 94  (units    

(unknown)



                    date)                                   unknown)  

 

           (unknown)  (no       (unknown)  (unknown)  HPI - General  (units    (

unknown)



                    date)                         Adult     unknown)  

 

           (unknown)  (no       (unknown)  (unknown)  HPI narrative:  (units    

(unknown)



                    date)                                   unknown)  

 

           (unknown)  (no       (unknown)  (unknown)  Heavy menstrual  (units   

 (unknown)



                    date)                         period (-) unknown)  

 

           (unknown)  (no       (unknown)  (unknown)  History of Present  (units

    (unknown)



                    date)                         Illness   unknown)  

 

           (unknown)  (no       (unknown)  (unknown)  Hx of laparoscopy  (units 

   (unknown)



                    date)                         (22) unknown)  

 

           (unknown)  (no       (unknown)  (unknown)  Hydromorphone HCl  (units 

   (unknown)



                    date)                         (Hydromorphone 1 Mg unknown)  



                                                  Inj) 1 mg IM NOW           



                                                  ONE                 

 

           (unknown)  (no       (unknown)  (unknown)  Initial Vital  (units    (

unknown)



                    date)                         Signs     unknown)  

 

           (unknown)  (no       (unknown)  (unknown)  Initial Vital  (units    (

unknown)



                    date)                         Signs:    unknown)  

 

           (unknown)  (no       (unknown)  (unknown)  Inspection: normal  (units

    (unknown)



                    date)                         to inspection unknown)  

 

           (unknown)  (no       (unknown)  (unknown)  Instructions: DI  (units  

  (unknown)



                    date)                         for Abdominal unknown)  



                                                  Pain-Adult           

 

           (unknown)  (no       (unknown)  (unknown)  Integumentary/[place]  (units

    (unknown)



                    date)                         sts       unknown)  

 

           (unknown)  (no       (unknown)  (unknown)  Irregular  (units    (unkn

own)



                    date)                         menstrual cycle unknown)  



                                                  ()             

 

           (unknown)  (no       (unknown)  (unknown)  [facility]  (units   

 (unknown)



                    date)                         [address] unknown)  



                                                  [place] [id number]           

 

           (unknown)  (no       (unknown)  (unknown)  Casie Darnell,  (units

    (unknown)



                    date)                         ARNP [Primary Care unknown)  



                                                  Provider]           

 

           (unknown)  (no       (unknown)  (unknown)  Lab Data  (units    (unkno

wn)



                    date)                                   unknown)  

 

           (unknown)  (no       (unknown)  (unknown)  Labs:     (units    (unkno

wn)



                    date)                                   unknown)  

 

           (unknown)  (no       (unknown)  (unknown)  Last Admin:  (units    (un

known)



                    date)                         23 02:47 unknown)  



                                                  Dose: 1 mg           

 

           (unknown)  (no       (unknown)  (unknown)  Left-sided adnexal  (units

    (unknown)



                    date)                         pain      unknown)  

 

           (unknown)  (no       (unknown)  (unknown)  Lymphangioma  (units    (u

nknown)



                    date)                                   unknown)  

 

           (unknown)  (no       (unknown)  (unknown)  MDM Narrative  (units    (

unknown)



                    date)                                   unknown)  

 

           (unknown)  (no       (unknown)  (unknown)  Medical Decision  (units  

  (unknown)



                    date)                         Making    unknown)  

 

           (unknown)  (no       (unknown)  (unknown)  Medical History  (units   

 (unknown)



                    date)                         (Reviewed 23 unknown)  



                                                  @ 03:28 by Jorge Quach DO)           

 

           (unknown)  (no       (unknown)  (unknown)  Medical decision  (units  

  (unknown)



                    date)                         making narrative: unknown)  

 

           (unknown)  (no       (unknown)  (unknown)  Medication  (units    (unk

nown)



                    date)                         Instructions unknown)  



                                                  Recorded            

 

           (unknown)  (no       (unknown)  (unknown)  Mode of arrival:  (units  

  (unknown)



                    date)                         Ambulatory unknown)  

 

           (unknown)  (no       (unknown)  (unknown)  Neuro     (units    (unkno

wn)



                    date)                                   unknown)  

 

           (unknown)  (no       (unknown)  (unknown)  No Action  (units    (unkn

own)



                    date)                                   unknown)  

 

           (unknown)  (no       (unknown)  (unknown)  Opioids - Morphine  (units

    (unknown)



                    date)                         Analogues Allergy unknown)  



                                                  Intermediate rash           



                                                  Verified 23           



                                                  14:59               

 

           (unknown)  (no       (unknown)  (unknown)  Ordered:  (units    (unkno

wn)



                    date)                                   unknown)  

 

           (unknown)  (no       (unknown)  (unknown)  Orders    (units    (unkno

wn)



                    date)                                   unknown)  

 

           (unknown)  (no       (unknown)  (unknown)  Other:    (units    (unkno

wn)



                    date)                                   unknown)  

 

           (unknown)  (no       (unknown)  (unknown)  Ovarian cyst  (units    (u

nknown)



                    date)                                   unknown)  

 

           (unknown)  (no       (unknown)  (unknown)  Oxygen Delivery  (units   

 (unknown)



                    date)                         Method Room Air unknown)  



                                                  23 02:29           

 

           (unknown)  (no       (unknown)  (unknown)  Oxygen Delivery  (units   

 (unknown)



                    date)                         Method Room Air unknown)  

 

           (unknown)  (no       (unknown)  (unknown)  Painful menstrual  (units 

   (unknown)



                    date)                         periods () unknown)  

 

           (unknown)  (no       (unknown)  (unknown)  Palpation: soft,  (units  

  (unknown)



                    date)                         No firm, No rigid unknown)  



                                                  and tender (Left           



                                                  lower quadrant)           

 

           (unknown)  (no       (unknown)  (unknown)  Patient   (units    (unkno

wn)



                    date)                         Disposition: Home unknown)  

 

           (unknown)  (no       (unknown)  (unknown)  Patient History  (units   

 (unknown)



                    date)                                   unknown)  

 

           (unknown)  (no       (unknown)  (unknown)  Patient does have  (units 

   (unknown)



                    date)                         a benign exam. She unknown)  



                                                  does have           



                                                  left-sided           



                                                  abdomen/adnexal           



                                                  pain                

 

           (unknown)  (no       (unknown)  (unknown) Patient is a  (units    (un

known)



                    date)                         17-year-old female unknown)  



                                                  who has had issues           



                                                  with chronic           



                                                  abdominal/pelvic           

 

           (unknown)  (no       (unknown)  (unknown)  Patient:  (units    (unkno

wn)



                    date)                         Lesvia Aguero [facility] unknown)  



                                                  MR#: [id number]             

 

           (unknown)  (no       (unknown)  (unknown)  Point of Care  (units    (

unknown)



                    date)                         Testing   unknown)  

 

           (unknown)  (no       (unknown)  (unknown)  Point of care  (units    (

unknown)



                    date)                         testing:  unknown)  

 

           (unknown)  (no       (unknown)  (unknown)  Pregnancy Test  (units    

(unknown)



                    date)                         Results Negative unknown)  

 

           (unknown)  (no       (unknown)  (unknown)  Prescriptions:  (units    

(unknown)



                    date)                                   unknown)  

 

           (unknown)  (no       (unknown)  (unknown)  Previous Rx's  (units    (

unknown)



                    date)                                   unknown)  

 

           (unknown)  (no       (unknown)  (unknown)  Pulse Oximetry 98  (units 

   (unknown)



                    date)                         23 02:29 unknown)  

 

           (unknown)  (no       (unknown)  (unknown)  Pulse Oximetry 98  (units 

   (unknown)



                    date)                                   unknown)  

 

           (unknown)  (no       (unknown)  (unknown)  Pulse Rate 60  (units    (

unknown)



                    date)                         23 02:29 unknown)  

 

           (unknown)  (no       (unknown)  (unknown)  Pulse Rate 60  (units    (

unknown)



                    date)                                   unknown)  

 

           (unknown)  (no       (unknown)  (unknown)  Recommend that you  (units

    (unknown)



                    date)                         continue to take unknown)  



                                                  all of your           



                                                  medications as           



                                                  directed and           

 

           (unknown)  (no       (unknown)  (unknown)  Referrals:  (units    (unk

nown)



                    date)                                   unknown)  

 

           (unknown)  (no       (unknown)  (unknown)  Related Data  (units    (u

nknown)



                    date)                                   unknown)  

 

           (unknown)  (no       (unknown)  (unknown)  Repeat second dose  (units

    (unknown)



                    date)                         48 hrs after first unknown)  



                                                  dose.               

 

           (unknown)  (no       (unknown)  (unknown)  Respiratory Rate  (units  

  (unknown)



                    date)                         16 23 02:29 unknown)  

 

           (unknown)  (no       (unknown)  (unknown)  Respiratory Rate  (units  

  (unknown)



                    date)                         16        unknown)  

 

           (unknown)  (no       (unknown)  (unknown)  Review of Systems  (units 

   (unknown)



                    date)                                   unknown)  

 

           (unknown)  (no       (unknown)  (unknown)  Rx Instructions:  (units  

  (unknown)



                    date)                                   unknown)  

 

           (unknown)  (no       (unknown)  (unknown)  S/P ACL   (units    (unkno

wn)



                    date)                         reconstruction unknown)  



                                                  (-21)           

 

           (unknown)  (no       (unknown)  (unknown)  She was given pain  (units

    (unknown)



                    date)                         medication which unknown)  



                                                  she states improved           



                                                  her pain quite a           



                                                  bit.                

 

           (unknown)  (no       (unknown)  (unknown)  Signed By:  (units    (unk

nown)



                    date)                                   unknown)  

 

           (unknown)  (no       (unknown)  (unknown)  Skin/Breast:  (units    (u

nknown)



                    date)                         Reports system unknown)  



                                                  reviewed and no           



                                                  additional           



                                                  complaints, except           



                                                  as                  

 

           (unknown)  (no       (unknown)  (unknown)  Smoking Status:  (units   

 (unknown)



                    date)                         Current some day unknown)  



                                                  smoker              

 

           (unknown)  (no       (unknown)  (unknown)  Social History  (units    

(unknown)



                    date)                         (Reviewed 23 unknown)  



                                                  @ 03:28 by Jorge Quach DO)           

 

           (unknown)  (no       (unknown)  (unknown)  Source: patient  (units   

 (unknown)



                    date)                         and family unknown)  

 

           (unknown)  (no       (unknown)  (unknown)  Stand Alone Forms:  (units

    (unknown)



                    date)                         Patient Portal/API, unknown)  



                                                  Work Release Note           

 

           (unknown)  (no       (unknown)  (unknown)  Stated complaint:  (units 

   (unknown)



                    date)                         ABD PAIN  unknown)  

 

           (unknown)  (no       (unknown)  (unknown)  Stop: 23  (units    

(unknown)



                    date)                         02:40     unknown)  

 

           (unknown)  (no       (unknown)  (unknown)  Substance Use  (units    (

unknown)



                    date)                         Type: does not use unknown)  

 

           (unknown)  (no       (unknown)  (unknown)  Surgical History  (units  

  (unknown)



                    date)                         (Updated 23 @ unknown)  



                                                  08:13 by Shayna Morales RN)           

 

           (unknown)  (no       (unknown)  (unknown)  TOA (tubo-ovarian  (units 

   (unknown)



                    date)                         abscess)  unknown)  



                                                  (-21)           

 

           (unknown)  (no       (unknown)  (unknown)  Temperature 97.7 F  (units

    (unknown)



                    date)                         23 02:29 unknown)  

 

           (unknown)  (no       (unknown)  (unknown)  Temperature 97.7 F  (units

    (unknown)



                    date)                                   unknown)  

 

           (unknown)  (no       (unknown)  (unknown)  Time Seen by  (units    (u

nknown)



                    date)                         Provider: 23 unknown)  



                                                  02:27               

 

           (unknown)  (no       (unknown)  (unknown)  Urine Dip  (units    (unkn

own)



                    date)                                   unknown)  

 

           (unknown)  (no       (unknown)  (unknown)  Urine Specific  (units    

(unknown)



                    date)                         Gravity 1.020 unknown)  

 

           (unknown)  (no       (unknown)  (unknown)  Vital Signs - 8 hr  (units

    (unknown)



                    date)                                   unknown)  

 

           (unknown)  (no       (unknown)  (unknown)  Vital Signs  (units    (un

known)



                    date)                                   unknown)  

 

           (unknown)  (no       (unknown)  (unknown)  Vital signs:  (units    (u

nknown)



                    date)                                   unknown)  

 

           (unknown)  (no       (unknown)  (unknown)  Will discharge  (units    

(unknown)



                    date)                         patient home with unknown)  



                                                  instructions to           



                                                  keep her            



                                                  appointment with           



                                                  the                 

 

           (unknown)  (no       (unknown)  (unknown)  alcohol intake  (units    

(unknown)



                    date)                         frequency: unknown)  



                                                  holidays/special           



                                                  occasions only           

 

           (unknown)  (no       (unknown)  (unknown)  alcohol intake:  (units   

 (unknown)



                    date)                         current   unknown)  

 

           (unknown)  (no       (unknown)  (unknown)  amoxicillin  (units    (un

known)



                    date)                         AdvReac Vomiting unknown)  



                                                  Verified 23           



                                                  15:00               

 

           (unknown)  (no       (unknown)  (unknown)  as documented  (units    (

unknown)



                    date)                                   unknown)  

 

           (unknown)  (no       (unknown)  (unknown)  clindamycin HCl  (units   

 (unknown)



                    date)                         150 mg capsule 150 unknown)  



                                                  mg PO DAILY #30           



                                                  caps 22           

 

           (unknown)  (no       (unknown)  (unknown)  clindamycin HCl  (units   

 (unknown)



                    date)                         150 mg capsule unknown)  

 

           (unknown)  (no       (unknown)  (unknown)  documented  (units    (unk

nown)



                    date)                                   unknown)  

 

           (unknown)  (no       (unknown)  (unknown)  doxycycline  (units    (un

known)



                    date)                         AdvReac   unknown)  



                                                  Intermediate vomitt           



                                                  Verified 23           



                                                  14:59               

 

           (unknown)  (no       (unknown)  (unknown)  emergency  (units    (unkn

own)



                    date)                         department for new unknown)  



                                                  symptoms.           

 

           (unknown)  (no       (unknown)  (unknown)  fluconazole 150 mg  (units

    (unknown)



                    date)                         tablet 150 mg PO unknown)  



                                                  Q48H 2 doses #2           



                                                  tabs 02/15/23           

 

           (unknown)  (no       (unknown)  (unknown)  fluconazole  (units    (un

known)



                    date)                         [Diflucan] 150 mg unknown)  



                                                  tablet              

 

           (unknown)  (no       (unknown)  (unknown)  follow all of the  (units 

   (unknown)



                    date)                         instructions given unknown)  



                                                  to you by the           



                                                  surgeon. Return to           



                                                  the                 

 

           (unknown)  (no       (unknown)  (unknown)  further evaluate  (units  

  (unknown)



                    date)                         the left-sided pain unknown)  



                                                  and potential           



                                                  removal of her           



                                                  fallopian           

 

           (unknown)  (no       (unknown)  (unknown)  gyn tomorrow for  (units  

  (unknown)



                    date)                         her surgery. She unknown)  



                                                  was given return           



                                                  precautions. She           



                                                  expressed           

 

           (unknown)  (no       (unknown)  (unknown)  household members:  (units

    (unknown)



                    date)                         friend(s) unknown)  

 

           (unknown)  (no       (unknown)  (unknown)  hydrocodone 5  (units    (

unknown)



                    date)                         mg-acetaminophen unknown)  



                                                  325 1 tab PO Q4-6H           



                                                  PRN pain #10 tabs           



                                                  22            

 

           (unknown)  (no       (unknown)  (unknown)  hydrocodone-acetam  (units

    (unknown)



                    date)                         inophen 5-325 mg unknown)  



                                                  tablet              

 

           (unknown)  (no       (unknown)  (unknown)  hydromorphone 4 mg  (units

    (unknown)



                    date)                         tablet 4 mg PO unknown)  



                                                  Q4-6H PRN pain #20           



                                                  tabs 22           

 

           (unknown)  (no       (unknown)  (unknown)  hydromorphone  (units    (

unknown)



                    date)                         [Dilaudid] 4 mg unknown)  



                                                  tablet              

 

           (unknown)  (no       (unknown)  (unknown)  ketorolac 10 mg  (units   

 (unknown)



                    date)                         tablet 10 mg PO Q6H unknown)  



                                                  PRN pain #14 tabs           



                                                  22            

 

           (unknown)  (no       (unknown)  (unknown)  ketorolac 10 mg  (units   

 (unknown)



                    date)                         tablet    unknown)  

 

           (unknown)  (no       (unknown)  (unknown)  mg tablet  (units    (unkn

own)



                    date)                                   unknown)  

 

           (unknown)  (no       (unknown)  (unknown)  ondansetron 4 mg  (units  

  (unknown)



                    date)                         disintegrating 4 mg unknown)  



                                                  PO TID-QID PRN           



                                                  nausea and 22           

 

           (unknown)  (no       (unknown)  (unknown)  ondansetron 4 mg  (units  

  (unknown)



                    date)                         tablet,disintegrati unknown)  



                                                  ng                  

 

           (unknown)  (no       (unknown)  (unknown)  oxycodone 5 mg  (units    

(unknown)



                    date)                         tablet 5 mg PO Q4H unknown)  



                                                  PRN pain #20 tabs           



                                                  22            

 

           (unknown)  (no       (unknown)  (unknown)  oxycodone 5 mg  (units    

(unknown)



                    date)                         tablet    unknown)  

 

           (unknown)  (no       (unknown)  (unknown)  pain for many  (units    (

unknown)



                    date)                         weeks/months. She unknown)  



                                                  is scheduled for           



                                                  surgery tomorrow           



                                                  with her gyn           

 

           (unknown)  (no       (unknown)  (unknown)  tablet vomiting  (units   

 (unknown)



                    date)                         #10 tabs  unknown)  

 

           (unknown)  (no       (unknown)  (unknown)  that she states is  (units

    (unknown)



                    date)                         consistent with her unknown)  



                                                  prior pain. She is           



                                                  had a very           



                                                  extensive           

 

           (unknown)  (no       (unknown)  (unknown)  the left side of  (units  

  (unknown)



                    date)                         her abdomen that is unknown)  



                                                  consistent with her           



                                                  prior discomfort.           



                                                  No                  

 

           (unknown)  (no       (unknown)  (unknown)  tobacco type:  (units    (

unknown)



                    date)                         vaping    unknown)  

 

           (unknown)  (no       (unknown)  (unknown)  tomorrow she is an  (units

    (unknown)



                    date)                         extensive workup we unknown)  



                                                  will hold on           



                                                  further imaging for           



                                                  now.                

 

           (unknown)  (no       (unknown)  (unknown)  tube. She states  (units  

  (unknown)



                    date)                         that for the past unknown)  



                                                  several days she is           



                                                  had increasing pain           



                                                  in                  

 

           (unknown)  (no       (unknown)  (unknown)  understanding  (units    (

unknown)



                    date)                                   unknown)  

 

           (unknown)  (no       (unknown)  (unknown)  vaginal bleeding.  (units 

   (unknown)



                    date)                                   unknown)  

 

           (unknown)  (no       (unknown)  (unknown)  vomiting. No  (units    (u

nknown)



                    date)                         fevers. No change unknown)  



                                                  in bowel habits. No           



                                                  urinary symptoms.           



                                                  No                  

 

           (unknown)  (no       (unknown)  (unknown)  workup of the  (units    (

unknown)



                    date)                         symptoms. Given the unknown)  



                                                  fact that she is           



                                                  scheduled for           



                                                  surgery             









                                         Result panel [id number]









           (unknown)  (no       (unknown)  (unknown)  (no value)  (units    (unk

nown)



                    date)                                   unknown)  

 

           (unknown)  (no       (unknown)  (unknown)  (Diflucan)  (units    (unk

nown)



                    date)                                   unknown)  

 

           (unknown)  (no       (unknown)  (unknown)  (Dilaudid)  (units    (unk

nown)



                    date)                                   unknown)  

 

           (unknown)  (no       (unknown)  (unknown)  [id number]  (units    (unkno

wn)



                    date)                                   unknown)  

 

           (unknown)  (no       (unknown)  (unknown)  1 tab PO Q4-6H  (units    

(unknown)



                    date)                         PRN (Reason: unknown)  



                                                  pain) Qty: 10 0RF           

 

           (unknown)  (no       (unknown)  (unknown)  10 mg PO Q6H PRN  (units  

  (unknown)



                    date)                         (Reason: pain) unknown)  



                                                  Qty: 14 0RF           

 

           (unknown)  (no       (unknown)  (unknown)  150 mg PO DAILY  (units   

 (unknown)



                    date)                         Qty: 30 0RF unknown)  

 

           (unknown)  (no       (unknown)  (unknown)  150 mg PO Q48H  (units    

(unknown)



                    date)                         Qty: 2 4RF unknown)  

 

           (unknown)  (no       (unknown)  (unknown)  4 mg PO Q4-6H  (units    (

unknown)



                    date)                         PRN (Reason: unknown)  



                                                  pain) Qty: 20 0RF           

 

           (unknown)  (no       (unknown)  (unknown)  4 mg PO TID-QID  (units   

 (unknown)



                    date)                         PRN (Reason: unknown)  



                                                  nausea and           



                                                  vomiting) Qty: 10           



                                                  0RF                 

 

           (unknown)  (no       (unknown)  (unknown)  5 mg PO Q4H PRN  (units   

 (unknown)



                    date)                         (Reason: pain) unknown)  



                                                  Qty: 20 0RF           

 

           (unknown)  (no       (unknown)  (unknown)  Acne (-2016)  (units    (u

nknown)



                    date)                                   unknown)  

 

           (unknown)  (no       (unknown)  (unknown)  Age/Sex: 17 / F  (units   

 (unknown)



                    date)                                   unknown)  

 

           (unknown)  (no       (unknown)  (unknown)  Alcohol type:  (units    (

unknown)



                    date)                         wine      unknown)  

 

           (unknown)  (no       (unknown)  (unknown)  Allergies  (units    (unkn

own)



                    date)                                   unknown)  

 

           (unknown)  (no       (unknown)  (unknown)  Allergy/AdvReac  (units   

 (unknown)



                    date)                         Type Severity unknown)  



                                                  Reaction Status           



                                                  Date / Time           

 

           (unknown)  (no       (unknown)  (unknown)  Anesthesia  (units    (unk

nown)



                    date)                                   unknown)  

 

           (unknown)  (no       (unknown)  (unknown)  Chlamydia  (units    (unkn

own)



                    date)                         infection () unknown)  

 

           (unknown)  (no       (unknown)  (unknown)  : 2005  (units   

 (unknown)



                    date)                         Acct:[id number] unknown)  

 

           (unknown)  (no       (unknown)  (unknown)  Date of Service:  (units  

  (unknown)



                    date)                         23  unknown)  

 

           (unknown)  (no       (unknown)  (unknown)  Departure  (units    (unkn

own)



                    date)                                   unknown)  

 

           (unknown)  (no       (unknown)  (unknown)  Discharge Plan  (units    

(unknown)



                    date)                                   unknown)  

 

           (unknown)  (no       (unknown)  (unknown)  ER Physician:  (units    (

unknown)



                    date)                         Magdalena Cai unknown)  



                                                  D.O.                

 

           (unknown)  (no       (unknown)  (unknown)  Emergency Report  (units  

  (unknown)



                    date)                                   unknown)  

 

           (unknown)  (no       (unknown)  (unknown)  Endometriosis  (units    (

unknown)



                    date)                         ()   unknown)  

 

           (unknown)  (no       (unknown)  (unknown)  General   (units    (unkno

wn)



                    date)                                   unknown)  

 

           (unknown)  (no       (unknown)  (unknown)  HPI - Abdominal  (units   

 (unknown)



                    date)                         Pain      unknown)  

 

           (unknown)  (no       (unknown)  (unknown)  Heavy menstrual  (units   

 (unknown)



                    date)                         period (-) unknown)  

 

           (unknown)  (no       (unknown)  (unknown)  Hx of     (units    (unkno

wn)



                    date)                         laparoscopy unknown)  



                                                  (22)           

 

           (unknown)  (no       (unknown)  (unknown)  Irregular  (units    (unkn

own)



                    date)                         menstrual cycle unknown)  



                                                  ()             

 

           (unknown)  (no       (unknown)  (unknown)  [facility]  (units   

 (unknown)



                    date)                         [address] [place] [address] unknown)  



                                                  [place]           



                                                  [id number]               

 

           (unknown)  (no       (unknown)  (unknown)  Arcenio Darnell  (units  

  (unknown)



                    date)                         HOLLY kim [Primary unknown)  



                                                  Care Provider]           

 

           (unknown)  (no       (unknown)  (unknown)  Limitations: no  (units   

 (unknown)



                    date)                         limitations unknown)  

 

           (unknown)  (no       (unknown)  (unknown)  Lymphangioma  (units    (u

nknown)



                    date)                                   unknown)  

 

           (unknown)  (no       (unknown)  (unknown)  Medical History  (units   

 (unknown)



                    date)                         (Reviewed unknown)  



                                                  23 @ 05:21           



                                                  by Magdalena Cai DO)                 

 

           (unknown)  (no       (unknown)  (unknown)  Medication  (units    (unk

nown)



                    date)                         Instructions unknown)  



                                                  Recorded            

 

           (unknown)  (no       (unknown)  (unknown)  Mode of arrival:  (units  

  (unknown)



                    date)                         Ambulatory unknown)  

 

           (unknown)  (no       (unknown)  (unknown)  No Action  (units    (unkn

own)



                    date)                                   unknown)  

 

           (unknown)  (no       (unknown)  (unknown)  Opioids -  (units    (unkn

own)



                    date)                         Morphine  unknown)  



                                                  Analogues Allergy           



                                                  Intermediate rash           



                                                  Verified 23           



                                                  14:59               

 

           (unknown)  (no       (unknown)  (unknown)  Ovarian cyst  (units    (u

nknown)



                    date)                                   unknown)  

 

           (unknown)  (no       (unknown)  (unknown)  Painful   (units    (unkno

wn)



                    date)                         menstrual periods unknown)  



                                                  ()             

 

           (unknown)  (no       (unknown)  (unknown)  Patient History  (units   

 (unknown)



                    date)                                   unknown)  

 

           (unknown)  (no       (unknown)  (unknown)  Patient:  (units    (unkno

wn)



                    date)                         Lesvia Aguero E unknown)  



                                                  MR#: [id number]             

 

           (unknown)  (no       (unknown)  (unknown)  Prescriptions:  (units    

(unknown)



                    date)                                   unknown)  

 

           (unknown)  (no       (unknown)  (unknown)  Previous Rx's  (units    (

unknown)



                    date)                                   unknown)  

 

           (unknown)  (no       (unknown)  (unknown) ROS Unobtainable:  (units  

  (unknown)



                    date)                         All systems unknown)  



                                                  reviewed + are           



                                                  unremarkable           



                                                  except as noted           



                                                  in HPI              

 

           (unknown)  (no       (unknown)  (unknown)  Referrals:  (units    (unk

nown)



                    date)                                   unknown)  

 

           (unknown)  (no       (unknown)  (unknown)  Related Data  (units    (u

nknown)



                    date)                                   unknown)  

 

           (unknown)  (no       (unknown)  (unknown)  Repeat second  (units    (

unknown)



                    date)                         dose 48 hrs after unknown)  



                                                  first dose.           

 

           (unknown)  (no       (unknown)  (unknown)  Review of  (units    (unkn

own)



                    date)                         Systems   unknown)  

 

           (unknown)  (no       (unknown)  (unknown)  Rx Instructions:  (units  

  (unknown)



                    date)                                   unknown)  

 

           (unknown)  (no       (unknown)  (unknown)  S/P ACL   (units    (unkno

wn)



                    date)                         reconstruction unknown)  



                                                  ()           

 

           (unknown)  (no       (unknown)  (unknown)  Signed By:  (units    (unk

nown)



                    date)                                   unknown)  

 

           (unknown)  (no       (unknown)  (unknown)  Smoking Status:  (units   

 (unknown)



                    date)                         Current some day unknown)  



                                                  smoker              

 

           (unknown)  (no       (unknown)  (unknown)  Social History  (units    

(unknown)



                    date)                         (Reviewed unknown)  



                                                  23 @ 05:21           



                                                  by Magdalena Cai DO)                 

 

           (unknown)  (no       (unknown)  (unknown)  Source: patient  (units   

 (unknown)



                    date)                                   unknown)  

 

           (unknown)  (no       (unknown)  (unknown)  Stated    (units    (unkno

wn)



                    date)                         Complaint: abd unknown)  



                                                  pain                

 

           (unknown)  (no       (unknown)  (unknown)  Substance Use  (units    (

unknown)



                    date)                         Type: does not unknown)  



                                                  use                 

 

           (unknown)  (no       (unknown)  (unknown)  Surgical History  (units  

  (unknown)



                    date)                         (Reviewed unknown)  



                                                  23 @ 05:21           



                                                  by Magdalena Cai DO)                 

 

           (unknown)  (no       (unknown)  (unknown)  TOA       (units    (unkno

wn)



                    date)                         (tubo-ovarian unknown)  



                                                  abscess)            



                                                  ()           

 

           (unknown)  (no       (unknown)  (unknown)  Time Seen by  (units    (u

nknown)



                    date)                         Provider: unknown)  



                                                  23 05:18           

 

           (unknown)  (no       (unknown)  (unknown)  alcohol intake  (units    

(unknown)



                    date)                         frequency: unknown)  



                                                  holidays/special           



                                                  occasions only           

 

           (unknown)  (no       (unknown)  (unknown)  alcohol intake:  (units   

 (unknown)



                    date)                         current   unknown)  

 

           (unknown)  (no       (unknown)  (unknown)  amoxicillin  (units    (un

known)



                    date)                         AdvReac Vomiting unknown)  



                                                  Verified 23           



                                                  15:00               

 

           (unknown)  (no       (unknown)  (unknown)  and below  (units    (unkn

own)



                    date)                                   unknown)  

 

           (unknown)  (no       (unknown)  (unknown)  clindamycin HCl  (units   

 (unknown)



                    date)                         150 mg capsule unknown)  



                                                  150 mg PO DAILY           



                                                  #30 caps 22           

 

           (unknown)  (no       (unknown)  (unknown)  clindamycin HCl  (units   

 (unknown)



                    date)                         150 mg capsule unknown)  

 

           (unknown)  (no       (unknown)  (unknown)  doxycycline  (units    (un

known)



                    date)                         AdvReac   unknown)  



                                                  Intermediate           



                                                  vomitt Verified           



                                                  23 14:59           

 

           (unknown)  (no       (unknown)  (unknown)  fluconazole 150  (units   

 (unknown)



                    date)                         mg tablet 150 mg unknown)  



                                                  PO Q48H 2 doses           



                                                  #2 tabs 02/15/23           

 

           (unknown)  (no       (unknown)  (unknown)  fluconazole  (units    (un

known)



                    date)                         [Diflucan] 150 mg unknown)  



                                                  tablet              

 

           (unknown)  (no       (unknown)  (unknown)  household  (units    (unkn

own)



                    date)                         members:  unknown)  



                                                  friend(s)           

 

           (unknown)  (no       (unknown)  (unknown)  hydrocodone 5  (units    (

unknown)



                    date)                         mg-acetaminophen unknown)  



                                                  325 1 tab PO           



                                                  Q4-6H PRN pain           



                                                  #10 tabs 22           

 

           (unknown)  (no       (unknown)  (unknown)  hydrocodone-acet  (units  

  (unknown)



                    date)                         aminophen 5-325 unknown)  



                                                  mg tablet           

 

           (unknown)  (no       (unknown)  (unknown)  hydromorphone 4  (units   

 (unknown)



                    date)                         mg tablet 4 mg PO unknown)  



                                                  Q4-6H PRN pain           



                                                  #20 tabs 22           

 

           (unknown)  (no       (unknown)  (unknown)  hydromorphone  (units    (

unknown)



                    date)                         [Dilaudid] 4 mg unknown)  



                                                  tablet              

 

           (unknown)  (no       (unknown)  (unknown)  ketorolac 10 mg  (units   

 (unknown)



                    date)                         tablet 10 mg PO unknown)  



                                                  Q6H PRN pain #14           



                                                  tabs 22           

 

           (unknown)  (no       (unknown)  (unknown)  ketorolac 10 mg  (units   

 (unknown)



                    date)                         tablet    unknown)  

 

           (unknown)  (no       (unknown)  (unknown)  mg tablet  (units    (unkn

own)



                    date)                                   unknown)  

 

           (unknown)  (no       (unknown)  (unknown)  ondansetron 4 mg  (units  

  (unknown)



                    date)                         disintegrating 4 unknown)  



                                                  mg PO TID-QID PRN           



                                                  nausea and           



                                                  22            

 

           (unknown)  (no       (unknown)  (unknown)  ondansetron 4 mg  (units  

  (unknown)



                    date)                         tablet,disintegra unknown)  



                                                  ting                

 

           (unknown)  (no       (unknown)  (unknown)  oxycodone 5 mg  (units    

(unknown)



                    date)                         tablet 5 mg PO unknown)  



                                                  Q4H PRN pain #20           



                                                  tabs 22           

 

           (unknown)  (no       (unknown)  (unknown)  oxycodone 5 mg  (units    

(unknown)



                    date)                         tablet    unknown)  

 

           (unknown)  (no       (unknown)  (unknown)  tablet vomiting  (units   

 (unknown)



                    date)                         #10 tabs  unknown)  

 

           (unknown)  (no       (unknown)  (unknown)  tobacco type:  (units    (

unknown)



                    date)                         vaping    unknown)  









                                         Result panel [id number]









           (unknown)  (no       (unknown)  (unknown)  (no value)  (units    (unk

nown)



                    date)                                   unknown)  

 

           (unknown)  (no       (unknown)  (unknown)  [id number]  (units    (unkn

own)



                    date)                                   unknown)  

 

           (unknown)  (no       (unknown)  (unknown)  23  (units    (unkno

wn)



                    date)                                   unknown)  

 

           (unknown)  (no       (unknown)  (unknown)  [address] [place] [address]  (units  

  (unknown)



                    date)                                   unknown)  

 

           (unknown)  (no       (unknown)  (unknown)  Accession Number:  (units 

   (unknown)



                    date)                         [id number] unknown)  

 

           (unknown)  (no       (unknown)  (unknown)  Accession Number:  (units 

   (unknown)



                    date)                         [id number] unknown)  

 

           (unknown)  (no       (unknown)  (unknown) Additional  (units    (unkn

own)



                    date)                         endovaginal unknown)  



                                                  scanning was           



                                                  necessary due to           



                                                  incomplete           



                                                  visualization of           

 

           (unknown)  (no       (unknown)  (unknown)  Age/Sex: 17 / F  (units   

 (unknown)



                    date)                         Date of Service: unknown)  

 

           (unknown)  (no       (unknown)  (unknown)  RENZO Roland  (units    (

unknown)



                    date)                         [id number]     unknown)  

 

           (unknown)  (no       (unknown)  (unknown)  Approved by:  (units    (u

nknown)



                    date)                         afsaneh Hampton)  



                                                  M.D. on 2023           



                                                  at 8:52             

 

           (unknown)  (no       (unknown)  (unknown)  Approved by:  (units    (u

nknown)



                    date)                         afsaneh Hampton)  



                                                  M.D. on 2023           



                                                  at 8:55             

 

           (unknown)  (no       (unknown)  (unknown)  COMPARISON:  (units    (un

known)



                    date)                         [facility], unknown)  



                                                  CT, CT ABDOMEN           



                                                  PELVIS W CON,           



                                                  2022, 3:28.           

 

           (unknown)  (no       (unknown)  (unknown)  COMPARISON:  (units    (un

known)



                    date)                         [facility], unknown)  



                                                  US, US PELVIC           



                                                  COMPLETE,           



                                                  2/15/2023, 15:25.           

 

           (unknown)  (no       (unknown)  (unknown)  : 2005  (units   

 (unknown)



                    date)                         Acct:[id number] unknown)  

 

           (unknown)  (no       (unknown)  (unknown)  FINDINGS:  (units    (unkn

own)



                    date)                                   unknown)  

 

           (unknown)  (no       (unknown)  (unknown)  IMPRESSION: No  (units    

(unknown)



                    date)                         acute sonographic unknown)  



                                                  abnormality in the           



                                                  right upper           



                                                  quadrant.           

 

           (unknown)  (no       (unknown)  (unknown)  IMPRESSION: Right  (units 

   (unknown)



                    date)                         ovary is enlarged unknown)  



                                                  by a 2.6 cm simple           



                                                  cyst. No acute           

 

           (unknown)  (no       (unknown)  (unknown)  INDICATIONS:  (units    (u

nknown)



                    date)                         RIGHT FLANK PAIN unknown)  

 

           (unknown)  (no       (unknown)  (unknown)  INDICATIONS:  (units    (u

nknown)



                    date)                         RIGHT PELVIC PAIN unknown)  

 

           (unknown)  (no       (unknown)  (unknown)  [facility]  (units   

 (unknown)



                    date)                                   unknown)  

 

           (unknown)  (no       (unknown)  (unknown)  Less than 12  (units    (u

nknown)



                    date)                         follicles can be unknown)  



                                                  seen in each           



                                                  ovary. No adnexal           



                                                  masses are seen.           

 

           (unknown)  (no       (unknown)  (unknown)  Liver is normal  (units   

 (unknown)



                    date)                         in size at 14.5 cm unknown)  



                                                  and demonstrates           



                                                  normal              



                                                  echogenicity.           

 

           (unknown)  (no       (unknown)  (unknown)  Loc: ED   (units    (unkno

wn)



                    date)                                   unknown)  

 

           (unknown)  (no       (unknown)  (unknown)  No intrahepatic  (units   

 (unknown)



                    date)                         or extrahepatic unknown)  



                                                  biliary duct           



                                                  dilatation. Common           



                                                  bile duct           

 

           (unknown)  (no       (unknown)  (unknown)  Ordering  (units    (unkno

wn)



                    date)                         Provider: unknown)  



                                                  Magdalena Cai DMatiOMati           

 

           (unknown)  (no       (unknown)  (unknown)  Other:    (units    (unkno

wn)



                    date)                         Physiologic amount unknown)  



                                                  of free fluid is           



                                                  present.            

 

           (unknown)  (no       (unknown)  (unknown)  Ovaries: The  (units    (u

nknown)



                    date)                         right ovary unknown)  



                                                  measures 3.7 x 4.0           



                                                  x 2.8 cm, with a           



                                                  calculated ovarian           

 

           (unknown)  (no       (unknown)  (unknown)  PROCEDURE: US  (units    (

unknown)



                    date)                         ABDOMEN LIMITED unknown)  

 

           (unknown)  (no       (unknown)  (unknown)  PROCEDURE: US  (units    (

unknown)



                    date)                         PELVIC COMPLETE unknown)  

 

           (unknown)  (no       (unknown)  (unknown)  Patient:  (units    (unkno

wn)



                    date)                         Lesvia Aguero E unknown)  



                                                  MR#: [id number]              

 

           (unknown)  (no       (unknown)  (unknown)  Procedure: US  (units    (

unknown)



                    date)                         abdomen limited unknown)  

 

           (unknown)  (no       (unknown)  (unknown)  Procedure: US  (units    (

unknown)



                    date)                         pelvic complete unknown)  

 

           (unknown)  (no       (unknown)  (unknown)  Real-time focused  (units 

   (unknown)



                    date)                         scanning was unknown)  



                                                  performed of the           



                                                  abdomen, with           



                                                  image               

 

           (unknown)  (no       (unknown)  (unknown) Real-time scanning  (units 

   (unknown)



                    date)                         was performed of unknown)  



                                                  the pelvic organs,           



                                                  with image           



                                                  documentation.           

 

           (unknown)  (no       (unknown)  (unknown)  Right kidney  (units    (u

nknown)



                    date)                         measures 9.4 cm in unknown)  



                                                  length. No           



                                                  hydronephrosis. No           



                                                  free fluid is           

 

           (unknown)  (no       (unknown)  (unknown)  Signed    (units    (unkno

wn)



                    date)                                   unknown)  

 

           (unknown)  (no       (unknown)  (unknown)  TECHNIQUE:  (units    (unk

nown)



                    date)                                   unknown)  

 

           (unknown)  (no       (unknown)  (unknown)  The gallbladder  (units   

 (unknown)



                    date)                         appears normal unknown)  



                                                  without gallstones           



                                                  or gallbladder           



                                                  wall                

 

           (unknown)  (no       (unknown)  (unknown)  The visualized  (units    

(unknown)



                    date)                         portions of the unknown)  



                                                  pancreas are           



                                                  unremarkable.           

 

           (unknown)  (no       (unknown)  (unknown)  There is no  (units    (un

known)



                    date)                         significant unknown)  



                                                  discrepancy when           



                                                  compared to the           



                                                  overnight           



                                                  preliminary           

 

           (unknown)  (no       (unknown)  (unknown)  To assist  (units    (unkn

own)



                    date)                                   unknown)  

 

           (unknown)  (no       (unknown)  (unknown)  Ultrasound Report  (units 

   (unknown)



                    date)                                   unknown)  

 

           (unknown)  (no       (unknown)  (unknown)  Uterus: Uterus is  (units 

   (unknown)



                    date)                         anteverted and unknown)  



                                                  normal in size at           



                                                  6.7 x 3.7 x 5.2           



                                                  cm. The             

 

           (unknown)  (no       (unknown)  (unknown)  We strive to  (units    (u

nknown)



                    date)                         produce accurate, unknown)  



                                                  complete, and           



                                                  clear reports of           



                                                  imaging services.           

 

           (unknown)  (no       (unknown)  (unknown)  abnormality.  (units    (u

nknown)



                    date)                                   unknown)  

 

           (unknown)  (no       (unknown)  (unknown)  adnexal and  (units    (un

known)



                    date)                         endometrial unknown)  



                                                  structures by           



                                                  transabdominal           



                                                  scanning.           

 

           (unknown)  (no       (unknown)  (unknown)  and voice  (units    (unkn

own)



                    date)                         recognition unknown)  



                                                  software.           



                                                  Therefore, it may           



                                                  contain abnormal           



                                                  punctuation,           

 

           (unknown)  (no       (unknown)  (unknown)  documentation.  (units    

(unknown)



                    date)                                   unknown)  

 

           (unknown)  (no       (unknown)  (unknown)  homogeneous. The  (units  

  (unknown)



                    date)                         endometrium unknown)  



                                                  measures 14 mm           



                                                  combined            



                                                  thickness.           

 

           (unknown)  (no       (unknown)  (unknown)  inaccuracies.  (units    (

unknown)



                    date)                                   unknown)  

 

           (unknown)  (no       (unknown)  (unknown)  insertions and/or  (units 

   (unknown)



                    date)                         omissions. unknown)  



                                                  Occasional           



                                                  wrong-word or           



                                                  sound-alike           



                                                  substitutions           

 

           (unknown)  (no       (unknown)  (unknown)  is no     (units    (unkno

wn)



                    date)                         pericholecystic unknown)  



                                                  fluid. Sonographic           



                                                  Hurley sign is           



                                                  negative.           

 

           (unknown)  (no       (unknown)  (unknown)  may       (units    (unkno

wn)



                    date)                                   unknown)  

 

           (unknown)  (no       (unknown)  (unknown)  measures 2.9  (units    (u

nknown)



                    date)                                   unknown)  

 

           (unknown)  (no       (unknown)  (unknown)  mm in diameter.  (units   

 (unknown)



                    date)                                   unknown)  

 

           (unknown)  (no       (unknown)  (unknown)  myometrium is  (units    (

unknown)



                    date)                                   unknown)  

 

           (unknown)  (no       (unknown)  (unknown)  occur. Though we  (units  

  (unknown)



                    date)                         review the report unknown)  



                                                  and make efforts           



                                                  to correct it, we           



                                                  do                  

 

           (unknown)  (no       (unknown)  (unknown)  of 21.3 cc. The  (units   

 (unknown)



                    date)                         left ovary unknown)  



                                                  measures 2.9 x 2.0           



                                                  x 2.7 cm, with a           



                                                  calculated           

 

           (unknown)  (no       (unknown)  (unknown)  of 8.1 cc. A 2.6  (units  

  (unknown)



                    date)                         x 2.1 x 2.6 cm unknown)  



                                                  simple              



                                                  cyst/dominant           



                                                  follicle seen in           



                                                  the right           

 

           (unknown)  (no       (unknown)  (unknown)  ovarian volume  (units    

(unknown)



                    date)                                   unknown)  

 

           (unknown)  (no       (unknown)  (unknown)  ovary.    (units    (unkno

wn)



                    date)                                   unknown)  

 

           (unknown)  (no       (unknown)  (unknown)  recommend that  (units    

(unknown)



                    date)                                   unknown)  

 

           (unknown)  (no       (unknown)  (unknown)  report.   (units    (unkno

wn)



                    date)                                   unknown)  

 

           (unknown)  (no       (unknown)  (unknown)  right upper  (units    (un

known)



                    date)                         quadrant. unknown)  

 

           (unknown)  (no       (unknown)  (unknown)  seen in the  (units    (un

known)



                    date)                                   unknown)  

 

           (unknown)  (no       (unknown)  (unknown)  sonographic  (units    (un

known)



                    date)                                   unknown)  

 

           (unknown)  (no       (unknown)  (unknown)  templates  (units    (unkn

own)



                    date)                                   unknown)  

 

           (unknown)  (no       (unknown)  (unknown)  the report be  (units    (

unknown)



                    date)                         read carefully in unknown)  



                                                  proper context to           



                                                  recognize any text           

 

           (unknown)  (no       (unknown)  (unknown)  the       (units    (unkno

wn)



                    date)                                   unknown)  

 

           (unknown)  (no       (unknown)  (unknown)  thickening. There  (units 

   (unknown)



                    date)                                   unknown)  

 

           (unknown)  (no       (unknown)  (unknown)  us in improving  (units   

 (unknown)



                    date)                         patient care, this unknown)  



                                                  report was           



                                                  composed using           



                                                  standard report           

 

           (unknown)  (no       (unknown)  (unknown)  volume    (units    (unkno

wn)



                    date)                                   unknown)  









                                         Result panel 369









           (unknown)  (no       (unknown)  (unknown)  (no value)  (units    (unk

nown)



                    date)                                   unknown)  

 

           (unknown)  (no       (unknown)  (unknown)  (Diflucan)  (units    (unk

nown)



                    date)                                   unknown)  

 

           (unknown)  (no       (unknown)  (unknown)  (Dilaudid)  (units    (unk

nown)



                    date)                                   unknown)  

 

           (unknown)  (no       (unknown)  (unknown)  [id number]  (units    (unkno

wn)



                    date)                                   unknown)  

 

           (unknown)  (no       (unknown)  (unknown)  23  (units    (unkno

wn)



                    date)                                   unknown)  

 

           (unknown)  (no       (unknown)  (unknown)  05:21     (units    (unkno

wn)



                    date)                                   unknown)  

 

           (unknown)  (no       (unknown)  (unknown)  1 tab PO Q4-6H  (units    

(unknown)



                    date)                         PRN (Reason: unknown)  



                                                  pain) Qty: 10 0RF           

 

           (unknown)  (no       (unknown)  (unknown)  10 mg PO Q6H PRN  (units  

  (unknown)



                    date)                         (Reason: pain) unknown)  



                                                  Qty: 14 0RF           

 

           (unknown)  (no       (unknown)  (unknown)  150 mg PO DAILY  (units   

 (unknown)



                    date)                         Qty: 30 0RF unknown)  

 

           (unknown)  (no       (unknown)  (unknown)  150 mg PO Q48H  (units    

(unknown)



                    date)                         Qty: 2 4RF unknown)  

 

           (unknown)  (no       (unknown)  (unknown)  4 mg PO Q4-6H  (units    (

unknown)



                    date)                         PRN (Reason: unknown)  



                                                  pain) Qty: 20 0RF           

 

           (unknown)  (no       (unknown)  (unknown)  4 mg PO TID-QID  (units   

 (unknown)



                    date)                         PRN (Reason: unknown)  



                                                  nausea and           



                                                  vomiting) Qty: 10           



                                                  0RF                 

 

           (unknown)  (no       (unknown)  (unknown)  5 mg PO Q4H PRN  (units   

 (unknown)



                    date)                         (Reason: pain) unknown)  



                                                  Qty: 20 0RF           

 

           (unknown)  (no       (unknown)  (unknown)  Acne (-2016)  (units    (u

nknown)



                    date)                                   unknown)  

 

           (unknown)  (no       (unknown)  (unknown)  Age/Sex: 17 / F  (units   

 (unknown)



                    date)                                   unknown)  

 

           (unknown)  (no       (unknown)  (unknown)  Alcohol type:  (units    (

unknown)



                    date)                         wine      unknown)  

 

           (unknown)  (no       (unknown)  (unknown)  Allergies  (units    (unkn

own)



                    date)                                   unknown)  

 

           (unknown)  (no       (unknown)  (unknown)  Allergy/AdvReac  (units   

 (unknown)



                    date)                         Type Severity unknown)  



                                                  Reaction Status           



                                                  Date / Time           

 

           (unknown)  (no       (unknown)  (unknown)  Anesthesia  (units    (unk

nown)



                    date)                                   unknown)  

 

           (unknown)  (no       (unknown)  (unknown)  Blood Pressure  (units    

(unknown)



                    date)                         137/85 23 unknown)  



                                                  05:21               

 

           (unknown)  (no       (unknown)  (unknown)  Blood Pressure  (units    

(unknown)



                    date)                         137/85    unknown)  

 

           (unknown)  (no       (unknown)  (unknown)  Chief Complaint:  (units  

  (unknown)



                    date)                         Abdominal Pain unknown)  

 

           (unknown)  (no       (unknown)  (unknown)  Chlamydia  (units    (unkn

own)



                    date)                         infection () unknown)  

 

           (unknown)  (no       (unknown)  (unknown)  Course    (units    (unkno

wn)



                    date)                                   unknown)  

 

           (unknown)  (no       (unknown)  (unknown)  : 2003  (units   

 (unknown)



                    date)                         Acct:[id number] unknown)  

 

           (unknown)  (no       (unknown)  (unknown)  Date of Service:  (units  

  (unknown)



                    date)                         23  unknown)  

 

           (unknown)  (no       (unknown)  (unknown)  Departure  (units    (unkn

own)



                    date)                                   unknown)  

 

           (unknown)  (no       (unknown)  (unknown)  Discharge Plan  (units    

(unknown)



                    date)                                   unknown)  

 

           (unknown)  (no       (unknown)  (unknown)  ER Physician:  (units    (

unknown)



                    date)                         Magdalena Cai unknown)  



                                                  D.O.                

 

           (unknown)  (no       (unknown)  (unknown)  Emergency Report  (units  

  (unknown)



                    date)                                   unknown)  

 

           (unknown)  (no       (unknown)  (unknown)  Endometriosis  (units    (

unknown)



                    date)                         ()   unknown)  

 

           (unknown)  (no       (unknown)  (unknown)  Exam      (units    (unkno

wn)



                    date)                                   unknown)  

 

           (unknown)  (no       (unknown)  (unknown)  General   (units    (unkno

wn)



                    date)                                   unknown)  

 

           (unknown)  (no       (unknown)  (unknown)  HPI - Abdominal  (units   

 (unknown)



                    date)                         Pain      unknown)  

 

           (unknown)  (no       (unknown)  (unknown)  Heavy menstrual  (units   

 (unknown)



                    date)                         period () unknown)  

 

           (unknown)  (no       (unknown)  (unknown)  Hx of     (units    (unkno

wn)



                    date)                         laparoscopy unknown)  



                                                  (22)           

 

           (unknown)  (no       (unknown)  (unknown)  Initial Vital  (units    (

unknown)



                    date)                         Signs     unknown)  

 

           (unknown)  (no       (unknown)  (unknown)  Initial Vital  (units    (

unknown)



                    date)                         Signs:    unknown)  

 

           (unknown)  (no       (unknown)  (unknown)  Irregular  (units    (unkn

own)



                    date)                         menstrual cycle unknown)  



                                                  ()             

 

           (unknown)  (no       (unknown)  (unknown)  [facility]  (units   

 (unknown)



                    date)                         [address] unknown)  



                                                  [place]           



                                                  [id number]               

 

           (unknown)  (no       (unknown)  (unknown)  Arcenio Darnell  (units  

  (unknown)



                    date)                         HOLLY kim [Primary unknown)  



                                                  Care Provider]           

 

           (unknown)  (no       (unknown)  (unknown)  Limitations: no  (units   

 (unknown)



                    date)                         limitations unknown)  

 

           (unknown)  (no       (unknown)  (unknown)  Lymphangioma  (units    (u

nknown)



                    date)                                   unknown)  

 

           (unknown)  (no       (unknown)  (unknown)  Medical History  (units   

 (unknown)



                    date)                         (Reviewed unknown)  



                                                  23 @ 05:21           



                                                  by Magdalena Cai DO)                 

 

           (unknown)  (no       (unknown)  (unknown)  Medication  (units    (unk

nown)



                    date)                         Instructions unknown)  



                                                  Recorded            

 

           (unknown)  (no       (unknown)  (unknown)  Mode of arrival:  (units  

  (unknown)



                    date)                         Ambulatory unknown)  

 

           (unknown)  (no       (unknown)  (unknown)  No Action  (units    (unkn

own)



                    date)                                   unknown)  

 

           (unknown)  (no       (unknown)  (unknown)  Opioids -  (units    (unkn

own)



                    date)                         Morphine  unknown)  



                                                  Analogues Allergy           



                                                  Intermediate rash           



                                                  Verified 23           



                                                  14:59               

 

           (unknown)  (no       (unknown)  (unknown)  Ovarian cyst  (units    (u

nknown)



                    date)                                   unknown)  

 

           (unknown)  (no       (unknown)  (unknown)  Oxygen Delivery  (units   

 (unknown)



                    date)                         Method Room Air unknown)  



                                                  23 05:21           

 

           (unknown)  (no       (unknown)  (unknown)  Oxygen Delivery  (units   

 (unknown)



                    date)                         Method Room Air unknown)  

 

           (unknown)  (no       (unknown)  (unknown)  Painful   (units    (unkno

wn)



                    date)                         menstrual periods unknown)  



                                                  ()             

 

           (unknown)  (no       (unknown)  (unknown)  Patient History  (units   

 (unknown)



                    date)                                   unknown)  

 

           (unknown)  (no       (unknown)  (unknown)  Patient:  (units    (unkno

wn)



                    date)                         Lesvia Aguero E unknown)  



                                                  MR#: [id number]             

 

           (unknown)  (no       (unknown)  (unknown)  Prescriptions:  (units    

(unknown)



                    date)                                   unknown)  

 

           (unknown)  (no       (unknown)  (unknown)  Previous Rx's  (units    (

unknown)



                    date)                                   unknown)  

 

           (unknown)  (no       (unknown)  (unknown)  Pulse Oximetry  (units    

(unknown)



                    date)                         99 23 05:21 unknown)  

 

           (unknown)  (no       (unknown)  (unknown)  Pulse Oximetry  (units    

(unknown)



                    date)                         99        unknown)  

 

           (unknown)  (no       (unknown)  (unknown)  Pulse Rate 68  (units    (

unknown)



                    date)                         23 05:21 unknown)  

 

           (unknown)  (no       (unknown)  (unknown)  Pulse Rate 68  (units    (

unknown)



                    date)                                   unknown)  

 

           (unknown)  (no       (unknown)  (unknown) ROS Unobtainable:  (units  

  (unknown)



                    date)                         All systems unknown)  



                                                  reviewed + are           



                                                  unremarkable           



                                                  except as noted           



                                                  in HPI              

 

           (unknown)  (no       (unknown)  (unknown)  Referrals:  (units    (unk

nown)



                    date)                                   unknown)  

 

           (unknown)  (no       (unknown)  (unknown)  Related Data  (units    (u

nknown)



                    date)                                   unknown)  

 

           (unknown)  (no       (unknown)  (unknown)  Repeat second  (units    (

unknown)



                    date)                         dose 48 hrs after unknown)  



                                                  first dose.           

 

           (unknown)  (no       (unknown)  (unknown)  Respiratory Rate  (units  

  (unknown)



                    date)                         18 23 05:21 unknown)  

 

           (unknown)  (no       (unknown)  (unknown)  Respiratory Rate  (units  

  (unknown)



                    date)                         18        unknown)  

 

           (unknown)  (no       (unknown)  (unknown)  Review of  (units    (unkn

own)



                    date)                         Systems   unknown)  

 

           (unknown)  (no       (unknown)  (unknown)  Rx Instructions:  (units  

  (unknown)



                    date)                                   unknown)  

 

           (unknown)  (no       (unknown)  (unknown)  S/P ACL   (units    (unkno

wn)



                    date)                         reconstruction unknown)  



                                                  (-21)           

 

           (unknown)  (no       (unknown)  (unknown)  Signed By:  (units    (unk

nown)



                    date)                                   unknown)  

 

           (unknown)  (no       (unknown)  (unknown)  Smoking Status:  (units   

 (unknown)



                    date)                         Current some day unknown)  



                                                  smoker              

 

           (unknown)  (no       (unknown)  (unknown)  Social History  (units    

(unknown)



                    date)                         (Reviewed unknown)  



                                                  23 @ 05:21           



                                                  by Magdalena Cai DO)                 

 

           (unknown)  (no       (unknown)  (unknown)  Source: patient  (units   

 (unknown)



                    date)                                   unknown)  

 

           (unknown)  (no       (unknown)  (unknown)  Stated    (units    (unkno

wn)



                    date)                         Complaint: abd unknown)  



                                                  pain                

 

           (unknown)  (no       (unknown)  (unknown)  Substance Use  (units    (

unknown)



                    date)                         Type: does not unknown)  



                                                  use                 

 

           (unknown)  (no       (unknown)  (unknown)  Surgical History  (units  

  (unknown)



                    date)                         (Reviewed unknown)  



                                                  23 @ 05:21           



                                                  by Magdalena Cai DO)                 

 

           (unknown)  (no       (unknown)  (unknown)  TOA       (units    (unkno

wn)



                    date)                         (tubo-ovarian unknown)  



                                                  abscess)            



                                                  (-21)           

 

           (unknown)  (no       (unknown)  (unknown)  Temperature 97 F  (units  

  (unknown)



                    date)                         L 23 05:21 unknown)  

 

           (unknown)  (no       (unknown)  (unknown)  Temperature 97 F  (units  

  (unknown)



                    date)                         L         unknown)  

 

           (unknown)  (no       (unknown)  (unknown)  Time Seen by  (units    (u

nknown)



                    date)                         Provider: unknown)  



                                                  23 05:18           

 

           (unknown)  (no       (unknown)  (unknown)  Vital Signs - 8  (units   

 (unknown)



                    date)                         hr        unknown)  

 

           (unknown)  (no       (unknown)  (unknown)  Vital Signs  (units    (un

known)



                    date)                                   unknown)  

 

           (unknown)  (no       (unknown)  (unknown)  Vital signs:  (units    (u

nknown)



                    date)                                   unknown)  

 

           (unknown)  (no       (unknown)  (unknown)  alcohol intake  (units    

(unknown)



                    date)                         frequency: unknown)  



                                                  holidays/special           



                                                  occasions only           

 

           (unknown)  (no       (unknown)  (unknown)  alcohol intake:  (units   

 (unknown)



                    date)                         current   unknown)  

 

           (unknown)  (no       (unknown)  (unknown)  amoxicillin  (units    (un

known)



                    date)                         AdvReac Vomiting unknown)  



                                                  Verified 23           



                                                  15:00               

 

           (unknown)  (no       (unknown)  (unknown)  and below  (units    (unkn

own)



                    date)                                   unknown)  

 

           (unknown)  (no       (unknown)  (unknown)  clindamycin HCl  (units   

 (unknown)



                    date)                         150 mg capsule unknown)  



                                                  150 mg PO DAILY           



                                                  #30 caps 22           

 

           (unknown)  (no       (unknown)  (unknown)  clindamycin HCl  (units   

 (unknown)



                    date)                         150 mg capsule unknown)  

 

           (unknown)  (no       (unknown)  (unknown)  doxycycline  (units    (un

known)



                    date)                         AdvReac   unknown)  



                                                  Intermediate           



                                                  vomitt Verified           



                                                  23 14:59           

 

           (unknown)  (no       (unknown)  (unknown)  fluconazole 150  (units   

 (unknown)



                    date)                         mg tablet 150 mg unknown)  



                                                  PO Q48H 2 doses           



                                                  #2 tabs 02/15/23           

 

           (unknown)  (no       (unknown)  (unknown)  fluconazole  (units    (un

known)



                    date)                         [Diflucan] 150 mg unknown)  



                                                  tablet              

 

           (unknown)  (no       (unknown)  (unknown)  household  (units    (unkn

own)



                    date)                         members:  unknown)  



                                                  friend(s)           

 

           (unknown)  (no       (unknown)  (unknown)  hydrocodone 5  (units    (

unknown)



                    date)                         mg-acetaminophen unknown)  



                                                  325 1 tab PO           



                                                  Q4-6H PRN pain           



                                                  #10 tabs 22           

 

           (unknown)  (no       (unknown)  (unknown)  hydrocodone-acet  (units  

  (unknown)



                    date)                         aminophen 5-325 unknown)  



                                                  mg tablet           

 

           (unknown)  (no       (unknown)  (unknown)  hydromorphone 4  (units   

 (unknown)



                    date)                         mg tablet 4 mg PO unknown)  



                                                  Q4-6H PRN pain           



                                                  #20 tabs 22           

 

           (unknown)  (no       (unknown)  (unknown)  hydromorphone  (units    (

unknown)



                    date)                         [Dilaudid] 4 mg unknown)  



                                                  tablet              

 

           (unknown)  (no       (unknown)  (unknown)  ketorolac 10 mg  (units   

 (unknown)



                    date)                         tablet 10 mg PO unknown)  



                                                  Q6H PRN pain #14           



                                                  tabs 22           

 

           (unknown)  (no       (unknown)  (unknown)  ketorolac 10 mg  (units   

 (unknown)



                    date)                         tablet    unknown)  

 

           (unknown)  (no       (unknown)  (unknown)  mg tablet  (units    (unkn

own)



                    date)                                   unknown)  

 

           (unknown)  (no       (unknown)  (unknown)  ondansetron 4 mg  (units  

  (unknown)



                    date)                         disintegrating 4 unknown)  



                                                  mg PO TID-QID PRN           



                                                  nausea and           



                                                  22            

 

           (unknown)  (no       (unknown)  (unknown)  ondansetron 4 mg  (units  

  (unknown)



                    date)                         tablet,disintegra unknown)  



                                                  ting                

 

           (unknown)  (no       (unknown)  (unknown)  oxycodone 5 mg  (units    

(unknown)



                    date)                         tablet 5 mg PO unknown)  



                                                  Q4H PRN pain #20           



                                                  tabs 22           

 

           (unknown)  (no       (unknown)  (unknown)  oxycodone 5 mg  (units    

(unknown)



                    date)                         tablet    unknown)  

 

           (unknown)  (no       (unknown)  (unknown)  tablet vomiting  (units   

 (unknown)



                    date)                         #10 tabs  unknown)  

 

           (unknown)  (no       (unknown)  (unknown)  tobacco type:  (units    (

unknown)



                    date)                         vaping    unknown)  









                                         Result panel 370









           (unknown)  (no       (unknown)  (unknown)  (no value)  (units    (unk

nown)



                    date)                                   unknown)  

 

           (unknown)  (no       (unknown)  (unknown)  (Diflucan)  (units    (unk

nown)



                    date)                                   unknown)  

 

           (unknown)  (no       (unknown)  (unknown)  (Dilaudid)  (units    (unk

nown)



                    date)                                   unknown)  

 

           (unknown)  (no       (unknown)  (unknown)  [id number]  (units    (unkno

wn)



                    date)                                   unknown)  

 

           (unknown)  (no       (unknown)  (unknown)  23  (units    (unkno

wn)



                    date)                                   unknown)  

 

           (unknown)  (no       (unknown)  (unknown)  05:21     (units    (unkno

wn)



                    date)                                   unknown)  

 

           (unknown)  (no       (unknown)  (unknown)  1 tab PO Q4-6H  (units    

(unknown)



                    date)                         PRN (Reason: pain) unknown)  



                                                  Qty: 10 0RF           

 

           (unknown)  (no       (unknown)  (unknown)  10 mg PO Q6H PRN  (units  

  (unknown)



                    date)                         (Reason: pain) unknown)  



                                                  Qty: 14 0RF           

 

           (unknown)  (no       (unknown)  (unknown)  150 mg PO DAILY  (units   

 (unknown)



                    date)                         Qty: 30 0RF unknown)  

 

           (unknown)  (no       (unknown)  (unknown)  150 mg PO Q48H  (units    

(unknown)



                    date)                         Qty: 2 4RF unknown)  

 

           (unknown)  (no       (unknown)  (unknown)  4 mg PO Q4-6H PRN  (units 

   (unknown)



                    date)                         (Reason: pain) unknown)  



                                                  Qty: 20 0RF           

 

           (unknown)  (no       (unknown)  (unknown)  4 mg PO TID-QID  (units   

 (unknown)



                    date)                         PRN (Reason: unknown)  



                                                  nausea and           



                                                  vomiting) Qty: 10           



                                                  0RF                 

 

           (unknown)  (no       (unknown)  (unknown)  5 mg PO Q4H PRN  (units   

 (unknown)



                    date)                         (Reason: pain) unknown)  



                                                  Qty: 20 0RF           

 

           (unknown)  (no       (unknown)  (unknown)  ABDOMEN: Soft,  (units    

(unknown)



                    date)                         right lower and unknown)  



                                                  upper quadrant           



                                                  tenderness.           



                                                  Normoactive bowel           

 

           (unknown)  (no       (unknown)  (unknown)  Acne (-2016)  (units    (u

nknown)



                    date)                                   unknown)  

 

           (unknown)  (no       (unknown)  (unknown)  Age/Sex: 17 / F  (units   

 (unknown)



                    date)                                   unknown)  

 

           (unknown)  (no       (unknown)  (unknown)  Alcohol type:  (units    (

unknown)



                    date)                         wine      unknown)  

 

           (unknown)  (no       (unknown)  (unknown)  Allergies  (units    (unkn

own)



                    date)                                   unknown)  

 

           (unknown)  (no       (unknown)  (unknown)  Allergy/AdvReac  (units   

 (unknown)



                    date)                         Type Severity unknown)  



                                                  Reaction Status           



                                                  Date / Time           

 

           (unknown)  (no       (unknown)  (unknown)  Anesthesia  (units    (unk

nown)



                    date)                                   unknown)  

 

           (unknown)  (no       (unknown)  (unknown)  Blood Pressure  (units    

(unknown)



                    date)                         13723 unknown)  



                                                  05:21               

 

           (unknown)  (no       (unknown)  (unknown)  Blood Pressure  (units    

(unknown)



                    date)                             unknown)  

 

           (unknown)  (no       (unknown)  (unknown)  CARDIOVASCULAR:  (units   

 (unknown)



                    date)                         Regular rate and unknown)  



                                                  rhythm without           



                                                  murmurs, rubs or           



                                                  gallops.            

 

           (unknown)  (no       (unknown)  (unknown)  Chief Complaint:  (units  

  (unknown)



                    date)                         Abdominal Pain unknown)  

 

           (unknown)  (no       (unknown)  (unknown)  Chlamydia  (units    (unkn

own)



                    date)                         infection () unknown)  

 

           (unknown)  (no       (unknown)  (unknown)  Course    (units    (unkno

wn)



                    date)                                   unknown)  

 

           (unknown)  (no       (unknown)  (unknown)  : 2003  (units   

 (unknown)



                    date)                         Acct:[id number] unknown)  

 

           (unknown)  (no       (unknown)  (unknown)  Date of Service:  (units  

  (unknown)



                    date)                         23  unknown)  

 

           (unknown)  (no       (unknown)  (unknown)  Departure  (units    (unkn

own)



                    date)                                   unknown)  

 

           (unknown)  (no       (unknown)  (unknown)  Discharge Plan  (units    

(unknown)



                    date)                                   unknown)  

 

           (unknown)  (no       (unknown)  (unknown)  ER Physician:  (units    (

unknown)



                    date)                         Magdalena Cai D.O. unknown)  

 

           (unknown)  (no       (unknown)  (unknown)  EXTREMITIES:  (units    (u

nknown)



                    date)                         Normal range of unknown)  



                                                  motion, no           



                                                  clubbing or edema.           



                                                  Neurovascularly           

 

           (unknown)  (no       (unknown)  (unknown)  Emergency Report  (units  

  (unknown)



                    date)                                   unknown)  

 

           (unknown)  (no       (unknown)  (unknown)  Endometriosis  (units    (

unknown)



                    date)                         ()   unknown)  

 

           (unknown)  (no       (unknown)  (unknown)  Exam Narrative:  (units   

 (unknown)



                    date)                                   unknown)  

 

           (unknown)  (no       (unknown)  (unknown)  Exam      (units    (unkno

wn)



                    date)                                   unknown)  

 

           (unknown)  (no       (unknown)  (unknown)  GENERAL: Alert  (units    

(unknown)



                    date)                         and oriented x unknown)  



                                                  three, moderate           



                                                  distress.           

 

           (unknown)  (no       (unknown)  (unknown)  : No CVA  (units    (unk

nown)



                    date)                         tenderness unknown)  

 

           (unknown)  (no       (unknown)  (unknown)  General   (units    (unkno

wn)



                    date)                                   unknown)  

 

           (unknown)  (no       (unknown)  (unknown)  HEENT: Head  (units    (un

known)



                    date)                         normocephalic, unknown)  



                                                  atraumatic, EOMI,           



                                                  pupils reactive,           



                                                  face symmetric,           

 

           (unknown)  (no       (unknown)  (unknown)  HPI - Abdominal  (units   

 (unknown)



                    date)                         Pain      unknown)  

 

           (unknown)  (no       (unknown)  (unknown)  HPI narrative:  (units    

(unknown)



                    date)                                   unknown)  

 

           (unknown)  (no       (unknown)  (unknown)  Heavy menstrual  (units   

 (unknown)



                    date)                         period (-) unknown)  

 

           (unknown)  (no       (unknown)  (unknown)  History of  (units    (unk

nown)



                    date)                         Present Illness unknown)  

 

           (unknown)  (no       (unknown)  (unknown)  Hx of laparoscopy  (units 

   (unknown)



                    date)                         (22) unknown)  

 

           (unknown)  (no       (unknown)  (unknown)  Initial Vital  (units    (

unknown)



                    date)                         Signs     unknown)  

 

           (unknown)  (no       (unknown)  (unknown)  Initial Vital  (units    (

unknown)



                    date)                         Signs:    unknown)  

 

           (unknown)  (no       (unknown)  (unknown)  Irregular  (units    (unkn

own)



                    date)                         menstrual cycle unknown)  



                                                  ()             

 

           (unknown)  (no       (unknown)  (unknown)  [facility]  (units   

 (unknown)



                    date)                         [address] unknown)  



                                                  [place]           



                                                  [id number]               

 

           (unknown)  (no       (unknown)  (unknown)  Casie Darnell  (units 

   (unknown)



                    date)                         , ARNP [Primary unknown)  



                                                  Care Provider]           

 

           (unknown)  (no       (unknown)  (unknown)  Limitations: no  (units   

 (unknown)



                    date)                         limitations unknown)  

 

           (unknown)  (no       (unknown)  (unknown)  Lymphangioma  (units    (u

nknown)



                    date)                                   unknown)  

 

           (unknown)  (no       (unknown)  (unknown)  MDM - Abdominal  (units   

 (unknown)



                    date)                         Pain      unknown)  

 

           (unknown)  (no       (unknown)  (unknown)  MDM Narrative  (units    (

unknown)



                    date)                                   unknown)  

 

           (unknown)  (no       (unknown)  (unknown)  Medical History  (units   

 (unknown)



                    date)                         (Reviewed 23 unknown)  



                                                  @ 05:42 by Magdalena Cai DO)           

 

           (unknown)  (no       (unknown)  (unknown)  Medical decision  (units  

  (unknown)



                    date)                         making narrative: unknown)  

 

           (unknown)  (no       (unknown)  (unknown)  Medication  (units    (unk

nown)



                    date)                         Instructions unknown)  



                                                  Recorded            

 

           (unknown)  (no       (unknown)  (unknown)  Mode of arrival:  (units  

  (unknown)



                    date)                         Ambulatory unknown)  

 

           (unknown)  (no       (unknown)  (unknown)  NECK: Supple,  (units    (

unknown)



                    date)                         full range of unknown)  



                                                  motion              

 

           (unknown)  (no       (unknown)  (unknown)  NEUROLOGICAL:  (units    (

unknown)



                    date)                         Cranial nerves II unknown)  



                                                  through XII           



                                                  grossly intact.           



                                                  Moving all           

 

           (unknown)  (no       (unknown)  (unknown)  Narrative  (units    (unkn

own)



                    date)                                   unknown)  

 

           (unknown)  (no       (unknown)  (unknown)  No Action  (units    (unkn

own)



                    date)                                   unknown)  

 

           (unknown)  (no       (unknown)  (unknown)  Opioids -  (units    (unkn

own)



                    date)                         Morphine Analogues unknown)  



                                                  Allergy             



                                                  Intermediate rash           



                                                  Verified 23           



                                                  14:59               

 

           (unknown)  (no       (unknown)  (unknown)  Ovarian cyst  (units    (u

nknown)



                    date)                                   unknown)  

 

           (unknown)  (no       (unknown)  (unknown)  Oxygen Delivery  (units   

 (unknown)



                    date)                         Method Room Air unknown)  



                                                  23 05:21           

 

           (unknown)  (no       (unknown)  (unknown)  Oxygen Delivery  (units   

 (unknown)



                    date)                         Method Room Air unknown)  

 

           (unknown)  (no       (unknown)  (unknown)  Painful menstrual  (units 

   (unknown)



                    date)                         periods () unknown)  

 

           (unknown)  (no       (unknown)  (unknown)  Patient History  (units   

 (unknown)



                    date)                                   unknown)  

 

           (unknown)  (no       (unknown)  (unknown)  Patient has had  (units   

 (unknown)



                    date)                         prior tubo-ovarian unknown)  



                                                  abscess as well as           



                                                  hemorrhagic cyst           



                                                  in the              

 

           (unknown)  (no       (unknown)  (unknown)  Patient:  (units    (unkno

wn)



                    date)                         Lesvia Aguero unknown)  



                                                  MR#: [id number]             

 

           (unknown)  (no       (unknown)  (unknown)  Prescriptions:  (units    

(unknown)



                    date)                                   unknown)  

 

           (unknown)  (no       (unknown)  (unknown)  Previous Rx's  (units    (

unknown)



                    date)                                   unknown)  

 

           (unknown)  (no       (unknown)  (unknown)  Pulse Oximetry 99  (units 

   (unknown)



                    date)                         23 05:21 unknown)  

 

           (unknown)  (no       (unknown)  (unknown)  Pulse Oximetry 99  (units 

   (unknown)



                    date)                                   unknown)  

 

           (unknown)  (no       (unknown)  (unknown)  Pulse Rate 68  (units    (

unknown)



                    date)                         23 05:21 unknown)  

 

           (unknown)  (no       (unknown)  (unknown)  Pulse Rate 68  (units    (

unknown)



                    date)                                   unknown)  

 

           (unknown)  (no       (unknown)  (unknown)  RESPIRATORY:  (units    (u

nknown)



                    date)                         Breath sounds unknown)  



                                                  equal bilaterally,           



                                                  no wheezes rales           



                                                  or rhonchi.           

 

           (unknown)  (no       (unknown)  (unknown) ROS Unobtainable:  (units  

  (unknown)



                    date)                         All systems unknown)  



                                                  reviewed + are           



                                                  unremarkable           



                                                  except as noted in           



                                                  HPI                 

 

           (unknown)  (no       (unknown)  (unknown)  Referrals:  (units    (unk

nown)



                    date)                                   unknown)  

 

           (unknown)  (no       (unknown)  (unknown)  Related Data  (units    (u

nknown)



                    date)                                   unknown)  

 

           (unknown)  (no       (unknown)  (unknown)  Repeat second  (units    (

unknown)



                    date)                         dose 48 hrs after unknown)  



                                                  first dose.           

 

           (unknown)  (no       (unknown)  (unknown)  Respiratory Rate  (units  

  (unknown)



                    date)                         18 23 05:21 unknown)  

 

           (unknown)  (no       (unknown)  (unknown)  Respiratory Rate  (units  

  (unknown)



                    date)                         18        unknown)  

 

           (unknown)  (no       (unknown)  (unknown)  Review of Systems  (units 

   (unknown)



                    date)                                   unknown)  

 

           (unknown)  (no       (unknown)  (unknown)  Rx Instructions:  (units  

  (unknown)



                    date)                                   unknown)  

 

           (unknown)  (no       (unknown)  (unknown)  S/P ACL   (units    (unkno

wn)



                    date)                         reconstruction unknown)  



                                                  (-21)           

 

           (unknown)  (no       (unknown)  (unknown)  SKIN: Warm, dry,  (units  

  (unknown)



                    date)                         no petechiae, no unknown)  



                                                  rashes or lesions.           

 

           (unknown)  (no       (unknown)  (unknown)  She states she is  (units 

   (unknown)



                    date)                         been stooling unknown)  



                                                  regularly with           



                                                  form soft stools.           



                                                  No diarrhea,           

 

           (unknown)  (no       (unknown)  (unknown)  Signed By:  (units    (unk

nown)



                    date)                                   unknown)  

 

           (unknown)  (no       (unknown)  (unknown)  Smoking Status:  (units   

 (unknown)



                    date)                         Current some day unknown)  



                                                  smoker              

 

           (unknown)  (no       (unknown)  (unknown)  Social History  (units    

(unknown)



                    date)                         (Reviewed 23 unknown)  



                                                  @ 05:42 by Magdalena Cai DO)           

 

           (unknown)  (no       (unknown)  (unknown)  Source: patient  (units   

 (unknown)



                    date)                                   unknown)  

 

           (unknown)  (no       (unknown)  (unknown)  Stated Complaint:  (units 

   (unknown)



                    date)                         abd pain  unknown)  

 

           (unknown)  (no       (unknown)  (unknown)  Substance Use  (units    (

unknown)



                    date)                         Type: does not use unknown)  

 

           (unknown)  (no       (unknown)  (unknown)  Surgical History  (units  

  (unknown)



                    date)                         (Reviewed 23 unknown)  



                                                  @ 05:42 by Magdalena Cai DO)           

 

           (unknown)  (no       (unknown)  (unknown)  TOA (tubo-ovarian  (units 

   (unknown)



                    date)                         abscess)  unknown)  



                                                  (-21)           

 

           (unknown)  (no       (unknown)  (unknown)  Temperature 97 F  (units  

  (unknown)



                    date)                         L 23 05:21 unknown)  

 

           (unknown)  (no       (unknown)  (unknown)  Temperature 97 F  (units  

  (unknown)



                    date)                         L         unknown)  

 

           (unknown)  (no       (unknown)  (unknown)  This is a  (units    (unkn

own)



                    date)                         17-year-old female unknown)  



                                                  who presents with           



                                                  acute on chronic           



                                                  or                  

 

           (unknown)  (no       (unknown)  (unknown)  This is a  (units    (unkn

own)



                    date)                         17-year-old female unknown)  



                                                  with history of           



                                                  prior tubo-ovarian           



                                                  abscess with           

 

           (unknown)  (no       (unknown)  (unknown)  Time Seen by  (units    (u

nknown)



                    date)                         Provider: 23 unknown)  



                                                  05:18               

 

           (unknown)  (no       (unknown)  (unknown)  Vital Signs - 8  (units   

 (unknown)



                    date)                         hr        unknown)  

 

           (unknown)  (no       (unknown)  (unknown)  Vital Signs  (units    (un

known)



                    date)                                   unknown)  

 

           (unknown)  (no       (unknown)  (unknown)  Vital signs:  (units    (u

nknown)



                    date)                                   unknown)  

 

           (unknown)  (no       (unknown)  (unknown)  a muscle it has  (units   

 (unknown)



                    date)                         been slowly unknown)  



                                                  increasing over           



                                                  time. She went to           



                                                  work out last           

 

           (unknown)  (no       (unknown)  (unknown)  accompanied by  (units    

(unknown)



                    date)                         her mother today. unknown)  



                                                  Patient is           



                                                  following with FLAKITA Crenshaw.           

 

           (unknown)  (no       (unknown)  (unknown)  alcohol intake  (units    

(unknown)



                    date)                         frequency: unknown)  



                                                  holidays/special           



                                                  occasions only           

 

           (unknown)  (no       (unknown)  (unknown)  alcohol intake:  (units   

 (unknown)



                    date)                         current   unknown)  

 

           (unknown)  (no       (unknown)  (unknown)  amoxicillin  (units    (un

known)



                    date)                         AdvReac Vomiting unknown)  



                                                  Verified 23           



                                                  15:00               

 

           (unknown)  (no       (unknown)  (unknown)  and below  (units    (unkn

own)



                    date)                                   unknown)  

 

           (unknown)  (no       (unknown)  (unknown)  atypical. She is  (units  

  (unknown)



                    date)                         denying any flank unknown)  



                                                  pain. Patient           



                                                  denies any other           



                                                  surgeries           

 

           (unknown)  (no       (unknown)  (unknown)  besides her right  (units 

   (unknown)



                    date)                         fallopian tube unknown)  



                                                  removal. She has           



                                                  allergies to           



                                                  morphine,           

 

           (unknown)  (no       (unknown)  (unknown)  clindamycin HCl  (units   

 (unknown)



                    date)                         150 mg capsule 150 unknown)  



                                                  mg PO DAILY #30           



                                                  caps 22           

 

           (unknown)  (no       (unknown)  (unknown)  clindamycin HCl  (units   

 (unknown)



                    date)                         150 mg capsule unknown)  

 

           (unknown)  (no       (unknown)  (unknown)  constipation, no  (units  

  (unknown)



                    date)                         black or bloody unknown)  



                                                  stools. She denies           



                                                  dysuria, urgency           



                                                  or                  

 

           (unknown)  (no       (unknown)  (unknown) different as it is  (units 

   (unknown)



                    date)                         right-sided but unknown)  



                                                  also goes to the           



                                                  right upper           



                                                  abdomen which is           

 

           (unknown)  (no       (unknown)  (unknown)  doxycycline  (units    (un

known)



                    date)                         AdvReac   unknown)  



                                                  Intermediate           



                                                  vomitt Verified           



                                                  23 14:59           

 

           (unknown)  (no       (unknown)  (unknown)  doxycycline and  (units   

 (unknown)



                    date)                         amoxicillin. No unknown)  



                                                  tobacco, alcohol           



                                                  or illicit. She is           

 

           (unknown)  (no       (unknown)  (unknown)  extremities  (units    (un

known)



                    date)                                   unknown)  

 

           (unknown)  (no       (unknown)  (unknown)  fluconazole 150  (units   

 (unknown)



                    date)                         mg tablet 150 mg unknown)  



                                                  PO Q48H 2 doses #2           



                                                  tabs 02/15/23           

 

           (unknown)  (no       (unknown)  (unknown)  fluconazole  (units    (un

known)



                    date)                         [Diflucan] 150 mg unknown)  



                                                  tablet              

 

           (unknown)  (no       (unknown)  (unknown)  for evaluation  (units    

(unknown)



                    date)                         and this was unknown)  



                                                  delayed by           



                                                  scheduling           



                                                  circumstances and           



                                                  has been            

 

           (unknown)  (no       (unknown)  (unknown)  frequency. No  (units    (

unknown)



                    date)                         vaginal bleeding unknown)  



                                                  or discharge.           



                                                  Patient states           



                                                  this feels           

 

           (unknown)  (no       (unknown)  (unknown)  household  (units    (unkn

own)



                    date)                         members: friend(s) unknown)  

 

           (unknown)  (no       (unknown)  (unknown)  hydrocodone 5  (units    (

unknown)



                    date)                         mg-acetaminophen unknown)  



                                                  325 1 tab PO Q4-6H           



                                                  PRN pain #10 tabs           



                                                  22            

 

           (unknown)  (no       (unknown)  (unknown)  hydrocodone-aceta  (units 

   (unknown)



                    date)                         minophen 5-325 mg unknown)  



                                                  tablet              

 

           (unknown)  (no       (unknown)  (unknown)  hydromorphone 4  (units   

 (unknown)



                    date)                         mg tablet 4 mg PO unknown)  



                                                  Q4-6H PRN pain #20           



                                                  tabs 22           

 

           (unknown)  (no       (unknown)  (unknown)  hydromorphone  (units    (

unknown)



                    date)                         [Dilaudid] 4 mg unknown)  



                                                  tablet              

 

           (unknown)  (no       (unknown)  (unknown)  intact    (units    (unkno

wn)



                    date)                                   unknown)  

 

           (unknown)  (no       (unknown)  (unknown)  ketorolac 10 mg  (units   

 (unknown)



                    date)                         tablet 10 mg PO unknown)  



                                                  Q6H PRN pain #14           



                                                  tabs 22           

 

           (unknown)  (no       (unknown)  (unknown)  ketorolac 10 mg  (units   

 (unknown)



                    date)                         tablet    unknown)  

 

           (unknown)  (no       (unknown)  (unknown)  lower pelvic  (units    (u

nknown)



                    date)                         discomfort about 2 unknown)  



                                                  or 3 days ago she           



                                                  thought she might           



                                                  have pulled           

 

           (unknown)  (no       (unknown)  (unknown)  mg tablet  (units    (unkn

own)



                    date)                                   unknown)  

 

           (unknown)  (no       (unknown)  (unknown)  moist mucous  (units    (u

nknown)



                    date)                         membranes unknown)  

 

           (unknown)  (no       (unknown)  (unknown)  night and that  (units    

(unknown)



                    date)                         made things worse. unknown)  



                                                  She denies fevers           



                                                  or chills, no           



                                                  chest pain or           

 

           (unknown)  (no       (unknown)  (unknown)  obtaining CT but  (units  

  (unknown)



                    date)                         felt be more unknown)  



                                                  appropriate to           



                                                  evaluate with           



                                                  ultrasound at this           

 

           (unknown)  (no       (unknown)  (unknown)  ondansetron 4 mg  (units  

  (unknown)



                    date)                         disintegrating 4 unknown)  



                                                  mg PO TID-QID PRN           



                                                  nausea and           



                                                  22            

 

           (unknown)  (no       (unknown)  (unknown)  ondansetron 4 mg  (units  

  (unknown)



                    date)                         tablet,disintegrat unknown)  



                                                  ing                 

 

           (unknown)  (no       (unknown)  (unknown)  oxycodone 5 mg  (units    

(unknown)



                    date)                         tablet 5 mg PO Q4H unknown)  



                                                  PRN pain #20 tabs           



                                                  22            

 

           (unknown)  (no       (unknown)  (unknown)  oxycodone 5 mg  (units    

(unknown)



                    date)                         tablet    unknown)  

 

           (unknown)  (no       (unknown)  (unknown)  past. Plan for  (units    

(unknown)



                    date)                         fluids, pain unknown)  



                                                  medication,           



                                                  ultrasound and           



                                                  reassessment.           



                                                  Discussed           

 

           (unknown)  (no       (unknown)  (unknown)  pelvic/abdominal  (units  

  (unknown)



                    date)                         pain. On exam unknown)  



                                                  patient has right           



                                                  upper and lower           



                                                  quadrant            

 

           (unknown)  (no       (unknown)  (unknown)  rescheduled for  (units   

 (unknown)



                    date)                         later in the unknown)  



                                                  month. Patient           



                                                  states she started           



                                                  having some           

 

           (unknown)  (no       (unknown)  (unknown)  salpingectomy on  (units  

  (unknown)



                    date)                         the right. Patient unknown)  



                                                  had surgery           



                                                  scheduled for ex           



                                                  lap with OBGYN           

 

           (unknown)  (no       (unknown)  (unknown)  shortness of  (units    (u

nknown)



                    date)                         breath, no unknown)  



                                                  lightheadedness or           



                                                  passing out, no           



                                                  nausea, no           



                                                  vomiting.           

 

           (unknown)  (no       (unknown)  (unknown)  sounds all 4  (units    (u

nknown)



                    date)                         quadrants. No unknown)  



                                                  guarding or           



                                                  rebound, rigidity,           



                                                  no mass             

 

           (unknown)  (no       (unknown)  (unknown)  tablet vomiting  (units   

 (unknown)



                    date)                         #10 tabs  unknown)  

 

           (unknown)  (no       (unknown)  (unknown)  tenderness. She  (units   

 (unknown)



                    date)                         is afebrile, not unknown)  



                                                  tachycardic or           



                                                  hypotensive. She           



                                                  appears quite           

 

           (unknown)  (no       (unknown)  (unknown)  time.     (units    (unkno

wn)



                    date)                                   unknown)  

 

           (unknown)  (no       (unknown)  (unknown)  tobacco type:  (units    (

unknown)



                    date)                         vaping    unknown)  

 

           (unknown)  (no       (unknown)  (unknown)  uncomfortable.  (units    

(unknown)



                    date)                         Plan for labs unknown)  



                                                  including CBC,           



                                                  CMP, lipase, hCG           



                                                  and urine sample.           









                                         Result panel [id number]









           (unknown)  (no       (unknown)  (unknown)  (no value)  (units    (unk

nown)



                    date)                                   unknown)  

 

           (unknown)  (no       (unknown)  (unknown)  (Diflucan)  (units    (unk

nown)



                    date)                                   unknown)  

 

           (unknown)  (no       (unknown)  (unknown)  (Dilaudid)  (units    (unk

nown)



                    date)                                   unknown)  

 

           (unknown)  (no       (unknown)  (unknown)  [id number]  (units    (unkno

wn)



                    date)                                   unknown)  

 

           (unknown)  (no       (unknown)  (unknown)  23 05:33  (units    

(unknown)



                    date)                                   unknown)  

 

           (unknown)  (no       (unknown)  (unknown)  23  (units    (unkno

wn)



                    date)                                   unknown)  

 

           (unknown)  (no       (unknown)  (unknown)  05:21     (units    (unkno

wn)



                    date)                                   unknown)  

 

           (unknown)  (no       (unknown)  (unknown)  1 tab PO Q4-6H  (units    

(unknown)



                    date)                         PRN (Reason: pain) unknown)  



                                                  Qty: 10 0RF           

 

           (unknown)  (no       (unknown)  (unknown)  10 mg PO Q6H PRN  (units  

  (unknown)



                    date)                         (Reason: pain) unknown)  



                                                  Qty: 14 0RF           

 

           (unknown)  (no       (unknown)  (unknown)  150 mg PO DAILY  (units   

 (unknown)



                    date)                         Qty: 30 0RF unknown)  

 

           (unknown)  (no       (unknown)  (unknown)  150 mg PO Q48H  (units    

(unknown)



                    date)                         Qty: 2 4RF unknown)  

 

           (unknown)  (no       (unknown)  (unknown)  4 mg PO Q4-6H PRN  (units 

   (unknown)



                    date)                         (Reason: pain) unknown)  



                                                  Qty: 20 0RF           

 

           (unknown)  (no       (unknown)  (unknown)  4 mg PO TID-QID  (units   

 (unknown)



                    date)                         PRN (Reason: unknown)  



                                                  nausea and           



                                                  vomiting) Qty: 10           



                                                  0RF                 

 

           (unknown)  (no       (unknown)  (unknown)  5 mg PO Q4H PRN  (units   

 (unknown)



                    date)                         (Reason: pain) unknown)  



                                                  Qty: 20 0RF           

 

           (unknown)  (no       (unknown)  (unknown)  ABDOMEN: Soft,  (units    

(unknown)



                    date)                         right lower and unknown)  



                                                  upper quadrant           



                                                  tenderness.           



                                                  Normoactive bowel           

 

           (unknown)  (no       (unknown)  (unknown)  Acne (-)  (units    (u

nknown)



                    date)                                   unknown)  

 

           (unknown)  (no       (unknown)  (unknown)  Age/Sex: 17 / F  (units   

 (unknown)



                    date)                                   unknown)  

 

           (unknown)  (no       (unknown)  (unknown)  Alcohol type:  (units    (

unknown)



                    date)                         wine      unknown)  

 

           (unknown)  (no       (unknown)  (unknown)  Allergies  (units    (unkn

own)



                    date)                                   unknown)  

 

           (unknown)  (no       (unknown)  (unknown)  Allergy/AdvReac  (units   

 (unknown)



                    date)                         Type Severity unknown)  



                                                  Reaction Status           



                                                  Date / Time           

 

           (unknown)  (no       (unknown)  (unknown)  Anesthesia  (units    (unk

nown)



                    date)                                   unknown)  

 

           (unknown)  (no       (unknown)  (unknown)  Blood Pressure  (units    

(unknown)



                    date)                         137/85 23 unknown)  



                                                  05:21               

 

           (unknown)  (no       (unknown)  (unknown)  Blood Pressure  (units    

(unknown)



                    date)                         137    unknown)  

 

           (unknown)  (no       (unknown)  (unknown)  CARDIOVASCULAR:  (units   

 (unknown)



                    date)                         Regular rate and unknown)  



                                                  rhythm without           



                                                  murmurs, rubs or           



                                                  gallops.            

 

           (unknown)  (no       (unknown)  (unknown)  CBC Auto Diff  (units    (

unknown)



                    date)                         [Complete Blood unknown)  



                                                  Count AUTO DIFF]           



                                                  Stat                

 

           (unknown)  (no       (unknown)  (unknown)  CMP       (units    (unkno

wn)



                    date)                         [Comprehensive unknown)  



                                                  Metabolic Panel]           



                                                  Stat                

 

           (unknown)  (no       (unknown)  (unknown)  Chief Complaint:  (units  

  (unknown)



                    date)                         Abdominal Pain unknown)  

 

           (unknown)  (no       (unknown)  (unknown)  Chlamydia  (units    (unkn

own)



                    date)                         infection () unknown)  

 

           (unknown)  (no       (unknown)  (unknown)  Course    (units    (unkno

wn)



                    date)                                   unknown)  

 

           (unknown)  (no       (unknown)  (unknown)  : 2005  (units   

 (unknown)



                    date)                         Acct:[id number] unknown)  

 

           (unknown)  (no       (unknown)  (unknown)  Date of Service:  (units  

  (unknown)



                    date)                         23  unknown)  

 

           (unknown)  (no       (unknown)  (unknown)  Departure  (units    (unkn

own)



                    date)                                   unknown)  

 

           (unknown)  (no       (unknown)  (unknown)  Discharge Plan  (units    

(unknown)



                    date)                                   unknown)  

 

           (unknown)  (no       (unknown)  (unknown)  Discontinued  (units    (u

nknown)



                    date)                         Medications unknown)  

 

           (unknown)  (no       (unknown)  (unknown)  Documented By: GC  (units 

   (unknown)



                    date)                                   unknown)  

 

           (unknown)  (no       (unknown)  (unknown)  ED Orders  (units    (unkn

own)



                    date)                                   unknown)  

 

           (unknown)  (no       (unknown)  (unknown)  ER Physician:  (units    (

unknown)



                    date)                         Magdalena Cai D.O. unknown)  

 

           (unknown)  (no       (unknown)  (unknown)  EXTREMITIES:  (units    (u

nknown)



                    date)                         Normal range of unknown)  



                                                  motion, no           



                                                  clubbing or edema.           



                                                  Neurovascularly           

 

           (unknown)  (no       (unknown)  (unknown)  Emergency Report  (units  

  (unknown)



                    date)                                   unknown)  

 

           (unknown)  (no       (unknown)  (unknown)  Endometriosis  (units    (

unknown)



                    date)                         ()   unknown)  

 

           (unknown)  (no       (unknown)  (unknown)  Exam Narrative:  (units   

 (unknown)



                    date)                                   unknown)  

 

           (unknown)  (no       (unknown)  (unknown)  Exam      (units    (unkno

wn)



                    date)                                   unknown)  

 

           (unknown)  (no       (unknown)  (unknown)  GENERAL: Alert  (units    

(unknown)



                    date)                         and oriented x unknown)  



                                                  three, moderate           



                                                  distress.           

 

           (unknown)  (no       (unknown)  (unknown)  : No CVA  (units    (unk

nown)



                    date)                         tenderness unknown)  

 

           (unknown)  (no       (unknown)  (unknown)  General   (units    (unkno

wn)



                    date)                                   unknown)  

 

           (unknown)  (no       (unknown)  (unknown)  HEENT: Head  (units    (un

known)



                    date)                         normocephalic, unknown)  



                                                  atraumatic, EOMI,           



                                                  pupils reactive,           



                                                  face symmetric,           

 

           (unknown)  (no       (unknown)  (unknown)  HPI - Abdominal  (units   

 (unknown)



                    date)                         Pain      unknown)  

 

           (unknown)  (no       (unknown)  (unknown)  HPI narrative:  (units    

(unknown)



                    date)                                   unknown)  

 

           (unknown)  (no       (unknown)  (unknown)  Heavy menstrual  (units   

 (unknown)



                    date)                         period (-) unknown)  

 

           (unknown)  (no       (unknown)  (unknown)  History of  (units    (unk

nown)



                    date)                         Present Illness unknown)  

 

           (unknown)  (no       (unknown)  (unknown)  Hx of laparoscopy  (units 

   (unknown)



                    date)                         (22) unknown)  

 

           (unknown)  (no       (unknown)  (unknown)  Initial Vital  (units    (

unknown)



                    date)                         Signs     unknown)  

 

           (unknown)  (no       (unknown)  (unknown)  Initial Vital  (units    (

unknown)



                    date)                         Signs:    unknown)  

 

           (unknown)  (no       (unknown)  (unknown)  Irregular  (units    (unkn

own)



                    date)                         menstrual cycle unknown)  



                                                  ()             

 

           (unknown)  (no       (unknown)  (unknown)  [facility]  (units   

 (unknown)



                    date)                         [address] [place] [address] unknown)  



                                                  [place], [place]           



                                                  [id number]               

 

           (unknown)  (no       (unknown)  (unknown)  Casie Darnell  (units 

   (unknown)



                    date)                         , ARNP [Primary unknown)  



                                                  Care Provider]           

 

           (unknown)  (no       (unknown)  (unknown)  Ketorolac  (units    (unkn

own)



                    date)                         Tromethamine unknown)  



                                                  (Ketorolac 30           



                                                  Mg/Ml Vial) 15 mg           



                                                  IV NOW ONE           

 

           (unknown)  (no       (unknown)  (unknown)  Last Admin:  (units    (un

known)



                    date)                         23 05:51 unknown)  



                                                  Dose: 15 mg           

 

           (unknown)  (no       (unknown)  (unknown)  Last Admin:  (units    (un

known)



                    date)                         23 05:52 unknown)  



                                                  Dose: 1,000 mls/hr           

 

           (unknown)  (no       (unknown)  (unknown)  Limitations: no  (units   

 (unknown)



                    date)                         limitations unknown)  

 

           (unknown)  (no       (unknown)  (unknown)  Lipase Stat  (units    (un

known)



                    date)                                   unknown)  

 

           (unknown)  (no       (unknown)  (unknown)  Lymphangioma  (units    (u

nknown)



                    date)                                   unknown)  

 

           (unknown)  (no       (unknown)  (unknown)  MDM - Abdominal  (units   

 (unknown)



                    date)                         Pain      unknown)  

 

           (unknown)  (no       (unknown)  (unknown)  MDM Narrative  (units    (

unknown)



                    date)                                   unknown)  

 

           (unknown)  (no       (unknown)  (unknown)  Medical History  (units   

 (unknown)



                    date)                         (Reviewed 23 unknown)  



                                                  @ 05:42 by Magdalena Cai DO)           

 

           (unknown)  (no       (unknown)  (unknown)  Medical decision  (units  

  (unknown)



                    date)                         making narrative: unknown)  

 

           (unknown)  (no       (unknown)  (unknown)  Medication  (units    (unk

nown)



                    date)                         Instructions unknown)  



                                                  Recorded            

 

           (unknown)  (no       (unknown)  (unknown)  Mode of arrival:  (units  

  (unknown)



                    date)                         Ambulatory unknown)  

 

           (unknown)  (no       (unknown)  (unknown)  NECK: Supple,  (units    (

unknown)



                    date)                         full range of unknown)  



                                                  motion              

 

           (unknown)  (no       (unknown)  (unknown)  NEUROLOGICAL:  (units    (

unknown)



                    date)                         Cranial nerves II unknown)  



                                                  through XII           



                                                  grossly intact.           



                                                  Moving all           

 

           (unknown)  (no       (unknown)  (unknown)  Narrative  (units    (unkn

own)



                    date)                                   unknown)  

 

           (unknown)  (no       (unknown)  (unknown)  No Action  (units    (unkn

own)



                    date)                                   unknown)  

 

           (unknown)  (no       (unknown)  (unknown)  Opioids -  (units    (unkn

own)



                    date)                         Morphine Analogues unknown)  



                                                  Allergy             



                                                  Intermediate rash           



                                                  Verified 23           



                                                  14:59               

 

           (unknown)  (no       (unknown)  (unknown)  Ordered:  (units    (unkno

wn)



                    date)                                   unknown)  

 

           (unknown)  (no       (unknown)  (unknown)  Orders    (units    (unkno

wn)



                    date)                                   unknown)  

 

           (unknown)  (no       (unknown)  (unknown)  Ovarian cyst  (units    (u

nknown)



                    date)                                   unknown)  

 

           (unknown)  (no       (unknown)  (unknown)  Oxygen Delivery  (units   

 (unknown)



                    date)                         Method Room Air unknown)  



                                                  23 05:21           

 

           (unknown)  (no       (unknown)  (unknown)  Oxygen Delivery  (units   

 (unknown)



                    date)                         Method Room Air unknown)  

 

           (unknown)  (no       (unknown)  (unknown)  Painful menstrual  (units 

   (unknown)



                    date)                         periods () unknown)  

 

           (unknown)  (no       (unknown)  (unknown)  Patient History  (units   

 (unknown)



                    date)                                   unknown)  

 

           (unknown)  (no       (unknown)  (unknown)  Patient has had  (units   

 (unknown)



                    date)                         prior tubo-ovarian unknown)  



                                                  abscess as well as           



                                                  hemorrhagic cyst           



                                                  in the              

 

           (unknown)  (no       (unknown)  (unknown)  Patient:  (units    (unkno

wn)



                    date)                         Lesvia Aguero E unknown)  



                                                  MR#: [id number]             

 

           (unknown)  (no       (unknown)  (unknown)  Pregnancy Test  (units    

(unknown)



                    date)                         Serum,Qual Stat unknown)  

 

           (unknown)  (no       (unknown)  (unknown)  Prescriptions:  (units    

(unknown)



                    date)                                   unknown)  

 

           (unknown)  (no       (unknown)  (unknown)  Previous Rx's  (units    (

unknown)



                    date)                                   unknown)  

 

           (unknown)  (no       (unknown)  (unknown)  Pulse Oximetry 99  (units 

   (unknown)



                    date)                         23 05:21 unknown)  

 

           (unknown)  (no       (unknown)  (unknown)  Pulse Oximetry 99  (units 

   (unknown)



                    date)                                   unknown)  

 

           (unknown)  (no       (unknown)  (unknown)  Pulse Rate 68  (units    (

unknown)



                    date)                         23 05:21 unknown)  

 

           (unknown)  (no       (unknown)  (unknown)  Pulse Rate 68  (units    (

unknown)



                    date)                                   unknown)  

 

           (unknown)  (no       (unknown)  (unknown)  RESPIRATORY:  (units    (u

nknown)



                    date)                         Breath sounds unknown)  



                                                  equal bilaterally,           



                                                  no wheezes rales           



                                                  or rhonchi.           

 

           (unknown)  (no       (unknown)  (unknown) ROS Unobtainable:  (units  

  (unknown)



                    date)                         All systems unknown)  



                                                  reviewed + are           



                                                  unremarkable           



                                                  except as noted in           



                                                  HPI                 

 

           (unknown)  (no       (unknown)  (unknown)  Referrals:  (units    (unk

nown)



                    date)                                   unknown)  

 

           (unknown)  (no       (unknown)  (unknown)  Related Data  (units    (u

nknown)



                    date)                                   unknown)  

 

           (unknown)  (no       (unknown)  (unknown)  Repeat second  (units    (

unknown)



                    date)                         dose 48 hrs after unknown)  



                                                  first dose.           

 

           (unknown)  (no       (unknown)  (unknown)  Respiratory Rate  (units  

  (unknown)



                    date)                         18 23 05:21 unknown)  

 

           (unknown)  (no       (unknown)  (unknown)  Respiratory Rate  (units  

  (unknown)



                    date)                         18        unknown)  

 

           (unknown)  (no       (unknown)  (unknown)  Review of Systems  (units 

   (unknown)



                    date)                                   unknown)  

 

           (unknown)  (no       (unknown)  (unknown)  Rx Instructions:  (units  

  (unknown)



                    date)                                   unknown)  

 

           (unknown)  (no       (unknown)  (unknown)  S/P ACL   (units    (unkno

wn)



                    date)                         reconstruction unknown)  



                                                  (-21)           

 

           (unknown)  (no       (unknown)  (unknown)  SKIN: Warm, dry,  (units  

  (unknown)



                    date)                         no petechiae, no unknown)  



                                                  rashes or lesions.           

 

           (unknown)  (no       (unknown)  (unknown)  She states she is  (units 

   (unknown)



                    date)                         been stooling unknown)  



                                                  regularly with           



                                                  form soft stools.           



                                                  No diarrhea,           

 

           (unknown)  (no       (unknown)  (unknown)  Signed By:  (units    (unk

nown)



                    date)                                   unknown)  

 

           (unknown)  (no       (unknown)  (unknown)  Smoking Status:  (units   

 (unknown)



                    date)                         Current some day unknown)  



                                                  smoker              

 

           (unknown)  (no       (unknown)  (unknown)  Social History  (units    

(unknown)



                    date)                         (Reviewed 23 unknown)  



                                                  @ 05:42 by Magdalena Cai DO)           

 

           (unknown)  (no       (unknown)  (unknown)  Sodium Chloride  (units   

 (unknown)



                    date)                         (Normal Saline unknown)  



                                                  0.9%) 1,000 mls @           



                                                  1,000 mls/hr IV           



                                                  BOLUS ONE           

 

           (unknown)  (no       (unknown)  (unknown)  Source: patient  (units   

 (unknown)



                    date)                                   unknown)  

 

           (unknown)  (no       (unknown)  (unknown)  Stated Complaint:  (units 

   (unknown)



                    date)                         abd pain  unknown)  

 

           (unknown)  (no       (unknown)  (unknown)  Stop: 23  (units    

(unknown)



                    date)                         05:34     unknown)  

 

           (unknown)  (no       (unknown)  (unknown)  Stop: 23  (units    

(unknown)



                    date)                         06:32     unknown)  

 

           (unknown)  (no       (unknown)  (unknown)  Substance Use  (units    (

unknown)



                    date)                         Type: does not use unknown)  

 

           (unknown)  (no       (unknown)  (unknown)  Surgical History  (units  

  (unknown)



                    date)                         (Reviewed 23 unknown)  



                                                  @ 05:42 by Magdalena Cai DO)           

 

           (unknown)  (no       (unknown)  (unknown)  TOA (tubo-ovarian  (units 

   (unknown)



                    date)                         abscess)  unknown)  



                                                  ()           

 

           (unknown)  (no       (unknown)  (unknown)  Temperature 97 F  (units  

  (unknown)



                    date)                         L 23 05:21 unknown)  

 

           (unknown)  (no       (unknown)  (unknown)  Temperature 97 F  (units  

  (unknown)



                    date)                         L         unknown)  

 

           (unknown)  (no       (unknown)  (unknown)  This is a  (units    (unkn

own)



                    date)                         17-year-old female unknown)  



                                                  who presents with           



                                                  acute on chronic           



                                                  or                  

 

           (unknown)  (no       (unknown)  (unknown)  This is a  (units    (unkn

own)



                    date)                         17-year-old female unknown)  



                                                  with history of           



                                                  prior tubo-ovarian           



                                                  abscess with           

 

           (unknown)  (no       (unknown)  (unknown)  Time Seen by  (units    (u

nknown)



                    date)                         Provider: 23 unknown)  



                                                  05:18               

 

           (unknown)  (no       (unknown)  (unknown)  US abdomen  (units    (unk

nown)



                    date)                         limited Stat unknown)  

 

           (unknown)  (no       (unknown)  (unknown)  US pelvic  (units    (unkn

own)



                    date)                         complete Stat unknown)  

 

           (unknown)  (no       (unknown)  (unknown)  Vital Signs - 8  (units   

 (unknown)



                    date)                         hr        unknown)  

 

           (unknown)  (no       (unknown)  (unknown)  Vital Signs  (units    (un

known)



                    date)                                   unknown)  

 

           (unknown)  (no       (unknown)  (unknown)  Vital signs:  (units    (u

nknown)



                    date)                                   unknown)  

 

           (unknown)  (no       (unknown)  (unknown)  a muscle it has  (units   

 (unknown)



                    date)                         been slowly unknown)  



                                                  increasing over           



                                                  time. She went to           



                                                  work out last           

 

           (unknown)  (no       (unknown)  (unknown)  accompanied by  (units    

(unknown)



                    date)                         her mother today. unknown)  



                                                  Patient is           



                                                  following with FLAKITA Crenshaw.           

 

           (unknown)  (no       (unknown)  (unknown)  alcohol intake  (units    

(unknown)



                    date)                         frequency: unknown)  



                                                  holidays/special           



                                                  occasions only           

 

           (unknown)  (no       (unknown)  (unknown)  alcohol intake:  (units   

 (unknown)



                    date)                         current   unknown)  

 

           (unknown)  (no       (unknown)  (unknown)  amoxicillin  (units    (un

known)



                    date)                         AdvReac Vomiting unknown)  



                                                  Verified 23           



                                                  15:00               

 

           (unknown)  (no       (unknown)  (unknown)  and below  (units    (unkn

own)



                    date)                                   unknown)  

 

           (unknown)  (no       (unknown)  (unknown)  atypical. She is  (units  

  (unknown)



                    date)                         denying any flank unknown)  



                                                  pain. Patient           



                                                  denies any other           



                                                  surgeries           

 

           (unknown)  (no       (unknown)  (unknown)  besides her right  (units 

   (unknown)



                    date)                         fallopian tube unknown)  



                                                  removal. She has           



                                                  allergies to           



                                                  morphine,           

 

           (unknown)  (no       (unknown)  (unknown)  clindamycin HCl  (units   

 (unknown)



                    date)                         150 mg capsule 150 unknown)  



                                                  mg PO DAILY #30           



                                                  caps 22           

 

           (unknown)  (no       (unknown)  (unknown)  clindamycin HCl  (units   

 (unknown)



                    date)                         150 mg capsule unknown)  

 

           (unknown)  (no       (unknown)  (unknown)  constipation, no  (units  

  (unknown)



                    date)                         black or bloody unknown)  



                                                  stools. She denies           



                                                  dysuria, urgency           



                                                  or                  

 

           (unknown)  (no       (unknown)  (unknown) different as it is  (units 

   (unknown)



                    date)                         right-sided but unknown)  



                                                  also goes to the           



                                                  right upper           



                                                  abdomen which is           

 

           (unknown)  (no       (unknown)  (unknown)  doxycycline  (units    (un

known)



                    date)                         AdvReac   unknown)  



                                                  Intermediate           



                                                  vomitt Verified           



                                                  23 14:59           

 

           (unknown)  (no       (unknown)  (unknown)  doxycycline and  (units   

 (unknown)



                    date)                         amoxicillin. No unknown)  



                                                  tobacco, alcohol           



                                                  or illicit. She is           

 

           (unknown)  (no       (unknown)  (unknown)  extremities  (units    (un

known)



                    date)                                   unknown)  

 

           (unknown)  (no       (unknown)  (unknown)  fluconazole 150  (units   

 (unknown)



                    date)                         mg tablet 150 mg unknown)  



                                                  PO Q48H 2 doses #2           



                                                  tabs 02/15/23           

 

           (unknown)  (no       (unknown)  (unknown)  fluconazole  (units    (un

known)



                    date)                         [Diflucan] 150 mg unknown)  



                                                  tablet              

 

           (unknown)  (no       (unknown)  (unknown)  for evaluation  (units    

(unknown)



                    date)                         and this was unknown)  



                                                  delayed by           



                                                  scheduling           



                                                  circumstances and           



                                                  has been            

 

           (unknown)  (no       (unknown)  (unknown)  frequency. No  (units    (

unknown)



                    date)                         vaginal bleeding unknown)  



                                                  or discharge.           



                                                  Patient states           



                                                  this feels           

 

           (unknown)  (no       (unknown)  (unknown)  household  (units    (unkn

own)



                    date)                         members: friend(s) unknown)  

 

           (unknown)  (no       (unknown)  (unknown)  hydrocodone 5  (units    (

unknown)



                    date)                         mg-acetaminophen unknown)  



                                                  325 1 tab PO Q4-6H           



                                                  PRN pain #10 tabs           



                                                  22            

 

           (unknown)  (no       (unknown)  (unknown)  hydrocodone-aceta  (units 

   (unknown)



                    date)                         minophen 5-325 mg unknown)  



                                                  tablet              

 

           (unknown)  (no       (unknown)  (unknown)  hydromorphone 4  (units   

 (unknown)



                    date)                         mg tablet 4 mg PO unknown)  



                                                  Q4-6H PRN pain #20           



                                                  tabs 22           

 

           (unknown)  (no       (unknown)  (unknown)  hydromorphone  (units    (

unknown)



                    date)                         [Dilaudid] 4 mg unknown)  



                                                  tablet              

 

           (unknown)  (no       (unknown)  (unknown)  intact    (units    (unkno

wn)



                    date)                                   unknown)  

 

           (unknown)  (no       (unknown)  (unknown)  ketorolac 10 mg  (units   

 (unknown)



                    date)                         tablet 10 mg PO unknown)  



                                                  Q6H PRN pain #14           



                                                  tabs 22           

 

           (unknown)  (no       (unknown)  (unknown)  ketorolac 10 mg  (units   

 (unknown)



                    date)                         tablet    unknown)  

 

           (unknown)  (no       (unknown)  (unknown)  lower pelvic  (units    (u

nknown)



                    date)                         discomfort about 2 unknown)  



                                                  or 3 days ago she           



                                                  thought she might           



                                                  have pulled           

 

           (unknown)  (no       (unknown)  (unknown)  mg tablet  (units    (unkn

own)



                    date)                                   unknown)  

 

           (unknown)  (no       (unknown)  (unknown)  moist mucous  (units    (u

nknown)



                    date)                         membranes unknown)  

 

           (unknown)  (no       (unknown)  (unknown)  night and that  (units    

(unknown)



                    date)                         made things worse. unknown)  



                                                  She denies fevers           



                                                  or chills, no           



                                                  chest pain or           

 

           (unknown)  (no       (unknown)  (unknown)  obtaining CT but  (units  

  (unknown)



                    date)                         felt be more unknown)  



                                                  appropriate to           



                                                  evaluate with           



                                                  ultrasound at this           

 

           (unknown)  (no       (unknown)  (unknown)  ondansetron 4 mg  (units  

  (unknown)



                    date)                         disintegrating 4 unknown)  



                                                  mg PO TID-QID PRN           



                                                  nausea and           



                                                  22            

 

           (unknown)  (no       (unknown)  (unknown)  ondansetron 4 mg  (units  

  (unknown)



                    date)                         tablet,disintegrat unknown)  



                                                  ing                 

 

           (unknown)  (no       (unknown)  (unknown)  oxycodone 5 mg  (units    

(unknown)



                    date)                         tablet 5 mg PO Q4H unknown)  



                                                  PRN pain #20 tabs           



                                                  22            

 

           (unknown)  (no       (unknown)  (unknown)  oxycodone 5 mg  (units    

(unknown)



                    date)                         tablet    unknown)  

 

           (unknown)  (no       (unknown)  (unknown)  past. Plan for  (units    

(unknown)



                    date)                         fluids, pain unknown)  



                                                  medication,           



                                                  ultrasound and           



                                                  reassessment.           



                                                  Discussed           

 

           (unknown)  (no       (unknown)  (unknown)  pelvic/abdominal  (units  

  (unknown)



                    date)                         pain. On exam unknown)  



                                                  patient has right           



                                                  upper and lower           



                                                  quadrant            

 

           (unknown)  (no       (unknown)  (unknown)  rescheduled for  (units   

 (unknown)



                    date)                         later in the unknown)  



                                                  month. Patient           



                                                  states she started           



                                                  having some           

 

           (unknown)  (no       (unknown)  (unknown)  salpingectomy on  (units  

  (unknown)



                    date)                         the right. Patient unknown)  



                                                  had surgery           



                                                  scheduled for ex           



                                                  lap with OBGYN           

 

           (unknown)  (no       (unknown)  (unknown)  shortness of  (units    (u

nknown)



                    date)                         breath, no unknown)  



                                                  lightheadedness or           



                                                  passing out, no           



                                                  nausea, no           



                                                  vomiting.           

 

           (unknown)  (no       (unknown)  (unknown)  sounds all 4  (units    (u

nknown)



                    date)                         quadrants. No unknown)  



                                                  guarding or           



                                                  rebound, rigidity,           



                                                  no mass             

 

           (unknown)  (no       (unknown)  (unknown)  tablet vomiting  (units   

 (unknown)



                    date)                         #10 tabs  unknown)  

 

           (unknown)  (no       (unknown)  (unknown)  tenderness. She  (units   

 (unknown)



                    date)                         is afebrile, not unknown)  



                                                  tachycardic or           



                                                  hypotensive. She           



                                                  appears quite           

 

           (unknown)  (no       (unknown)  (unknown)  time. Patient  (units    (

unknown)



                    date)                         signed out to Dr. shelby)  



                                                  Leon while           



                                                  awaiting results.           

 

           (unknown)  (no       (unknown)  (unknown)  tobacco type:  (units    (

unknown)



                    date)                         vaping    unknown)  

 

           (unknown)  (no       (unknown)  (unknown)  uncomfortable.  (units    

(unknown)



                    date)                         Plan for labs unknown)  



                                                  including CBC,           



                                                  CMP, lipase, hCG           



                                                  and urine sample.           









                                         Result panel 372









           (unknown)  (no date)  (unknown)  (unknown)  0         /ul       (unkn

own)

 

           (unknown)  (no date)  (unknown)  (unknown)  0.7       %         (unkn

own)

 

           (unknown)  (no date)  (unknown)  (unknown)  12.7      g/dl      (unkn

own)

 

           (unknown)  (no date)  (unknown)  (unknown)  14.3      %         (unkn

own)

 

           (unknown)  (no date)  (unknown)  (unknown)  1900      /ul       (unkn

own)

 

           (unknown)  (no date)  (unknown)  (unknown)  2.5       %         (unkn

own)

 

           (unknown)  (no date)  (unknown)  (unknown)  200       /ul       (unkn

own)

 

           (unknown)  (no date)  (unknown)  (unknown)  28.8      %         (unkn

own)

 

           (unknown)  (no date)  (unknown)  (unknown)  29.4      pg        (unkn

own)

 

           (unknown)  (no date)  (unknown)  (unknown)  304       x10 3/ul  (unkn

own)

 

           (unknown)  (no date)  (unknown)  (unknown)  34.0      %         (unkn

own)

 

           (unknown)  (no date)  (unknown)  (unknown)  37.3      %         (unkn

own)

 

           (unknown)  (no date)  (unknown)  (unknown)  3900      /ul       (unkn

own)

 

           (unknown)  (no date)  (unknown)  (unknown)  4.32      x10 6/ul  (unkn

own)

 

           (unknown)  (no date)  (unknown)  (unknown)  58.4      %         (unkn

own)

 

           (unknown)  (no date)  (unknown)  (unknown)  6.7       x10 3/ul  (unkn

own)

 

           (unknown)  (no date)  (unknown)  (unknown)  600       /ul       (unkn

own)

 

           (unknown)  (no date)  (unknown)  (unknown)  86.4      fl        (unkn

own)

 

           (unknown)  (no date)  (unknown)  (unknown)  9.6       %         (unkn

own)









                                         Result panel 373









           (unknown)  (no date)  (unknown)  (unknown)  > 60      ml/min    (unkn

own)

 

           (unknown)  (no date)  (unknown)  (unknown)  > 60      ml/min    (unkn

own)

 

           (unknown)  (no date)  (unknown)  (unknown)  0.67      mg/dl     (unkn

own)

 

           (unknown)  (no date)  (unknown)  (unknown)  0.7       mg/dl     (unkn

own)

 

           (unknown)  (no date)  (unknown)  (unknown)  1.5       (units unknown)

  (unknown)

 

           (unknown)  (no date)  (unknown)  (unknown)  103       mmol/l    (unkn

own)

 

           (unknown)  (no date)  (unknown)  (unknown)  104       u/l       (unkn

own)

 

           (unknown)  (no date)  (unknown)  (unknown)  12        mg/dl     (unkn

own)

 

           (unknown)  (no date)  (unknown)  (unknown)  135       mmol/l    (unkn

own)

 

           (unknown)  (no date)  (unknown)  (unknown)  17.9      (units unknown)

  (unknown)

 

           (unknown)  (no date)  (unknown)  (unknown)  2.6       g/dl      (unkn

own)

 

           (unknown)  (no date)  (unknown)  (unknown)  26        mmol/l    (unkn

own)

 

           (unknown)  (no date)  (unknown)  (unknown)  27        iu/l      (unkn

own)

 

           (unknown)  (no date)  (unknown)  (unknown)  3.7       mmol/l    (unkn

own)

 

           (unknown)  (no date)  (unknown)  (unknown)  3.9       g/dl      (unkn

own)

 

           (unknown)  (no date)  (unknown)  (unknown)  30        iu/l      (unkn

own)

 

           (unknown)  (no date)  (unknown)  (unknown)  6.5       g/dl      (unkn

own)

 

           (unknown)  (no date)  (unknown)  (unknown)  8.6       mg/dl     (unkn

own)

 

           (unknown)  (no date)  (unknown)  (unknown)  80        u/l       (unkn

own)

 

           (unknown)  (no date)  (unknown)  (unknown)  83        mg/dl     (unkn

own)

 

           (unknown)  (no date)  (unknown)  (unknown)  83        mg/dl     (unkn

own)









                                         Result panel 374









           (unknown)  (no       (unknown)  (unknown)  (no value)  (units    (unk

nown)



                    date)                                   unknown)  

 

           (unknown)  (no       (unknown)  (unknown)  (Diflucan)  (units    (unk

nown)



                    date)                                   unknown)  

 

           (unknown)  (no       (unknown)  (unknown)  (Dilaudid)  (units    (unk

nown)



                    date)                                   unknown)  

 

           (unknown)  (no       (unknown)  (unknown)  [id number]  (units    (unkno

wn)



                    date)                                   unknown)  

 

           (unknown)  (no       (unknown)  (unknown)  23 05:33  (units    

(unknown)



                    date)                                   unknown)  

 

           (unknown)  (no       (unknown)  (unknown)  23  (units    (unkno

wn)



                    date)                                   unknown)  

 

           (unknown)  (no       (unknown)  (unknown)  05:21     (units    (unkno

wn)



                    date)                                   unknown)  

 

           (unknown)  (no       (unknown)  (unknown)  1 tab PO Q4-6H  (units    

(unknown)



                    date)                         PRN (Reason: pain) unknown)  



                                                  Qty: 10 0RF           

 

           (unknown)  (no       (unknown)  (unknown)  10 mg PO Q6H PRN  (units  

  (unknown)



                    date)                         (Reason: pain) unknown)  



                                                  Qty: 14 0RF           

 

           (unknown)  (no       (unknown)  (unknown)  150 mg PO DAILY  (units   

 (unknown)



                    date)                         Qty: 30 0RF unknown)  

 

           (unknown)  (no       (unknown)  (unknown)  150 mg PO Q48H  (units    

(unknown)



                    date)                         Qty: 2 4RF unknown)  

 

           (unknown)  (no       (unknown)  (unknown)  4 mg PO Q4-6H PRN  (units 

   (unknown)



                    date)                         (Reason: pain) unknown)  



                                                  Qty: 20 0RF           

 

           (unknown)  (no       (unknown)  (unknown)  4 mg PO TID-QID  (units   

 (unknown)



                    date)                         PRN (Reason: unknown)  



                                                  nausea and           



                                                  vomiting) Qty: 10           



                                                  0RF                 

 

           (unknown)  (no       (unknown)  (unknown)  5 mg PO Q4H PRN  (units   

 (unknown)



                    date)                         (Reason: pain) unknown)  



                                                  Qty: 20 0RF           

 

           (unknown)  (no       (unknown)  (unknown)  ABDOMEN: Soft,  (units    

(unknown)



                    date)                         right lower and unknown)  



                                                  upper quadrant           



                                                  tenderness.           



                                                  Normoactive bowel           

 

           (unknown)  (no       (unknown)  (unknown)  Acne (-2016)  (units    (u

nknown)



                    date)                                   unknown)  

 

           (unknown)  (no       (unknown)  (unknown)  Age/Sex: 17 / F  (units   

 (unknown)



                    date)                                   unknown)  

 

           (unknown)  (no       (unknown)  (unknown)  Alcohol type:  (units    (

unknown)



                    date)                         wine      unknown)  

 

           (unknown)  (no       (unknown)  (unknown)  Allergies  (units    (unkn

own)



                    date)                                   unknown)  

 

           (unknown)  (no       (unknown)  (unknown)  Allergy/AdvReac  (units   

 (unknown)



                    date)                         Type Severity unknown)  



                                                  Reaction Status           



                                                  Date / Time           

 

           (unknown)  (no       (unknown)  (unknown)  Anesthesia  (units    (unk

nown)



                    date)                                   unknown)  

 

           (unknown)  (no       (unknown)  (unknown)  Blood Pressure  (units    

(unknown)



                    date)                         137/85 23 unknown)  



                                                  05:21               

 

           (unknown)  (no       (unknown)  (unknown)  Blood Pressure  (units    

(unknown)



                    date)                         137    unknown)  

 

           (unknown)  (no       (unknown)  (unknown)  CARDIOVASCULAR:  (units   

 (unknown)



                    date)                         Regular rate and unknown)  



                                                  rhythm without           



                                                  murmurs, rubs or           



                                                  gallops.            

 

           (unknown)  (no       (unknown)  (unknown)  CBC Auto Diff  (units    (

unknown)



                    date)                         [Complete Blood unknown)  



                                                  Count AUTO DIFF]           



                                                  Stat                

 

           (unknown)  (no       (unknown)  (unknown)  CMP       (units    (unkno

wn)



                    date)                         [Comprehensive unknown)  



                                                  Metabolic Panel]           



                                                  Stat                

 

           (unknown)  (no       (unknown)  (unknown)  Chief Complaint:  (units  

  (unknown)



                    date)                         Abdominal Pain unknown)  

 

           (unknown)  (no       (unknown)  (unknown)  Chlamydia  (units    (unkn

own)



                    date)                         infection () unknown)  

 

           (unknown)  (no       (unknown)  (unknown)  Course    (units    (unkno

wn)



                    date)                                   unknown)  

 

           (unknown)  (no       (unknown)  (unknown)  : 2003  (units   

 (unknown)



                    date)                         Acct:[id number] unknown)  

 

           (unknown)  (no       (unknown)  (unknown)  Date of Service:  (units  

  (unknown)



                    date)                         23  unknown)  

 

           (unknown)  (no       (unknown)  (unknown)  Departure  (units    (unkn

own)



                    date)                                   unknown)  

 

           (unknown)  (no       (unknown)  (unknown)  Discharge Plan  (units    

(unknown)



                    date)                                   unknown)  

 

           (unknown)  (no       (unknown)  (unknown)  Discontinued  (units    (u

nknown)



                    date)                         Medications unknown)  

 

           (unknown)  (no       (unknown)  (unknown)  Documented By: GC  (units 

   (unknown)



                    date)                                   unknown)  

 

           (unknown)  (no       (unknown)  (unknown)  ED Orders  (units    (unkn

own)



                    date)                                   unknown)  

 

           (unknown)  (no       (unknown)  (unknown)  ER Physician:  (units    (

unknown)



                    date)                         Dashawn Quintero D.O. unknown)  

 

           (unknown)  (no       (unknown)  (unknown)  EXTREMITIES:  (units    (u

nknown)



                    date)                         Normal range of unknown)  



                                                  motion, no           



                                                  clubbing or edema.           



                                                  Neurovascularly           

 

           (unknown)  (no       (unknown)  (unknown)  Emergency Report  (units  

  (unknown)



                    date)                                   unknown)  

 

           (unknown)  (no       (unknown)  (unknown)  Endometriosis  (units    (

unknown)



                    date)                         ()   unknown)  

 

           (unknown)  (no       (unknown)  (unknown)  Exam Narrative:  (units   

 (unknown)



                    date)                                   unknown)  

 

           (unknown)  (no       (unknown)  (unknown)  Exam      (units    (unkno

wn)



                    date)                                   unknown)  

 

           (unknown)  (no       (unknown)  (unknown)  GENERAL: Alert  (units    

(unknown)



                    date)                         and oriented x unknown)  



                                                  three, moderate           



                                                  distress.           

 

           (unknown)  (no       (unknown)  (unknown)  : No CVA  (units    (unk

nown)



                    date)                         tenderness unknown)  

 

           (unknown)  (no       (unknown)  (unknown)  General   (units    (unkno

wn)



                    date)                                   unknown)  

 

           (unknown)  (no       (unknown)  (unknown)  HEENT: Head  (units    (un

known)



                    date)                         normocephalic, unknown)  



                                                  atraumatic, EOMI,           



                                                  pupils reactive,           



                                                  face symmetric,           

 

           (unknown)  (no       (unknown)  (unknown)  HPI - Abdominal  (units   

 (unknown)



                    date)                         Pain      unknown)  

 

           (unknown)  (no       (unknown)  (unknown)  HPI narrative:  (units    

(unknown)



                    date)                                   unknown)  

 

           (unknown)  (no       (unknown)  (unknown)  Heavy menstrual  (units   

 (unknown)



                    date)                         period (-) unknown)  

 

           (unknown)  (no       (unknown)  (unknown)  History of  (units    (unk

nown)



                    date)                         Present Illness unknown)  

 

           (unknown)  (no       (unknown)  (unknown)  Hx of laparoscopy  (units 

   (unknown)



                    date)                         (22) unknown)  

 

           (unknown)  (no       (unknown)  (unknown)  Initial Vital  (units    (

unknown)



                    date)                         Signs     unknown)  

 

           (unknown)  (no       (unknown)  (unknown)  Initial Vital  (units    (

unknown)



                    date)                         Signs:    unknown)  

 

           (unknown)  (no       (unknown)  (unknown)  Irregular  (units    (unkn

own)



                    date)                         menstrual cycle unknown)  



                                                  ()             

 

           (unknown)  (no       (unknown)  (unknown)  [facility]  (units   

 (unknown)



                    date)                         [address] [place] [address] unknown)  



                                                  [place]           



                                                  [id number]               

 

           (unknown)  (no       (unknown)  (unknown)  Casie Darnell  (units 

   (unknown)



                    date)                         , ARNP [Primary unknown)  



                                                  Care Provider]           

 

           (unknown)  (no       (unknown)  (unknown)  Ketorolac  (units    (unkn

own)



                    date)                         Tromethamine unknown)  



                                                  (Ketorolac 30           



                                                  Mg/Ml Vial) 15 mg           



                                                  IV NOW ONE           

 

           (unknown)  (no       (unknown)  (unknown)  Last Admin:  (units    (un

known)



                    date)                         23 05:51 unknown)  



                                                  Dose: 15 mg           

 

           (unknown)  (no       (unknown)  (unknown)  Last Admin:  (units    (un

known)



                    date)                         23 05:52 unknown)  



                                                  Dose: 1,000 mls/hr           

 

           (unknown)  (no       (unknown)  (unknown)  Limitations: no  (units   

 (unknown)



                    date)                         limitations unknown)  

 

           (unknown)  (no       (unknown)  (unknown)  Lipase Stat  (units    (un

known)



                    date)                                   unknown)  

 

           (unknown)  (no       (unknown)  (unknown)  Lymphangioma  (units    (u

nknown)



                    date)                                   unknown)  

 

           (unknown)  (no       (unknown)  (unknown)  MDM - Abdominal  (units   

 (unknown)



                    date)                         Pain      unknown)  

 

           (unknown)  (no       (unknown)  (unknown)  MDM Narrative  (units    (

unknown)



                    date)                                   unknown)  

 

           (unknown)  (no       (unknown)  (unknown)  Medical History  (units   

 (unknown)



                    date)                         (Reviewed 23 unknown)  



                                                  @ 05:42 by Magdalena Cai DO)           

 

           (unknown)  (no       (unknown)  (unknown)  Medical decision  (units  

  (unknown)



                    date)                         making narrative: unknown)  

 

           (unknown)  (no       (unknown)  (unknown)  Medication  (units    (unk

nown)



                    date)                         Instructions unknown)  



                                                  Recorded            

 

           (unknown)  (no       (unknown)  (unknown)  Mode of arrival:  (units  

  (unknown)



                    date)                         Ambulatory unknown)  

 

           (unknown)  (no       (unknown)  (unknown)  NECK: Supple,  (units    (

unknown)



                    date)                         full range of unknown)  



                                                  motion              

 

           (unknown)  (no       (unknown)  (unknown)  NEUROLOGICAL:  (units    (

unknown)



                    date)                         Cranial nerves II unknown)  



                                                  through XII           



                                                  grossly intact.           



                                                  Moving all           

 

           (unknown)  (no       (unknown)  (unknown)  Narrative  (units    (unkn

own)



                    date)                                   unknown)  

 

           (unknown)  (no       (unknown)  (unknown)  No Action  (units    (unkn

own)



                    date)                                   unknown)  

 

           (unknown)  (no       (unknown)  (unknown)  Opioids -  (units    (unkn

own)



                    date)                         Morphine Analogues unknown)  



                                                  Allergy             



                                                  Intermediate rash           



                                                  Verified 23           



                                                  14:59               

 

           (unknown)  (no       (unknown)  (unknown)  Ordered:  (units    (unkno

wn)



                    date)                                   unknown)  

 

           (unknown)  (no       (unknown)  (unknown)  Orders    (units    (unkno

wn)



                    date)                                   unknown)  

 

           (unknown)  (no       (unknown)  (unknown)  Ovarian cyst  (units    (u

nknown)



                    date)                                   unknown)  

 

           (unknown)  (no       (unknown)  (unknown)  Oxygen Delivery  (units   

 (unknown)



                    date)                         Method Room Air unknown)  



                                                  23 05:21           

 

           (unknown)  (no       (unknown)  (unknown)  Oxygen Delivery  (units   

 (unknown)



                    date)                         Method Room Air unknown)  

 

           (unknown)  (no       (unknown)  (unknown)  Painful menstrual  (units 

   (unknown)



                    date)                         periods (-) unknown)  

 

           (unknown)  (no       (unknown)  (unknown)  Patient History  (units   

 (unknown)



                    date)                                   unknown)  

 

           (unknown)  (no       (unknown)  (unknown)  Patient has had  (units   

 (unknown)



                    date)                         prior tubo-ovarian unknown)  



                                                  abscess as well as           



                                                  hemorrhagic cyst           



                                                  in the              

 

           (unknown)  (no       (unknown)  (unknown)  Patient:  (units    (unkno

wn)



                    date)                         Lesvia Aguero E unknown)  



                                                  MR#: [id number]             

 

           (unknown)  (no       (unknown)  (unknown)  Pregnancy Test  (units    

(unknown)



                    date)                         Serum,Qual Stat unknown)  

 

           (unknown)  (no       (unknown)  (unknown)  Prescriptions:  (units    

(unknown)



                    date)                                   unknown)  

 

           (unknown)  (no       (unknown)  (unknown)  Previous Rx's  (units    (

unknown)



                    date)                                   unknown)  

 

           (unknown)  (no       (unknown)  (unknown)  Pulse Oximetry 99  (units 

   (unknown)



                    date)                         23 05:21 unknown)  

 

           (unknown)  (no       (unknown)  (unknown)  Pulse Oximetry 99  (units 

   (unknown)



                    date)                                   unknown)  

 

           (unknown)  (no       (unknown)  (unknown)  Pulse Rate 68  (units    (

unknown)



                    date)                         23 05:21 unknown)  

 

           (unknown)  (no       (unknown)  (unknown)  Pulse Rate 68  (units    (

unknown)



                    date)                                   unknown)  

 

           (unknown)  (no       (unknown)  (unknown)  RESPIRATORY:  (units    (u

nknown)



                    date)                         Breath sounds unknown)  



                                                  equal bilaterally,           



                                                  no wheezes rales           



                                                  or rhonchi.           

 

           (unknown)  (no       (unknown)  (unknown) ROS Unobtainable:  (units  

  (unknown)



                    date)                         All systems unknown)  



                                                  reviewed + are           



                                                  unremarkable           



                                                  except as noted in           



                                                  HPI                 

 

           (unknown)  (no       (unknown)  (unknown)  Referrals:  (units    (unk

nown)



                    date)                                   unknown)  

 

           (unknown)  (no       (unknown)  (unknown)  Related Data  (units    (u

nknown)



                    date)                                   unknown)  

 

           (unknown)  (no       (unknown)  (unknown)  Repeat second  (units    (

unknown)



                    date)                         dose 48 hrs after unknown)  



                                                  first dose.           

 

           (unknown)  (no       (unknown)  (unknown)  Respiratory Rate  (units  

  (unknown)



                    date)                         18 23 05:21 unknown)  

 

           (unknown)  (no       (unknown)  (unknown)  Respiratory Rate  (units  

  (unknown)



                    date)                         18        unknown)  

 

           (unknown)  (no       (unknown)  (unknown)  Review of Systems  (units 

   (unknown)



                    date)                                   unknown)  

 

           (unknown)  (no       (unknown)  (unknown)  Rx Instructions:  (units  

  (unknown)



                    date)                                   unknown)  

 

           (unknown)  (no       (unknown)  (unknown)  S/P ACL   (units    (unkno

wn)



                    date)                         reconstruction unknown)  



                                                  (-21)           

 

           (unknown)  (no       (unknown)  (unknown)  SKIN: Warm, dry,  (units  

  (unknown)



                    date)                         no petechiae, no unknown)  



                                                  rashes or lesions.           

 

           (unknown)  (no       (unknown)  (unknown)  She states she is  (units 

   (unknown)



                    date)                         been stooling unknown)  



                                                  regularly with           



                                                  form soft stools.           



                                                  No diarrhea,           

 

           (unknown)  (no       (unknown)  (unknown)  Signed By:  (units    (unk

nown)



                    date)                                   unknown)  

 

           (unknown)  (no       (unknown)  (unknown)  Smoking Status:  (units   

 (unknown)



                    date)                         Current some day unknown)  



                                                  smoker              

 

           (unknown)  (no       (unknown)  (unknown)  Social History  (units    

(unknown)



                    date)                         (Reviewed 23 unknown)  



                                                  @ 05:42 by Magdalena Cai DO)           

 

           (unknown)  (no       (unknown)  (unknown)  Sodium Chloride  (units   

 (unknown)



                    date)                         (Normal Saline unknown)  



                                                  0.9%) 1,000 mls @           



                                                  1,000 mls/hr IV           



                                                  BOLUS ONE           

 

           (unknown)  (no       (unknown)  (unknown)  Source: patient  (units   

 (unknown)



                    date)                                   unknown)  

 

           (unknown)  (no       (unknown)  (unknown)  Stated Complaint:  (units 

   (unknown)



                    date)                         abd pain  unknown)  

 

           (unknown)  (no       (unknown)  (unknown)  Stop: 23  (units    

(unknown)



                    date)                         05:34     unknown)  

 

           (unknown)  (no       (unknown)  (unknown)  Stop: 23  (units    

(unknown)



                    date)                         06:32     unknown)  

 

           (unknown)  (no       (unknown)  (unknown)  Substance Use  (units    (

unknown)



                    date)                         Type: does not use unknown)  

 

           (unknown)  (no       (unknown)  (unknown)  Surgical History  (units  

  (unknown)



                    date)                         (Reviewed 23 unknown)  



                                                  @ 05:42 by Magdalena Cai DO)           

 

           (unknown)  (no       (unknown)  (unknown)  TOA (tubo-ovarian  (units 

   (unknown)



                    date)                         abscess)  unknown)  



                                                  (-21)           

 

           (unknown)  (no       (unknown)  (unknown)  Temperature 97 F  (units  

  (unknown)



                    date)                         L 23 05:21 unknown)  

 

           (unknown)  (no       (unknown)  (unknown)  Temperature 97 F  (units  

  (unknown)



                    date)                         L         unknown)  

 

           (unknown)  (no       (unknown)  (unknown)  This is a  (units    (unkn

own)



                    date)                         17-year-old female unknown)  



                                                  who presents with           



                                                  acute on chronic           



                                                  or                  

 

           (unknown)  (no       (unknown)  (unknown)  This is a  (units    (unkn

own)



                    date)                         17-year-old female unknown)  



                                                  with history of           



                                                  prior tubo-ovarian           



                                                  abscess with           

 

           (unknown)  (no       (unknown)  (unknown)  Time Seen by  (units    (u

nknown)



                    date)                         Provider: 23 unknown)  



                                                  05:18               

 

           (unknown)  (no       (unknown)  (unknown)  US abdomen  (units    (unk

nown)



                    date)                         limited Stat unknown)  

 

           (unknown)  (no       (unknown)  (unknown)  US pelvic  (units    (unkn

own)



                    date)                         complete Stat unknown)  

 

           (unknown)  (no       (unknown)  (unknown)  Vital Signs - 8  (units   

 (unknown)



                    date)                         hr        unknown)  

 

           (unknown)  (no       (unknown)  (unknown)  Vital Signs  (units    (un

known)



                    date)                                   unknown)  

 

           (unknown)  (no       (unknown)  (unknown)  Vital signs:  (units    (u

nknown)



                    date)                                   unknown)  

 

           (unknown)  (no       (unknown)  (unknown)  a muscle it has  (units   

 (unknown)



                    date)                         been slowly unknown)  



                                                  increasing over           



                                                  time. She went to           



                                                  work out last           

 

           (unknown)  (no       (unknown)  (unknown)  accompanied by  (units    

(unknown)



                    date)                         her mother today. unknown)  



                                                  Patient is           



                                                  following with FLAKITA Crenshaw.           

 

           (unknown)  (no       (unknown)  (unknown)  alcohol intake  (units    

(unknown)



                    date)                         frequency: unknown)  



                                                  holidays/special           



                                                  occasions only           

 

           (unknown)  (no       (unknown)  (unknown)  alcohol intake:  (units   

 (unknown)



                    date)                         current   unknown)  

 

           (unknown)  (no       (unknown)  (unknown)  amoxicillin  (units    (un

known)



                    date)                         AdvReac Vomiting unknown)  



                                                  Verified 23           



                                                  15:00               

 

           (unknown)  (no       (unknown)  (unknown)  and below  (units    (unkn

own)



                    date)                                   unknown)  

 

           (unknown)  (no       (unknown)  (unknown)  atypical. She is  (units  

  (unknown)



                    date)                         denying any flank unknown)  



                                                  pain. Patient           



                                                  denies any other           



                                                  surgeries           

 

           (unknown)  (no       (unknown)  (unknown)  besides her right  (units 

   (unknown)



                    date)                         fallopian tube unknown)  



                                                  removal. She has           



                                                  allergies to           



                                                  morphine,           

 

           (unknown)  (no       (unknown)  (unknown)  clindamycin HCl  (units   

 (unknown)



                    date)                         150 mg capsule 150 unknown)  



                                                  mg PO DAILY #30           



                                                  caps 22           

 

           (unknown)  (no       (unknown)  (unknown)  clindamycin HCl  (units   

 (unknown)



                    date)                         150 mg capsule unknown)  

 

           (unknown)  (no       (unknown)  (unknown)  constipation, no  (units  

  (unknown)



                    date)                         black or bloody unknown)  



                                                  stools. She denies           



                                                  dysuria, urgency           



                                                  or                  

 

           (unknown)  (no       (unknown)  (unknown) different as it is  (units 

   (unknown)



                    date)                         right-sided but unknown)  



                                                  also goes to the           



                                                  right upper           



                                                  abdomen which is           

 

           (unknown)  (no       (unknown)  (unknown)  doxycycline  (units    (un

known)



                    date)                         AdvReac   unknown)  



                                                  Intermediate           



                                                  vomitt Verified           



                                                  23 14:59           

 

           (unknown)  (no       (unknown)  (unknown)  doxycycline and  (units   

 (unknown)



                    date)                         amoxicillin. No unknown)  



                                                  tobacco, alcohol           



                                                  or illicit. She is           

 

           (unknown)  (no       (unknown)  (unknown)  extremities  (units    (un

known)



                    date)                                   unknown)  

 

           (unknown)  (no       (unknown)  (unknown)  fluconazole 150  (units   

 (unknown)



                    date)                         mg tablet 150 mg unknown)  



                                                  PO Q48H 2 doses #2           



                                                  tabs 02/15/23           

 

           (unknown)  (no       (unknown)  (unknown)  fluconazole  (units    (un

known)



                    date)                         [Diflucan] 150 mg unknown)  



                                                  tablet              

 

           (unknown)  (no       (unknown)  (unknown)  for evaluation  (units    

(unknown)



                    date)                         and this was unknown)  



                                                  delayed by           



                                                  scheduling           



                                                  circumstances and           



                                                  has been            

 

           (unknown)  (no       (unknown)  (unknown)  frequency. No  (units    (

unknown)



                    date)                         vaginal bleeding unknown)  



                                                  or discharge.           



                                                  Patient states           



                                                  this feels           

 

           (unknown)  (no       (unknown)  (unknown)  household  (units    (unkn

own)



                    date)                         members: friend(s) unknown)  

 

           (unknown)  (no       (unknown)  (unknown)  hydrocodone 5  (units    (

unknown)



                    date)                         mg-acetaminophen unknown)  



                                                  325 1 tab PO Q4-6H           



                                                  PRN pain #10 tabs           



                                                  22            

 

           (unknown)  (no       (unknown)  (unknown)  hydrocodone-aceta  (units 

   (unknown)



                    date)                         minophen 5-325 mg unknown)  



                                                  tablet              

 

           (unknown)  (no       (unknown)  (unknown)  hydromorphone 4  (units   

 (unknown)



                    date)                         mg tablet 4 mg PO unknown)  



                                                  Q4-6H PRN pain #20           



                                                  tabs 22           

 

           (unknown)  (no       (unknown)  (unknown)  hydromorphone  (units    (

unknown)



                    date)                         [Dilaudid] 4 mg unknown)  



                                                  tablet              

 

           (unknown)  (no       (unknown)  (unknown)  intact    (units    (unkno

wn)



                    date)                                   unknown)  

 

           (unknown)  (no       (unknown)  (unknown)  ketorolac 10 mg  (units   

 (unknown)



                    date)                         tablet 10 mg PO unknown)  



                                                  Q6H PRN pain #14           



                                                  tabs 22           

 

           (unknown)  (no       (unknown)  (unknown)  ketorolac 10 mg  (units   

 (unknown)



                    date)                         tablet    unknown)  

 

           (unknown)  (no       (unknown)  (unknown)  lower pelvic  (units    (u

nknown)



                    date)                         discomfort about 2 unknown)  



                                                  or 3 days ago she           



                                                  thought she might           



                                                  have pulled           

 

           (unknown)  (no       (unknown)  (unknown)  mg tablet  (units    (unkn

own)



                    date)                                   unknown)  

 

           (unknown)  (no       (unknown)  (unknown)  moist mucous  (units    (u

nknown)



                    date)                         membranes unknown)  

 

           (unknown)  (no       (unknown)  (unknown)  night and that  (units    

(unknown)



                    date)                         made things worse. unknown)  



                                                  She denies fevers           



                                                  or chills, no           



                                                  chest pain or           

 

           (unknown)  (no       (unknown)  (unknown)  obtaining CT but  (units  

  (unknown)



                    date)                         felt be more unknown)  



                                                  appropriate to           



                                                  evaluate with           



                                                  ultrasound at this           

 

           (unknown)  (no       (unknown)  (unknown)  ondansetron 4 mg  (units  

  (unknown)



                    date)                         disintegrating 4 unknown)  



                                                  mg PO TID-QID PRN           



                                                  nausea and           



                                                  22            

 

           (unknown)  (no       (unknown)  (unknown)  ondansetron 4 mg  (units  

  (unknown)



                    date)                         tablet,disintegrat unknown)  



                                                  ing                 

 

           (unknown)  (no       (unknown)  (unknown)  oxycodone 5 mg  (units    

(unknown)



                    date)                         tablet 5 mg PO Q4H unknown)  



                                                  PRN pain #20 tabs           



                                                  22            

 

           (unknown)  (no       (unknown)  (unknown)  oxycodone 5 mg  (units    

(unknown)



                    date)                         tablet    unknown)  

 

           (unknown)  (no       (unknown)  (unknown)  past. Plan for  (units    

(unknown)



                    date)                         fluids, pain unknown)  



                                                  medication,           



                                                  ultrasound and           



                                                  reassessment.           



                                                  Discussed           

 

           (unknown)  (no       (unknown)  (unknown)  pelvic/abdominal  (units  

  (unknown)



                    date)                         pain. On exam unknown)  



                                                  patient has right           



                                                  upper and lower           



                                                  quadrant            

 

           (unknown)  (no       (unknown)  (unknown)  rescheduled for  (units   

 (unknown)



                    date)                         later in the unknown)  



                                                  month. Patient           



                                                  states she started           



                                                  having some           

 

           (unknown)  (no       (unknown)  (unknown)  salpingectomy on  (units  

  (unknown)



                    date)                         the right. Patient unknown)  



                                                  had surgery           



                                                  scheduled for ex           



                                                  lap with OBGYN           

 

           (unknown)  (no       (unknown)  (unknown)  shortness of  (units    (u

nknown)



                    date)                         breath, no unknown)  



                                                  lightheadedness or           



                                                  passing out, no           



                                                  nausea, no           



                                                  vomiting.           

 

           (unknown)  (no       (unknown)  (unknown)  sounds all 4  (units    (u

nknown)



                    date)                         quadrants. No unknown)  



                                                  guarding or           



                                                  rebound, rigidity,           



                                                  no mass             

 

           (unknown)  (no       (unknown)  (unknown)  tablet vomiting  (units   

 (unknown)



                    date)                         #10 tabs  unknown)  

 

           (unknown)  (no       (unknown)  (unknown)  tenderness. She  (units   

 (unknown)



                    date)                         is afebrile, not unknown)  



                                                  tachycardic or           



                                                  hypotensive. She           



                                                  appears quite           

 

           (unknown)  (no       (unknown)  (unknown)  time. Patient  (units    (

unknown)



                    date)                         signed out to Dr. alley Quintero while           



                                                  awaiting results.           

 

           (unknown)  (no       (unknown)  (unknown)  tobacco type:  (units    (

unknown)



                    date)                         vaping    unknown)  

 

           (unknown)  (no       (unknown)  (unknown)  uncomfortable.  (units    

(unknown)



                    date)                         Plan for labs unknown)  



                                                  including CBC,           



                                                  CMP, lipase, hCG           



                                                  and urine sample.           









                                         Result panel 375









           (unknown)  (no       (unknown)  (unknown)  (no value)  (units    (unk

nown)



                    date)                                   unknown)  

 

           (unknown)  (no       (unknown)  (unknown)  <Electronically  (units   

 (unknown)



                    date)                         signed by Dashawn Quintero D.O.>           

 

           (unknown)  (no       (unknown)  (unknown)  <Dashawn Quintero,  (units   

 (unknown)



                    date)                         DO - Last Filed: unknown)  



                                                  23 19:18>           

 

           (unknown)  (no       (unknown)  (unknown)  <Magdalena Cai,  (units   

 (unknown)



                    date)                         DO - Last Filed: unknown)  



                                                  23 06:41>           

 

           (unknown)  (no       (unknown)  (unknown)  (Diflucan)  (units    (unk

nown)



                    date)                                   unknown)  

 

           (unknown)  (no       (unknown)  (unknown)  (Dilaudid)  (units    (unk

nown)



                    date)                                   unknown)  

 

           (unknown)  (no       (unknown)  (unknown)  *If you do not  (units    

(unknown)



                    date)                         have a primary unknown)  



                                                  care provider           



                                                  please contact the           



                                                  [facility]           

 

           (unknown)  (no       (unknown)  (unknown)  *Please continue  (units  

  (unknown)



                    date)                         to take your unknown)  



                                                  regular             



                                                  medications as           



                                                  directed.           

 

           (unknown)  (no       (unknown)  (unknown)  *Please follow up  (units 

   (unknown)



                    date)                         with your primary unknown)  



                                                  care provider in           



                                                  2-3 days, call for           



                                                  an                  

 

           (unknown)  (no       (unknown)  (unknown)  *Return to  (units    (unk

nown)



                    date)                         Emergency unknown)  



                                                  Department if you           



                                                  should have any           



                                                  new, worsening or           

 

           (unknown)  (no       (unknown)  (unknown)  *What to do:  (units    (u

nknown)



                    date)                                   unknown)  

 

           (unknown)  (no       (unknown)  (unknown)  *You have been  (units    

(unknown)



                    date)                         diagnosed with unknown)  



                                                  [right lower           



                                                  quadrant ]           

 

           (unknown)  (no       (unknown)  (unknown)  [id number]  (units    (unkno

wn)



                    date)                                   unknown)  

 

           (unknown)  (no       (unknown)  (unknown)  23  (units 

   (unknown)



                    date)                         23  unknown)  



                                                  Range/Units           

 

           (unknown)  (no       (unknown)  (unknown)  23 05:45  (units    

(unknown)



                    date)                                   unknown)  

 

           (unknown)  (no       (unknown)  (unknown)  23 1918  (units    (

unknown)



                    date)                                   unknown)  

 

           (unknown)  (no       (unknown)  (unknown)  23  (units    (unkno

wn)



                    date)                                   unknown)  

 

           (unknown)  (no       (unknown)  (unknown)  05:21     (units    (unkno

wn)



                    date)                                   unknown)  

 

           (unknown)  (no       (unknown)  (unknown)  05:45 05:45 05:45  (units 

   (unknown)



                    date)                                   unknown)  

 

           (unknown)  (no       (unknown)  (unknown)  1 tab PO Q4-6H  (units    

(unknown)



                    date)                         PRN (Reason: pain) unknown)  



                                                  Qty: 10 0RF           

 

           (unknown)  (no       (unknown)  (unknown)  1 tab PO Q4-6H  (units    

(unknown)



                    date)                         PRN (Reason: pain) unknown)  



                                                  Qty: 20 0RF           

 

           (unknown)  (no       (unknown)  (unknown)  10 mg PO Q6H PRN  (units  

  (unknown)



                    date)                         (Reason: pain) unknown)  



                                                  Qty: 14 0RF           

 

           (unknown)  (no       (unknown)  (unknown)  150 mg PO DAILY  (units   

 (unknown)



                    date)                         Qty: 30 0RF unknown)  

 

           (unknown)  (no       (unknown)  (unknown)  150 mg PO Q48H  (units    

(unknown)



                    date)                         Qty: 2 4RF unknown)  

 

           (unknown)  (no       (unknown)  (unknown)  4 mg PO Q4-6H PRN  (units 

   (unknown)



                    date)                         (Reason: pain) unknown)  



                                                  Qty: 20 0RF           

 

           (unknown)  (no       (unknown)  (unknown)  4 mg PO TID-QID  (units   

 (unknown)



                    date)                         PRN (Reason: unknown)  



                                                  nausea and           



                                                  vomiting) Qty: 10           



                                                  0RF                 

 

           (unknown)  (no       (unknown)  (unknown)  5 mg PO Q4H PRN  (units   

 (unknown)



                    date)                         (Reason: pain) unknown)  



                                                  Qty: 20 0RF           

 

           (unknown)  (no       (unknown)  (unknown)  ABDOMEN: Soft,  (units    

(unknown)



                    date)                         right lower and unknown)  



                                                  upper quadrant           



                                                  tenderness.           



                                                  Normoactive bowel           

 

           (unknown)  (no       (unknown)  (unknown)  ALT 30 (<35) IU/L  (units 

   (unknown)



                    date)                                   unknown)  

 

           (unknown)  (no       (unknown)  (unknown)  AST 27 (14-36)  (units    

(unknown)



                    date)                         IU/L      unknown)  

 

           (unknown)  (no       (unknown)  (unknown)  Acne (-2016)  (units    (u

nknown)



                    date)                                   unknown)  

 

           (unknown)  (no       (unknown)  (unknown)  Activity  (units    (unkno

wn)



                    date)                         Restrictions/Addit unknown)  



                                                  ional               



                                                  Instructions:           

 

           (unknown)  (no       (unknown)  (unknown)  Admin: 23  (units   

 (unknown)



                    date)                         05:52 Dose: 1,000 unknown)  



                                                  mls/hr              

 

           (unknown)  (no       (unknown)  (unknown)  Age/Sex: 17 / F  (units   

 (unknown)



                    date)                                   unknown)  

 

           (unknown)  (no       (unknown)  (unknown)  Albumin 3.9  (units    (un

known)



                    date)                         (3.5-5.0) g/dL unknown)  

 

           (unknown)  (no       (unknown)  (unknown)  Albumin/Globulin  (units  

  (unknown)



                    date)                         Ratio 1.5 unknown)  



                                                  (1.0-2.8)           

 

           (unknown)  (no       (unknown)  (unknown)  Alcohol type:  (units    (

unknown)



                    date)                         wine      unknown)  

 

           (unknown)  (no       (unknown)  (unknown)  Alkaline  (units    (unkno

wn)



                    date)                         Phosphatase 80 unknown)  



                                                  () U/L           

 

           (unknown)  (no       (unknown)  (unknown)  Allergies  (units    (unkn

own)



                    date)                                   unknown)  

 

           (unknown)  (no       (unknown)  (unknown)  Allergy/AdvReac  (units   

 (unknown)



                    date)                         Type Severity unknown)  



                                                  Reaction Status           



                                                  Date / Time           

 

           (unknown)  (no       (unknown)  (unknown)  Anesthesia  (units    (unk

nown)



                    date)                                   unknown)  

 

           (unknown)  (no       (unknown)  (unknown)  At this time we  (units   

 (unknown)



                    date)                         are still Pending unknown)  



                                                  results of imaging           

 

           (unknown)  (no       (unknown)  (unknown)  At time of  (units    (unk

nown)



                    date)                         discharge there unknown)  



                                                  are no surgical           



                                                  findings on           



                                                  imaging, labs are           



                                                  within              

 

           (unknown)  (no       (unknown)  (unknown)  BUN 12 (7-17)  (units    (

unknown)



                    date)                         mg/dL     unknown)  

 

           (unknown)  (no       (unknown)  (unknown)  BUN/Creatinine  (units    

(unknown)



                    date)                         Ratio 17.9 (6-22) unknown)  

 

           (unknown)  (no       (unknown)  (unknown)  Baso # (Auto) 0  (units   

 (unknown)



                    date)                         (0-100) /uL unknown)  

 

           (unknown)  (no       (unknown)  (unknown)  Baso % (Auto) 0.7  (units 

   (unknown)



                    date)                         (0-2) %   unknown)  

 

           (unknown)  (no       (unknown)  (unknown)  Blood Pressure  (units    

(unknown)



                    date)                         137/85 23 unknown)  



                                                  05:21               

 

           (unknown)  (no       (unknown)  (unknown)  Blood Pressure  (units    

(unknown)



                    date)                         137/85    unknown)  

 

           (unknown)  (no       (unknown)  (unknown)  CARDIOVASCULAR:  (units   

 (unknown)



                    date)                         Regular rate and unknown)  



                                                  rhythm without           



                                                  murmurs, rubs or           



                                                  gallops.            

 

           (unknown)  (no       (unknown)  (unknown)  Calcium 8.6  (units    (un

known)



                    date)                         (8.4-10.2) mg/dL unknown)  

 

           (unknown)  (no       (unknown)  (unknown)  Carbon Dioxide 26  (units 

   (unknown)



                    date)                         (22-32) mmol/L unknown)  

 

           (unknown)  (no       (unknown)  (unknown)  Chief Complaint:  (units  

  (unknown)



                    date)                         Abdominal Pain unknown)  

 

           (unknown)  (no       (unknown)  (unknown)  Chlamydia  (units    (unkn

own)



                    date)                         infection () unknown)  

 

           (unknown)  (no       (unknown)  (unknown)  Chloride 103  (units    (u

nknown)



                    date)                         () mmol/L unknown)  

 

           (unknown)  (no       (unknown)  (unknown)  Clinical  (units    (unkno

wn)



                    date)                         Impression: unknown)  

 

           (unknown)  (no       (unknown)  (unknown)  Course    (units    (unkno

wn)



                    date)                                   unknown)  

 

           (unknown)  (no       (unknown)  (unknown)  Creatinine 0.67  (units   

 (unknown)



                    date)                         (0.52-1.04) mg/dL unknown)  

 

           (unknown)  (no       (unknown)  (unknown)  : 2005  (units   

 (unknown)



                    date)                         Acct:[id number] unknown)  

 

           (unknown)  (no       (unknown)  (unknown)  Date of Service:  (units  

  (unknown)



                    date)                         23  unknown)  

 

           (unknown)  (no       (unknown)  (unknown)  Departure  (units    (unkn

own)



                    date)                                   unknown)  

 

           (unknown)  (no       (unknown)  (unknown)  Discharge Plan  (units    

(unknown)



                    date)                                   unknown)  

 

           (unknown)  (no       (unknown)  (unknown)  Discontinued  (units    (u

nknown)



                    date)                         Medications unknown)  

 

           (unknown)  (no       (unknown)  (unknown)  Documented By: GC  (units 

   (unknown)



                    date)                                   unknown)  

 

           (unknown)  (no       (unknown)  (unknown)  Documented By: KM  (units 

   (unknown)



                    date)                                   unknown)  

 

           (unknown)  (no       (unknown)  (unknown)  ER Physician:  (units    (

unknown)



                    date)                         Dashawn Quintero D.O. unknown)  

 

           (unknown)  (no       (unknown)  (unknown)  EXTREMITIES:  (units    (u

nknown)



                    date)                         Normal range of unknown)  



                                                  motion, no           



                                                  clubbing or edema.           



                                                  Neurovascularly           

 

           (unknown)  (no       (unknown)  (unknown)  Emergency Report  (units  

  (unknown)



                    date)                                   unknown)  

 

           (unknown)  (no       (unknown)  (unknown)  Endometriosis  (units    (

unknown)



                    date)                         (-)   unknown)  

 

           (unknown)  (no       (unknown)  (unknown)  Eos # (Auto) 200  (units  

  (unknown)



                    date)                         (0-450) /uL unknown)  

 

           (unknown)  (no       (unknown)  (unknown)  Eos % (Auto) 2.5  (units  

  (unknown)



                    date)                         (2-4) %   unknown)  

 

           (unknown)  (no       (unknown)  (unknown)  Estimated GFR >  (units   

 (unknown)



                    date)                         60 (>60) mL/min unknown)  

 

           (unknown)  (no       (unknown)  (unknown)  Exam Narrative:  (units   

 (unknown)



                    date)                                   unknown)  

 

           (unknown)  (no       (unknown)  (unknown)  Exam      (units    (unkno

wn)



                    date)                                   unknown)  

 

           (unknown)  (no       (unknown)  (unknown)  GENERAL: Alert  (units    

(unknown)



                    date)                         and oriented x unknown)  



                                                  three, moderate           



                                                  distress.           

 

           (unknown)  (no       (unknown)  (unknown)  : No CVA  (units    (unk

nown)



                    date)                         tenderness unknown)  

 

           (unknown)  (no       (unknown)  (unknown)  General   (units    (unkno

wn)



                    date)                                   unknown)  

 

           (unknown)  (no       (unknown)  (unknown)  Globulin 2.6  (units    (u

nknown)



                    date)                         (1.7-4.1) g/dL unknown)  

 

           (unknown)  (no       (unknown)  (unknown)  Glucose 83  (units    (unk

nown)



                    date)                         () mg/dL unknown)  

 

           (unknown)  (no       (unknown)  (unknown)  HEENT: Head  (units    (un

known)



                    date)                         normocephalic, unknown)  



                                                  atraumatic, EOMI,           



                                                  pupils reactive,           



                                                  face symmetric,           

 

           (unknown)  (no       (unknown)  (unknown)  HPI - Abdominal  (units   

 (unknown)



                    date)                         Pain      unknown)  

 

           (unknown)  (no       (unknown)  (unknown)  HPI narrative:  (units    

(unknown)



                    date)                                   unknown)  

 

           (unknown)  (no       (unknown)  (unknown)  Hct 37.3 (36-46)  (units  

  (unknown)



                    date)                         %         unknown)  

 

           (unknown)  (no       (unknown)  (unknown)  Heavy menstrual  (units   

 (unknown)



                    date)                         period (-2019) unknown)  

 

           (unknown)  (no       (unknown)  (unknown)  Hgb 12.7  (units    (unkno

wn)



                    date)                         (12.0-16.0) g/dL unknown)  

 

           (unknown)  (no       (unknown)  (unknown)  History of  (units    (unk

nown)



                    date)                         Present Illness unknown)  

 

           (unknown)  (no       (unknown)  (unknown)  Hx of laparoscopy  (units 

   (unknown)



                    date)                         (22) unknown)  

 

           (unknown)  (no       (unknown)  (unknown)  Hydromorphone HCl  (units 

   (unknown)



                    date)                         (Hydromorphone 0.5 unknown)  



                                                  Mg Inj) 0.5 mg IV           



                                                  NOW ONE             

 

           (unknown)  (no       (unknown)  (unknown)  Initial Vital  (units    (

unknown)



                    date)                         Signs     unknown)  

 

           (unknown)  (no       (unknown)  (unknown)  Initial Vital  (units    (

unknown)



                    date)                         Signs:    unknown)  

 

           (unknown)  (no       (unknown)  (unknown)  Instructions: DI  (units  

  (unknown)



                    date)                         for Abdominal unknown)  



                                                  Pain-Adult           

 

           (unknown)  (no       (unknown)  (unknown)  Irregular  (units    (unkn

own)



                    date)                         menstrual cycle unknown)  



                                                  ()             

 

           (unknown)  (no       (unknown)  (unknown)  [facility]  (units   

 (unknown)



                    date)                         [address] [place] [address] unknown)  



                                                  [place], [place]           



                                                  [id number]               

 

           (unknown)  (no       (unknown)  (unknown)  Casie Darnell  (units 

   (unknown)



                    date)                         , ARNP [Primary unknown)  



                                                  Care Provider]           

 

           (unknown)  (no       (unknown)  (unknown)  Ketorolac  (units    (unkn

own)



                    date)                         Tromethamine unknown)  



                                                  (Ketorolac 30           



                                                  Mg/Ml Vial) 15 mg           



                                                  IV NOW ONE           

 

           (unknown)  (no       (unknown)  (unknown)  Lab Data  (units    (unkno

wn)



                    date)                                   unknown)  

 

           (unknown)  (no       (unknown)  (unknown)  Lab Results  (units    (un

known)



                    date)                                   unknown)  

 

           (unknown)  (no       (unknown)  (unknown)  Labs:     (units    (unkno

wn)



                    date)                                   unknown)  

 

           (unknown)  (no       (unknown)  (unknown)  Last Admin:  (units    (un

known)



                    date)                         23 05:51 unknown)  



                                                  Dose: 15 mg           

 

           (unknown)  (no       (unknown)  (unknown)  Last Admin:  (units    (un

known)



                    date)                         23 08:39 unknown)  



                                                  Dose: 0.5 mg           

 

           (unknown)  (no       (unknown)  (unknown)  Last Infusion:  (units    

(unknown)



                    date)                         23 06:55 unknown)  



                                                  Dose: 0 mls/hr           

 

           (unknown)  (no       (unknown)  (unknown)  Limitations: no  (units   

 (unknown)



                    date)                         limitations unknown)  

 

           (unknown)  (no       (unknown)  (unknown)  Lipase 104  (units    (unk

nown)



                    date)                         () U/L unknown)  

 

           (unknown)  (no       (unknown)  (unknown)  Lymph # (Auto)  (units    

(unknown)



                    date)                         1900 ([id number]) unknown)  



                                                  /uL                 

 

           (unknown)  (no       (unknown)  (unknown)  Lymph % (Auto)  (units    

(unknown)



                    date)                         28.8 (25-40) % unknown)  

 

           (unknown)  (no       (unknown)  (unknown)  Lymphangioma  (units    (u

nknown)



                    date)                                   unknown)  

 

           (unknown)  (no       (unknown)  (unknown)  MCH 29.4 (26-34)  (units  

  (unknown)



                    date)                         PG        unknown)  

 

           (unknown)  (no       (unknown)  (unknown)  MCHC 34.0 (30-36)  (units 

   (unknown)



                    date)                         %         unknown)  

 

           (unknown)  (no       (unknown)  (unknown)  MCV 86.4 ()  (units 

   (unknown)



                    date)                         fL        unknown)  

 

           (unknown)  (no       (unknown)  (unknown)  MDM - Abdominal  (units   

 (unknown)



                    date)                         Pain      unknown)  

 

           (unknown)  (no       (unknown)  (unknown)  MDM Narrative  (units    (

unknown)



                    date)                                   unknown)  

 

           (unknown)  (no       (unknown)  (unknown)  Medical History  (units   

 (unknown)



                    date)                         (Reviewed 23 unknown)  



                                                  @ 05:42 by Magdalena Cai DO)           

 

           (unknown)  (no       (unknown)  (unknown)  Medical decision  (units  

  (unknown)



                    date)                         making narrative: unknown)  

 

           (unknown)  (no       (unknown)  (unknown)  Medication  (units    (unk

nown)



                    date)                         Instructions unknown)  



                                                  Recorded            

 

           (unknown)  (no       (unknown)  (unknown)  Mode of arrival:  (units  

  (unknown)



                    date)                         Ambulatory unknown)  

 

           (unknown)  (no       (unknown)  (unknown)  Mono # (Auto) 600  (units 

   (unknown)



                    date)                         (0-900) /uL unknown)  

 

           (unknown)  (no       (unknown)  (unknown)  Mono % (Auto) 9.6  (units 

   (unknown)



                    date)                         (3-14) %  unknown)  

 

           (unknown)  (no       (unknown)  (unknown)  NECK: Supple,  (units    (

unknown)



                    date)                         full range of unknown)  



                                                  motion              

 

           (unknown)  (no       (unknown)  (unknown)  NEUROLOGICAL:  (units    (

unknown)



                    date)                         Cranial nerves II unknown)  



                                                  through XII           



                                                  grossly intact.           



                                                  Moving all           

 

           (unknown)  (no       (unknown)  (unknown)  Narrative  (units    (unkn

own)



                    date)                                   unknown)  

 

           (unknown)  (no       (unknown)  (unknown)  Neut # (Auto)  (units    (

unknown)



                    date)                         3900 (6473-3651) unknown)  



                                                  /uL                 

 

           (unknown)  (no       (unknown)  (unknown)  Neut % (Auto)  (units    (

unknown)



                    date)                         58.4 (50-75) % unknown)  

 

           (unknown)  (no       (unknown)  (unknown)  New       (units    (unkno

wn)



                    date)                                   unknown)  

 

           (unknown)  (no       (unknown)  (unknown)  No Action  (units    (unkn

own)



                    date)                                   unknown)  

 

           (unknown)  (no       (unknown)  (unknown)  Opioids -  (units    (unkn

own)



                    date)                         Morphine Analogues unknown)  



                                                  Allergy             



                                                  Intermediate rash           



                                                  Verified 23           



                                                  14:59               

 

           (unknown)  (no       (unknown)  (unknown)  Ordered:  (units    (unkno

wn)



                    date)                                   unknown)  

 

           (unknown)  (no       (unknown)  (unknown)  Orders    (units    (unkno

wn)



                    date)                                   unknown)  

 

           (unknown)  (no       (unknown)  (unknown)  Ovarian cyst  (units    (u

nknown)



                    date)                                   unknown)  

 

           (unknown)  (no       (unknown)  (unknown)  Oxygen Delivery  (units   

 (unknown)



                    date)                         Method Room Air unknown)  



                                                  23 05:21           

 

           (unknown)  (no       (unknown)  (unknown)  Oxygen Delivery  (units   

 (unknown)



                    date)                         Method Room Air unknown)  

 

           (unknown)  (no       (unknown)  (unknown)  Painful menstrual  (units 

   (unknown)



                    date)                         periods () unknown)  

 

           (unknown)  (no       (unknown)  (unknown)  Patient   (units    (unkno

wn)



                    date)                         Disposition: Home unknown)  

 

           (unknown)  (no       (unknown)  (unknown)  Patient History  (units   

 (unknown)



                    date)                                   unknown)  

 

           (unknown)  (no       (unknown)  (unknown)  Patient has had  (units   

 (unknown)



                    date)                         prior tubo-ovarian unknown)  



                                                  abscess as well as           



                                                  hemorrhagic cyst           



                                                  in the              

 

           (unknown)  (no       (unknown)  (unknown)  Patient:  (units    (unkno

wn)



                    date)                         Lesvia Aguero unknown)  



                                                  MR#: [id number]             

 

           (unknown)  (no       (unknown)  (unknown)  Plt Count 304  (units    (

unknown)



                    date)                         (150-400) X103/uL unknown)  

 

           (unknown)  (no       (unknown)  (unknown)  Point of Care  (units    (

unknown)



                    date)                         Testing   unknown)  

 

           (unknown)  (no       (unknown)  (unknown)  Point of care  (units    (

unknown)



                    date)                         testing:  unknown)  

 

           (unknown)  (no       (unknown)  (unknown)  Potassium 3.7  (units    (

unknown)



                    date)                         (3.4-5.1) mmol/L unknown)  

 

           (unknown)  (no       (unknown)  (unknown)  Pregnancy Test  (units    

(unknown)



                    date)                         Results Negative unknown)  

 

           (unknown)  (no       (unknown)  (unknown)  Prescriptions:  (units    

(unknown)



                    date)                                   unknown)  

 

           (unknown)  (no       (unknown)  (unknown)  Previous Rx's  (units    (

unknown)



                    date)                                   unknown)  

 

           (unknown)  (no       (unknown)  (unknown)  Pulse Oximetry 99  (units 

   (unknown)



                    date)                         23 05:21 unknown)  

 

           (unknown)  (no       (unknown)  (unknown)  Pulse Oximetry 99  (units 

   (unknown)



                    date)                                   unknown)  

 

           (unknown)  (no       (unknown)  (unknown)  Pulse Rate 68  (units    (

unknown)



                    date)                         23 05:21 unknown)  

 

           (unknown)  (no       (unknown)  (unknown)  Pulse Rate 68  (units    (

unknown)



                    date)                                   unknown)  

 

           (unknown)  (no       (unknown)  (unknown)  RBC 4.32  (units    (unkno

wn)



                    date)                         (4.0-5.2) X106/uL unknown)  

 

           (unknown)  (no       (unknown)  (unknown)  RDW 14.3  (units    (unkno

wn)



                    date)                         (11.6-14.8) % unknown)  

 

           (unknown)  (no       (unknown)  (unknown)  RESPIRATORY:  (units    (u

nknown)



                    date)                         Breath sounds unknown)  



                                                  equal bilaterally,           



                                                  no wheezes rales           



                                                  or rhonchi.           

 

           (unknown)  (no       (unknown)  (unknown) ROS Unobtainable:  (units  

  (unknown)



                    date)                         All systems unknown)  



                                                  reviewed + are           



                                                  unremarkable           



                                                  except as noted in           



                                                  HPI                 

 

           (unknown)  (no       (unknown)  (unknown)  Referrals:  (units    (unk

nown)



                    date)                                   unknown)  

 

           (unknown)  (no       (unknown)  (unknown)  Related Data  (units    (u

nknown)



                    date)                                   unknown)  

 

           (unknown)  (no       (unknown)  (unknown)  Repeat second  (units    (

unknown)



                    date)                         dose 48 hrs after unknown)  



                                                  first dose.           

 

           (unknown)  (no       (unknown)  (unknown)  Resource line at  (units  

  (unknown)



                    date)                         [phone]. They unknown)  



                                                  will ask some           



                                                  questions about           



                                                  your medical           

 

           (unknown)  (no       (unknown)  (unknown)  Respiratory Rate  (units  

  (unknown)



                    date)                         18 23 05:21 unknown)  

 

           (unknown)  (no       (unknown)  (unknown)  Respiratory Rate  (units  

  (unknown)



                    date)                         18        unknown)  

 

           (unknown)  (no       (unknown)  (unknown)  Review of Systems  (units 

   (unknown)



                    date)                                   unknown)  

 

           (unknown)  (no       (unknown)  (unknown)  Right lower  (units    (un

known)



                    date)                         quadrant abdominal unknown)  



                                                  pain                

 

           (unknown)  (no       (unknown)  (unknown)  Rx Instructions:  (units  

  (unknown)



                    date)                                   unknown)  

 

           (unknown)  (no       (unknown)  (unknown)  S/P ACL   (units    (unkno

wn)



                    date)                         reconstruction unknown)  



                                                  (-21)           

 

           (unknown)  (no       (unknown)  (unknown)  SKIN: Warm, dry,  (units  

  (unknown)



                    date)                         no petechiae, no unknown)  



                                                  rashes or lesions.           

 

           (unknown)  (no       (unknown)  (unknown)  Serum Pregnancy,  (units  

  (unknown)



                    date)                         Qual Cancelled unknown)  

 

           (unknown)  (no       (unknown)  (unknown)  She states she is  (units 

   (unknown)



                    date)                         been stooling unknown)  



                                                  regularly with           



                                                  form soft stools.           



                                                  No diarrhea,           

 

           (unknown)  (no       (unknown)  (unknown)  Signed By:  (units    (unk

nown)



                    date)                                   unknown)  

 

           (unknown)  (no       (unknown)  (unknown)  Smoking Status:  (units   

 (unknown)



                    date)                         Current some day unknown)  



                                                  smoker              

 

           (unknown)  (no       (unknown)  (unknown)  Social History  (units    

(unknown)



                    date)                         (Reviewed 23 unknown)  



                                                  @ 05:42 by Magdalena           



                                                  C Mank, DO)           

 

           (unknown)  (no       (unknown)  (unknown)  Sodium 135 L  (units    (u

nknown)



                    date)                         (137-145) mmol/L unknown)  

 

           (unknown)  (no       (unknown)  (unknown)  Sodium Chloride  (units   

 (unknown)



                    date)                         (Normal Saline unknown)  



                                                  0.9%) 1,000 mls @           



                                                  1,000 mls/hr IV           



                                                  BOLUS ONE           

 

           (unknown)  (no       (unknown)  (unknown)  Source: patient  (units   

 (unknown)



                    date)                                   unknown)  

 

           (unknown)  (no       (unknown)  (unknown)  Stand Alone  (units    (un

known)



                    date)                         Forms: Patient unknown)  



                                                  Portal/API           

 

           (unknown)  (no       (unknown)  (unknown)  Stated Complaint:  (units 

   (unknown)



                    date)                         abd pain  unknown)  

 

           (unknown)  (no       (unknown)  (unknown)  Stop: 23  (units    

(unknown)



                    date)                         05:34     unknown)  

 

           (unknown)  (no       (unknown)  (unknown)  Stop: 23  (units    

(unknown)



                    date)                         06:32     unknown)  

 

           (unknown)  (no       (unknown)  (unknown)  Stop: 23  (units    

(unknown)



                    date)                         08:29     unknown)  

 

           (unknown)  (no       (unknown)  (unknown)  Substance Use  (units    (

unknown)



                    date)                         Type: does not use unknown)  

 

           (unknown)  (no       (unknown)  (unknown)  Surgical History  (units  

  (unknown)



                    date)                         (Reviewed 23 unknown)  



                                                  @ 05:42 by Magdalena Cai DO)           

 

           (unknown)  (no       (unknown)  (unknown)  TOA (tubo-ovarian  (units 

   (unknown)



                    date)                         abscess)  unknown)  



                                                  (-21)           

 

           (unknown)  (no       (unknown)  (unknown)  Temperature 97 F  (units  

  (unknown)



                    date)                         L 23 05:21 unknown)  

 

           (unknown)  (no       (unknown)  (unknown)  Temperature 97 F  (units  

  (unknown)



                    date)                         L         unknown)  

 

           (unknown)  (no       (unknown)  (unknown)  This is a  (units    (unkn

own)



                    date)                         17-year-old female unknown)  



                                                  who presents with           



                                                  acute on chronic           



                                                  or                  

 

           (unknown)  (no       (unknown)  (unknown)  This is a  (units    (unkn

own)



                    date)                         17-year-old female unknown)  



                                                  with history of           



                                                  prior tubo-ovarian           



                                                  abscess with           

 

           (unknown)  (no       (unknown)  (unknown)  Time Seen by  (units    (u

nknown)



                    date)                         Provider: 23 unknown)  



                                                  05:18               

 

           (unknown)  (no       (unknown)  (unknown)  Total Bilirubin  (units   

 (unknown)



                    date)                         0.7 (0.2-1.3) unknown)  



                                                  mg/dL               

 

           (unknown)  (no       (unknown)  (unknown)  Total Protein 6.5  (units 

   (unknown)



                    date)                         (6.3-8.2) g/dL unknown)  

 

           (unknown)  (no       (unknown)  (unknown)  Vital Signs - 8  (units   

 (unknown)



                    date)                         hr        unknown)  

 

           (unknown)  (no       (unknown)  (unknown)  Vital Signs  (units    (un

known)



                    date)                                   unknown)  

 

           (unknown)  (no       (unknown)  (unknown)  Vital signs:  (units    (u

nknown)



                    date)                                   unknown)  

 

           (unknown)  (no       (unknown)  (unknown)  WBC 6.7   (units    (unkno

wn)



                    date)                         (4.5-11.0) X103/uL unknown)  

 

           (unknown)  (no       (unknown)  (unknown)  Jose Almodovar,  (units  

  (unknown)



                    date)                         MD [Physician] unknown)  

 

           (unknown)  (no       (unknown)  (unknown)  [ ] New   (units    (unkno

wn)



                    date)                         medication written unknown)  



                                                  as a paper           



                                                  prescription           

 

           (unknown)  (no       (unknown)  (unknown)  [ ] No new  (units    (unk

nown)



                    date)                         medications given unknown)  

 

           (unknown)  (no       (unknown)  (unknown)  [0700] (Success)  (units   

 (unknown)



                    date)                         Patient received unknown)  



                                                  in sign out from           



                                                   [make]. I have           



                                                  reviewed            

 

           (unknown)  (no       (unknown)  (unknown)  [Embedded Image  (units   

 (unknown)



                    date)                         Not Available] unknown)  

 

           (unknown)  (no       (unknown)  (unknown)  [x ] New  (units    (unkno

wn)



                    date)                         medication unknown)  



                                                  prescriptions sent           



                                                  to your pharmacy:           



                                                  [ [facility] ]           

 

           (unknown)  (no       (unknown)  (unknown)  a muscle it has  (units   

 (unknown)



                    date)                         been slowly unknown)  



                                                  increasing over           



                                                  time. She went to           



                                                  work out last           

 

           (unknown)  (no       (unknown)  (unknown)  accompanied by  (units    

(unknown)



                    date)                         her mother today. unknown)  



                                                  Patient is           



                                                  following with FLAKITA Crenshaw.           

 

           (unknown)  (no       (unknown)  (unknown)  alcohol intake  (units    

(unknown)



                    date)                         frequency: unknown)  



                                                  holidays/special           



                                                  occasions only           

 

           (unknown)  (no       (unknown)  (unknown)  alcohol intake:  (units   

 (unknown)



                    date)                         current   unknown)  

 

           (unknown)  (no       (unknown)  (unknown)  amoxicillin  (units    (un

known)



                    date)                         AdvReac Vomiting unknown)  



                                                  Verified 23           



                                                  15:00               

 

           (unknown)  (no       (unknown)  (unknown)  and below  (units    (unkn

own)



                    date)                                   unknown)  

 

           (unknown)  (no       (unknown)  (unknown) appointment. Let  (units   

 (unknown)



                    date)                         them know you were unknown)  



                                                  seen in the           



                                                  Emergency           



                                                  Department and           



                                                  that we             

 

           (unknown)  (no       (unknown)  (unknown)  ask that you be  (units   

 (unknown)



                    date)                         seen in follow up. unknown)  



                                                  We will             



                                                  electronically           



                                                  transmit a record           



                                                  of                  

 

           (unknown)  (no       (unknown)  (unknown)  atypical. She is  (units  

  (unknown)



                    date)                         denying any flank unknown)  



                                                  pain. Patient           



                                                  denies any other           



                                                  surgeries           

 

           (unknown)  (no       (unknown)  (unknown)  besides her right  (units 

   (unknown)



                    date)                         fallopian tube unknown)  



                                                  removal. She has           



                                                  allergies to           



                                                  morphine,           

 

           (unknown)  (no       (unknown)  (unknown)  clindamycin HCl  (units   

 (unknown)



                    date)                         150 mg capsule 150 unknown)  



                                                  mg PO DAILY #30           



                                                  caps 22           

 

           (unknown)  (no       (unknown)  (unknown)  clindamycin HCl  (units   

 (unknown)



                    date)                         150 mg capsule unknown)  

 

           (unknown)  (no       (unknown)  (unknown)  concerning  (units    (unk

nown)



                    date)                         symptoms, such as unknown)  



                                                  [fever greater           



                                                  than 101 F,           



                                                  shaking chills,           

 

           (unknown)  (no       (unknown)  (unknown)  constipation, no  (units  

  (unknown)



                    date)                         black or bloody unknown)  



                                                  stools. She denies           



                                                  dysuria, urgency           



                                                  or                  

 

           (unknown)  (no       (unknown)  (unknown) different as it is  (units 

   (unknown)



                    date)                         right-sided but unknown)  



                                                  also goes to the           



                                                  right upper           



                                                  abdomen which is           

 

           (unknown)  (no       (unknown)  (unknown)  doxycycline  (units    (un

known)



                    date)                         AdvReac   unknown)  



                                                  Intermediate           



                                                  vomitt Verified           



                                                  23 14:59           

 

           (unknown)  (no       (unknown)  (unknown)  doxycycline and  (units   

 (unknown)



                    date)                         amoxicillin. No unknown)  



                                                  tobacco, alcohol           



                                                  or illicit. She is           

 

           (unknown)  (no       (unknown)  (unknown)  extremities  (units    (un

known)



                    date)                                   unknown)  

 

           (unknown)  (no       (unknown)  (unknown)  fluconazole 150  (units   

 (unknown)



                    date)                         mg tablet 150 mg unknown)  



                                                  PO Q48H 2 doses #2           



                                                  tabs 02/15/23           

 

           (unknown)  (no       (unknown)  (unknown)  fluconazole  (units    (un

known)



                    date)                         [Diflucan] 150 mg unknown)  



                                                  tablet              

 

           (unknown)  (no       (unknown)  (unknown)  for evaluation  (units    

(unknown)



                    date)                         and this was unknown)  



                                                  delayed by           



                                                  scheduling           



                                                  circumstances and           



                                                  has been            

 

           (unknown)  (no       (unknown)  (unknown)  frequency. No  (units    (

unknown)



                    date)                         vaginal bleeding unknown)  



                                                  or discharge.           



                                                  Patient states           



                                                  this feels           

 

           (unknown)  (no       (unknown)  (unknown)  have discussed  (units    

(unknown)



                    date)                         findings including unknown)  



                                                  plan for follow-up           

 

           (unknown)  (no       (unknown)  (unknown)  history and help  (units  

  (unknown)



                    date)                         get you set up unknown)  



                                                  with a doctor in           



                                                  the community.           

 

           (unknown)  (no       (unknown)  (unknown)  household  (units    (unkn

own)



                    date)                         members: friend(s) unknown)  

 

           (unknown)  (no       (unknown)  (unknown)  hydrocodone 5  (units    (

unknown)



                    date)                         mg-acetaminophen unknown)  



                                                  325 1 tab PO Q4-6H           



                                                  PRN pain #10 tabs           



                                                  22            

 

           (unknown)  (no       (unknown)  (unknown)  hydrocodone 5  (units    (

unknown)



                    date)                         mg-acetaminophen unknown)  



                                                  325 1 tab PO Q4-6H           



                                                  PRN pain #20 tabs           



                                                  23            

 

           (unknown)  (no       (unknown)  (unknown)  hydrocodone-aceta  (units 

   (unknown)



                    date)                         minophen 5-325 mg unknown)  



                                                  tablet              

 

           (unknown)  (no       (unknown)  (unknown)  hydromorphone 4  (units   

 (unknown)



                    date)                         mg tablet 4 mg PO unknown)  



                                                  Q4-6H PRN pain #20           



                                                  tabs 22           

 

           (unknown)  (no       (unknown)  (unknown)  hydromorphone  (units    (

unknown)



                    date)                         [Dilaudid] 4 mg unknown)  



                                                  tablet              

 

           (unknown)  (no       (unknown)  (unknown)  intact    (units    (unkno

wn)



                    date)                                   unknown)  

 

           (unknown)  (no       (unknown)  (unknown)  ketorolac 10 mg  (units   

 (unknown)



                    date)                         tablet 10 mg PO unknown)  



                                                  Q6H PRN pain #14           



                                                  tabs 23           

 

           (unknown)  (no       (unknown)  (unknown)  ketorolac 10 mg  (units   

 (unknown)



                    date)                         tablet 10 mg PO unknown)  



                                                  Q6H PRN pain #14           



                                                  tabs 22           

 

           (unknown)  (no       (unknown)  (unknown)  ketorolac 10 mg  (units   

 (unknown)



                    date)                         tablet    unknown)  

 

           (unknown)  (no       (unknown)  (unknown)  lower pelvic  (units    (u

nknown)



                    date)                         discomfort about 2 unknown)  



                                                  or 3 days ago she           



                                                  thought she might           



                                                  have pulled           

 

           (unknown)  (no       (unknown)  (unknown)  mg tablet  (units    (unkn

own)



                    date)                                   unknown)  

 

           (unknown)  (no       (unknown)  (unknown)  moist mucous  (units    (u

nknown)



                    date)                         membranes unknown)  

 

           (unknown)  (no       (unknown)  (unknown)  night and that  (units    

(unknown)



                    date)                         made things worse. unknown)  



                                                  She denies fevers           



                                                  or chills, no           



                                                  chest pain or           

 

           (unknown)  (no       (unknown)  (unknown)  obtaining CT but  (units  

  (unknown)



                    date)                         felt be more unknown)  



                                                  appropriate to           



                                                  evaluate with           



                                                  ultrasound at this           

 

           (unknown)  (no       (unknown)  (unknown)  ondansetron 4 mg  (units  

  (unknown)



                    date)                         disintegrating 4 unknown)  



                                                  mg PO TID-QID PRN           



                                                  nausea and           



                                                  23            

 

           (unknown)  (no       (unknown)  (unknown)  ondansetron 4 mg  (units  

  (unknown)



                    date)                         disintegrating 4 unknown)  



                                                  mg PO TID-QID PRN           



                                                  nausea and           



                                                  22            

 

           (unknown)  (no       (unknown)  (unknown)  ondansetron 4 mg  (units  

  (unknown)



                    date)                         tablet,disintegrat unknown)  



                                                  ing                 

 

           (unknown)  (no       (unknown)  (unknown)  oxycodone 5 mg  (units    

(unknown)



                    date)                         tablet 5 mg PO Q4H unknown)  



                                                  PRN pain #20 tabs           



                                                  22            

 

           (unknown)  (no       (unknown)  (unknown)  oxycodone 5 mg  (units    

(unknown)



                    date)                         tablet    unknown)  

 

           (unknown)  (no       (unknown)  (unknown)  past. Plan for  (units    

(unknown)



                    date)                         fluids, pain unknown)  



                                                  medication,           



                                                  ultrasound and           



                                                  reassessment.           



                                                  Discussed           

 

           (unknown)  (no       (unknown)  (unknown)  pelvic/abdominal  (units  

  (unknown)



                    date)                         pain. On exam unknown)  



                                                  patient has right           



                                                  upper and lower           



                                                  quadrant            

 

           (unknown)  (no       (unknown)  (unknown)  rescheduled for  (units   

 (unknown)



                    date)                         later in the unknown)  



                                                  month. Patient           



                                                  states she started           



                                                  having some           

 

           (unknown)  (no       (unknown)  (unknown)  salpingectomy on  (units  

  (unknown)



                    date)                         the right. Patient unknown)  



                                                  had surgery           



                                                  scheduled for ex           



                                                  lap with OBGYN           

 

           (unknown)  (no       (unknown)  (unknown)  shortness of  (units    (u

nknown)



                    date)                         breath, no unknown)  



                                                  lightheadedness or           



                                                  passing out, no           



                                                  nausea, no           



                                                  vomiting.           

 

           (unknown)  (no       (unknown)  (unknown)  sounds all 4  (units    (u

nknown)



                    date)                         quadrants. No unknown)  



                                                  guarding or           



                                                  rebound, rigidity,           



                                                  no mass             

 

           (unknown)  (no       (unknown)  (unknown)  tablet vomiting  (units   

 (unknown)



                    date)                         #10 tabs  unknown)  

 

           (unknown)  (no       (unknown)  (unknown)  tenderness. She  (units   

 (unknown)



                    date)                         is afebrile, not unknown)  



                                                  tachycardic or           



                                                  hypotensive. She           



                                                  appears quite           

 

           (unknown)  (no       (unknown)  (unknown)  the clinical  (units    (u

nknown)



                    date)                         course and unknown)  



                                                  performed an           



                                                  independent           



                                                  history and           



                                                  physical exam.           

 

           (unknown)  (no       (unknown)  (unknown)  the normal,  (units    (un

known)



                    date)                         patient's pain is unknown)  



                                                  well controlled           



                                                  and she is           



                                                  tolerating orals.           



                                                  We                  

 

           (unknown)  (no       (unknown)  (unknown)  time. Patient  (units    (

unknown)



                    date)                         signed out to Dr. shelby)  



                                                  Leon while           



                                                  awaiting results.           

 

           (unknown)  (no       (unknown)  (unknown)  tobacco type:  (units    (

unknown)



                    date)                         vaping    unknown)  

 

           (unknown)  (no       (unknown)  (unknown)  today's note if  (units   

 (unknown)



                    date)                         your PCP is in our unknown)  



                                                  system              

 

           (unknown)  (no       (unknown)  (unknown)  uncomfortable.  (units    

(unknown)



                    date)                         Plan for labs unknown)  



                                                  including CBC,           



                                                  CMP, lipase, hCG           



                                                  and urine sample.           

 

           (unknown)  (no       (unknown)  (unknown)  worsening pain,  (units   

 (unknown)



                    date)                         persistent unknown)  



                                                  vomiting or other           



                                                  bothersome           



                                                  symptoms]           









                                         Result panel [id number]









           (unknown)  (no       (unknown)  (unknown)  (no value)  (units    (unk

nown)



                    date)                                   unknown)  

 

           (unknown)  (no       (unknown)  (unknown)  <Electronically  (units   

 (unknown)



                    date)                         signed by Dashawn unknown)  



                                                  ZOFIA Quintero>           

 

           (unknown)  (no       (unknown)  (unknown)  <Electronically  (units   

 (unknown)



                    date)                         signed by Magdalena PAIGE unknown)  



                                                  ZOFIA Cai>           

 

           (unknown)  (no       (unknown)  (unknown)  <Dashawn Quintero,  (units   

 (unknown)



                    date)                         DO - Last Filed: unknown)  



                                                  23 19:18>           

 

           (unknown)  (no       (unknown)  (unknown)  <Magdalena Cai,  (units   

 (unknown)



                    date)                         DO - Last Filed: unknown)  



                                                  23 02:59>           

 

           (unknown)  (no       (unknown)  (unknown)  (Diflucan)  (units    (unk

nown)



                    date)                                   unknown)  

 

           (unknown)  (no       (unknown)  (unknown)  (Dilaudid)  (units    (unk

nown)



                    date)                                   unknown)  

 

           (unknown)  (no       (unknown)  (unknown)  *If you do not  (units    

(unknown)



                    date)                         have a primary unknown)  



                                                  care provider           



                                                  please contact the           



                                                  [facility]           

 

           (unknown)  (no       (unknown)  (unknown)  *Please continue  (units  

  (unknown)



                    date)                         to take your unknown)  



                                                  regular             



                                                  medications as           



                                                  directed.           

 

           (unknown)  (no       (unknown)  (unknown)  *Please follow up  (units 

   (unknown)



                    date)                         with your primary unknown)  



                                                  care provider in           



                                                  2-3 days, call for           



                                                  an                  

 

           (unknown)  (no       (unknown)  (unknown)  *Return to  (units    (unk

nown)



                    date)                         Emergency unknown)  



                                                  Department if you           



                                                  should have any           



                                                  new, worsening or           

 

           (unknown)  (no       (unknown)  (unknown)  *What to do:  (units    (u

nknown)



                    date)                                   unknown)  

 

           (unknown)  (no       (unknown)  (unknown)  *You have been  (units    

(unknown)



                    date)                         diagnosed with unknown)  



                                                  [right lower           



                                                  quadrant ]           

 

           (unknown)  (no       (unknown)  (unknown)  [id number]  (units    (unkno

wn)



                    date)                                   unknown)  

 

           (unknown)  (no       (unknown)  (unknown)  23  (units 

   (unknown)



                    date)                         23  unknown)  



                                                  Range/Units           

 

           (unknown)  (no       (unknown)  (unknown)  23 05:45  (units    

(unknown)



                    date)                                   unknown)  

 

           (unknown)  (no       (unknown)  (unknown)  23 1918  (units    (

unknown)



                    date)                                   unknown)  

 

           (unknown)  (no       (unknown)  (unknown)  23  (units    (unkno

wn)



                    date)                                   unknown)  

 

           (unknown)  (no       (unknown)  (unknown)  23 0259  (units    (

unknown)



                    date)                                   unknown)  

 

           (unknown)  (no       (unknown)  (unknown)  05:21     (units    (unkno

wn)



                    date)                                   unknown)  

 

           (unknown)  (no       (unknown)  (unknown)  05:45 05:45 05:45  (units 

   (unknown)



                    date)                                   unknown)  

 

           (unknown)  (no       (unknown)  (unknown)  1 tab PO Q4-6H  (units    

(unknown)



                    date)                         PRN (Reason: pain) unknown)  



                                                  Qty: 10 0RF           

 

           (unknown)  (no       (unknown)  (unknown)  1 tab PO Q4-6H  (units    

(unknown)



                    date)                         PRN (Reason: pain) unknown)  



                                                  Qty: 20 0RF           

 

           (unknown)  (no       (unknown)  (unknown)  10 mg PO Q6H PRN  (units  

  (unknown)



                    date)                         (Reason: pain) unknown)  



                                                  Qty: 14 0RF           

 

           (unknown)  (no       (unknown)  (unknown)  150 mg PO DAILY  (units   

 (unknown)



                    date)                         Qty: 30 0RF unknown)  

 

           (unknown)  (no       (unknown)  (unknown)  150 mg PO Q48H  (units    

(unknown)



                    date)                         Qty: 2 4RF unknown)  

 

           (unknown)  (no       (unknown)  (unknown)  4 mg PO Q4-6H PRN  (units 

   (unknown)



                    date)                         (Reason: pain) unknown)  



                                                  Qty: 20 0RF           

 

           (unknown)  (no       (unknown)  (unknown)  4 mg PO TID-QID  (units   

 (unknown)



                    date)                         PRN (Reason: unknown)  



                                                  nausea and           



                                                  vomiting) Qty: 10           



                                                  0RF                 

 

           (unknown)  (no       (unknown)  (unknown)  5 mg PO Q4H PRN  (units   

 (unknown)



                    date)                         (Reason: pain) unknown)  



                                                  Qty: 20 0RF           

 

           (unknown)  (no       (unknown)  (unknown)  ABDOMEN: Soft,  (units    

(unknown)



                    date)                         right lower and unknown)  



                                                  upper quadrant           



                                                  tenderness.           



                                                  Normoactive bowel           

 

           (unknown)  (no       (unknown)  (unknown)  ALT 30 (<35) IU/L  (units 

   (unknown)



                    date)                                   unknown)  

 

           (unknown)  (no       (unknown)  (unknown)  AST 27 (14-36)  (units    

(unknown)



                    date)                         IU/L      unknown)  

 

           (unknown)  (no       (unknown)  (unknown)  Acne (-2016)  (units    (u

nknown)



                    date)                                   unknown)  

 

           (unknown)  (no       (unknown)  (unknown)  Activity  (units    (unkno

wn)



                    date)                         Restrictions/Addit unknown)  



                                                  ional               



                                                  Instructions:           

 

           (unknown)  (no       (unknown)  (unknown)  Admin: 23  (units   

 (unknown)



                    date)                         05:52 Dose: 1,000 unknown)  



                                                  mls/hr              

 

           (unknown)  (no       (unknown)  (unknown)  Age/Sex: 17 / F  (units   

 (unknown)



                    date)                                   unknown)  

 

           (unknown)  (no       (unknown)  (unknown)  Albumin 3.9  (units    (un

known)



                    date)                         (3.5-5.0) g/dL unknown)  

 

           (unknown)  (no       (unknown)  (unknown)  Albumin/Globulin  (units  

  (unknown)



                    date)                         Ratio 1.5 unknown)  



                                                  (1.0-2.8)           

 

           (unknown)  (no       (unknown)  (unknown)  Alcohol type:  (units    (

unknown)



                    date)                         wine      unknown)  

 

           (unknown)  (no       (unknown)  (unknown)  Alkaline  (units    (unkno

wn)



                    date)                         Phosphatase 80 unknown)  



                                                  () U/L           

 

           (unknown)  (no       (unknown)  (unknown)  Allergies  (units    (unkn

own)



                    date)                                   unknown)  

 

           (unknown)  (no       (unknown)  (unknown)  Allergy/AdvReac  (units   

 (unknown)



                    date)                         Type Severity unknown)  



                                                  Reaction Status           



                                                  Date / Time           

 

           (unknown)  (no       (unknown)  (unknown)  Anesthesia  (units    (unk

nown)



                    date)                                   unknown)  

 

           (unknown)  (no       (unknown)  (unknown)  At this time we  (units   

 (unknown)



                    date)                         are still Pending unknown)  



                                                  results of imaging           

 

           (unknown)  (no       (unknown)  (unknown)  At time of  (units    (unk

nown)



                    date)                         discharge there unknown)  



                                                  are no surgical           



                                                  findings on           



                                                  imaging, labs are           



                                                  within              

 

           (unknown)  (no       (unknown)  (unknown)  BUN 12 (7-17)  (units    (

unknown)



                    date)                         mg/dL     unknown)  

 

           (unknown)  (no       (unknown)  (unknown)  BUN/Creatinine  (units    

(unknown)



                    date)                         Ratio 17.9 (6-22) unknown)  

 

           (unknown)  (no       (unknown)  (unknown)  Baso # (Auto) 0  (units   

 (unknown)



                    date)                         (0-100) /uL unknown)  

 

           (unknown)  (no       (unknown)  (unknown)  Baso % (Auto) 0.7  (units 

   (unknown)



                    date)                         (0-2) %   unknown)  

 

           (unknown)  (no       (unknown)  (unknown)  Blood Pressure  (units    

(unknown)



                    date)                         137/85 23 unknown)  



                                                  05:21               

 

           (unknown)  (no       (unknown)  (unknown)  Blood Pressure  (units    

(unknown)



                    date)                         137    unknown)  

 

           (unknown)  (no       (unknown)  (unknown)  CARDIOVASCULAR:  (units   

 (unknown)



                    date)                         Regular rate and unknown)  



                                                  rhythm without           



                                                  murmurs, rubs or           



                                                  gallops.            

 

           (unknown)  (no       (unknown)  (unknown)  Calcium 8.6  (units    (un

known)



                    date)                         (8.4-10.2) mg/dL unknown)  

 

           (unknown)  (no       (unknown)  (unknown)  Carbon Dioxide 26  (units 

   (unknown)



                    date)                         (22-32) mmol/L unknown)  

 

           (unknown)  (no       (unknown)  (unknown)  Chief Complaint:  (units  

  (unknown)



                    date)                         Abdominal Pain unknown)  

 

           (unknown)  (no       (unknown)  (unknown)  Chlamydia  (units    (unkn

own)



                    date)                         infection () unknown)  

 

           (unknown)  (no       (unknown)  (unknown)  Chloride 103  (units    (u

nknown)



                    date)                         () mmol/L unknown)  

 

           (unknown)  (no       (unknown)  (unknown)  Clinical  (units    (unkno

wn)



                    date)                         Impression: unknown)  

 

           (unknown)  (no       (unknown)  (unknown)  Course    (units    (unkno

wn)



                    date)                                   unknown)  

 

           (unknown)  (no       (unknown)  (unknown)  Creatinine 0.67  (units   

 (unknown)



                    date)                         (0.52-1.04) mg/dL unknown)  

 

           (unknown)  (no       (unknown)  (unknown)  : 2005  (units   

 (unknown)



                    date)                         Acct:[id number] unknown)  

 

           (unknown)  (no       (unknown)  (unknown)  Date of Service:  (units  

  (unknown)



                    date)                         23  unknown)  

 

           (unknown)  (no       (unknown)  (unknown)  Departure  (units    (unkn

own)



                    date)                                   unknown)  

 

           (unknown)  (no       (unknown)  (unknown)  Discharge Plan  (units    

(unknown)



                    date)                                   unknown)  

 

           (unknown)  (no       (unknown)  (unknown)  Discontinued  (units    (u

nknown)



                    date)                         Medications unknown)  

 

           (unknown)  (no       (unknown)  (unknown)  Documented By: GC  (units 

   (unknown)



                    date)                                   unknown)  

 

           (unknown)  (no       (unknown)  (unknown)  Documented By: KM  (units 

   (unknown)



                    date)                                   unknown)  

 

           (unknown)  (no       (unknown)  (unknown)  ER Physician:  (units    (

unknown)



                    date)                         Dashawn Quintero D.O. unknown)  

 

           (unknown)  (no       (unknown)  (unknown)  EXTREMITIES:  (units    (u

nknown)



                    date)                         Normal range of unknown)  



                                                  motion, no           



                                                  clubbing or edema.           



                                                  Neurovascularly           

 

           (unknown)  (no       (unknown)  (unknown)  Emergency Report  (units  

  (unknown)



                    date)                                   unknown)  

 

           (unknown)  (no       (unknown)  (unknown)  Endometriosis  (units    (

unknown)



                    date)                         (-)   unknown)  

 

           (unknown)  (no       (unknown)  (unknown)  Eos # (Auto) 200  (units  

  (unknown)



                    date)                         (0-450) /uL unknown)  

 

           (unknown)  (no       (unknown)  (unknown)  Eos % (Auto) 2.5  (units  

  (unknown)



                    date)                         (2-4) %   unknown)  

 

           (unknown)  (no       (unknown)  (unknown)  Estimated GFR >  (units   

 (unknown)



                    date)                         60 (>60) mL/min unknown)  

 

           (unknown)  (no       (unknown)  (unknown)  Exam Narrative:  (units   

 (unknown)



                    date)                                   unknown)  

 

           (unknown)  (no       (unknown)  (unknown)  Exam      (units    (unkno

wn)



                    date)                                   unknown)  

 

           (unknown)  (no       (unknown)  (unknown)  GENERAL: Alert  (units    

(unknown)



                    date)                         and oriented x unknown)  



                                                  three, moderate           



                                                  distress.           

 

           (unknown)  (no       (unknown)  (unknown)  : No CVA  (units    (unk

nown)



                    date)                         tenderness unknown)  

 

           (unknown)  (no       (unknown)  (unknown)  General   (units    (unkno

wn)



                    date)                                   unknown)  

 

           (unknown)  (no       (unknown)  (unknown)  Globulin 2.6  (units    (u

nknown)



                    date)                         (1.7-4.1) g/dL unknown)  

 

           (unknown)  (no       (unknown)  (unknown)  Glucose 83  (units    (unk

nown)



                    date)                         () mg/dL unknown)  

 

           (unknown)  (no       (unknown)  (unknown)  HEENT: Head  (units    (un

known)



                    date)                         normocephalic, unknown)  



                                                  atraumatic, EOMI,           



                                                  pupils reactive,           



                                                  face symmetric,           

 

           (unknown)  (no       (unknown)  (unknown)  HPI - Abdominal  (units   

 (unknown)



                    date)                         Pain      unknown)  

 

           (unknown)  (no       (unknown)  (unknown)  HPI narrative:  (units    

(unknown)



                    date)                                   unknown)  

 

           (unknown)  (no       (unknown)  (unknown)  Hct 37.3 (36-46)  (units  

  (unknown)



                    date)                         %         unknown)  

 

           (unknown)  (no       (unknown)  (unknown)  Heavy menstrual  (units   

 (unknown)



                    date)                         period (-2019) unknown)  

 

           (unknown)  (no       (unknown)  (unknown)  Hgb 12.7  (units    (unkno

wn)



                    date)                         (12.0-16.0) g/dL unknown)  

 

           (unknown)  (no       (unknown)  (unknown)  History of  (units    (unk

nown)



                    date)                         Present Illness unknown)  

 

           (unknown)  (no       (unknown)  (unknown)  Hx of laparoscopy  (units 

   (unknown)



                    date)                         (22) unknown)  

 

           (unknown)  (no       (unknown)  (unknown)  Hydromorphone HCl  (units 

   (unknown)



                    date)                         (Hydromorphone 0.5 unknown)  



                                                  Mg Inj) 0.5 mg IV           



                                                  NOW ONE             

 

           (unknown)  (no       (unknown)  (unknown)  Initial Vital  (units    (

unknown)



                    date)                         Signs     unknown)  

 

           (unknown)  (no       (unknown)  (unknown)  Initial Vital  (units    (

unknown)



                    date)                         Signs:    unknown)  

 

           (unknown)  (no       (unknown)  (unknown)  Instructions: DI  (units  

  (unknown)



                    date)                         for Abdominal unknown)  



                                                  Pain-Adult           

 

           (unknown)  (no       (unknown)  (unknown)  Irregular  (units    (unkn

own)



                    date)                         menstrual cycle unknown)  



                                                  ()             

 

           (unknown)  (no       (unknown)  (unknown)  [facility]  (units   

 (unknown)



                    date)                         [address] unknown)  



                                                  [place]           



                                                  [id number]               

 

           (unknown)  (no       (unknown)  (unknown)  Casie Darnell  (units 

   (unknown)



                    date)                         , ARNP [Primary unknown)  



                                                  Care Provider]           

 

           (unknown)  (no       (unknown)  (unknown)  Ketorolac  (units    (unkn

own)



                    date)                         Tromethamine unknown)  



                                                  (Ketorolac 30           



                                                  Mg/Ml Vial) 15 mg           



                                                  IV NOW ONE           

 

           (unknown)  (no       (unknown)  (unknown)  Lab Data  (units    (unkno

wn)



                    date)                                   unknown)  

 

           (unknown)  (no       (unknown)  (unknown)  Lab Results  (units    (un

known)



                    date)                                   unknown)  

 

           (unknown)  (no       (unknown)  (unknown)  Labs:     (units    (unkno

wn)



                    date)                                   unknown)  

 

           (unknown)  (no       (unknown)  (unknown)  Last Admin:  (units    (un

known)



                    date)                         23 05:51 unknown)  



                                                  Dose: 15 mg           

 

           (unknown)  (no       (unknown)  (unknown)  Last Admin:  (units    (un

known)



                    date)                         23 08:39 unknown)  



                                                  Dose: 0.5 mg           

 

           (unknown)  (no       (unknown)  (unknown)  Last Infusion:  (units    

(unknown)



                    date)                         23 06:55 unknown)  



                                                  Dose: 0 mls/hr           

 

           (unknown)  (no       (unknown)  (unknown)  Limitations: no  (units   

 (unknown)



                    date)                         limitations unknown)  

 

           (unknown)  (no       (unknown)  (unknown)  Lipase 104  (units    (unk

nown)



                    date)                         () U/L unknown)  

 

           (unknown)  (no       (unknown)  (unknown)  Lymph # (Auto)  (units    

(unknown)



                    date)                         1900 (1572-5240) unknown)  



                                                  /uL                 

 

           (unknown)  (no       (unknown)  (unknown)  Lymph % (Auto)  (units    

(unknown)



                    date)                         28.8 (25-40) % unknown)  

 

           (unknown)  (no       (unknown)  (unknown)  Lymphangioma  (units    (u

nknown)



                    date)                                   unknown)  

 

           (unknown)  (no       (unknown)  (unknown)  MCH 29.4 (26-34)  (units  

  (unknown)



                    date)                         PG        unknown)  

 

           (unknown)  (no       (unknown)  (unknown)  MCHC 34.0 (30-36)  (units 

   (unknown)



                    date)                         %         unknown)  

 

           (unknown)  (no       (unknown)  (unknown)  MCV 86.4 ()  (units 

   (unknown)



                    date)                         fL        unknown)  

 

           (unknown)  (no       (unknown)  (unknown)  MDM - Abdominal  (units   

 (unknown)



                    date)                         Pain      unknown)  

 

           (unknown)  (no       (unknown)  (unknown)  MDM Narrative  (units    (

unknown)



                    date)                                   unknown)  

 

           (unknown)  (no       (unknown)  (unknown)  Medical History  (units   

 (unknown)



                    date)                         (Reviewed 23 unknown)  



                                                  @ 05:42 by Magdalena Cai DO)           

 

           (unknown)  (no       (unknown)  (unknown)  Medical decision  (units  

  (unknown)



                    date)                         making narrative: unknown)  

 

           (unknown)  (no       (unknown)  (unknown)  Medication  (units    (unk

nown)



                    date)                         Instructions unknown)  



                                                  Recorded            

 

           (unknown)  (no       (unknown)  (unknown)  Mode of arrival:  (units  

  (unknown)



                    date)                         Ambulatory unknown)  

 

           (unknown)  (no       (unknown)  (unknown)  Mono # (Auto) 600  (units 

   (unknown)



                    date)                         (0-900) /uL unknown)  

 

           (unknown)  (no       (unknown)  (unknown)  Mono % (Auto) 9.6  (units 

   (unknown)



                    date)                         (3-14) %  unknown)  

 

           (unknown)  (no       (unknown)  (unknown)  NECK: Supple,  (units    (

unknown)



                    date)                         full range of unknown)  



                                                  motion              

 

           (unknown)  (no       (unknown)  (unknown)  NEUROLOGICAL:  (units    (

unknown)



                    date)                         Cranial nerves II unknown)  



                                                  through XII           



                                                  grossly intact.           



                                                  Moving all           

 

           (unknown)  (no       (unknown)  (unknown)  Narrative  (units    (unkn

own)



                    date)                                   unknown)  

 

           (unknown)  (no       (unknown)  (unknown)  Neut # (Auto)  (units    (

unknown)



                    date)                         3900 ([id number]) unknown)  



                                                  /uL                 

 

           (unknown)  (no       (unknown)  (unknown)  Neut % (Auto)  (units    (

unknown)



                    date)                         58.4 (50-75) % unknown)  

 

           (unknown)  (no       (unknown)  (unknown)  New       (units    (unkno

wn)



                    date)                                   unknown)  

 

           (unknown)  (no       (unknown)  (unknown)  No Action  (units    (unkn

own)



                    date)                                   unknown)  

 

           (unknown)  (no       (unknown)  (unknown)  Opioids -  (units    (unkn

own)



                    date)                         Morphine Analogues unknown)  



                                                  Allergy             



                                                  Intermediate rash           



                                                  Verified 23           



                                                  14:59               

 

           (unknown)  (no       (unknown)  (unknown)  Ordered:  (units    (unkno

wn)



                    date)                                   unknown)  

 

           (unknown)  (no       (unknown)  (unknown)  Orders    (units    (unkno

wn)



                    date)                                   unknown)  

 

           (unknown)  (no       (unknown)  (unknown)  Ovarian cyst  (units    (u

nknown)



                    date)                                   unknown)  

 

           (unknown)  (no       (unknown)  (unknown)  Oxygen Delivery  (units   

 (unknown)



                    date)                         Method Room Air unknown)  



                                                  23 05:21           

 

           (unknown)  (no       (unknown)  (unknown)  Oxygen Delivery  (units   

 (unknown)



                    date)                         Method Room Air unknown)  

 

           (unknown)  (no       (unknown)  (unknown)  Painful menstrual  (units 

   (unknown)



                    date)                         periods (-) unknown)  

 

           (unknown)  (no       (unknown)  (unknown)  Patient   (units    (unkno

wn)



                    date)                         Disposition: Home unknown)  

 

           (unknown)  (no       (unknown)  (unknown)  Patient History  (units   

 (unknown)



                    date)                                   unknown)  

 

           (unknown)  (no       (unknown)  (unknown)  Patient has had  (units   

 (unknown)



                    date)                         prior tubo-ovarian unknown)  



                                                  abscess as well as           



                                                  hemorrhagic cyst           



                                                  in the              

 

           (unknown)  (no       (unknown)  (unknown)  Patient:  (units    (unkno

wn)



                    date)                         Lesvia Aguero unknown)  



                                                  MR#: [id number]             

 

           (unknown)  (no       (unknown)  (unknown)  Plt Count 304  (units    (

unknown)



                    date)                         (150-400) X103/uL unknown)  

 

           (unknown)  (no       (unknown)  (unknown)  Point of Care  (units    (

unknown)



                    date)                         Testing   unknown)  

 

           (unknown)  (no       (unknown)  (unknown)  Point of care  (units    (

unknown)



                    date)                         testing:  unknown)  

 

           (unknown)  (no       (unknown)  (unknown)  Potassium 3.7  (units    (

unknown)



                    date)                         (3.4-5.1) mmol/L unknown)  

 

           (unknown)  (no       (unknown)  (unknown)  Pregnancy Test  (units    

(unknown)



                    date)                         Results Negative unknown)  

 

           (unknown)  (no       (unknown)  (unknown)  Prescriptions:  (units    

(unknown)



                    date)                                   unknown)  

 

           (unknown)  (no       (unknown)  (unknown)  Previous Rx's  (units    (

unknown)



                    date)                                   unknown)  

 

           (unknown)  (no       (unknown)  (unknown)  Pulse Oximetry 99  (units 

   (unknown)



                    date)                         23 05:21 unknown)  

 

           (unknown)  (no       (unknown)  (unknown)  Pulse Oximetry 99  (units 

   (unknown)



                    date)                                   unknown)  

 

           (unknown)  (no       (unknown)  (unknown)  Pulse Rate 68  (units    (

unknown)



                    date)                         23 05:21 unknown)  

 

           (unknown)  (no       (unknown)  (unknown)  Pulse Rate 68  (units    (

unknown)



                    date)                                   unknown)  

 

           (unknown)  (no       (unknown)  (unknown)  RBC 4.32  (units    (unkno

wn)



                    date)                         (4.0-5.2) X106/uL unknown)  

 

           (unknown)  (no       (unknown)  (unknown)  RDW 14.3  (units    (unkno

wn)



                    date)                         (11.6-14.8) % unknown)  

 

           (unknown)  (no       (unknown)  (unknown)  RESPIRATORY:  (units    (u

nknown)



                    date)                         Breath sounds unknown)  



                                                  equal bilaterally,           



                                                  no wheezes rales           



                                                  or rhonchi.           

 

           (unknown)  (no       (unknown)  (unknown) ROS Unobtainable:  (units  

  (unknown)



                    date)                         All systems unknown)  



                                                  reviewed + are           



                                                  unremarkable           



                                                  except as noted in           



                                                  HPI                 

 

           (unknown)  (no       (unknown)  (unknown)  Referrals:  (units    (unk

nown)



                    date)                                   unknown)  

 

           (unknown)  (no       (unknown)  (unknown)  Related Data  (units    (u

nknown)



                    date)                                   unknown)  

 

           (unknown)  (no       (unknown)  (unknown)  Repeat second  (units    (

unknown)



                    date)                         dose 48 hrs after unknown)  



                                                  first dose.           

 

           (unknown)  (no       (unknown)  (unknown)  Resource line at  (units  

  (unknown)



                    date)                         [phone]. They unknown)  



                                                  will ask some           



                                                  questions about           



                                                  your medical           

 

           (unknown)  (no       (unknown)  (unknown)  Respiratory Rate  (units  

  (unknown)



                    date)                         18 23 05:21 unknown)  

 

           (unknown)  (no       (unknown)  (unknown)  Respiratory Rate  (units  

  (unknown)



                    date)                         18        unknown)  

 

           (unknown)  (no       (unknown)  (unknown)  Review of Systems  (units 

   (unknown)



                    date)                                   unknown)  

 

           (unknown)  (no       (unknown)  (unknown)  Right lower  (units    (un

known)



                    date)                         quadrant abdominal unknown)  



                                                  pain                

 

           (unknown)  (no       (unknown)  (unknown)  Rx Instructions:  (units  

  (unknown)



                    date)                                   unknown)  

 

           (unknown)  (no       (unknown)  (unknown)  S/P ACL   (units    (unkno

wn)



                    date)                         reconstruction unknown)  



                                                  (-21)           

 

           (unknown)  (no       (unknown)  (unknown)  SKIN: Warm, dry,  (units  

  (unknown)



                    date)                         no petechiae, no unknown)  



                                                  rashes or lesions.           

 

           (unknown)  (no       (unknown)  (unknown)  Serum Pregnancy,  (units  

  (unknown)



                    date)                         Qual Cancelled unknown)  

 

           (unknown)  (no       (unknown)  (unknown)  She states she is  (units 

   (unknown)



                    date)                         been stooling unknown)  



                                                  regularly with           



                                                  form soft stools.           



                                                  No diarrhea,           

 

           (unknown)  (no       (unknown)  (unknown)  Signed By:  (units    (unk

nown)



                    date)                                   unknown)  

 

           (unknown)  (no       (unknown)  (unknown)  Smoking Status:  (units   

 (unknown)



                    date)                         Current some day unknown)  



                                                  smoker              

 

           (unknown)  (no       (unknown)  (unknown)  Social History  (units    

(unknown)



                    date)                         (Reviewed 23 unknown)  



                                                  @ 05:42 by Magdalena Cai DO)           

 

           (unknown)  (no       (unknown)  (unknown)  Sodium 135 L  (units    (u

nknown)



                    date)                         (137-145) mmol/L unknown)  

 

           (unknown)  (no       (unknown)  (unknown)  Sodium Chloride  (units   

 (unknown)



                    date)                         (Normal Saline unknown)  



                                                  0.9%) 1,000 mls @           



                                                  1,000 mls/hr IV           



                                                  BOLUS ONE           

 

           (unknown)  (no       (unknown)  (unknown)  Source: patient  (units   

 (unknown)



                    date)                                   unknown)  

 

           (unknown)  (no       (unknown)  (unknown)  Stand Alone  (units    (un

known)



                    date)                         Forms: Patient unknown)  



                                                  Portal/API           

 

           (unknown)  (no       (unknown)  (unknown)  Stated Complaint:  (units 

   (unknown)



                    date)                         abd pain  unknown)  

 

           (unknown)  (no       (unknown)  (unknown)  Stop: 23  (units    

(unknown)



                    date)                         05:34     unknown)  

 

           (unknown)  (no       (unknown)  (unknown)  Stop: 23  (units    

(unknown)



                    date)                         06:32     unknown)  

 

           (unknown)  (no       (unknown)  (unknown)  Stop: 23  (units    

(unknown)



                    date)                         08:29     unknown)  

 

           (unknown)  (no       (unknown)  (unknown)  Substance Use  (units    (

unknown)



                    date)                         Type: does not use unknown)  

 

           (unknown)  (no       (unknown)  (unknown)  Surgical History  (units  

  (unknown)



                    date)                         (Reviewed 23 unknown)  



                                                  @ 05:42 by Magdalena Cai DO)           

 

           (unknown)  (no       (unknown)  (unknown)  TOA (tubo-ovarian  (units 

   (unknown)



                    date)                         abscess)  unknown)  



                                                  (-21)           

 

           (unknown)  (no       (unknown)  (unknown)  Temperature 97 F  (units  

  (unknown)



                    date)                         L 23 05:21 unknown)  

 

           (unknown)  (no       (unknown)  (unknown)  Temperature 97 F  (units  

  (unknown)



                    date)                         L         unknown)  

 

           (unknown)  (no       (unknown)  (unknown)  This is a  (units    (unkn

own)



                    date)                         17-year-old female unknown)  



                                                  who presents with           



                                                  acute on chronic           



                                                  or                  

 

           (unknown)  (no       (unknown)  (unknown)  This is a  (units    (unkn

own)



                    date)                         17-year-old female unknown)  



                                                  with history of           



                                                  prior tubo-ovarian           



                                                  abscess with           

 

           (unknown)  (no       (unknown)  (unknown)  Time Seen by  (units    (u

nknown)



                    date)                         Provider: 23 unknown)  



                                                  05:18               

 

           (unknown)  (no       (unknown)  (unknown)  Total Bilirubin  (units   

 (unknown)



                    date)                         0.7 (0.2-1.3) unknown)  



                                                  mg/dL               

 

           (unknown)  (no       (unknown)  (unknown)  Total Protein 6.5  (units 

   (unknown)



                    date)                         (6.3-8.2) g/dL unknown)  

 

           (unknown)  (no       (unknown)  (unknown)  Vital Signs - 8  (units   

 (unknown)



                    date)                         hr        unknown)  

 

           (unknown)  (no       (unknown)  (unknown)  Vital Signs  (units    (un

known)



                    date)                                   unknown)  

 

           (unknown)  (no       (unknown)  (unknown)  Vital signs:  (units    (u

nknown)



                    date)                                   unknown)  

 

           (unknown)  (no       (unknown)  (unknown)  WBC 6.7   (units    (unkno

wn)



                    date)                         (4.5-11.0) X103/uL unknown)  

 

           (unknown)  (no       (unknown)  (unknown)  Jose Almodovar,  (units  

  (unknown)



                    date)                         MD [Physician] unknown)  

 

           (unknown)  (no       (unknown)  (unknown)  [ ] New   (units    (unkno

wn)



                    date)                         medication written unknown)  



                                                  as a paper           



                                                  prescription           

 

           (unknown)  (no       (unknown)  (unknown)  [ ] No new  (units    (unk

nown)



                    date)                         medications given unknown)  

 

           (unknown)  (no       (unknown)  (unknown)  [0700] (Leon)  (units   

 (unknown)



                    date)                         Patient received unknown)  



                                                  in sign out from           



                                                   [make]. I have           



                                                  reviewed            

 

           (unknown)  (no       (unknown)  (unknown)  [Embedded Image  (units   

 (unknown)



                    date)                         Not Available] unknown)  

 

           (unknown)  (no       (unknown)  (unknown)  [x ] New  (units    (unkno

wn)



                    date)                         medication unknown)  



                                                  prescriptions sent           



                                                  to your pharmacy:           



                                                  [ [facility] ]           

 

           (unknown)  (no       (unknown)  (unknown)  a muscle it has  (units   

 (unknown)



                    date)                         been slowly unknown)  



                                                  increasing over           



                                                  time. She went to           



                                                  work out last           

 

           (unknown)  (no       (unknown)  (unknown)  accompanied by  (units    

(unknown)



                    date)                         her mother today. unknown)  



                                                  Patient is           



                                                  following with FLAKITA Crenshaw.           

 

           (unknown)  (no       (unknown)  (unknown)  alcohol intake  (units    

(unknown)



                    date)                         frequency: unknown)  



                                                  holidays/special           



                                                  occasions only           

 

           (unknown)  (no       (unknown)  (unknown)  alcohol intake:  (units   

 (unknown)



                    date)                         current   unknown)  

 

           (unknown)  (no       (unknown)  (unknown)  amoxicillin  (units    (un

known)



                    date)                         AdvReac Vomiting unknown)  



                                                  Verified 23           



                                                  15:00               

 

           (unknown)  (no       (unknown)  (unknown)  and below  (units    (unkn

own)



                    date)                                   unknown)  

 

           (unknown)  (no       (unknown)  (unknown) appointment. Let  (units   

 (unknown)



                    date)                         them know you were unknown)  



                                                  seen in the           



                                                  Emergency           



                                                  Department and           



                                                  that we             

 

           (unknown)  (no       (unknown)  (unknown)  ask that you be  (units   

 (unknown)



                    date)                         seen in follow up. unknown)  



                                                  We will             



                                                  electronically           



                                                  transmit a record           



                                                  of                  

 

           (unknown)  (no       (unknown)  (unknown)  atypical. She is  (units  

  (unknown)



                    date)                         denying any flank unknown)  



                                                  pain. Patient           



                                                  denies any other           



                                                  surgeries           

 

           (unknown)  (no       (unknown)  (unknown)  besides her right  (units 

   (unknown)



                    date)                         fallopian tube unknown)  



                                                  removal. She has           



                                                  allergies to           



                                                  morphine,           

 

           (unknown)  (no       (unknown)  (unknown)  clindamycin HCl  (units   

 (unknown)



                    date)                         150 mg capsule 150 unknown)  



                                                  mg PO DAILY #30           



                                                  caps 22           

 

           (unknown)  (no       (unknown)  (unknown)  clindamycin HCl  (units   

 (unknown)



                    date)                         150 mg capsule unknown)  

 

           (unknown)  (no       (unknown)  (unknown)  concerning  (units    (unk

nown)



                    date)                         symptoms, such as unknown)  



                                                  [fever greater           



                                                  than 101 F,           



                                                  shaking chills,           

 

           (unknown)  (no       (unknown)  (unknown)  constipation, no  (units  

  (unknown)



                    date)                         black or bloody unknown)  



                                                  stools. She denies           



                                                  dysuria, urgency           



                                                  or                  

 

           (unknown)  (no       (unknown)  (unknown) different as it is  (units 

   (unknown)



                    date)                         right-sided but unknown)  



                                                  also goes to the           



                                                  right upper           



                                                  abdomen which is           

 

           (unknown)  (no       (unknown)  (unknown)  doxycycline  (units    (un

known)



                    date)                         AdvReac   unknown)  



                                                  Intermediate           



                                                  vomitt Verified           



                                                  23 14:59           

 

           (unknown)  (no       (unknown)  (unknown)  doxycycline and  (units   

 (unknown)



                    date)                         amoxicillin. No unknown)  



                                                  tobacco, alcohol           



                                                  or illicit. She is           

 

           (unknown)  (no       (unknown)  (unknown)  extremities  (units    (un

known)



                    date)                                   unknown)  

 

           (unknown)  (no       (unknown)  (unknown)  fluconazole 150  (units   

 (unknown)



                    date)                         mg tablet 150 mg unknown)  



                                                  PO Q48H 2 doses #2           



                                                  tabs 02/15/23           

 

           (unknown)  (no       (unknown)  (unknown)  fluconazole  (units    (un

known)



                    date)                         [Diflucan] 150 mg unknown)  



                                                  tablet              

 

           (unknown)  (no       (unknown)  (unknown)  for evaluation  (units    

(unknown)



                    date)                         and this was unknown)  



                                                  delayed by           



                                                  scheduling           



                                                  circumstances and           



                                                  has been            

 

           (unknown)  (no       (unknown)  (unknown)  frequency. No  (units    (

unknown)



                    date)                         vaginal bleeding unknown)  



                                                  or discharge.           



                                                  Patient states           



                                                  this feels           

 

           (unknown)  (no       (unknown)  (unknown)  have discussed  (units    

(unknown)



                    date)                         findings including unknown)  



                                                  plan for follow-up           

 

           (unknown)  (no       (unknown)  (unknown)  history and help  (units  

  (unknown)



                    date)                         get you set up unknown)  



                                                  with a doctor in           



                                                  the community.           

 

           (unknown)  (no       (unknown)  (unknown)  household  (units    (unkn

own)



                    date)                         members: friend(s) unknown)  

 

           (unknown)  (no       (unknown)  (unknown)  hydrocodone 5  (units    (

unknown)



                    date)                         mg-acetaminophen unknown)  



                                                  325 1 tab PO Q4-6H           



                                                  PRN pain #10 tabs           



                                                  22            

 

           (unknown)  (no       (unknown)  (unknown)  hydrocodone 5  (units    (

unknown)



                    date)                         mg-acetaminophen unknown)  



                                                  325 1 tab PO Q4-6H           



                                                  PRN pain #20 tabs           



                                                  23            

 

           (unknown)  (no       (unknown)  (unknown)  hydrocodone-aceta  (units 

   (unknown)



                    date)                         minophen 5-325 mg unknown)  



                                                  tablet              

 

           (unknown)  (no       (unknown)  (unknown)  hydromorphone 4  (units   

 (unknown)



                    date)                         mg tablet 4 mg PO unknown)  



                                                  Q4-6H PRN pain #20           



                                                  tabs 22           

 

           (unknown)  (no       (unknown)  (unknown)  hydromorphone  (units    (

unknown)



                    date)                         [Dilaudid] 4 mg unknown)  



                                                  tablet              

 

           (unknown)  (no       (unknown)  (unknown)  intact    (units    (unkno

wn)



                    date)                                   unknown)  

 

           (unknown)  (no       (unknown)  (unknown)  ketorolac 10 mg  (units   

 (unknown)



                    date)                         tablet 10 mg PO unknown)  



                                                  Q6H PRN pain #14           



                                                  tabs 23           

 

           (unknown)  (no       (unknown)  (unknown)  ketorolac 10 mg  (units   

 (unknown)



                    date)                         tablet 10 mg PO unknown)  



                                                  Q6H PRN pain #14           



                                                  tabs 22           

 

           (unknown)  (no       (unknown)  (unknown)  ketorolac 10 mg  (units   

 (unknown)



                    date)                         tablet    unknown)  

 

           (unknown)  (no       (unknown)  (unknown)  lower pelvic  (units    (u

nknown)



                    date)                         discomfort about 2 unknown)  



                                                  or 3 days ago she           



                                                  thought she might           



                                                  have pulled           

 

           (unknown)  (no       (unknown)  (unknown)  mg tablet  (units    (unkn

own)



                    date)                                   unknown)  

 

           (unknown)  (no       (unknown)  (unknown)  moist mucous  (units    (u

nknown)



                    date)                         membranes unknown)  

 

           (unknown)  (no       (unknown)  (unknown)  night and that  (units    

(unknown)



                    date)                         made things worse. unknown)  



                                                  She denies fevers           



                                                  or chills, no           



                                                  chest pain or           

 

           (unknown)  (no       (unknown)  (unknown)  obtaining CT but  (units  

  (unknown)



                    date)                         felt be more unknown)  



                                                  appropriate to           



                                                  evaluate with           



                                                  ultrasound at this           

 

           (unknown)  (no       (unknown)  (unknown)  ondansetron 4 mg  (units  

  (unknown)



                    date)                         disintegrating 4 unknown)  



                                                  mg PO TID-QID PRN           



                                                  nausea and           



                                                  23            

 

           (unknown)  (no       (unknown)  (unknown)  ondansetron 4 mg  (units  

  (unknown)



                    date)                         disintegrating 4 unknown)  



                                                  mg PO TID-QID PRN           



                                                  nausea and           



                                                  22            

 

           (unknown)  (no       (unknown)  (unknown)  ondansetron 4 mg  (units  

  (unknown)



                    date)                         tablet,disintegrat unknown)  



                                                  ing                 

 

           (unknown)  (no       (unknown)  (unknown)  oxycodone 5 mg  (units    

(unknown)



                    date)                         tablet 5 mg PO Q4H unknown)  



                                                  PRN pain #20 tabs           



                                                  22            

 

           (unknown)  (no       (unknown)  (unknown)  oxycodone 5 mg  (units    

(unknown)



                    date)                         tablet    unknown)  

 

           (unknown)  (no       (unknown)  (unknown)  past. Plan for  (units    

(unknown)



                    date)                         fluids, pain unknown)  



                                                  medication,           



                                                  ultrasound and           



                                                  reassessment.           



                                                  Discussed           

 

           (unknown)  (no       (unknown)  (unknown)  pelvic/abdominal  (units  

  (unknown)



                    date)                         pain. On exam unknown)  



                                                  patient has right           



                                                  upper and lower           



                                                  quadrant            

 

           (unknown)  (no       (unknown)  (unknown)  rescheduled for  (units   

 (unknown)



                    date)                         later in the unknown)  



                                                  month. Patient           



                                                  states she started           



                                                  having some           

 

           (unknown)  (no       (unknown)  (unknown)  salpingectomy on  (units  

  (unknown)



                    date)                         the right. Patient unknown)  



                                                  had surgery           



                                                  scheduled for ex           



                                                  lap with OBGYN           

 

           (unknown)  (no       (unknown)  (unknown)  shortness of  (units    (u

nknown)



                    date)                         breath, no unknown)  



                                                  lightheadedness or           



                                                  passing out, no           



                                                  nausea, no           



                                                  vomiting.           

 

           (unknown)  (no       (unknown)  (unknown)  sounds all 4  (units    (u

nknown)



                    date)                         quadrants. No unknown)  



                                                  guarding or           



                                                  rebound, rigidity,           



                                                  no mass             

 

           (unknown)  (no       (unknown)  (unknown)  tablet vomiting  (units   

 (unknown)



                    date)                         #10 tabs  unknown)  

 

           (unknown)  (no       (unknown)  (unknown)  tenderness. She  (units   

 (unknown)



                    date)                         is afebrile, not unknown)  



                                                  tachycardic or           



                                                  hypotensive. She           



                                                  appears quite           

 

           (unknown)  (no       (unknown)  (unknown)  the clinical  (units    (u

nknown)



                    date)                         course and unknown)  



                                                  performed an           



                                                  independent           



                                                  history and           



                                                  physical exam.           

 

           (unknown)  (no       (unknown)  (unknown)  the normal,  (units    (un

known)



                    date)                         patient's pain is unknown)  



                                                  well controlled           



                                                  and she is           



                                                  tolerating orals.           



                                                  We                  

 

           (unknown)  (no       (unknown)  (unknown)  time. Patient  (units    (

unknown)



                    date)                         signed out to Dr. alley Quintero while           



                                                  awaiting results.           

 

           (unknown)  (no       (unknown)  (unknown)  tobacco type:  (units    (

unknown)



                    date)                         vaping    unknown)  

 

           (unknown)  (no       (unknown)  (unknown)  today's note if  (units   

 (unknown)



                    date)                         your PCP is in our unknown)  



                                                  system              

 

           (unknown)  (no       (unknown)  (unknown)  uncomfortable.  (units    

(unknown)



                    date)                         Plan for labs unknown)  



                                                  including CBC,           



                                                  CMP, lipase, hCG           



                                                  and urine sample.           

 

           (unknown)  (no       (unknown)  (unknown)  worsening pain,  (units   

 (unknown)



                    date)                         persistent unknown)  



                                                  vomiting or other           



                                                  bothersome           



                                                  symptoms]           









                                         Result panel 377









           (unknown)  (no       (unknown)  (unknown)  (no value)  (units    (unk

nown)



                    date)                                   unknown)  

 

           (unknown)  (no       (unknown)  (unknown)  [id number]  (units    (unkno

wn)



                    date)                                   unknown)  

 

           (unknown)  (no       (unknown)  (unknown)  23 1606  (units    (

unknown)



                    date)                                   unknown)  

 

           (unknown)  (no       (unknown)  (unknown)  Age/Sex: 17 / F  (units   

 (unknown)



                    date)                                   unknown)  

 

           (unknown)  (no       (unknown)  (unknown)  COVID-19  (units    (unkno

wn)



                    date)                         status: Not unknown)  



                                                  tested              

 

           (unknown)  (no       (unknown)  (unknown)  COVID-19  (units    (unkno

wn)



                    date)                                   unknown)  

 

           (unknown)  (no       (unknown)  (unknown)  Changes to H+P:  (units   

 (unknown)



                    date)                         No        unknown)  

 

           (unknown)  (no       (unknown)  (unknown)  Criteria for  (units    (u

nknown)



                    date)                         continued unknown)  



                                                  procedure:           



                                                  Non-surgical           



                                                  alternatives not           



                                                  available or           

 

           (unknown)  (no       (unknown)  (unknown)  : 2005  (units   

 (unknown)



                    date)                         Acct:[id number] unknown)  

 

           (unknown)  (no       (unknown)  (unknown)  Date of   (units    (unkno

wn)



                    date)                         Service:  unknown)  



                                                  23            

 

           (unknown)  (no       (unknown)  (unknown)  History +  (units    (unkn

own)



                    date)                         Physical  unknown)  



                                                  reviewed/Exam           



                                                  performed by           



                                                  Physician: Yes           

 

           (unknown)  (no       (unknown)  (unknown)  Interval Note  (units    (

unknown)



                    date)                                   unknown)  

 

           (unknown)  (no       (unknown)  (unknown)  [facility]  (units   

 (unknown)



                    date)                         [address] unknown)  



                                                  [place]           



                                                  [address]               

 

           (unknown)  (no       (unknown)  (unknown)  Patient:  (units    (unkno

wn)



                    date)                         Lesvia Aguero E unknown)  



                                                  MR#: [id number]             

 

           (unknown)  (no       (unknown)  (unknown)  Pre-operative  (units    (

unknown)



                    date)                         Note      unknown)  

 

           (unknown)  (no       (unknown)  (unknown)  Provider:  (units    (unkn

own)



                    date)                         Jose Almodovar unknown)  



                                                  MD                  

 

           (unknown)  (no       (unknown)  (unknown)  Signed    (units    (unkno

wn)



                    date)                         By:<Electronical unknown)  



                                                  ly signed by           



                                                  Jose Almodovar MD>                 

 

           (unknown)  (no       (unknown)  (unknown)  appropriate per  (units   

 (unknown)



                    date)                         current SOC unknown)  









                                         Result panel 378









           (unknown)  (no date)  (unknown)  (unknown)  (no value)  (units    (un

known)



                                                            unknown)  

 

           (unknown)  (no date)  (unknown)  (unknown)  [id number]  (units    (unkn

own)



                                                            unknown)  

 

           (unknown)  (no date)  (unknown)  (unknown)  Actual    (units    (unkn

own)



                                                  Procedure Side unknown)  



                                                  Surgeon             

 

           (unknown)  (no date)  (unknown)  (unknown)  Age/Sex:   (units    

(unknown)



                                                  F         unknown)  

 

           (unknown)  (no date)  (unknown)  (unknown)  :      (units    (unkn

own)



                                                  2005 unknown)  



                                                  Acct:[id number]           

 

           (unknown)  (no date)  (unknown)  (unknown)  Date of   (units    (unkn

own)



                                                  Service:  unknown)  



                                                  23            

 

           (unknown)  (no date)  (unknown)  (unknown)  Date of   (units    (unkn

own)



                                                  procedure: unknown)  



                                                  23            

 

           (unknown)  (no date)  (unknown)  (unknown)  History of  (units    (un

known)



                                                  right     unknown)  



                                                  tubo-ovarian           



                                                  abscess             

 

           (unknown)  (no date)  (unknown)  (unknown)  [place]    (units    (unkn

own)



                                                  [place] [address] unknown)  



                                                  [address] [place] [address]           



                                                  [address]               

 

           (unknown)  (no date)  (unknown)  (unknown)  Operation  (units    (unk

nown)



                                                  Date: 23 unknown)  



                                                  15:30               

 

           (unknown)  (no date)  (unknown)  (unknown)  Operative  (units    (unk

nown)



                                                  Date/Time/Diagn unknown)  



                                                  oses                

 

           (unknown)  (no date)  (unknown)  (unknown)  Operative Note  (units   

 (unknown)



                                                            unknown)  

 

           (unknown)  (no date)  (unknown)  (unknown)  Patient:  (units    (unkn

own)



                                                  Lesvia Aguero unknown)  



                                                  E MR#: [id number]           

 

           (unknown)  (no date)  (unknown)  (unknown)  Post-op   (units    (unkn

own)



                                                  diagnosis: unknown)  



                                                  other (Same as           



                                                  above; pelvic           



                                                  adhesions,           



                                                  minimal pelvic           

 

           (unknown)  (no date)  (unknown)  (unknown)  Pre-op    (units    (unkn

own)



                                                  diagnosis: unknown)  



                                                  Chronic pelvic           



                                                  pain                

 

           (unknown)  (no date)  (unknown)  (unknown)  Procedure +  (units    (u

nknown)



                                                  Clinicians unknown)  

 

           (unknown)  (no date)  (unknown)  (unknown)  Procedure:  (units    (un

known)



                                                            unknown)  

 

           (unknown)  (no date)  (unknown)  (unknown)  Procedures  (units    (un

known)



                                                            unknown)  

 

           (unknown)  (no date)  (unknown)  (unknown)  Provider:  (units    (unk

nown)



                                                  Jose Almodovar unknown)  



                                                  MD                  

 

           (unknown)  (no date)  (unknown)  (unknown)  Signed By:  (units    (un

known)



                                                            unknown)  

 

           (unknown)  (no date)  (unknown)  (unknown)  Time of   (units    (unkn

own)



                                                  procedure: unknown)  



                                                  16:15               

 

           (unknown)  (no date)  (unknown)  (unknown)  endometriosis)  (units   

 (unknown)



                                                            unknown)  

 

           (unknown)  (no date)  (unknown)  (unknown)  implants  (units    (unkn

own)



                                                            unknown)  

 

           (unknown)  (no date)  (unknown)  (unknown)  p Dx      (units    (unkn

own)



                                                  Laparoscopy, unknown)  



                                                  with lysis of           



                                                  adhesions and           



                                                  fulguration of           



                                                  endometrial           

 

           (unknown)  (no date)  (unknown)  (unknown)  s Drainage  (units    (un

known)



                                                  follicular unknown)  



                                                  cyst, right           



                                                  ovary Jose Almodovar MD           









                                         Result panel 379









           (unknown)  (no       (unknown)  (unknown)  (no value)  (units    (unk

nown)



                    date)                                   unknown)  

 

           (unknown)  (no       (unknown)  (unknown)  [id number]  (units    (unkno

wn)



                    date)                                   unknown)  

 

           (unknown)  (no       (unknown)  (unknown)  23 1735  (units    (

unknown)



                    date)                                   unknown)  

 

           (unknown)  (no       (unknown)  (unknown)  5 mm port was  (units    (

unknown)



                    date)                         placed in the left unknown)  



                                                  and right mid           



                                                  quadrants using a           



                                                  similar             

 

           (unknown)  (no       (unknown)  (unknown)  ?         (units    (unkno

wn)



                    date)                                   unknown)  

 

           (unknown)  (no       (unknown)  (unknown)  ??        (units    (unkno

wn)



                    date)                                   unknown)  

 

           (unknown)  (no       (unknown)  (unknown)  Accordingly the  (units   

 (unknown)



                    date)                         pneumoperitoneum unknown)  



                                                  was then vented the           



                                                  sleeves removed. A           

 

           (unknown)  (no       (unknown)  (unknown)  Actual Procedure  (units  

  (unknown)



                    date)                         Side Surgeon unknown)  

 

           (unknown)  (no       (unknown)  (unknown)  Age/Sex: 17 / F  (units   

 (unknown)



                    date)                                   unknown)  

 

           (unknown)  (no       (unknown)  (unknown)  Anesthesia Type:  (units  

  (unknown)



                    date)                         General   unknown)  

 

           (unknown)  (no       (unknown)  (unknown)  Because of the  (units    

(unknown)



                    date)                         patient's unknown)  



                                                  persistent pain and           



                                                  the impact it is           



                                                  having on her           

 

           (unknown)  (no       (unknown)  (unknown)  Bilateral ovarian  (units 

   (unknown)



                    date)                         hypoechoic masses unknown)  



                                                  with peripheral           



                                                  vascularity.?           



                                                  Ectopic             

 

           (unknown)  (no       (unknown)  (unknown)  Blood products  (units    

(unknown)



                    date)                         transfused: none unknown)  

 

           (unknown)  (no       (unknown)  (unknown)  Closure Type:  (units    (

unknown)



                    date)                         primary   unknown)  

 

           (unknown)  (no       (unknown)  (unknown)  Complications:  (units    

(unknown)



                    date)                         none      unknown)  

 

           (unknown)  (no       (unknown)  (unknown)  Condition: stable  (units 

   (unknown)



                    date)                                   unknown)  

 

           (unknown)  (no       (unknown)  (unknown)  Continued  (units    (unkn

own)



                    date)                         surveillance and unknown)  



                                                  correlation with           



                                                  serial serum           



                                                  beta-hCG            



                                                  measurements           

 

           (unknown)  (no       (unknown)  (unknown)  : 2003  (units   

 (unknown)



                    date)                         Acct:[id number] unknown)  

 

           (unknown)  (no       (unknown)  (unknown)  Date of Service:  (units  

  (unknown)



                    date)                         23  unknown)  

 

           (unknown)  (no       (unknown)  (unknown)  Date of procedure:  (units

    (unknown)



                    date)                         23  unknown)  

 

           (unknown)  (no       (unknown)  (unknown)  Disposition: PACU  (units 

   (unknown)



                    date)                                   unknown)  

 

           (unknown)  (no       (unknown)  (unknown)  Estimated blood  (units   

 (unknown)



                    date)                         loss (mL): 10 unknown)  

 

           (unknown)  (no       (unknown)  (unknown)  FINDINGS:?  (units    (unk

nown)



                    date)                                   unknown)  

 

           (unknown)  (no       (unknown)  (unknown)  Findings:  (units    (unkn

own)



                    date)                                   unknown)  

 

           (unknown)  (no       (unknown)  (unknown)  Lesvia is a  (units    (unk

nown)



                    date)                         17-year-old unknown)  



                                                  nulligravida who           



                                                  returns today with           



                                                  a 4?month history           



                                                  of                  

 

           (unknown)  (no       (unknown)  (unknown)  History of right  (units  

  (unknown)



                    date)                         tubo-ovarian unknown)  



                                                  abscess             

 

           (unknown)  (no       (unknown)  (unknown)  IMPRESSION:?  (units    (u

nknown)



                    date)                                   unknown)  

 

           (unknown)  (no       (unknown)  (unknown)  Indications:  (units    (u

nknown)



                    date)                                   unknown)  

 

           (unknown)  (no       (unknown)  (unknown)  Inspection of the  (units 

   (unknown)



                    date)                         left ovarian fossa unknown)  



                                                  showed no           



                                                  abnormalities but           



                                                  there was a           

 

           (unknown)  (no       (unknown)  (unknown)  [facility]  (units   

 (unknown)



                    date)                         [address] [place] [address] unknown)  



                                                  [place] [address]           

 

           (unknown)  (no       (unknown)  (unknown)  Monopolar current  (units 

   (unknown)



                    date)                         was used  unknown)  



                                                  judiciously to           



                                                  destroy all areas           



                                                  of superficial           

 

           (unknown)  (no       (unknown)  (unknown)  No convincing  (units    (

unknown)



                    date)                         evidence of unknown)  



                                                  intrauterine           



                                                  pregnancy.           

 

           (unknown)  (no       (unknown)  (unknown)  Operation Date:  (units   

 (unknown)



                    date)                         23 15:30 unknown)  

 

           (unknown)  (no       (unknown)  (unknown)  Operative  (units    (unkn

own)



                    date)                         Date/Time/Diagnoses unknown)  

 

           (unknown)  (no       (unknown)  (unknown)  Operative Note  (units    

(unknown)



                    date)                                   unknown)  

 

           (unknown)  (no       (unknown)  (unknown)  Operative Notes  (units   

 (unknown)



                    date)                                   unknown)  

 

           (unknown)  (no       (unknown)  (unknown)  Patient was then  (units  

  (unknown)



                    date)                         awakened from unknown)  



                                                  anesthesia and           



                                                  transferred to the           



                                                  PACU for a           

 

           (unknown)  (no       (unknown)  (unknown)  Patient:  (units    (unkno

wn)



                    date)                         Lesvia Aguero E unknown)  



                                                  MR#: [id number]             

 

           (unknown)  (no       (unknown)  (unknown)  Plan for  (units    (unkno

wn)



                    date)                         aftercare: unknown)  

 

           (unknown)  (no       (unknown)  (unknown)  Post-op diagnosis:  (units

    (unknown)



                    date)                         other (Same as unknown)  



                                                  above; pelvic           



                                                  adhesions, minimal           



                                                  pelvic              

 

           (unknown)  (no       (unknown)  (unknown)  Post-operative  (units    

(unknown)



                    date)                                   unknown)  

 

           (unknown)  (no       (unknown)  (unknown)  Pre-op diagnosis:  (units 

   (unknown)



                    date)                         Chronic pelvic pain unknown)  

 

           (unknown)  (no       (unknown)  (unknown)  Procedure +  (units    (un

known)



                    date)                         Clinicians unknown)  

 

           (unknown)  (no       (unknown)  (unknown)  Procedure in  (units    (u

nknown)



                    date)                         detail:   unknown)  

 

           (unknown)  (no       (unknown)  (unknown)  Procedure:  (units    (unk

nown)



                    date)                                   unknown)  

 

           (unknown)  (no       (unknown)  (unknown)  Procedures  (units    (unk

nown)



                    date)                                   unknown)  

 

           (unknown)  (no       (unknown)  (unknown)  Provider:  (units    (unkn

own)



                    date)                         Jose lAmodovar MD unknown)  

 

           (unknown)  (no       (unknown)  (unknown)  Routine   (units    (unkno

wn)



                    date)                         postoperative care unknown)  

 

           (unknown)  (no       (unknown)  (unknown)  Signed    (units    (unkno

wn)



                    date)                         By:<Electronically unknown)  



                                                  signed by Jose Almodovar MD>           

 

           (unknown)  (no       (unknown)  (unknown)  Specimen(s): none  (units 

   (unknown)



                    date)                                   unknown)  

 

           (unknown)  (no       (unknown)  (unknown)  Surgeon: Jose MUNSON  (units 

   (unknown)



                    date)                         Scooby    unknown)  

 

           (unknown)  (no       (unknown)  (unknown)  The anterior  (units    (u

nknown)



                    date)                         cul-de-sac is free unknown)  



                                                  of any abnormality.           



                                                  There is no           



                                                  adhesions and           

 

           (unknown)  (no       (unknown)  (unknown)  The ovary on the  (units  

  (unknown)



                    date)                         right was enlarged unknown)  



                                                  slightly with a           



                                                  follicular cyst           



                                                  which was           

 

           (unknown)  (no       (unknown)  (unknown)  Time of procedure:  (units

    (unknown)



                    date)                         16:15     unknown)  

 

           (unknown)  (no       (unknown)  (unknown)  Uterine body is  (units   

 (unknown)



                    date)                         normal in size.? unknown)  



                                                  Echogenic fluid in           



                                                  the uterine cavity           



                                                  may                 

 

           (unknown)  (no       (unknown)  (unknown)  With the patient  (units  

  (unknown)



                    date)                         under satisfactory unknown)  



                                                  general anesthesia           



                                                  in the modified           



                                                  dorsal              

 

           (unknown)  (no       (unknown)  (unknown)  abdomen with  (units    (u

nknown)



                    date)                         carbon dioxide and unknown)  



                                                  once it was           



                                                  properly            



                                                  insufflated, a 5 mm           



                                                  trocar              

 

           (unknown)  (no       (unknown)  (unknown) ability to work and  (units

    (unknown)



                    date)                         normal daily unknown)  



                                                  activities, she has           



                                                  decided that she           



                                                  would like           

 

           (unknown)  (no       (unknown)  (unknown)  although a  (units    (unk

nown)



                    date)                                   unknown)  

 

           (unknown)  (no       (unknown)  (unknown)  and       (units    (unkno

wn)



                    date)                         photographically unknown)  



                                                  documented. The           



                                                  ovary on the right           



                                                  side was mobilized           



                                                  and                 

 

           (unknown)  (no       (unknown)  (unknown)  and sleeve were  (units   

 (unknown)



                    date)                         placed through the unknown)  



                                                  incision into the           



                                                  abdominal cavity.           



                                                  The                 

 

           (unknown)  (no       (unknown)  (unknown)  and the upper  (units    (

unknown)



                    date)                         abdomen were normal unknown)  



                                                  to laparoscopic           



                                                  visualization.           

 

           (unknown)  (no       (unknown)  (unknown)  and       (units    (unkno

wn)



                    date)                                   unknown)  

 

           (unknown)  (no       (unknown)  (unknown)  appear unaffected  (units 

   (unknown)



                    date)                         by the patient's unknown)  



                                                  prior episode of           



                                                  acute salpingitis           



                                                  with                

 

           (unknown)  (no       (unknown)  (unknown)  bleeding for the  (units  

  (unknown)



                    date)                         last 2 weeks and unknown)  



                                                  her pain has been           



                                                  worse during that           



                                                  period of           

 

           (unknown)  (no       (unknown)  (unknown)  blood products.?  (units  

  (unknown)



                    date)                         There is no unknown)  



                                                  definite evidence           



                                                  of gestational           



                                                  sac.? There is a           

 

           (unknown)  (no       (unknown)  (unknown)  but smaller 1.5 cm  (units

    (unknown)



                    date)                         hypoechoic area in unknown)  



                                                  the left ovary.?           

 

           (unknown)  (no       (unknown)  (unknown)  cannot be strictly  (units

    (unknown)



                    date)                         excluded for either unknown)  



                                                  these locations.           

 

           (unknown)  (no       (unknown)  (unknown)  clinical symptoms  (units 

   (unknown)



                    date)                         recommended. unknown)  

 

           (unknown)  (no       (unknown)  (unknown)  corpus luteum or  (units  

  (unknown)



                    date)                         hemorrhagic cyst unknown)  



                                                  could have a           



                                                  similar             



                                                  appearance.? There           



                                                  is a                

 

           (unknown)  (no       (unknown)  (unknown)  course of the  (units    (

unknown)



                    date)                         surgery. There were unknown)  



                                                  no other areas of           



                                                  endometriosis           



                                                  visible in           

 

           (unknown)  (no       (unknown)  (unknown)  cul-de-sac. The  (units   

 (unknown)



                    date)                         uterus is normal in unknown)  



                                                  size and shape,           



                                                  retroverted. The           



                                                  left                

 

           (unknown)  (no       (unknown)  (unknown)  cystectomy or  (units    (

unknown)



                    date)                         possible left unknown)  



                                                  oophorectomy.? At           



                                                  the same time she           



                                                  would like to           

 

           (unknown)  (no       (unknown)  (unknown) decision was made  (units  

  (unknown)



                    date)                         to leave the left unknown)  



                                                  tube in-situ along           



                                                  with the left ovary           



                                                  which               

 

           (unknown)  (no       (unknown)  (unknown)  dissection. A  (units    (

unknown)



                    date)                         monopolar L hook unknown)  



                                                  was then used to           



                                                  puncture the           



                                                  surface of the           

 

           (unknown)  (no       (unknown)  (unknown) endometriosis  (units    (u

nknown)



                    date)                         implants and at the unknown)  



                                                  completion of the           



                                                  process no other           



                                                  evidence of           

 

           (unknown)  (no       (unknown)  (unknown)  endometriosis was  (units 

   (unknown)



                    date)                         visible. The pelvis unknown)  



                                                  was thoroughly           



                                                  irrigated and fully           

 

           (unknown)  (no       (unknown)  (unknown)  endometriosis  (units    (

unknown)



                    date)                         within the cyst. unknown)  



                                                  Attention was then           



                                                  turned to the left           



                                                  adnexa              

 

           (unknown)  (no       (unknown)  (unknown)  endometriosis)  (units    

(unknown)



                    date)                                   unknown)  

 

           (unknown)  (no       (unknown)  (unknown)  follicular cyst  (units   

 (unknown)



                    date)                         and clear fluid was unknown)  



                                                  seen to exude from           



                                                  the defect created           



                                                  with                

 

           (unknown)  (no       (unknown)  (unknown)  have a Mirena IUD  (units 

   (unknown)



                    date)                         inserted to address unknown)  



                                                  her persistent           



                                                  menometrorrhagia.?           



                                                  She                 

 

           (unknown)  (no       (unknown)  (unknown)  if the tube is  (units    

(unknown)



                    date)                         severely affected unknown)  



                                                  by adhesive disease           



                                                  and possible left           



                                                  ovarian             

 

           (unknown)  (no       (unknown)  (unknown)  implants  (units    (unkno

wn)



                    date)                                   unknown)  

 

           (unknown)  (no       (unknown)  (unknown)  in accordance with  (units

    (unknown)



                    date)                         [facility] unknown)  



                                                  Main OR protocols.           



                                                  No uterine           



                                                  manipulator           

 

           (unknown)  (no       (unknown)  (unknown)  inspected again.  (units  

  (unknown)



                    date)                         There were no areas unknown)  



                                                  of bleeding or           



                                                  other abnormalities           



                                                  noted.              

 

           (unknown)  (no       (unknown)  (unknown)  intermittent.? It  (units 

   (unknown)



                    date)                         does not seem to be unknown)  



                                                  related to her           



                                                  cycles but she is           



                                                  been                

 

           (unknown)  (no       (unknown)  (unknown)  interrupted  (units    (un

known)



                    date)                         stitches, skin glue unknown)  



                                                  was applied,           



                                                  followed by           



                                                  appropriate           



                                                  dressings.           

 

           (unknown)  (no       (unknown)  (unknown)  involving the  (units    (

unknown)



                    date)                         ovary which were unknown)  



                                                  easily lysed with           



                                                  sharp and blunt           



                                                  dissection.           

 

           (unknown)  (no       (unknown)  (unknown)  judicious use of  (units  

  (unknown)



                    date)                         monopolar current. unknown)  



                                                  At the completion           



                                                  of the case the           



                                                  left                

 

           (unknown)  (no       (unknown)  (unknown)  laparoscope and  (units   

 (unknown)



                    date)                         found to be smooth unknown)  



                                                  with no             



                                                  abnormalities or           



                                                  suggestive           

 

           (unknown)  (no       (unknown)  (unknown)  laparoscopic  (units    (u

nknown)



                    date)                         incisions were then unknown)  



                                                  closed with 4-0           



                                                  Monocryl using           



                                                  inverted            

 

           (unknown)  (no       (unknown)  (unknown) left uterosacral  (units   

 (unknown)



                    date)                         ligament near its unknown)  



                                                  insertion in the           



                                                  posterior cervix on           



                                                  the left            

 

           (unknown)  (no       (unknown)  (unknown) lithotomy position,  (units

    (unknown)



                    date)                         the perineum, unknown)  



                                                  vagina, and abdomen           



                                                  were prepped and           



                                                  draped in           

 

           (unknown)  (no       (unknown)  (unknown) masters window  (units    (

unknown)



                    date)                         immediately lateral unknown)  



                                                  to the insertion of           



                                                  the uterosacral           



                                                  ligament.           

 

           (unknown)  (no       (unknown)  (unknown)  movement or  (units    (un

known)



                    date)                         empties her unknown)  



                                                  bladder.? Pelvic US           



                                                  performed 2/15/2023           



                                                  shows:              

 

           (unknown)  (no       (unknown)  (unknown)  of any    (units    (unkno

wn)



                    date)                         endometriosis or unknown)  



                                                  endometrioma within           



                                                  the right ovary.           



                                                  Using the appendix           

 

           (unknown)  (no       (unknown)  (unknown)  on the left. The  (units  

  (unknown)



                    date)                         left fallopian tube unknown)  



                                                  appears to be           



                                                  mobile and the           



                                                  fimbria             

 

           (unknown)  (no       (unknown)  (unknown)  opened with  (units    (un

known)



                    date)                         monopolar current unknown)  



                                                  and drained of           



                                                  clear fluid. There           



                                                  was no evidence           

 

           (unknown)  (no       (unknown)  (unknown)  ovary is normal in  (units

    (unknown)



                    date)                         appearance and size unknown)  



                                                  but has some filmy           



                                                  adhesions on the           

 

           (unknown)  (no       (unknown)  (unknown)  ovary was  (units    (unkn

own)



                    date)                         completely mobile unknown)  



                                                  and the left           



                                                  fallopian tube was           



                                                  similarly mobile.           

 

           (unknown)  (no       (unknown)  (unknown) ovary were removed  (units 

   (unknown)



                    date)                         with a combination unknown)  



                                                  of blunt            



                                                  dissection, sharp           



                                                  dissection, and           

 

           (unknown)  (no       (unknown)  (unknown)  p Dx Laparoscopy,  (units 

   (unknown)



                    date)                         with lysis of unknown)  



                                                  adhesions and           



                                                  fulguration of           



                                                  endometrial           

 

           (unknown)  (no       (unknown)  (unknown)  period of  (units    (unkn

own)



                    date)                         observation after unknown)  



                                                  having tolerated           



                                                  the procedure well           

 

           (unknown)  (no       (unknown)  (unknown)  peripheral  (units    (unk

nown)



                    date)                         vascularity.? This unknown)  



                                                  could potentially           



                                                  represent an           



                                                  ectopic pregnancy           

 

           (unknown)  (no       (unknown)  (unknown)  persistent left  (units   

 (unknown)



                    date)                         lower quadrant pain unknown)  



                                                  which is sharp and           



                                                  stabbing, and           

 

           (unknown)  (no       (unknown)  (unknown)  pregnancy  (units    (unkn

own)



                    date)                                   unknown)  

 

           (unknown)  (no       (unknown)  (unknown)  presents today for  (units

    (unknown)



                    date)                         her scheduled unknown)  



                                                  surgery.            

 

           (unknown)  (no       (unknown)  (unknown)  proper placement  (units  

  (unknown)



                    date)                         of the sleeve was unknown)  



                                                  confirmed with           



                                                  laparoscopy and a           



                                                  2nd and 3rd           

 

           (unknown)  (no       (unknown)  (unknown)  represent  (units    (unkn

own)



                    date)                                   unknown)  

 

           (unknown)  (no       (unknown)  (unknown)  right-sided  (units    (un

known)



                    date)                         tubo-ovarian unknown)  



                                                  abscess. In the           



                                                  absence of severe           



                                                  tubal disease, the           

 

           (unknown)  (no       (unknown)  (unknown)  s Drainage  (units    (unk

nown)



                    date)                         follicular cyst, unknown)  



                                                  right ovary Jose Almodovar MD           

 

           (unknown)  (no       (unknown)  (unknown)  side. In the base  (units 

   (unknown)



                    date)                         of the masters unknown)  



                                                  window there           



                                                  appeared to be           



                                                  numerous            

 

           (unknown)  (no       (unknown)  (unknown)  similar   (units    (unkno

wn)



                    date)                                   unknown)  

 

           (unknown)  (no       (unknown)  (unknown)  superficial  (units    (un

known)



                    date)                         endometrial unknown)  



                                                  implants. All these           



                                                  implants were           



                                                  destroyed during           



                                                  the                 

 

           (unknown)  (no       (unknown)  (unknown)  surface. There  (units    

(unknown)



                    date)                         were no adhesions unknown)  



                                                  or endometrial           



                                                  implants in the           



                                                  ovarian fossa           

 

           (unknown)  (no       (unknown)  (unknown)  technique. Using a  (units

    (unknown)



                    date)                         3 puncture unknown)  



                                                  technique the           



                                                  pelvis and abdomen           



                                                  were visualized           

 

           (unknown)  (no       (unknown)  (unknown)  the adhesions in  (units  

  (unknown)



                    date)                         the ovarian fossa unknown)  



                                                  were lysed/removed           



                                                  with sharp and           



                                                  blunt               

 

           (unknown)  (no       (unknown)  (unknown)  the cautery. The  (units  

  (unknown)



                    date)                         inner surface of unknown)  



                                                  the follicular cyst           



                                                  was visualized with           



                                                  the                 

 

           (unknown)  (no       (unknown)  (unknown)  the posterior  (units    (

unknown)



                    date)                         cul-de-sac. The unknown)  



                                                  right ovarian fossa           



                                                  had some filmy           



                                                  adhesions           

 

           (unknown)  (no       (unknown)  (unknown)  the umbilicus was  (units 

   (unknown)



                    date)                         infiltrated with unknown)  



                                                  0.5% Marcaine with           



                                                  epinephrine and a 1           



                                                  cm                  

 

           (unknown)  (no       (unknown)  (unknown)  the usual manner  (units  

  (unknown)



                    date)                         for laparoscopy. A unknown)  



                                                  pre-surgical safety           



                                                  time-out was then           



                                                  taken               

 

           (unknown)  (no       (unknown)  (unknown)  there does not  (units    

(unknown)



                    date)                         appear to be any unknown)  



                                                  superficial           



                                                  endometriosis in           



                                                  the anterior           

 

           (unknown)  (no       (unknown)  (unknown) thick-walled 2.7 cm  (units

    (unknown)



                    date)                         heterogeneous unknown)  



                                                  complex hypoechoic           



                                                  mass in the left           



                                                  ovary with           

 

           (unknown)  (no       (unknown)  (unknown)  time.? In addition  (units

    (unknown)



                    date)                         she has significant unknown)  



                                                  pain when she           



                                                  strains to have a           



                                                  bowel               

 

           (unknown)  (no       (unknown)  (unknown)  to proceed with a  (units 

   (unknown)



                    date)                         2nd laparoscopy unknown)  



                                                  with plans to           



                                                  remove the left           



                                                  fallopian tube           

 

           (unknown)  (no       (unknown)  (unknown)  unremarkable with  (units 

   (unknown)



                    date)                         the exception of a unknown)  



                                                  masters window           



                                                  immediately lateral           



                                                  to the              

 

           (unknown)  (no       (unknown)  (unknown)  vertical incision  (units 

   (unknown)



                    date)                         was made. A Veress unknown)  



                                                  needle was then           



                                                  used to insufflate           



                                                  the                 

 

           (unknown)  (no       (unknown)  (unknown)  was also normal in  (units

    (unknown)



                    date)                         appearance. The unknown)  



                                                  posterior           



                                                  cul-de-sac was           



                                                  largely             

 

           (unknown)  (no       (unknown)  (unknown)  was placed and  (units    

(unknown)



                    date)                         attention was unknown)  



                                                  turned to the           



                                                  laparoscopy. The           



                                                  inferior edge of           

 

           (unknown)  (no       (unknown)  (unknown)  which was  (units    (unkn

own)



                    date)                         thoroughly unknown)  



                                                  visualized. The           



                                                  filmy adhesions on           



                                                  the surface of the           







Social History







                     date                description         facility

 

                     2023-02-15 00:00    Current some day smoker  [facility]

l

 

                     2023 00:00    Current some day smoker  [facility]

l

 

                     2023 00:00    Current some day smoker  [facility]

l

 

                     2023 00:00    Current some day smoker  [facility]

l

 

                     2023 00:00    Never smoked tobacco ([facility]







Vital Signs







                 date            measurement     value           units

 

                 2023-02-15 00:00  BMI             28.7            kg/m2

 

                 2023-02-15 00:00  BMI             91.2            %

 

                 2023-02-15 00:00  BP_diastolic    68              mmHg

 

                 2023-02-15 00:00  BP_systolic     118             mmHg

 

                 2023-02-15 00:00  height_metric   160.02          cm

 

                 2023-02-15 00:00  height_standard  63              in

 

                 2023-02-15 00:00  weight_metric   73.48           kg

 

                 2023-02-15 00:00  weight_standard  162             lb

 

                 2023 00:00  BMI             28.7            kg/m2

 

                 2023 00:00  BMI             91.1            %

 

                 2023 00:00  BP_diastolic    66              mmHg

 

                 2023 00:00  BP_systolic     114             mmHg

 

                 2023 00:00  heart_rate      60              /min

 

                 2023 00:00  height_metric   160.02          cm

 

                 2023 00:00  height_standard  63              in

 

                 2023 00:00  o2_saturation   99              %

 

                 2023 00:00  temperature_metric  36.94           C

 

                 2023 00:00  temperature_standard  98.5            F

 

                 2023 00:00  weight_metric   73.48           kg

 

                 2023 00:00  weight_standard  162             lb

 

                 2023 00:00  BMI             26.4            kg/m2

 

                 2023 00:00  BMI             84.8            %

 

                 2023 00:00  BP_diastolic    62              mmHg

 

                 2023 00:00  BP_systolic     120             mmHg

 

                 2023 00:00  heart_rate      60              /min

 

                 2023 00:00  height_metric   162.56          cm

 

                 2023 00:00  height_standard  64              in

 

                 2023 00:00  o2_saturation   98              %

 

                 2023 00:00  respiration_rate  16              /min

 

                 2023 00:00  temperature_metric  36.5            C

 

                 2023 00:00  temperature_standard  97.7            F

 

                 2023 00:00  weight_metric   69.67           kg

 

                 2023 00:00  weight_standard  153.6           lb

 

                 2023 00:00  BMI             28.7            kg/m2

 

                 2023 00:00  BMI             91.0            %

 

                 2023 00:00  BP_diastolic    56              mmHg

 

                 2023 00:00  BP_systolic     117             mmHg

 

                 2023 00:00  heart_rate      55              /min

 

                 2023 00:00  height_metric   160.02          cm

 

                 2023 00:00  height_standard  63              in

 

                 2023 00:00  o2_saturation   98              %

 

                 2023 00:00  respiration_rate  18              /min

 

                 2023 00:00  temperature_metric  36.11           C

 

                 2023 00:00  temperature_standard  97              F

 

                 2023 00:00  weight_metric   73.48           kg

 

                 2023 00:00  weight_standard  162             lb

 

                 2023 00:00  BMI             28.7            kg/m2

 

                 2023 00:00  BMI             91.0            %

 

                 2023 00:00  BP_diastolic    71              mmHg

 

                 2023 00:00  BP_systolic     119             mmHg

 

                 2023 00:00  heart_rate      61              /min

 

                 2023 00:00  height_metric   160.02          cm

 

                 2023 00:00  height_standard  63              in

 

                 2023 00:00  o2_saturation   98              %

 

                 2023 00:00  respiration_rate  18              /min

 

                 2023 00:00  temperature_metric  36.61           C

 

                 2023 00:00  temperature_standard  97.9            F

 

                 2023 00:00  weight_metric   73.48           kg

 

                 2023 00:00  weight_standard  162             lb

## 2023-06-16 ENCOUNTER — HOSPITAL ENCOUNTER (OUTPATIENT)
Dept: HOSPITAL 76 - LAB.N | Age: 18
Discharge: HOME | End: 2023-06-16
Attending: FAMILY MEDICINE
Payer: COMMERCIAL

## 2023-06-16 DIAGNOSIS — R74.8: Primary | ICD-10-CM

## 2023-06-16 LAB
ALBUMIN DIAFP-MCNC: 4.2 G/DL (ref 3.2–5.5)
ALBUMIN/GLOB SERPL: 1.6 {RATIO} (ref 1–2.2)
ALP SERPL-CCNC: 76 IU/L (ref 42–121)
ALT SERPL W P-5'-P-CCNC: 48 IU/L (ref 10–60)
ANION GAP SERPL CALCULATED.4IONS-SCNC: 5 MMOL/L (ref 6–13)
AST SERPL W P-5'-P-CCNC: 49 IU/L (ref 10–42)
BASOPHILS NFR BLD AUTO: 0.1 10^3/UL (ref 0–0.1)
BASOPHILS NFR BLD AUTO: 0.7 %
BILIRUB BLD-MCNC: 0.6 MG/DL (ref 0.2–1)
BUN SERPL-MCNC: 11 MG/DL (ref 6–20)
CALCIUM UR-MCNC: 9.1 MG/DL (ref 8.5–10.3)
CHLORIDE SERPL-SCNC: 106 MMOL/L (ref 101–111)
CK SERPL-CCNC: 1173 IU/L (ref 22–269)
CLARITY UR REFRACT.AUTO: (no result)
CO2 SERPL-SCNC: 27 MMOL/L (ref 21–32)
CREAT SERPLBLD-SCNC: 0.8 MG/DL (ref 0.4–1)
EOSINOPHIL # BLD AUTO: 0.2 10^3/UL (ref 0–0.7)
EOSINOPHIL NFR BLD AUTO: 2.3 %
ERYTHROCYTE [DISTWIDTH] IN BLOOD BY AUTOMATED COUNT: 13.6 % (ref 12–15)
GFRSERPLBLD MDRD-ARVRAT: 91 ML/MIN/{1.73_M2} (ref 89–?)
GLOBULIN SER-MCNC: 2.6 G/DL (ref 2.1–4.2)
GLUCOSE SERPL-MCNC: 71 MG/DL (ref 70–100)
GLUCOSE UR QL STRIP.AUTO: NEGATIVE MG/DL
HCT VFR BLD AUTO: 40.3 % (ref 37–47)
HGB UR QL STRIP: 13 G/DL (ref 12–16)
KETONES UR QL STRIP.AUTO: NEGATIVE MG/DL
LYMPHOCYTES # SPEC AUTO: 2 10^3/UL (ref 1.5–3.5)
LYMPHOCYTES NFR BLD AUTO: 26.9 %
MCH RBC QN AUTO: 28.8 PG (ref 27–31)
MCHC RBC AUTO-ENTMCNC: 32.3 G/DL (ref 32–36)
MCV RBC AUTO: 89.4 FL (ref 81–99)
MONOCYTES # BLD AUTO: 0.7 10^3/UL (ref 0–1)
MONOCYTES NFR BLD AUTO: 9.5 %
NEUTROPHILS # BLD AUTO: 4.5 10^3/UL (ref 1.5–6.6)
NEUTROPHILS # SNV AUTO: 7.4 X10^3/UL (ref 4.8–10.8)
NEUTROPHILS NFR BLD AUTO: 60.3 %
NITRITE UR QL STRIP.AUTO: NEGATIVE
NRBC # BLD AUTO: 0 /100WBC
NRBC # BLD AUTO: 0 X10^3/UL
PDW BLD AUTO: 11.2 FL (ref 7.9–10.8)
PH UR STRIP.AUTO: 7.5 PH (ref 5–7.5)
PLATELET # BLD: 394 10^3/UL (ref 130–450)
POTASSIUM SERPL-SCNC: 4.2 MMOL/L (ref 3.5–5)
PROT SPEC-MCNC: 6.8 G/DL (ref 6.7–8.2)
PROT UR STRIP.AUTO-MCNC: NEGATIVE MG/DL
RBC # UR STRIP.AUTO: NEGATIVE /UL
RBC MAR: 4.51 10^6/UL (ref 4.2–5.4)
SODIUM SERPLBLD-SCNC: 138 MMOL/L (ref 135–145)
SP GR UR STRIP.AUTO: 1.01 (ref 1–1.03)
SQUAMOUS URNS QL MICRO: (no result)
UROBILINOGEN UR QL STRIP.AUTO: (no result) E.U./DL
UROBILINOGEN UR STRIP.AUTO-MCNC: NEGATIVE MG/DL
WBC # UR MANUAL: (no result) /HPF (ref 0–5)

## 2023-06-16 PROCEDURE — 82550 ASSAY OF CK (CPK): CPT

## 2023-06-16 PROCEDURE — 85025 COMPLETE CBC W/AUTO DIFF WBC: CPT

## 2023-06-16 PROCEDURE — 80053 COMPREHEN METABOLIC PANEL: CPT

## 2023-06-16 PROCEDURE — 87086 URINE CULTURE/COLONY COUNT: CPT

## 2023-06-16 PROCEDURE — 81001 URINALYSIS AUTO W/SCOPE: CPT

## 2023-06-16 PROCEDURE — 36415 COLL VENOUS BLD VENIPUNCTURE: CPT

## 2023-06-17 ENCOUNTER — HOSPITAL ENCOUNTER (OUTPATIENT)
Dept: HOSPITAL 76 - LAB | Age: 18
Discharge: HOME | End: 2023-06-17
Attending: FAMILY MEDICINE
Payer: COMMERCIAL

## 2023-06-17 DIAGNOSIS — R74.8: Primary | ICD-10-CM

## 2023-06-17 PROCEDURE — 36415 COLL VENOUS BLD VENIPUNCTURE: CPT

## 2023-06-17 PROCEDURE — 82550 ASSAY OF CK (CPK): CPT

## 2023-06-18 ENCOUNTER — HOSPITAL ENCOUNTER (OUTPATIENT)
Dept: HOSPITAL 76 - LAB | Age: 18
Discharge: HOME | End: 2023-06-18
Attending: FAMILY MEDICINE
Payer: COMMERCIAL

## 2023-06-18 DIAGNOSIS — R74.8: Primary | ICD-10-CM

## 2023-06-18 PROCEDURE — 36415 COLL VENOUS BLD VENIPUNCTURE: CPT

## 2023-06-18 PROCEDURE — 82550 ASSAY OF CK (CPK): CPT

## 2023-06-19 ENCOUNTER — HOSPITAL ENCOUNTER (OUTPATIENT)
Dept: HOSPITAL 76 - LAB | Age: 18
Discharge: HOME | End: 2023-06-19
Attending: FAMILY MEDICINE
Payer: COMMERCIAL

## 2023-06-19 DIAGNOSIS — R74.8: Primary | ICD-10-CM

## 2023-06-19 PROCEDURE — 82550 ASSAY OF CK (CPK): CPT

## 2023-06-19 PROCEDURE — 36415 COLL VENOUS BLD VENIPUNCTURE: CPT

## 2023-06-28 ENCOUNTER — HOSPITAL ENCOUNTER (OUTPATIENT)
Dept: HOSPITAL 76 - DI.N | Age: 18
Discharge: HOME | End: 2023-06-28
Attending: NURSE PRACTITIONER
Payer: COMMERCIAL

## 2023-06-28 ENCOUNTER — HOSPITAL ENCOUNTER (OUTPATIENT)
Dept: HOSPITAL 76 - LAB.N | Age: 18
Discharge: HOME | End: 2023-06-28
Attending: NURSE PRACTITIONER
Payer: COMMERCIAL

## 2023-06-28 DIAGNOSIS — R05.9: Primary | ICD-10-CM

## 2023-06-28 LAB
ALBUMIN DIAFP-MCNC: 4.2 G/DL (ref 3.2–5.5)
ALBUMIN/GLOB SERPL: 1.5 {RATIO} (ref 1–2.2)
ALP SERPL-CCNC: 85 IU/L (ref 42–121)
ALT SERPL W P-5'-P-CCNC: 17 IU/L (ref 10–60)
ANION GAP SERPL CALCULATED.4IONS-SCNC: 5 MMOL/L (ref 6–13)
AST SERPL W P-5'-P-CCNC: 18 IU/L (ref 10–42)
BASOPHILS NFR BLD AUTO: 0.1 10^3/UL (ref 0–0.1)
BASOPHILS NFR BLD AUTO: 0.7 %
BILIRUB BLD-MCNC: 0.6 MG/DL (ref 0.2–1)
BUN SERPL-MCNC: 17 MG/DL (ref 6–20)
CALCIUM UR-MCNC: 9.3 MG/DL (ref 8.5–10.3)
CHLORIDE SERPL-SCNC: 105 MMOL/L (ref 101–111)
CK SERPL-CCNC: 133 IU/L (ref 22–269)
CO2 SERPL-SCNC: 30 MMOL/L (ref 21–32)
CREAT SERPLBLD-SCNC: 0.8 MG/DL (ref 0.4–1)
EOSINOPHIL # BLD AUTO: 0.3 10^3/UL (ref 0–0.7)
EOSINOPHIL NFR BLD AUTO: 4 %
ERYTHROCYTE [DISTWIDTH] IN BLOOD BY AUTOMATED COUNT: 13.5 % (ref 12–15)
GFRSERPLBLD MDRD-ARVRAT: 91 ML/MIN/{1.73_M2} (ref 89–?)
GLOBULIN SER-MCNC: 2.8 G/DL (ref 2.1–4.2)
GLUCOSE SERPL-MCNC: 91 MG/DL (ref 70–100)
HCT VFR BLD AUTO: 42.3 % (ref 37–47)
HGB UR QL STRIP: 13.5 G/DL (ref 12–16)
LYMPHOCYTES # SPEC AUTO: 2.2 10^3/UL (ref 1.5–3.5)
LYMPHOCYTES NFR BLD AUTO: 26.5 %
MCH RBC QN AUTO: 29.2 PG (ref 27–31)
MCHC RBC AUTO-ENTMCNC: 31.9 G/DL (ref 32–36)
MCV RBC AUTO: 91.4 FL (ref 81–99)
MONOCYTES # BLD AUTO: 0.7 10^3/UL (ref 0–1)
MONOCYTES NFR BLD AUTO: 8.2 %
NEUTROPHILS # BLD AUTO: 5.1 10^3/UL (ref 1.5–6.6)
NEUTROPHILS # SNV AUTO: 8.5 X10^3/UL (ref 4.8–10.8)
NEUTROPHILS NFR BLD AUTO: 60.2 %
NRBC # BLD AUTO: 0 /100WBC
NRBC # BLD AUTO: 0 X10^3/UL
PDW BLD AUTO: 11 FL (ref 7.9–10.8)
PLATELET # BLD: 361 10^3/UL (ref 130–450)
POTASSIUM SERPL-SCNC: 4 MMOL/L (ref 3.5–5)
PROT SPEC-MCNC: 7 G/DL (ref 6.7–8.2)
RBC MAR: 4.63 10^6/UL (ref 4.2–5.4)
SODIUM SERPLBLD-SCNC: 140 MMOL/L (ref 135–145)

## 2023-06-28 PROCEDURE — 36415 COLL VENOUS BLD VENIPUNCTURE: CPT

## 2023-06-28 PROCEDURE — 85025 COMPLETE CBC W/AUTO DIFF WBC: CPT

## 2023-06-28 PROCEDURE — 82550 ASSAY OF CK (CPK): CPT

## 2023-06-28 PROCEDURE — 80053 COMPREHEN METABOLIC PANEL: CPT

## 2023-06-28 NOTE — XRAY REPORT
PROCEDURE:  Chest 2 View X-Ray

 

INDICATIONS:  COUGH

 

TECHNIQUE:  2 views of the chest were acquired.  

 

COMPARISON:  None.

 

FINDINGS:  

 

Surgical changes and devices:  None.  

 

Lungs and pleura:  No pleural effusions or pneumothorax.  Lungs are clear.  

 

Mediastinum:  Mediastinal contours appear normal.  Heart size is normal.  

 

Bones and chest wall:  No suspicious bony lesions.  Overlying soft tissues appear unremarkable.  

 

IMPRESSION:  

 

No acute cardiopulmonary process.

 

 

 

Reviewed by: Adilson Pineda MD on 6/28/2023 11:34 AM PDT

Approved by: Adilson Pineda MD on 6/28/2023 11:34 AM PDT

 

 

Station ID:  [id number]

## 2023-08-03 ENCOUNTER — HOSPITAL ENCOUNTER (EMERGENCY)
Dept: HOSPITAL 76 - ED | Age: 18
Discharge: HOME | End: 2023-08-03
Payer: COMMERCIAL

## 2023-08-03 VITALS — SYSTOLIC BLOOD PRESSURE: 124 MMHG | DIASTOLIC BLOOD PRESSURE: 74 MMHG

## 2023-08-03 DIAGNOSIS — G56.22: Primary | ICD-10-CM

## 2023-08-03 DIAGNOSIS — Z79.899: ICD-10-CM

## 2023-08-03 PROCEDURE — 99282 EMERGENCY DEPT VISIT SF MDM: CPT

## 2023-08-03 PROCEDURE — 99283 EMERGENCY DEPT VISIT LOW MDM: CPT

## 2023-08-03 NOTE — ED PHYSICIAN DOCUMENTATION
History of Present Illness





- Stated complaint


Stated Complaint: LT HAND/ARM NUMB





- Chief complaint


Chief Complaint: Ext Problem





- History obtained from


History obtained from: Patient





- History of Present Illness


Timing: Yesterday


Pain level max: 3


Pain level now: 3





- Additonal information


Additional information: 





18-year-old female presents to the emergency department with left arm numbness 

x24 hours.  It is on the ulnar aspect of the arm, affecting the fourth and fifth

digits of the left hand.  Denies any leaning on the elbow.  Works as a 

phlebotomist.  Has no neck or back pain.  No head injuries.  No fevers.  No 

other symptoms.  Took Motrin and Tylenol this morning.  








Review of Systems


Constitutional: denies: Fever, Chills


GI: denies: Vomiting, Diarrhea


Skin: denies: Rash





PD PAST MEDICAL HISTORY





- Past Medical History


Past Medical History: Yes


Cardiovascular: None


Respiratory: None


Neuro: None


Endocrine/Autoimmune: None


GI: None


GYN: Ovarian cysts, Other


: None


HEENT: None


Psych: Depression


Musculoskeletal: None


Derm: None





- Past Surgical History


Past Surgical History: Yes


General: Other


Ortho: ACL reconstruction


/GYN: Oophrectomy, Other





- Present Medications


Home Medications: 


                                Ambulatory Orders











 Medication  Instructions  Recorded  Confirmed


 


Sertraline [Zoloft] 25 mg PO DAILY 02/17/23 02/17/23


 


predniSONE [Deltasone] 40 mg PO DAILY 3 Days #6 tablet 02/17/23 


 


predniSONE [Deltasone] 10 mg PO [id number] #42 tab 08/03/23 














- Allergies


Allergies/Adverse Reactions: 


                                    Allergies











Allergy/AdvReac Type Severity Reaction Status Date / Time


 


doxycycline Allergy  Unknown Verified 08/03/23 13:19


 


lactose Allergy  Unknown Verified 08/03/23 13:19


 


morphine Allergy  Unknown Verified 08/03/23 13:19


 


Morpholine Analogues Allergy  Hives Verified 08/03/23 13:19


 


Dairy Allergy  Unknown Uncoded 08/03/23 13:19














- Social History


Does the pt smoke?: No


Smoking Status: Never smoker


Does the pt drink ETOH?: No


Does the pt have substance abuse?: No





- Immunizations


Immunizations are current?: Yes





- POLST


Patient has POLST: No





PD ED PE NORMAL





- Vitals


Vital signs reviewed: Yes





- General


General: Alert and oriented X 3, No acute distress





- HEENT


HEENT: Moist mucous membranes





- Neck


Neck: Supple, no meningeal sign, No bony TTP





- Derm


Derm: Warm and dry





- Extremities


Extremities: Other (Tender to palpation over the left wrist and left lateral 

epicondyle.  Reproduces her symptoms with percussion of the ulnar tunnel and 

cubital tunnel.)





- Neuro


Neuro: Alert and oriented X 3





- Psych


Psych: Normal mood, Normal affect





Results





- Vitals


Vitals: 


                               Vital Signs - 24 hr











  08/03/23





  13:19


 


Temperature 36.5 C


 


Heart Rate 60


 


Respiratory 16





Rate 


 


Blood Pressure 124/74


 


O2 Saturation 96








                                     Oxygen











O2 Source                      Room air

















PD Medical Decision Making





- ED course


Complexity details: reviewed results, re-evaluated patient, considered 

differential, d/w patient


ED course: 





Patient with known ulnar nerve palsy/irritation.  Decreased sensation over the 

ulnar aspect of the arm and fourth and fifth digits.  She does have a small 

ganglion cyst, though it could be causing some of the symptoms.  Also could be 

from the elbow. Ulnar tunnel syndrome versus cubital tunnel syndrome. We will 

have her follow-up with her doctor for further care.  We will start on steroids.

  No indication for emergent imaging.  Patient counseled regarding signs and 

symptoms for which I believe and urgent re-evaluation would be necessary. 

Patient with good understanding of and agreement to plan and is comfortable 

going home at this time





This document was made in part using voice recognition software. While efforts 

are made to proofread this document, sound alike and grammatical errors may oc

cur.





Departure





- Departure


Disposition: 01 Home, Self Care


Clinical Impression: 


Ulnar nerve entrapment


Qualifiers:


 Laterality: left Qualified Code(s): G56.22 - Lesion of ulnar nerve, left upper 

limb





Condition: Good


Instructions:  ED Palsy Unlar Nerve


Follow-Up: 


ORVILLE PITTS ARNP [Primary Care Provider] - Within 1 week


Prescriptions: 


predniSONE [Deltasone] 10 mg PO [id number] #42 tab


Comments: 


Use the steroids as prescribed as this will help to decrease the inflammation in

 your ulnar nerve.  You can use ice as well, this should improve over the next 2

 to 3 days.  Please follow-up with your doctor as needed for further care.





Your prescriptions were sent to [facility] in [place].


Forms:  PCP List

## 2023-09-12 ENCOUNTER — HOSPITAL ENCOUNTER (OUTPATIENT)
Dept: HOSPITAL 76 - LAB | Age: 18
Discharge: HOME | End: 2023-09-12
Attending: INTERNAL MEDICINE
Payer: COMMERCIAL

## 2023-09-12 DIAGNOSIS — K85.90: Primary | ICD-10-CM

## 2023-09-12 LAB
ALBUMIN DIAFP-MCNC: 4.6 G/DL (ref 3.2–5.5)
ALBUMIN/GLOB SERPL: 2.1 {RATIO} (ref 1–2.2)
ALP SERPL-CCNC: 95 IU/L (ref 42–121)
ALT SERPL W P-5'-P-CCNC: 10 IU/L (ref 10–60)
AMYLASE SERPL-CCNC: 61 U/L (ref 28–100)
ANION GAP SERPL CALCULATED.4IONS-SCNC: 4 MMOL/L (ref 6–13)
AST SERPL W P-5'-P-CCNC: 13 IU/L (ref 10–42)
BASOPHILS NFR BLD AUTO: 0.1 10^3/UL (ref 0–0.1)
BASOPHILS NFR BLD AUTO: 0.7 %
BILIRUB BLD-MCNC: 0.4 MG/DL (ref 0.2–1)
BUN SERPL-MCNC: 13 MG/DL (ref 6–20)
CALCIUM UR-MCNC: 9.7 MG/DL (ref 8.5–10.3)
CHLORIDE SERPL-SCNC: 104 MMOL/L (ref 101–111)
CO2 SERPL-SCNC: 30 MMOL/L (ref 21–32)
CREAT SERPLBLD-SCNC: 0.8 MG/DL (ref 0.6–1.3)
EOSINOPHIL # BLD AUTO: 0.3 10^3/UL (ref 0–0.7)
EOSINOPHIL NFR BLD AUTO: 3.6 %
ERYTHROCYTE [DISTWIDTH] IN BLOOD BY AUTOMATED COUNT: 13.4 % (ref 12–15)
GFRSERPLBLD MDRD-ARVRAT: 91 ML/MIN/{1.73_M2} (ref 89–?)
GLOBULIN SER-MCNC: 2.2 G/DL (ref 2.1–4.2)
GLUCOSE SERPL-MCNC: 93 MG/DL (ref 74–104)
HCT VFR BLD AUTO: 39.2 % (ref 37–47)
HGB UR QL STRIP: 13.1 G/DL (ref 12–16)
LIPASE SERPL-CCNC: 40 U/L (ref 11–82)
LYMPHOCYTES # SPEC AUTO: 2.6 10^3/UL (ref 1.5–3.5)
LYMPHOCYTES NFR BLD AUTO: 29.6 %
MCH RBC QN AUTO: 29.8 PG (ref 27–31)
MCHC RBC AUTO-ENTMCNC: 33.4 G/DL (ref 32–36)
MCV RBC AUTO: 89.1 FL (ref 81–99)
MONOCYTES # BLD AUTO: 0.7 10^3/UL (ref 0–1)
MONOCYTES NFR BLD AUTO: 7.4 %
NEUTROPHILS # BLD AUTO: 5.2 10^3/UL (ref 1.5–6.6)
NEUTROPHILS # SNV AUTO: 8.8 X10^3/UL (ref 4.8–10.8)
NEUTROPHILS NFR BLD AUTO: 58.5 %
NRBC # BLD AUTO: 0 /100WBC
NRBC # BLD AUTO: 0 X10^3/UL
PDW BLD AUTO: 10.5 FL (ref 7.9–10.8)
PLATELET # BLD: 328 10^3/UL (ref 130–450)
POTASSIUM SERPL-SCNC: 3.9 MMOL/L (ref 3.5–4.5)
PROT SPEC-MCNC: 6.8 G/DL (ref 6.4–8.9)
RBC MAR: 4.4 10^6/UL (ref 4.2–5.4)
SODIUM SERPLBLD-SCNC: 138 MMOL/L (ref 135–145)

## 2023-09-12 PROCEDURE — 83690 ASSAY OF LIPASE: CPT

## 2023-09-12 PROCEDURE — 85025 COMPLETE CBC W/AUTO DIFF WBC: CPT

## 2023-09-12 PROCEDURE — 80053 COMPREHEN METABOLIC PANEL: CPT

## 2023-09-12 PROCEDURE — 82150 ASSAY OF AMYLASE: CPT

## 2023-09-12 PROCEDURE — 36415 COLL VENOUS BLD VENIPUNCTURE: CPT
